# Patient Record
Sex: FEMALE | Race: WHITE | NOT HISPANIC OR LATINO | Employment: OTHER | URBAN - METROPOLITAN AREA
[De-identification: names, ages, dates, MRNs, and addresses within clinical notes are randomized per-mention and may not be internally consistent; named-entity substitution may affect disease eponyms.]

---

## 2017-01-31 ENCOUNTER — COMMUNICATION - HEALTHEAST (OUTPATIENT)
Dept: SCHEDULING | Facility: CLINIC | Age: 62
End: 2017-01-31

## 2017-01-31 ENCOUNTER — OFFICE VISIT - HEALTHEAST (OUTPATIENT)
Dept: FAMILY MEDICINE | Facility: CLINIC | Age: 62
End: 2017-01-31

## 2017-01-31 DIAGNOSIS — R07.9 CHEST PAIN: ICD-10-CM

## 2017-02-01 LAB
ATRIAL RATE - MUSE: 73 BPM
DIASTOLIC BLOOD PRESSURE - MUSE: NORMAL MMHG
INTERPRETATION ECG - MUSE: NORMAL
P AXIS - MUSE: 71 DEGREES
PR INTERVAL - MUSE: 172 MS
QRS DURATION - MUSE: 74 MS
QT - MUSE: 380 MS
QTC - MUSE: 418 MS
R AXIS - MUSE: 36 DEGREES
SYSTOLIC BLOOD PRESSURE - MUSE: NORMAL MMHG
T AXIS - MUSE: 67 DEGREES
VENTRICULAR RATE- MUSE: 73 BPM

## 2017-02-04 ENCOUNTER — COMMUNICATION - HEALTHEAST (OUTPATIENT)
Dept: FAMILY MEDICINE | Facility: CLINIC | Age: 62
End: 2017-02-04

## 2017-03-07 ENCOUNTER — COMMUNICATION - HEALTHEAST (OUTPATIENT)
Dept: FAMILY MEDICINE | Facility: CLINIC | Age: 62
End: 2017-03-07

## 2017-03-07 DIAGNOSIS — G47.00 INSOMNIA: ICD-10-CM

## 2017-03-08 ENCOUNTER — COMMUNICATION - HEALTHEAST (OUTPATIENT)
Dept: FAMILY MEDICINE | Facility: CLINIC | Age: 62
End: 2017-03-08

## 2017-03-21 ENCOUNTER — COMMUNICATION - HEALTHEAST (OUTPATIENT)
Dept: FAMILY MEDICINE | Facility: CLINIC | Age: 62
End: 2017-03-21

## 2017-03-21 DIAGNOSIS — E78.5 HYPERLIPEMIA: ICD-10-CM

## 2017-04-27 ENCOUNTER — COMMUNICATION - HEALTHEAST (OUTPATIENT)
Dept: FAMILY MEDICINE | Facility: CLINIC | Age: 62
End: 2017-04-27

## 2017-05-01 ENCOUNTER — OFFICE VISIT - HEALTHEAST (OUTPATIENT)
Dept: CARDIOLOGY | Facility: CLINIC | Age: 62
End: 2017-05-01

## 2017-05-01 DIAGNOSIS — I25.84 CORONARY ARTERY DISEASE DUE TO CALCIFIED CORONARY LESION: ICD-10-CM

## 2017-05-01 DIAGNOSIS — I25.10 CORONARY ARTERY DISEASE DUE TO CALCIFIED CORONARY LESION: ICD-10-CM

## 2017-05-01 ASSESSMENT — MIFFLIN-ST. JEOR: SCORE: 1196.32

## 2017-05-16 ENCOUNTER — HOSPITAL ENCOUNTER (OUTPATIENT)
Dept: CARDIOLOGY | Facility: CLINIC | Age: 62
Discharge: HOME | End: 2017-05-16
Attending: INTERNAL MEDICINE

## 2017-05-16 DIAGNOSIS — I25.10 CORONARY ARTERY DISEASE DUE TO CALCIFIED CORONARY LESION: ICD-10-CM

## 2017-05-16 DIAGNOSIS — I25.84 CORONARY ARTERY DISEASE DUE TO CALCIFIED CORONARY LESION: ICD-10-CM

## 2017-05-16 LAB
CV STRESS CURRENT BP HE: NORMAL
CV STRESS CURRENT HR HE: 100
CV STRESS CURRENT HR HE: 102
CV STRESS CURRENT HR HE: 105
CV STRESS CURRENT HR HE: 107
CV STRESS CURRENT HR HE: 107
CV STRESS CURRENT HR HE: 108
CV STRESS CURRENT HR HE: 108
CV STRESS CURRENT HR HE: 114
CV STRESS CURRENT HR HE: 116
CV STRESS CURRENT HR HE: 118
CV STRESS CURRENT HR HE: 119
CV STRESS CURRENT HR HE: 132
CV STRESS CURRENT HR HE: 133
CV STRESS CURRENT HR HE: 133
CV STRESS CURRENT HR HE: 136
CV STRESS CURRENT HR HE: 143
CV STRESS CURRENT HR HE: 154
CV STRESS CURRENT HR HE: 158
CV STRESS CURRENT HR HE: 158
CV STRESS CURRENT HR HE: 161
CV STRESS CURRENT HR HE: 163
CV STRESS CURRENT HR HE: 164
CV STRESS CURRENT HR HE: 165
CV STRESS CURRENT HR HE: 165
CV STRESS CURRENT HR HE: 75
CV STRESS CURRENT HR HE: 79
CV STRESS CURRENT HR HE: 86
CV STRESS CURRENT HR HE: 92
CV STRESS CURRENT HR HE: 93
CV STRESS CURRENT HR HE: 95
CV STRESS CURRENT HR HE: 96
CV STRESS DEVIATION TIME HE: NORMAL
CV STRESS ECHO PERCENT HR HE: NORMAL
CV STRESS EXERCISE STAGE HE: NORMAL
CV STRESS FINAL RESTING BP HE: NORMAL
CV STRESS FINAL RESTING HR HE: 86
CV STRESS MAX HR HE: 165
CV STRESS MAX TREADMILL GRADE HE: 16
CV STRESS MAX TREADMILL SPEED HE: 4.2
CV STRESS PEAK DIA BP HE: NORMAL
CV STRESS PEAK SYS BP HE: NORMAL
CV STRESS PHASE HE: NORMAL
CV STRESS PROTOCOL HE: NORMAL
CV STRESS RESTING PT POSITION HE: NORMAL
CV STRESS RESTING PT POSITION HE: NORMAL
CV STRESS ST DEVIATION AMOUNT HE: NORMAL
CV STRESS ST DEVIATION ELEVATION HE: NORMAL
CV STRESS ST EVELATION AMOUNT HE: NORMAL
CV STRESS TEST TYPE HE: NORMAL
CV STRESS TOTAL STAGE TIME MIN 1 HE: NORMAL
STRESS ECHO BASELINE BP: NORMAL
STRESS ECHO BASELINE HR: 75
STRESS ECHO CALCULATED PERCENT HR: 104 %
STRESS ECHO LAST STRESS BP: NORMAL
STRESS ECHO LAST STRESS HR: 164
STRESS ECHO POST ESTIMATED WORKLOAD: 12.1
STRESS ECHO POST EXERCISE DUR MIN: 11
STRESS ECHO POST EXERCISE DUR SEC: 53
STRESS ECHO TARGET HR: 134

## 2017-05-18 ENCOUNTER — AMBULATORY - HEALTHEAST (OUTPATIENT)
Dept: CARDIOLOGY | Facility: CLINIC | Age: 62
End: 2017-05-18

## 2017-05-18 DIAGNOSIS — R94.39 ABNORMAL STRESS TEST: ICD-10-CM

## 2017-05-18 DIAGNOSIS — I25.84 CORONARY ARTERY DISEASE DUE TO CALCIFIED CORONARY LESION: ICD-10-CM

## 2017-05-18 DIAGNOSIS — I25.10 CORONARY ARTERY DISEASE DUE TO CALCIFIED CORONARY LESION: ICD-10-CM

## 2017-05-19 ENCOUNTER — SURGERY - HEALTHEAST (OUTPATIENT)
Dept: CARDIOLOGY | Facility: CLINIC | Age: 62
End: 2017-05-19

## 2017-05-19 ENCOUNTER — COMMUNICATION - HEALTHEAST (OUTPATIENT)
Dept: FAMILY MEDICINE | Facility: CLINIC | Age: 62
End: 2017-05-19

## 2017-05-19 ENCOUNTER — COMMUNICATION - HEALTHEAST (OUTPATIENT)
Dept: CARDIOLOGY | Facility: CLINIC | Age: 62
End: 2017-05-19

## 2017-05-30 ENCOUNTER — SURGERY - HEALTHEAST (OUTPATIENT)
Dept: CARDIOLOGY | Facility: CLINIC | Age: 62
End: 2017-05-30

## 2017-05-30 ASSESSMENT — MIFFLIN-ST. JEOR: SCORE: 1178.18

## 2017-06-17 ENCOUNTER — COMMUNICATION - HEALTHEAST (OUTPATIENT)
Dept: FAMILY MEDICINE | Facility: CLINIC | Age: 62
End: 2017-06-17

## 2017-06-17 DIAGNOSIS — G47.00 INSOMNIA: ICD-10-CM

## 2017-06-29 ENCOUNTER — HOSPITAL ENCOUNTER (OUTPATIENT)
Dept: MAMMOGRAPHY | Facility: CLINIC | Age: 62
Discharge: HOME | End: 2017-06-29
Attending: FAMILY MEDICINE

## 2017-06-29 ENCOUNTER — OFFICE VISIT - HEALTHEAST (OUTPATIENT)
Dept: FAMILY MEDICINE | Facility: CLINIC | Age: 62
End: 2017-06-29

## 2017-06-29 ENCOUNTER — COMMUNICATION - HEALTHEAST (OUTPATIENT)
Dept: FAMILY MEDICINE | Facility: CLINIC | Age: 62
End: 2017-06-29

## 2017-06-29 DIAGNOSIS — I35.9 AORTIC VALVE DISORDER: ICD-10-CM

## 2017-06-29 DIAGNOSIS — M85.80 OSTEOPENIA: ICD-10-CM

## 2017-06-29 DIAGNOSIS — I25.10 CORONARY ARTERY DISEASE DUE TO CALCIFIED CORONARY LESION: ICD-10-CM

## 2017-06-29 DIAGNOSIS — E78.5 HYPERLIPIDEMIA, UNSPECIFIED HYPERLIPIDEMIA TYPE: ICD-10-CM

## 2017-06-29 DIAGNOSIS — I25.84 CORONARY ARTERY DISEASE DUE TO CALCIFIED CORONARY LESION: ICD-10-CM

## 2017-06-29 DIAGNOSIS — E55.9 VITAMIN D DEFICIENCY: ICD-10-CM

## 2017-06-29 DIAGNOSIS — Z00.00 ROUTINE GENERAL MEDICAL EXAMINATION AT A HEALTH CARE FACILITY: ICD-10-CM

## 2017-06-29 DIAGNOSIS — Z12.31 VISIT FOR SCREENING MAMMOGRAM: ICD-10-CM

## 2017-06-29 DIAGNOSIS — R87.612 PAPANICOLAOU SMEAR OF CERVIX WITH LOW GRADE SQUAMOUS INTRAEPITHELIAL LESION (LGSIL): ICD-10-CM

## 2017-06-29 DIAGNOSIS — I71.20 THORACIC AORTIC ANEURYSM WITHOUT RUPTURE (H): ICD-10-CM

## 2017-06-29 ASSESSMENT — MIFFLIN-ST. JEOR: SCORE: 1165.82

## 2017-07-05 LAB
HPV INTERPRETATION - HISTORICAL: NORMAL
HPV INTERPRETER - HISTORICAL: NORMAL

## 2017-07-13 ENCOUNTER — COMMUNICATION - HEALTHEAST (OUTPATIENT)
Dept: FAMILY MEDICINE | Facility: CLINIC | Age: 62
End: 2017-07-13

## 2017-07-13 LAB
BKR LAB AP ABNORMAL BLEEDING: NO
BKR LAB AP BIRTH CONTROL/HORMONES: NORMAL
BKR LAB AP CERVICAL APPEARANCE: NORMAL
BKR LAB AP GYN ADEQUACY: NORMAL
BKR LAB AP GYN INTERPRETATION: NORMAL
BKR LAB AP HPV REFLEX: NORMAL
BKR LAB AP LMP: NORMAL
BKR LAB AP PATIENT STATUS: NORMAL
BKR LAB AP PREVIOUS ABNORMAL: NORMAL
BKR LAB AP PREVIOUS NORMAL: NORMAL
HIGH RISK?: YES
PATH REPORT.COMMENTS IMP SPEC: NORMAL
RESULT FLAG (HE HISTORICAL CONVERSION): NORMAL

## 2017-07-26 ENCOUNTER — COMMUNICATION - HEALTHEAST (OUTPATIENT)
Dept: FAMILY MEDICINE | Facility: CLINIC | Age: 62
End: 2017-07-26

## 2017-08-08 ENCOUNTER — OFFICE VISIT - HEALTHEAST (OUTPATIENT)
Dept: CARDIOLOGY | Facility: CLINIC | Age: 62
End: 2017-08-08

## 2017-08-08 DIAGNOSIS — E78.5 HYPERLIPIDEMIA, UNSPECIFIED HYPERLIPIDEMIA TYPE: ICD-10-CM

## 2017-08-08 ASSESSMENT — MIFFLIN-ST. JEOR: SCORE: 1193.49

## 2017-08-24 ENCOUNTER — RECORDS - HEALTHEAST (OUTPATIENT)
Dept: ADMINISTRATIVE | Facility: OTHER | Age: 62
End: 2017-08-24

## 2017-08-25 ENCOUNTER — COMMUNICATION - HEALTHEAST (OUTPATIENT)
Dept: FAMILY MEDICINE | Facility: CLINIC | Age: 62
End: 2017-08-25

## 2017-08-25 DIAGNOSIS — G47.00 INSOMNIA: ICD-10-CM

## 2017-09-13 ENCOUNTER — RECORDS - HEALTHEAST (OUTPATIENT)
Dept: ADMINISTRATIVE | Facility: OTHER | Age: 62
End: 2017-09-13

## 2017-09-19 ENCOUNTER — AMBULATORY - HEALTHEAST (OUTPATIENT)
Dept: CARDIOLOGY | Facility: CLINIC | Age: 62
End: 2017-09-19

## 2017-12-27 ENCOUNTER — OFFICE VISIT - HEALTHEAST (OUTPATIENT)
Dept: FAMILY MEDICINE | Facility: CLINIC | Age: 62
End: 2017-12-27

## 2017-12-27 DIAGNOSIS — J34.0: ICD-10-CM

## 2017-12-31 ENCOUNTER — HEALTH MAINTENANCE LETTER (OUTPATIENT)
Age: 62
End: 2017-12-31

## 2018-01-25 ENCOUNTER — RECORDS - HEALTHEAST (OUTPATIENT)
Dept: ADMINISTRATIVE | Facility: OTHER | Age: 63
End: 2018-01-25

## 2018-01-26 ENCOUNTER — RECORDS - HEALTHEAST (OUTPATIENT)
Dept: ADMINISTRATIVE | Facility: OTHER | Age: 63
End: 2018-01-26

## 2018-02-22 ENCOUNTER — COMMUNICATION - HEALTHEAST (OUTPATIENT)
Dept: FAMILY MEDICINE | Facility: CLINIC | Age: 63
End: 2018-02-22

## 2018-02-22 DIAGNOSIS — G47.00 INSOMNIA: ICD-10-CM

## 2018-02-27 ENCOUNTER — AMBULATORY - HEALTHEAST (OUTPATIENT)
Dept: CARDIOLOGY | Facility: CLINIC | Age: 63
End: 2018-02-27

## 2018-02-27 ENCOUNTER — COMMUNICATION - HEALTHEAST (OUTPATIENT)
Dept: ADMINISTRATIVE | Facility: CLINIC | Age: 63
End: 2018-02-27

## 2018-02-27 DIAGNOSIS — I35.0 AORTIC STENOSIS: ICD-10-CM

## 2018-05-09 ENCOUNTER — OFFICE VISIT - HEALTHEAST (OUTPATIENT)
Dept: CARDIOLOGY | Facility: CLINIC | Age: 63
End: 2018-05-09

## 2018-05-09 DIAGNOSIS — I35.9 AORTIC VALVE DISORDER: ICD-10-CM

## 2018-05-09 ASSESSMENT — MIFFLIN-ST. JEOR: SCORE: 1208

## 2018-05-16 ENCOUNTER — HOSPITAL ENCOUNTER (OUTPATIENT)
Dept: CARDIOLOGY | Facility: CLINIC | Age: 63
Discharge: HOME | End: 2018-05-16
Attending: INTERNAL MEDICINE

## 2018-05-16 DIAGNOSIS — I35.9 AORTIC VALVE DISORDER: ICD-10-CM

## 2018-05-16 LAB
AORTIC ROOT: 2.6 CM
AORTIC VALVE MEAN VELOCITY: 201 CM/S
ASCENDING AORTA: 4 CM
AV DIMENSIONLESS INDEX VTI: 0.3
AV MEAN GRADIENT: 18 MMHG
AV PEAK GRADIENT: 31.8 MMHG
AV VALVE AREA: 0.7 CM2
AV VELOCITY RATIO: 0.3
BSA FOR ECHO PROCEDURE: 1.74 M2
CV BLOOD PRESSURE: NORMAL MMHG
CV ECHO HEIGHT: 62 IN
CV ECHO WEIGHT: 153 LBS
DOP CALC AO PEAK VEL: 282 CM/S
DOP CALC AO VTI: 63.7 CM
DOP CALC LVOT AREA: 2.83 CM2
DOP CALC LVOT DIAMETER: 1.9 CM
DOP CALC LVOT PEAK VEL: 79.7 CM/S
DOP CALC LVOT STROKE VOLUME: 46.8 CM3
DOP CALCLVOT PEAK VEL VTI: 16.5 CM
ECHO EJECTION FRACTION ESTIMATED: 60 %
EJECTION FRACTION: 54 % (ref 55–75)
FRACTIONAL SHORTENING: 29.9 % (ref 28–44)
INTERVENTRICULAR SEPTUM IN END DIASTOLE: 0.69 CM (ref 0.6–0.9)
IVS/PW RATIO: 0.7
LA AREA 1: 9.06 CM2
LA AREA 2: 11.8 CM2
LEFT ATRIUM LENGTH: 3.94 CM
LEFT ATRIUM SIZE: 3 CM
LEFT ATRIUM TO AORTIC ROOT RATIO: 1.15 NO UNITS
LEFT ATRIUM VOLUME INDEX: 13.3 ML/M2
LEFT ATRIUM VOLUME: 23.1 ML
LEFT VENTRICLE DIASTOLIC VOLUME INDEX: 26.4 CM3/M2 (ref 34–74)
LEFT VENTRICLE DIASTOLIC VOLUME: 46 CM3 (ref 46–106)
LEFT VENTRICLE MASS INDEX: 47.5 G/M2
LEFT VENTRICLE SYSTOLIC VOLUME INDEX: 12.1 CM3/M2 (ref 11–31)
LEFT VENTRICLE SYSTOLIC VOLUME: 21 CM3 (ref 14–42)
LEFT VENTRICULAR INTERNAL DIMENSION IN DIASTOLE: 3.54 CM (ref 3.8–5.2)
LEFT VENTRICULAR INTERNAL DIMENSION IN SYSTOLE: 2.48 CM (ref 2.2–3.5)
LEFT VENTRICULAR MASS: 82.6 G
LEFT VENTRICULAR OUTFLOW TRACT MEAN GRADIENT: 1 MMHG
LEFT VENTRICULAR OUTFLOW TRACT MEAN VELOCITY: 56.6 CM/S
LEFT VENTRICULAR OUTFLOW TRACT PEAK GRADIENT: 3 MMHG
LEFT VENTRICULAR POSTERIOR WALL IN END DIASTOLE: 0.99 CM (ref 0.6–0.9)
LV STROKE VOLUME INDEX: 26.9 ML/M2
MITRAL VALVE E/A RATIO: 1.1
MV E'TISSUE VEL-LAT: 7.51 CM/S
MV LATERAL E/E' RATIO: 10.4
MV PEAK A VELOCITY: 73.5 CM/S
MV PEAK E VELOCITY: 78 CM/S
NUC REST DIASTOLIC VOLUME INDEX: 2448 LBS
NUC REST SYSTOLIC VOLUME INDEX: 62 IN
TRICUSPID REGURGITATION PEAK PRESSURE GRADIENT: 17.6 MMHG
TRICUSPID VALVE ANULAR PLANE SYSTOLIC EXCURSION: 2.2 CM
TRICUSPID VALVE PEAK REGURGITANT VELOCITY: 210 CM/S

## 2018-05-16 ASSESSMENT — MIFFLIN-ST. JEOR: SCORE: 1187.25

## 2018-05-23 ENCOUNTER — COMMUNICATION - HEALTHEAST (OUTPATIENT)
Dept: FAMILY MEDICINE | Facility: CLINIC | Age: 63
End: 2018-05-23

## 2018-05-23 DIAGNOSIS — G47.00 INSOMNIA: ICD-10-CM

## 2018-06-21 ENCOUNTER — COMMUNICATION - HEALTHEAST (OUTPATIENT)
Dept: CARDIOLOGY | Facility: CLINIC | Age: 63
End: 2018-06-21

## 2018-06-21 DIAGNOSIS — E78.5 HYPERLIPIDEMIA, UNSPECIFIED HYPERLIPIDEMIA TYPE: ICD-10-CM

## 2018-07-09 ENCOUNTER — COMMUNICATION - HEALTHEAST (OUTPATIENT)
Dept: FAMILY MEDICINE | Facility: CLINIC | Age: 63
End: 2018-07-09

## 2018-07-09 ENCOUNTER — OFFICE VISIT - HEALTHEAST (OUTPATIENT)
Dept: FAMILY MEDICINE | Facility: CLINIC | Age: 63
End: 2018-07-09

## 2018-07-09 ENCOUNTER — HOSPITAL ENCOUNTER (OUTPATIENT)
Dept: MAMMOGRAPHY | Facility: CLINIC | Age: 63
Discharge: HOME | End: 2018-07-09
Attending: FAMILY MEDICINE

## 2018-07-09 DIAGNOSIS — K63.5 COLORECTAL POLYPS: ICD-10-CM

## 2018-07-09 DIAGNOSIS — I35.9 AORTIC VALVE DISORDER: ICD-10-CM

## 2018-07-09 DIAGNOSIS — I25.84 CORONARY ARTERY DISEASE DUE TO CALCIFIED CORONARY LESION: ICD-10-CM

## 2018-07-09 DIAGNOSIS — Z87.42 HISTORY OF ABNORMAL CERVICAL PAP SMEAR: ICD-10-CM

## 2018-07-09 DIAGNOSIS — I71.20 THORACIC AORTIC ANEURYSM WITHOUT RUPTURE (H): ICD-10-CM

## 2018-07-09 DIAGNOSIS — G47.00 INSOMNIA, UNSPECIFIED TYPE: ICD-10-CM

## 2018-07-09 DIAGNOSIS — Z00.00 ROUTINE GENERAL MEDICAL EXAMINATION AT A HEALTH CARE FACILITY: ICD-10-CM

## 2018-07-09 DIAGNOSIS — Z80.0 FAMILY HISTORY OF COLON CANCER: ICD-10-CM

## 2018-07-09 DIAGNOSIS — R87.612 PAPANICOLAOU SMEAR OF CERVIX WITH LOW GRADE SQUAMOUS INTRAEPITHELIAL LESION (LGSIL): ICD-10-CM

## 2018-07-09 DIAGNOSIS — E78.5 HYPERLIPIDEMIA, UNSPECIFIED HYPERLIPIDEMIA TYPE: ICD-10-CM

## 2018-07-09 DIAGNOSIS — I25.10 CORONARY ARTERY DISEASE DUE TO CALCIFIED CORONARY LESION: ICD-10-CM

## 2018-07-09 DIAGNOSIS — L60.9 NAIL ABNORMALITY: ICD-10-CM

## 2018-07-09 DIAGNOSIS — M85.80 OSTEOPENIA: ICD-10-CM

## 2018-07-09 DIAGNOSIS — E55.9 VITAMIN D DEFICIENCY: ICD-10-CM

## 2018-07-09 DIAGNOSIS — K62.1 COLORECTAL POLYPS: ICD-10-CM

## 2018-07-09 DIAGNOSIS — Z12.31 VISIT FOR SCREENING MAMMOGRAM: ICD-10-CM

## 2018-07-09 LAB
ALBUMIN SERPL-MCNC: 4.3 G/DL (ref 3.5–5)
ALP SERPL-CCNC: 60 U/L (ref 45–120)
ALT SERPL W P-5'-P-CCNC: 24 U/L (ref 0–45)
ANION GAP SERPL CALCULATED.3IONS-SCNC: 10 MMOL/L (ref 5–18)
AST SERPL W P-5'-P-CCNC: 23 U/L (ref 0–40)
BILIRUB SERPL-MCNC: 1.8 MG/DL (ref 0–1)
BUN SERPL-MCNC: 10 MG/DL (ref 8–22)
CALCIUM SERPL-MCNC: 9.7 MG/DL (ref 8.5–10.5)
CHLORIDE BLD-SCNC: 105 MMOL/L (ref 98–107)
CHOLEST SERPL-MCNC: 129 MG/DL
CO2 SERPL-SCNC: 24 MMOL/L (ref 22–31)
CREAT SERPL-MCNC: 0.79 MG/DL (ref 0.6–1.1)
FASTING STATUS PATIENT QL REPORTED: YES
GFR SERPL CREATININE-BSD FRML MDRD: >60 ML/MIN/1.73M2
GLUCOSE BLD-MCNC: 82 MG/DL (ref 70–125)
HDLC SERPL-MCNC: 59 MG/DL
LDLC SERPL CALC-MCNC: 55 MG/DL
POTASSIUM BLD-SCNC: 4 MMOL/L (ref 3.5–5)
PROT SERPL-MCNC: 6.5 G/DL (ref 6–8)
SODIUM SERPL-SCNC: 139 MMOL/L (ref 136–145)
TRIGL SERPL-MCNC: 73 MG/DL
TSH SERPL DL<=0.005 MIU/L-ACNC: 1.09 UIU/ML (ref 0.3–5)

## 2018-07-09 ASSESSMENT — MIFFLIN-ST. JEOR: SCORE: 1190.89

## 2018-07-10 LAB
25(OH)D3 SERPL-MCNC: 30.7 NG/ML (ref 30–80)
HPV SOURCE: ABNORMAL
HUMAN PAPILLOMA VIRUS 16 DNA: POSITIVE
HUMAN PAPILLOMA VIRUS 18 DNA: NEGATIVE
HUMAN PAPILLOMA VIRUS FINAL DIAGNOSIS: ABNORMAL
HUMAN PAPILLOMA VIRUS OTHER HR: NEGATIVE
SPECIMEN DESCRIPTION: ABNORMAL

## 2018-07-11 ENCOUNTER — AMBULATORY - HEALTHEAST (OUTPATIENT)
Dept: FAMILY MEDICINE | Facility: CLINIC | Age: 63
End: 2018-07-11

## 2018-07-12 ENCOUNTER — COMMUNICATION - HEALTHEAST (OUTPATIENT)
Dept: FAMILY MEDICINE | Facility: CLINIC | Age: 63
End: 2018-07-12

## 2018-07-16 LAB
BKR LAB AP ABNORMAL BLEEDING: NO
BKR LAB AP BIRTH CONTROL/HORMONES: ABNORMAL
BKR LAB AP CERVICAL APPEARANCE: ABNORMAL
BKR LAB AP GYN ADEQUACY: ABNORMAL
BKR LAB AP GYN INTERPRETATION: ABNORMAL
BKR LAB AP HPV REFLEX: ABNORMAL
BKR LAB AP LMP: ABNORMAL
BKR LAB AP PATIENT STATUS: ABNORMAL
BKR LAB AP PREVIOUS ABNORMAL: ABNORMAL
BKR LAB AP PREVIOUS NORMAL: ABNORMAL
HIGH RISK?: YES
PATH REPORT.COMMENTS IMP SPEC: ABNORMAL
RESULT FLAG (HE HISTORICAL CONVERSION): ABNORMAL

## 2018-07-19 ENCOUNTER — AMBULATORY - HEALTHEAST (OUTPATIENT)
Dept: FAMILY MEDICINE | Facility: CLINIC | Age: 63
End: 2018-07-19

## 2018-07-19 ENCOUNTER — COMMUNICATION - HEALTHEAST (OUTPATIENT)
Dept: FAMILY MEDICINE | Facility: CLINIC | Age: 63
End: 2018-07-19

## 2018-07-19 DIAGNOSIS — R87.810 ATYPICAL SQUAMOUS CELL CHANGES OF UNDETERMINED SIGNIFICANCE (ASCUS) ON CERVICAL CYTOLOGY WITH POSITIVE HIGH RISK HUMAN PAPILLOMA VIRUS (HPV): ICD-10-CM

## 2018-07-19 DIAGNOSIS — R87.610 ATYPICAL SQUAMOUS CELL CHANGES OF UNDETERMINED SIGNIFICANCE (ASCUS) ON CERVICAL CYTOLOGY WITH POSITIVE HIGH RISK HUMAN PAPILLOMA VIRUS (HPV): ICD-10-CM

## 2018-08-01 ENCOUNTER — RECORDS - HEALTHEAST (OUTPATIENT)
Dept: ADMINISTRATIVE | Facility: OTHER | Age: 63
End: 2018-08-01

## 2019-03-12 ENCOUNTER — COMMUNICATION - HEALTHEAST (OUTPATIENT)
Dept: CARDIOLOGY | Facility: CLINIC | Age: 64
End: 2019-03-12

## 2019-03-12 ENCOUNTER — COMMUNICATION - HEALTHEAST (OUTPATIENT)
Dept: TELEHEALTH | Facility: CLINIC | Age: 64
End: 2019-03-12

## 2019-03-12 DIAGNOSIS — I35.0 AORTIC STENOSIS: ICD-10-CM

## 2019-04-26 ENCOUNTER — OFFICE VISIT - HEALTHEAST (OUTPATIENT)
Dept: FAMILY MEDICINE | Facility: CLINIC | Age: 64
End: 2019-04-26

## 2019-04-26 ENCOUNTER — COMMUNICATION - HEALTHEAST (OUTPATIENT)
Dept: FAMILY MEDICINE | Facility: CLINIC | Age: 64
End: 2019-04-26

## 2019-04-26 DIAGNOSIS — J98.01 BRONCHOSPASM: ICD-10-CM

## 2019-04-28 ENCOUNTER — COMMUNICATION - HEALTHEAST (OUTPATIENT)
Dept: FAMILY MEDICINE | Facility: CLINIC | Age: 64
End: 2019-04-28

## 2019-04-28 DIAGNOSIS — J98.01 BRONCHOSPASM: ICD-10-CM

## 2019-04-29 ENCOUNTER — HOSPITAL ENCOUNTER (OUTPATIENT)
Dept: CARDIOLOGY | Facility: CLINIC | Age: 64
Discharge: HOME | End: 2019-04-29
Attending: INTERNAL MEDICINE

## 2019-04-29 ENCOUNTER — OFFICE VISIT - HEALTHEAST (OUTPATIENT)
Dept: CARDIOLOGY | Facility: CLINIC | Age: 64
End: 2019-04-29

## 2019-04-29 DIAGNOSIS — I35.0 AORTIC STENOSIS: ICD-10-CM

## 2019-04-29 DIAGNOSIS — I25.84 CORONARY ARTERY DISEASE DUE TO CALCIFIED CORONARY LESION: ICD-10-CM

## 2019-04-29 DIAGNOSIS — R05.9 COUGH: ICD-10-CM

## 2019-04-29 DIAGNOSIS — I35.9 AORTIC VALVE DISORDER: ICD-10-CM

## 2019-04-29 DIAGNOSIS — I25.10 CORONARY ARTERY DISEASE DUE TO CALCIFIED CORONARY LESION: ICD-10-CM

## 2019-04-29 LAB
AORTIC ROOT: 2.4 CM
AORTIC VALVE MEAN VELOCITY: 219 CM/S
ASCENDING AORTA: 4.1 CM
AV DIMENSIONLESS INDEX VTI: 0.3
AV MEAN GRADIENT: 22 MMHG
AV PEAK GRADIENT: 34.8 MMHG
AV VALVE AREA: 1.1 CM2
AV VELOCITY RATIO: 0.4
BSA FOR ECHO PROCEDURE: 1.74 M2
CV BLOOD PRESSURE: ABNORMAL MMHG
CV ECHO HEIGHT: 63 IN
CV ECHO WEIGHT: 150 LBS
DOP CALC AO PEAK VEL: 295 CM/S
DOP CALC AO VTI: 61.6 CM
DOP CALC LVOT AREA: 3.14 CM2
DOP CALC LVOT DIAMETER: 2 CM
DOP CALC LVOT PEAK VEL: 105 CM/S
DOP CALC LVOT STROKE VOLUME: 66.6 CM3
DOP CALCLVOT PEAK VEL VTI: 21.2 CM
EJECTION FRACTION: 62 % (ref 55–75)
FRACTIONAL SHORTENING: 29.2 % (ref 28–44)
INTERVENTRICULAR SEPTUM IN END DIASTOLE: 0.92 CM (ref 0.6–0.9)
IVS/PW RATIO: 1
LA AREA 1: 13 CM2
LA AREA 2: 14 CM2
LEFT ATRIUM LENGTH: 3.93 CM
LEFT ATRIUM SIZE: 2.6 CM
LEFT ATRIUM TO AORTIC ROOT RATIO: 1.08 NO UNITS
LEFT ATRIUM VOLUME INDEX: 22.6 ML/M2
LEFT ATRIUM VOLUME: 39.4 ML
LEFT VENTRICLE DIASTOLIC VOLUME INDEX: 39.1 CM3/M2 (ref 29–61)
LEFT VENTRICLE DIASTOLIC VOLUME: 68 CM3 (ref 46–106)
LEFT VENTRICLE MASS INDEX: 59.4 G/M2
LEFT VENTRICLE SYSTOLIC VOLUME INDEX: 14.9 CM3/M2 (ref 8–24)
LEFT VENTRICLE SYSTOLIC VOLUME: 26 CM3 (ref 14–42)
LEFT VENTRICULAR INTERNAL DIMENSION IN DIASTOLE: 3.73 CM (ref 3.8–5.2)
LEFT VENTRICULAR INTERNAL DIMENSION IN SYSTOLE: 2.64 CM (ref 2.2–3.5)
LEFT VENTRICULAR MASS: 103.3 G
LEFT VENTRICULAR OUTFLOW TRACT MEAN GRADIENT: 2 MMHG
LEFT VENTRICULAR OUTFLOW TRACT MEAN VELOCITY: 67.1 CM/S
LEFT VENTRICULAR OUTFLOW TRACT PEAK GRADIENT: 4 MMHG
LEFT VENTRICULAR POSTERIOR WALL IN END DIASTOLE: 0.95 CM (ref 0.6–0.9)
LV STROKE VOLUME INDEX: 38.3 ML/M2
MITRAL VALVE E/A RATIO: 1
MV AVERAGE E/E' RATIO: 12.7 CM/S
MV DECELERATION TIME: 246 MS
MV E'TISSUE VEL-LAT: 5.46 CM/S
MV E'TISSUE VEL-MED: 6.73 CM/S
MV LATERAL E/E' RATIO: 14.2
MV MEDIAL E/E' RATIO: 11.5
MV PEAK A VELOCITY: 75 CM/S
MV PEAK E VELOCITY: 77.5 CM/S
NUC REST DIASTOLIC VOLUME INDEX: 2400 LBS
NUC REST SYSTOLIC VOLUME INDEX: 63 IN
TRICUSPID REGURGITATION PEAK PRESSURE GRADIENT: 19.5 MMHG
TRICUSPID VALVE ANULAR PLANE SYSTOLIC EXCURSION: 1.8 CM
TRICUSPID VALVE PEAK REGURGITANT VELOCITY: 221 CM/S

## 2019-04-29 ASSESSMENT — MIFFLIN-ST. JEOR
SCORE: 1189.53
SCORE: 1175.92

## 2019-04-30 ENCOUNTER — HOSPITAL ENCOUNTER (OUTPATIENT)
Dept: RADIOLOGY | Facility: CLINIC | Age: 64
Discharge: HOME | End: 2019-04-30
Attending: INTERNAL MEDICINE

## 2019-04-30 DIAGNOSIS — R05.9 COUGH: ICD-10-CM

## 2019-05-13 ENCOUNTER — COMMUNICATION - HEALTHEAST (OUTPATIENT)
Dept: SCHEDULING | Facility: CLINIC | Age: 64
End: 2019-05-13

## 2019-05-16 ENCOUNTER — OFFICE VISIT - HEALTHEAST (OUTPATIENT)
Dept: FAMILY MEDICINE | Facility: CLINIC | Age: 64
End: 2019-05-16

## 2019-05-16 ENCOUNTER — COMMUNICATION - HEALTHEAST (OUTPATIENT)
Dept: FAMILY MEDICINE | Facility: CLINIC | Age: 64
End: 2019-05-16

## 2019-05-16 DIAGNOSIS — R05.3 CHRONIC COUGH: ICD-10-CM

## 2019-05-16 DIAGNOSIS — R07.89 CHEST HEAVINESS: ICD-10-CM

## 2019-05-17 ENCOUNTER — AMBULATORY - HEALTHEAST (OUTPATIENT)
Dept: PULMONOLOGY | Facility: OTHER | Age: 64
End: 2019-05-17

## 2019-05-17 ENCOUNTER — HOSPITAL ENCOUNTER (OUTPATIENT)
Dept: CT IMAGING | Facility: CLINIC | Age: 64
Discharge: HOME | End: 2019-05-17

## 2019-05-17 ENCOUNTER — COMMUNICATION - HEALTHEAST (OUTPATIENT)
Dept: ADMINISTRATIVE | Facility: CLINIC | Age: 64
End: 2019-05-17

## 2019-05-17 DIAGNOSIS — R05.3 CHRONIC COUGH: ICD-10-CM

## 2019-05-17 DIAGNOSIS — R07.89 CHEST HEAVINESS: ICD-10-CM

## 2019-05-17 LAB
CREAT BLD-MCNC: 0.9 MG/DL (ref 0.6–1.1)
GFR SERPL CREATININE-BSD FRML MDRD: >60 ML/MIN/1.73M2

## 2019-05-20 ENCOUNTER — COMMUNICATION - HEALTHEAST (OUTPATIENT)
Dept: FAMILY MEDICINE | Facility: CLINIC | Age: 64
End: 2019-05-20

## 2019-05-20 DIAGNOSIS — R05.3 CHRONIC COUGH: ICD-10-CM

## 2019-05-20 DIAGNOSIS — J98.01 BRONCHOSPASM: ICD-10-CM

## 2019-06-02 ENCOUNTER — COMMUNICATION - HEALTHEAST (OUTPATIENT)
Dept: FAMILY MEDICINE | Facility: CLINIC | Age: 64
End: 2019-06-02

## 2019-06-02 DIAGNOSIS — E55.9 VITAMIN D DEFICIENCY: ICD-10-CM

## 2019-06-12 ENCOUNTER — COMMUNICATION - HEALTHEAST (OUTPATIENT)
Dept: FAMILY MEDICINE | Facility: CLINIC | Age: 64
End: 2019-06-12

## 2019-06-12 DIAGNOSIS — R05.3 CHRONIC COUGH: ICD-10-CM

## 2019-06-12 DIAGNOSIS — J98.01 BRONCHOSPASM: ICD-10-CM

## 2019-06-19 ENCOUNTER — OFFICE VISIT - HEALTHEAST (OUTPATIENT)
Dept: FAMILY MEDICINE | Facility: CLINIC | Age: 64
End: 2019-06-19

## 2019-06-19 DIAGNOSIS — R05.9 COUGH: ICD-10-CM

## 2019-06-19 DIAGNOSIS — J40 BRONCHITIS: ICD-10-CM

## 2019-07-04 ENCOUNTER — COMMUNICATION - HEALTHEAST (OUTPATIENT)
Dept: FAMILY MEDICINE | Facility: CLINIC | Age: 64
End: 2019-07-04

## 2019-07-10 ENCOUNTER — HOSPITAL ENCOUNTER (OUTPATIENT)
Dept: MAMMOGRAPHY | Facility: CLINIC | Age: 64
Discharge: HOME | End: 2019-07-10
Attending: FAMILY MEDICINE

## 2019-07-10 ENCOUNTER — OFFICE VISIT - HEALTHEAST (OUTPATIENT)
Dept: FAMILY MEDICINE | Facility: CLINIC | Age: 64
End: 2019-07-10

## 2019-07-10 DIAGNOSIS — M32.9 SYSTEMIC LUPUS ERYTHEMATOSUS, UNSPECIFIED SLE TYPE, UNSPECIFIED ORGAN INVOLVEMENT STATUS (H): ICD-10-CM

## 2019-07-10 DIAGNOSIS — G47.00 INSOMNIA, UNSPECIFIED TYPE: ICD-10-CM

## 2019-07-10 DIAGNOSIS — K62.1 COLORECTAL POLYPS: ICD-10-CM

## 2019-07-10 DIAGNOSIS — R87.810 ATYPICAL SQUAMOUS CELL CHANGES OF UNDETERMINED SIGNIFICANCE (ASCUS) ON CERVICAL CYTOLOGY WITH POSITIVE HIGH RISK HUMAN PAPILLOMA VIRUS (HPV): ICD-10-CM

## 2019-07-10 DIAGNOSIS — R87.612 PAPANICOLAOU SMEAR OF CERVIX WITH LOW GRADE SQUAMOUS INTRAEPITHELIAL LESION (LGSIL): ICD-10-CM

## 2019-07-10 DIAGNOSIS — I25.84 CORONARY ARTERY DISEASE DUE TO CALCIFIED CORONARY LESION: ICD-10-CM

## 2019-07-10 DIAGNOSIS — Z00.00 ROUTINE GENERAL MEDICAL EXAMINATION AT A HEALTH CARE FACILITY: ICD-10-CM

## 2019-07-10 DIAGNOSIS — I71.20 THORACIC AORTIC ANEURYSM WITHOUT RUPTURE (H): ICD-10-CM

## 2019-07-10 DIAGNOSIS — E78.5 HYPERLIPIDEMIA, UNSPECIFIED HYPERLIPIDEMIA TYPE: ICD-10-CM

## 2019-07-10 DIAGNOSIS — Z12.31 VISIT FOR SCREENING MAMMOGRAM: ICD-10-CM

## 2019-07-10 DIAGNOSIS — I25.10 CORONARY ARTERY DISEASE DUE TO CALCIFIED CORONARY LESION: ICD-10-CM

## 2019-07-10 DIAGNOSIS — K63.5 COLORECTAL POLYPS: ICD-10-CM

## 2019-07-10 DIAGNOSIS — M85.80 OSTEOPENIA, UNSPECIFIED LOCATION: ICD-10-CM

## 2019-07-10 DIAGNOSIS — Z80.0 FAMILY HISTORY OF COLON CANCER: ICD-10-CM

## 2019-07-10 DIAGNOSIS — Z87.42 HISTORY OF ABNORMAL CERVICAL PAP SMEAR: ICD-10-CM

## 2019-07-10 DIAGNOSIS — E55.9 VITAMIN D DEFICIENCY: ICD-10-CM

## 2019-07-10 DIAGNOSIS — R87.610 ATYPICAL SQUAMOUS CELL CHANGES OF UNDETERMINED SIGNIFICANCE (ASCUS) ON CERVICAL CYTOLOGY WITH POSITIVE HIGH RISK HUMAN PAPILLOMA VIRUS (HPV): ICD-10-CM

## 2019-07-10 DIAGNOSIS — I35.9 AORTIC VALVE DISORDER: ICD-10-CM

## 2019-07-10 LAB
ALBUMIN SERPL-MCNC: 4.2 G/DL (ref 3.5–5)
ALP SERPL-CCNC: 66 U/L (ref 45–120)
ALT SERPL W P-5'-P-CCNC: 38 U/L (ref 0–45)
ANION GAP SERPL CALCULATED.3IONS-SCNC: 11 MMOL/L (ref 5–18)
AST SERPL W P-5'-P-CCNC: 30 U/L (ref 0–40)
BILIRUB SERPL-MCNC: 1.5 MG/DL (ref 0–1)
BUN SERPL-MCNC: 15 MG/DL (ref 8–22)
CALCIUM SERPL-MCNC: 10.1 MG/DL (ref 8.5–10.5)
CHLORIDE BLD-SCNC: 106 MMOL/L (ref 98–107)
CHOLEST SERPL-MCNC: 159 MG/DL
CO2 SERPL-SCNC: 22 MMOL/L (ref 22–31)
CREAT SERPL-MCNC: 0.82 MG/DL (ref 0.6–1.1)
ERYTHROCYTE [DISTWIDTH] IN BLOOD BY AUTOMATED COUNT: 11.7 % (ref 11–14.5)
FASTING STATUS PATIENT QL REPORTED: YES
GFR SERPL CREATININE-BSD FRML MDRD: >60 ML/MIN/1.73M2
GLUCOSE BLD-MCNC: 82 MG/DL (ref 70–125)
HCT VFR BLD AUTO: 38.9 % (ref 35–47)
HDLC SERPL-MCNC: 68 MG/DL
HGB BLD-MCNC: 13.2 G/DL (ref 12–16)
LDLC SERPL CALC-MCNC: 77 MG/DL
MCH RBC QN AUTO: 31.3 PG (ref 27–34)
MCHC RBC AUTO-ENTMCNC: 33.9 G/DL (ref 32–36)
MCV RBC AUTO: 92 FL (ref 80–100)
PLATELET # BLD AUTO: 197 THOU/UL (ref 140–440)
PMV BLD AUTO: 7.6 FL (ref 7–10)
POTASSIUM BLD-SCNC: 4.1 MMOL/L (ref 3.5–5)
PROT SERPL-MCNC: 6.5 G/DL (ref 6–8)
RBC # BLD AUTO: 4.21 MILL/UL (ref 3.8–5.4)
SODIUM SERPL-SCNC: 139 MMOL/L (ref 136–145)
TRIGL SERPL-MCNC: 68 MG/DL
TSH SERPL DL<=0.005 MIU/L-ACNC: 1.53 UIU/ML (ref 0.3–5)
WBC: 6.3 THOU/UL (ref 4–11)

## 2019-07-10 ASSESSMENT — MIFFLIN-ST. JEOR: SCORE: 1169.68

## 2019-07-11 LAB
25(OH)D3 SERPL-MCNC: 37.5 NG/ML (ref 30–80)
HPV SOURCE: NORMAL
HUMAN PAPILLOMA VIRUS 16 DNA: NEGATIVE
HUMAN PAPILLOMA VIRUS 18 DNA: NEGATIVE
HUMAN PAPILLOMA VIRUS FINAL DIAGNOSIS: NORMAL
HUMAN PAPILLOMA VIRUS OTHER HR: NEGATIVE
SPECIMEN DESCRIPTION: NORMAL

## 2019-07-17 ENCOUNTER — COMMUNICATION - HEALTHEAST (OUTPATIENT)
Dept: FAMILY MEDICINE | Facility: CLINIC | Age: 64
End: 2019-07-17

## 2019-07-18 ENCOUNTER — RECORDS - HEALTHEAST (OUTPATIENT)
Dept: ADMINISTRATIVE | Facility: OTHER | Age: 64
End: 2019-07-18

## 2019-07-18 ENCOUNTER — RECORDS - HEALTHEAST (OUTPATIENT)
Dept: BONE DENSITY | Facility: CLINIC | Age: 64
End: 2019-07-18

## 2019-07-18 DIAGNOSIS — Z00.00 ENCOUNTER FOR GENERAL ADULT MEDICAL EXAMINATION WITHOUT ABNORMAL FINDINGS: ICD-10-CM

## 2019-07-22 ENCOUNTER — OFFICE VISIT - HEALTHEAST (OUTPATIENT)
Dept: PULMONOLOGY | Facility: OTHER | Age: 64
End: 2019-07-22

## 2019-07-22 ENCOUNTER — RECORDS - HEALTHEAST (OUTPATIENT)
Dept: ADMINISTRATIVE | Facility: OTHER | Age: 64
End: 2019-07-22

## 2019-07-22 ENCOUNTER — RECORDS - HEALTHEAST (OUTPATIENT)
Dept: PULMONOLOGY | Facility: OTHER | Age: 64
End: 2019-07-22

## 2019-07-22 DIAGNOSIS — R05.9 COUGH: ICD-10-CM

## 2019-07-22 ASSESSMENT — MIFFLIN-ST. JEOR: SCORE: 1174.22

## 2019-07-24 ENCOUNTER — COMMUNICATION - HEALTHEAST (OUTPATIENT)
Dept: FAMILY MEDICINE | Facility: CLINIC | Age: 64
End: 2019-07-24

## 2019-07-28 ENCOUNTER — COMMUNICATION - HEALTHEAST (OUTPATIENT)
Dept: FAMILY MEDICINE | Facility: CLINIC | Age: 64
End: 2019-07-28

## 2019-07-28 DIAGNOSIS — G47.00 INSOMNIA, UNSPECIFIED TYPE: ICD-10-CM

## 2019-08-12 ENCOUNTER — RECORDS - HEALTHEAST (OUTPATIENT)
Dept: ADMINISTRATIVE | Facility: OTHER | Age: 64
End: 2019-08-12

## 2019-08-28 ENCOUNTER — COMMUNICATION - HEALTHEAST (OUTPATIENT)
Dept: FAMILY MEDICINE | Facility: CLINIC | Age: 64
End: 2019-08-28

## 2019-08-28 DIAGNOSIS — M32.9 SYSTEMIC LUPUS ERYTHEMATOSUS, UNSPECIFIED SLE TYPE, UNSPECIFIED ORGAN INVOLVEMENT STATUS (H): ICD-10-CM

## 2019-09-03 ENCOUNTER — COMMUNICATION - HEALTHEAST (OUTPATIENT)
Dept: FAMILY MEDICINE | Facility: CLINIC | Age: 64
End: 2019-09-03

## 2019-09-04 ENCOUNTER — AMBULATORY - HEALTHEAST (OUTPATIENT)
Dept: FAMILY MEDICINE | Facility: CLINIC | Age: 64
End: 2019-09-04

## 2019-09-04 DIAGNOSIS — E78.5 HYPERLIPIDEMIA, UNSPECIFIED HYPERLIPIDEMIA TYPE: ICD-10-CM

## 2019-09-09 ENCOUNTER — OFFICE VISIT - HEALTHEAST (OUTPATIENT)
Dept: FAMILY MEDICINE | Facility: CLINIC | Age: 64
End: 2019-09-09

## 2019-09-09 DIAGNOSIS — J06.9 UPPER RESPIRATORY TRACT INFECTION, UNSPECIFIED TYPE: ICD-10-CM

## 2020-02-14 ENCOUNTER — RECORDS - HEALTHEAST (OUTPATIENT)
Dept: ADMINISTRATIVE | Facility: OTHER | Age: 65
End: 2020-02-14

## 2020-02-16 ENCOUNTER — COMMUNICATION - HEALTHEAST (OUTPATIENT)
Dept: FAMILY MEDICINE | Facility: CLINIC | Age: 65
End: 2020-02-16

## 2020-02-18 ENCOUNTER — RECORDS - HEALTHEAST (OUTPATIENT)
Dept: ADMINISTRATIVE | Facility: OTHER | Age: 65
End: 2020-02-18

## 2020-02-20 ENCOUNTER — OFFICE VISIT - HEALTHEAST (OUTPATIENT)
Dept: FAMILY MEDICINE | Facility: CLINIC | Age: 65
End: 2020-02-20

## 2020-02-20 DIAGNOSIS — R05.9 COUGH: ICD-10-CM

## 2020-02-20 DIAGNOSIS — J40 BRONCHITIS: ICD-10-CM

## 2020-03-10 ENCOUNTER — RECORDS - HEALTHEAST (OUTPATIENT)
Dept: ADMINISTRATIVE | Facility: OTHER | Age: 65
End: 2020-03-10

## 2020-03-10 ENCOUNTER — HEALTH MAINTENANCE LETTER (OUTPATIENT)
Age: 65
End: 2020-03-10

## 2020-03-19 ENCOUNTER — COMMUNICATION - HEALTHEAST (OUTPATIENT)
Dept: FAMILY MEDICINE | Facility: CLINIC | Age: 65
End: 2020-03-19

## 2020-04-30 ENCOUNTER — COMMUNICATION - HEALTHEAST (OUTPATIENT)
Dept: FAMILY MEDICINE | Facility: CLINIC | Age: 65
End: 2020-04-30

## 2020-04-30 ENCOUNTER — COMMUNICATION - HEALTHEAST (OUTPATIENT)
Dept: CARDIOLOGY | Facility: CLINIC | Age: 65
End: 2020-04-30

## 2020-05-01 ENCOUNTER — COMMUNICATION - HEALTHEAST (OUTPATIENT)
Dept: CARDIOLOGY | Facility: CLINIC | Age: 65
End: 2020-05-01

## 2020-05-06 ENCOUNTER — COMMUNICATION - HEALTHEAST (OUTPATIENT)
Dept: FAMILY MEDICINE | Facility: CLINIC | Age: 65
End: 2020-05-06

## 2020-05-06 DIAGNOSIS — G47.00 INSOMNIA, UNSPECIFIED TYPE: ICD-10-CM

## 2020-05-06 DIAGNOSIS — E78.5 HYPERLIPIDEMIA, UNSPECIFIED HYPERLIPIDEMIA TYPE: ICD-10-CM

## 2020-05-13 ENCOUNTER — OFFICE VISIT - HEALTHEAST (OUTPATIENT)
Dept: CARDIOLOGY | Facility: CLINIC | Age: 65
End: 2020-05-13

## 2020-05-13 DIAGNOSIS — E78.00 HYPERCHOLESTEROLEMIA: ICD-10-CM

## 2020-05-13 DIAGNOSIS — I71.20 THORACIC AORTIC ANEURYSM WITHOUT RUPTURE (H): ICD-10-CM

## 2020-05-13 DIAGNOSIS — I35.9 AORTIC VALVE DISORDER: ICD-10-CM

## 2020-05-13 DIAGNOSIS — I20.89 ANGINAL EQUIVALENT (H): ICD-10-CM

## 2020-05-13 DIAGNOSIS — I25.10 CORONARY ARTERY DISEASE DUE TO CALCIFIED CORONARY LESION: ICD-10-CM

## 2020-05-13 DIAGNOSIS — I25.84 CORONARY ARTERY DISEASE DUE TO CALCIFIED CORONARY LESION: ICD-10-CM

## 2020-05-20 ENCOUNTER — HOSPITAL ENCOUNTER (OUTPATIENT)
Dept: MRI IMAGING | Facility: HOSPITAL | Age: 65
Discharge: HOME | End: 2020-05-20
Attending: INTERNAL MEDICINE

## 2020-05-20 DIAGNOSIS — I71.20 THORACIC AORTIC ANEURYSM WITHOUT RUPTURE (H): ICD-10-CM

## 2020-05-20 DIAGNOSIS — I25.84 CORONARY ARTERY DISEASE DUE TO CALCIFIED CORONARY LESION: ICD-10-CM

## 2020-05-20 DIAGNOSIS — I25.10 CORONARY ARTERY DISEASE DUE TO CALCIFIED CORONARY LESION: ICD-10-CM

## 2020-05-20 DIAGNOSIS — I35.9 AORTIC VALVE DISORDER: ICD-10-CM

## 2020-05-20 LAB
ATRIAL RATE - MUSE: 69 BPM
ATRIAL RATE - MUSE: 73 BPM
CREAT BLD-MCNC: 0.8 MG/DL (ref 0.6–1.1)
DIASTOLIC BLOOD PRESSURE - MUSE: NORMAL
DIASTOLIC BLOOD PRESSURE - MUSE: NORMAL
GFR SERPL CREATININE-BSD FRML MDRD: >60 ML/MIN/1.73M2
INTERPRETATION ECG - MUSE: NORMAL
INTERPRETATION ECG - MUSE: NORMAL
MR CARDIAC LEFT VENTRIAL CARDIAC INDEX: 1.6 L/MIN/M2 (ref 1.75–3.8)
MR CARDIAC LEFT VENTRICAL CARDIAC OUTPUT: 2.8 L/MIN (ref 2.8–8.8)
MR CARDIAC LEFT VENTRICULAR DIASTOLIC VOLUME INDEX: 36.97 ML/M2 (ref 41–81)
MR CARDIAC LEFT VENTRICULAR MASS INDEX: 39 G/M2 (ref 63–95)
MR CARDIAC LEFT VENTRICULAR MASS: 67 G (ref 75–175)
MR CARDIAC LEFT VENTRICULAR STROKE VOLUME INDEX: 24.92 ML/M2 (ref 26–56)
MR CARDIAC LEFT VENTRICULAR SYSTOLIC VOLUME INDEX: 12.05 ML/M2 (ref 12–20)
MR EJECTION FRACTION: 67.4 %
MR HEIGHT: 1.59 M
MR LEFT VENTRICULAR DYSTOLIC VOLUMEN: 63.5 ML (ref 52–141)
MR LEFT VENTRICULAR STROKE VOLUMEN: 42.8 ML (ref 33–97)
MR LEFT VENTRICULAR SYSTOLIC VOLUME: 20.7 ML (ref 13–51)
MR WEIGHT: 69.4 KG
P AXIS - MUSE: -10 DEGREES
P AXIS - MUSE: 59 DEGREES
PR INTERVAL - MUSE: 166 MS
PR INTERVAL - MUSE: 172 MS
QRS DURATION - MUSE: 72 MS
QRS DURATION - MUSE: 80 MS
QT - MUSE: 384 MS
QT - MUSE: 392 MS
QTC - MUSE: 411 MS
QTC - MUSE: 431 MS
R AXIS - MUSE: 18 DEGREES
R AXIS - MUSE: 23 DEGREES
SYSTOLIC BLOOD PRESSURE - MUSE: NORMAL
SYSTOLIC BLOOD PRESSURE - MUSE: NORMAL
T AXIS - MUSE: 56 DEGREES
T AXIS - MUSE: 65 DEGREES
VENTRICULAR RATE- MUSE: 69 BPM
VENTRICULAR RATE- MUSE: 73 BPM

## 2020-05-30 ENCOUNTER — RECORDS - HEALTHEAST (OUTPATIENT)
Dept: ADMINISTRATIVE | Facility: OTHER | Age: 65
End: 2020-05-30

## 2020-06-16 ENCOUNTER — RECORDS - HEALTHEAST (OUTPATIENT)
Dept: ADMINISTRATIVE | Facility: OTHER | Age: 65
End: 2020-06-16

## 2020-06-27 ENCOUNTER — COMMUNICATION - HEALTHEAST (OUTPATIENT)
Dept: FAMILY MEDICINE | Facility: CLINIC | Age: 65
End: 2020-06-27

## 2020-06-27 DIAGNOSIS — E78.5 HYPERLIPIDEMIA, UNSPECIFIED HYPERLIPIDEMIA TYPE: ICD-10-CM

## 2020-06-29 ENCOUNTER — COMMUNICATION - HEALTHEAST (OUTPATIENT)
Dept: SCHEDULING | Facility: CLINIC | Age: 65
End: 2020-06-29

## 2020-06-29 ENCOUNTER — COMMUNICATION - HEALTHEAST (OUTPATIENT)
Dept: FAMILY MEDICINE | Facility: CLINIC | Age: 65
End: 2020-06-29

## 2020-07-13 ENCOUNTER — OFFICE VISIT - HEALTHEAST (OUTPATIENT)
Dept: FAMILY MEDICINE | Facility: CLINIC | Age: 65
End: 2020-07-13

## 2020-07-13 ENCOUNTER — HOSPITAL ENCOUNTER (OUTPATIENT)
Dept: MAMMOGRAPHY | Facility: CLINIC | Age: 65
Discharge: HOME | End: 2020-07-13
Attending: FAMILY MEDICINE

## 2020-07-13 DIAGNOSIS — I35.9 AORTIC VALVE DISORDER: ICD-10-CM

## 2020-07-13 DIAGNOSIS — M32.9 SYSTEMIC LUPUS ERYTHEMATOSUS, UNSPECIFIED SLE TYPE, UNSPECIFIED ORGAN INVOLVEMENT STATUS (H): ICD-10-CM

## 2020-07-13 DIAGNOSIS — Z00.00 ROUTINE HISTORY AND PHYSICAL EXAMINATION OF ADULT: ICD-10-CM

## 2020-07-13 DIAGNOSIS — I25.10 CORONARY ARTERY DISEASE DUE TO CALCIFIED CORONARY LESION: ICD-10-CM

## 2020-07-13 DIAGNOSIS — Z12.31 VISIT FOR SCREENING MAMMOGRAM: ICD-10-CM

## 2020-07-13 DIAGNOSIS — I25.84 CORONARY ARTERY DISEASE DUE TO CALCIFIED CORONARY LESION: ICD-10-CM

## 2020-07-13 DIAGNOSIS — Z01.411 ENCOUNTER FOR GYNECOLOGICAL EXAMINATION (GENERAL) (ROUTINE) WITH ABNORMAL FINDINGS: ICD-10-CM

## 2020-07-13 DIAGNOSIS — E78.5 HYPERLIPIDEMIA, UNSPECIFIED HYPERLIPIDEMIA TYPE: ICD-10-CM

## 2020-07-13 DIAGNOSIS — Z80.0 FAMILY HISTORY OF COLON CANCER: ICD-10-CM

## 2020-07-13 DIAGNOSIS — E55.9 VITAMIN D DEFICIENCY: ICD-10-CM

## 2020-07-13 LAB
ALBUMIN SERPL-MCNC: 4 G/DL (ref 3.5–5)
ALP SERPL-CCNC: 72 U/L (ref 45–120)
ALT SERPL W P-5'-P-CCNC: 28 U/L (ref 0–45)
ANION GAP SERPL CALCULATED.3IONS-SCNC: 9 MMOL/L (ref 5–18)
AST SERPL W P-5'-P-CCNC: 24 U/L (ref 0–40)
BILIRUB SERPL-MCNC: 0.8 MG/DL (ref 0–1)
BUN SERPL-MCNC: 12 MG/DL (ref 8–22)
CALCIUM SERPL-MCNC: 9.5 MG/DL (ref 8.5–10.5)
CHLORIDE BLD-SCNC: 107 MMOL/L (ref 98–107)
CHOLEST SERPL-MCNC: 153 MG/DL
CO2 SERPL-SCNC: 25 MMOL/L (ref 22–31)
CREAT SERPL-MCNC: 0.83 MG/DL (ref 0.6–1.1)
ERYTHROCYTE [DISTWIDTH] IN BLOOD BY AUTOMATED COUNT: 12 % (ref 11–14.5)
FASTING STATUS PATIENT QL REPORTED: YES
GFR SERPL CREATININE-BSD FRML MDRD: >60 ML/MIN/1.73M2
GLUCOSE BLD-MCNC: 91 MG/DL (ref 70–125)
HCT VFR BLD AUTO: 40.1 % (ref 35–47)
HDLC SERPL-MCNC: 55 MG/DL
HGB BLD-MCNC: 13.3 G/DL (ref 12–16)
LDLC SERPL CALC-MCNC: 77 MG/DL
MCH RBC QN AUTO: 31.9 PG (ref 27–34)
MCHC RBC AUTO-ENTMCNC: 33.3 G/DL (ref 32–36)
MCV RBC AUTO: 96 FL (ref 80–100)
PLATELET # BLD AUTO: 194 THOU/UL (ref 140–440)
PMV BLD AUTO: 8.3 FL (ref 7–10)
POTASSIUM BLD-SCNC: 4.2 MMOL/L (ref 3.5–5)
PROT SERPL-MCNC: 6.3 G/DL (ref 6–8)
RBC # BLD AUTO: 4.19 MILL/UL (ref 3.8–5.4)
SODIUM SERPL-SCNC: 141 MMOL/L (ref 136–145)
TRIGL SERPL-MCNC: 104 MG/DL
WBC: 6.7 THOU/UL (ref 4–11)

## 2020-07-13 ASSESSMENT — MIFFLIN-ST. JEOR: SCORE: 1204.71

## 2020-07-14 LAB
25(OH)D3 SERPL-MCNC: 37.9 NG/ML (ref 30–80)
HPV SOURCE: NORMAL
HUMAN PAPILLOMA VIRUS 16 DNA: NEGATIVE
HUMAN PAPILLOMA VIRUS 18 DNA: NEGATIVE
HUMAN PAPILLOMA VIRUS FINAL DIAGNOSIS: NORMAL
HUMAN PAPILLOMA VIRUS OTHER HR: NEGATIVE
SPECIMEN DESCRIPTION: NORMAL

## 2020-07-15 ENCOUNTER — COMMUNICATION - HEALTHEAST (OUTPATIENT)
Dept: FAMILY MEDICINE | Facility: CLINIC | Age: 65
End: 2020-07-15

## 2020-07-22 ENCOUNTER — COMMUNICATION - HEALTHEAST (OUTPATIENT)
Dept: FAMILY MEDICINE | Facility: CLINIC | Age: 65
End: 2020-07-22

## 2020-08-20 ENCOUNTER — COMMUNICATION - HEALTHEAST (OUTPATIENT)
Dept: FAMILY MEDICINE | Facility: CLINIC | Age: 65
End: 2020-08-20

## 2020-08-20 ENCOUNTER — VIRTUAL VISIT (OUTPATIENT)
Dept: FAMILY MEDICINE | Facility: OTHER | Age: 65
End: 2020-08-20

## 2020-08-20 ENCOUNTER — AMBULATORY - HEALTHEAST (OUTPATIENT)
Dept: INTERNAL MEDICINE | Facility: CLINIC | Age: 65
End: 2020-08-20

## 2020-08-20 ENCOUNTER — AMBULATORY - HEALTHEAST (OUTPATIENT)
Dept: FAMILY MEDICINE | Facility: CLINIC | Age: 65
End: 2020-08-20

## 2020-08-20 DIAGNOSIS — Z20.822 SUSPECTED 2019 NOVEL CORONAVIRUS INFECTION: ICD-10-CM

## 2020-08-20 NOTE — PROGRESS NOTES
"Date: 2020 11:06:30  Clinician: Fareed Pearson  Clinician NPI: [id number]  Patient: IRINA Handy  Patient : 1955  Patient Address: [address] [place] [address]  Patient Phone: [phone]  Visit Protocol: URI  Patient Summary:  IRINA is a 65 year old ( : 1955 ) female who initiated a Visit for COVID-19 (Coronavirus) evaluation and screening. When asked the question \"Please sign me up to receive news, health information and promotions. \", IRINA responded \"No\".    IRINA states her symptoms started gradually 3-4 days ago.   Her symptoms consist of ear pain, a headache, malaise, a sore throat, a cough, nasal congestion, and rhinitis. She is experiencing difficulty breathing due to nasal congestion but she is not short of breath.   Symptom details     Nasal secretions: The color of her mucus is clear.    Cough: IRINA coughs a few times an hour and her cough is more bothersome at night. Phlegm does not come into her throat when she coughs. She does not believe her cough is caused by post-nasal drip.     Sore throat: IRINA reports having moderate throat pain (4-6 on a 10 point pain scale), does not have exudate on her tonsils, and can swallow liquids. She is not sure if the lymph nodes in her neck are enlarged. A rash has not appeared on the skin since the sore throat started.     Headache: She states the headache is mild (1-3 on a 10 point pain scale).      IRINA denies having chills, fever, wheezing, teeth pain, ageusia, diarrhea, vomiting, nausea, myalgias, anosmia, and facial pain or pressure. She also denies having recent facial or sinus surgery in the past 60 days, taking antibiotic medication in the past month, and double sickening (worsening symptoms after initial improvement).   Precipitating events  IRINA is not sure if she has been exposed to someone with strep throat. She has not recently been exposed to someone with influenza. IRINA has been in close contact with the following " high risk individuals: children under the age of 5 and adults 65 or older.   Pertinent COVID-19 (Coronavirus) information  In the past 14 days, IRINA has not worked in a congregate living setting.   She does not work or volunteer as healthcare worker or a  and does not work or volunteer in a healthcare facility.   IRINA also has not lived in a congregate living setting in the past 14 days. She does not live with a healthcare worker.   IRINA has not had a close contact with a laboratory-confirmed COVID-19 patient within 14 days of symptom onset.   Since December 2019, IRINA and has had upper respiratory infection (URI) or influenza-like illness. Has not been diagnosed with lab-confirmed COVID-19 test      Date(s) of previous URI or influenza-like illness (free-text): February 20, 2020     Symptoms IRINA experienced during previous URI or influenza-like illness as reported by the patient (free-text): Sore throat, stuffy nose, difficult breathing, plugged ear, consistent cough        Pertinent medical history  IRINA does not get yeast infections when she takes antibiotics.   IRINA does not need a return to work/school note.   Weight: 155 lbs   IRINA does not smoke or use smokeless tobacco.   Additional information as reported by the patient (free text): MY  HAS COLD SYMPTOMS AS WELL INCLUDING A FEVER OF 99.3, NAUSEA, AND FACIAL PRESSURE.  HIS DOCTOR ORDERD THE COVID TEST, WHICH IS IS DOING TODAY.  I WOULD LIKE TO BE TESTED AS WELL, GIVEN HIS SYMPTOMS AND THE START OF MINE.   Weight: 155 lbs    MEDICATIONS: atorvastatin oral, vitamin A-vitamin D3-vitamin E-vitamin K oral, cholecalciferol (vitamin D3)-vitamin K2 (MK4) oral, ALLERGIES: trazodone, atorvastatin, Vitamin D3  Clinician Response:  Dear IRINA,   Your symptoms show that you may have coronavirus (COVID-19). This illness can cause fever, cough and trouble breathing. Many people get a mild case and get better on their own. Some people can get  "very sick.  What should I do?  We would like to test you for this virus.   1. Please call [phone] to schedule your visit. Explain that you were referred by [facility] to have a COVID-19 test. Be ready to share your [facility] visit ID number.  The following will serve as your written order for this COVID Test, ordered by me, for the indication of suspected COVID [Z20.828]: The test will be ordered in [employer], our electronic health record, after you are scheduled. It will show as ordered and authorized by Luis M Laws MD.  Order: COVID-19 (Coronavirus) PCR for SYMPTOMATIC testing from [facility].      2. When it's time for your COVID test:  Stay at least 6 feet away from others. (If someone will drive you to your test, stay in the backseat, as far away from the  as you can.)   Cover your mouth and nose with a mask, tissue or washcloth.  Go straight to the testing site. Don't make any stops on the way there or back.      3.Starting now: Stay home and away from others (self-isolate) until:   You've had no fever---and no medicine that reduces fever---for one full day (24 hours). And...   Your other symptoms have gotten better. For example, your cough or breathing has improved. And...   At least 10 days have passed since your symptoms started.       During this time, don't leave the house except for testing or medical care.   Stay in your own room, even for meals. Use your own bathroom if you can.   Stay away from others in your home. No hugging, kissing or shaking hands. No visitors.  Don't go to work, school or anywhere else.    Clean \"high touch\" surfaces often (doorknobs, counters, handles, etc.). Use a household cleaning spray or wipes. You'll find a full list of  on the EPA website: www.epa.gov/pesticide-registration/list-n-disinfectants-use-against-sars-cov-2.   Cover your mouth and nose with a mask, tissue or washcloth to avoid spreading germs.  Wash your hands and face often. Use soap and water.  Caregivers " in these groups are at risk for severe illness due to COVID-19:  o People 65 years and older  o People who live in a nursing home or long-term care facility  o People with chronic disease (lung, heart, cancer, diabetes, kidney, liver, immunologic)  o People who have a weakened immune system, including those who:   Are in cancer treatment  Take medicine that weakens the immune system, such as corticosteroids  Had a bone marrow or organ transplant  Have an immune deficiency  Have poorly controlled HIV or AIDS  Are obese (body mass index of 40 or higher)  Smoke regularly   o Caregivers should wear gloves while washing dishes, handling laundry and cleaning bedrooms and bathrooms.  o Use caution when washing and drying laundry: Don't shake dirty laundry, and use the warmest water setting that you can.  o For more tips, go to www.cdc.gov/coronavirus/2019-ncov/downloads/10Things.pdf.    4.Sign up for [employer]. We know it's scary to hear that you might have COVID-19. We want to track your symptoms to make sure you're okay over the next 2 weeks. Please look for an email from [employer]---this is a free, online program that we'll use to keep in touch. To sign up, follow the link in the email. Learn more at [email or web address]  How can I take care of myself?   Get lots of rest. Drink extra fluids (unless a doctor has told you not to).   Take Tylenol (acetaminophen) for fever or pain. If you have liver or kidney problems, ask your family doctor if it's okay to take Tylenol.   Adults can take either:    650 mg (two 325 mg pills) every 4 to 6 hours, or...   1,000 mg (two 500 mg pills) every 8 hours as needed.    Note: Don't take more than 3,000 mg in one day. Acetaminophen is found in many medicines (both prescribed and over-the-counter medicines). Read all labels to be sure you don't take too much.   For children, check the Tylenol bottle for the right dose. The dose is based on the child's age or weight.     If you have other health problems (like cancer, heart failure, an organ transplant or severe kidney disease): Call your specialty clinic if you don't feel better in the next 2 days.       Know when to call 911. Emergency warning signs include:    Trouble breathing or shortness of breath Pain or pressure in the chest that doesn't go away Feeling confused like you haven't felt before, or not being able to wake up Bluish-colored lips or face.  Where can I get more information?   [facility] -- About COVID-19: [email or web address]   CDC -- What to Do If You're Sick: www.cdc.gov/coronavirus/2019-ncov/about/steps-when-sick.html   CDC -- Ending Home Isolation: www.cdc.gov/coronavirus/2019-ncov/hcp/disposition-in-home-patients.html   [facility] -- Caring for Someone: www.cdc.gov/coronavirus/2019-ncov/if-you-are-sick/care-for-someone.html   [facility] -- Interim Guidance for Hospital Discharge to Home: www.[facility].mn./diseases/coronavirus/hcp/hospdischarge.pdf   [facility] clinical trials (COVID-19 research studies): clinicalaffairs.[facility]-clinical-trials    Below are the COVID-19 hotlines at the [facility] ([facility]). Interpreters are available.    For health questions: Call 189-291-1526 or 1-778.996.5323 (7 a.m. to 7 p.m.) For questions about schools and childcare: Call 229-230-0758 or 1-846.758.5536 (7 a.m. to 7 p.m.)    Diagnosis: Other malaise  Diagnosis ICD: R53.81

## 2020-08-22 ENCOUNTER — COMMUNICATION - HEALTHEAST (OUTPATIENT)
Dept: SCHEDULING | Facility: CLINIC | Age: 65
End: 2020-08-22

## 2020-09-23 ENCOUNTER — RECORDS - HEALTHEAST (OUTPATIENT)
Dept: ADMINISTRATIVE | Facility: OTHER | Age: 65
End: 2020-09-23

## 2020-09-24 ENCOUNTER — COMMUNICATION - HEALTHEAST (OUTPATIENT)
Dept: FAMILY MEDICINE | Facility: CLINIC | Age: 65
End: 2020-09-24

## 2020-09-24 DIAGNOSIS — E55.9 VITAMIN D DEFICIENCY: ICD-10-CM

## 2020-09-29 ENCOUNTER — RECORDS - HEALTHEAST (OUTPATIENT)
Dept: ADMINISTRATIVE | Facility: OTHER | Age: 65
End: 2020-09-29

## 2020-10-13 ENCOUNTER — COMMUNICATION - HEALTHEAST (OUTPATIENT)
Dept: FAMILY MEDICINE | Facility: CLINIC | Age: 65
End: 2020-10-13

## 2020-10-22 ENCOUNTER — COMMUNICATION - HEALTHEAST (OUTPATIENT)
Dept: FAMILY MEDICINE | Facility: CLINIC | Age: 65
End: 2020-10-22

## 2020-10-22 DIAGNOSIS — G47.00 INSOMNIA, UNSPECIFIED TYPE: ICD-10-CM

## 2020-11-09 ENCOUNTER — COMMUNICATION - HEALTHEAST (OUTPATIENT)
Dept: FAMILY MEDICINE | Facility: CLINIC | Age: 65
End: 2020-11-09

## 2020-11-10 ENCOUNTER — OFFICE VISIT - HEALTHEAST (OUTPATIENT)
Dept: FAMILY MEDICINE | Facility: CLINIC | Age: 65
End: 2020-11-10

## 2020-11-10 DIAGNOSIS — H91.92 HEARING LOSS OF LEFT EAR, UNSPECIFIED HEARING LOSS TYPE: ICD-10-CM

## 2020-11-11 ENCOUNTER — AMBULATORY - HEALTHEAST (OUTPATIENT)
Dept: FAMILY MEDICINE | Facility: CLINIC | Age: 65
End: 2020-11-11

## 2020-11-11 DIAGNOSIS — H91.90 DECREASED HEARING, UNSPECIFIED LATERALITY: ICD-10-CM

## 2020-11-19 ENCOUNTER — OFFICE VISIT - HEALTHEAST (OUTPATIENT)
Dept: AUDIOLOGY | Facility: CLINIC | Age: 65
End: 2020-11-19

## 2020-11-19 ENCOUNTER — OFFICE VISIT - HEALTHEAST (OUTPATIENT)
Dept: OTOLARYNGOLOGY | Facility: CLINIC | Age: 65
End: 2020-11-19

## 2020-11-19 ENCOUNTER — RECORDS - HEALTHEAST (OUTPATIENT)
Dept: ADMINISTRATIVE | Facility: OTHER | Age: 65
End: 2020-11-19

## 2020-11-19 DIAGNOSIS — H92.02 LEFT EAR PAIN: ICD-10-CM

## 2020-11-19 DIAGNOSIS — H93.8X2 SENSATION OF FULLNESS IN LEFT EAR: ICD-10-CM

## 2020-11-19 DIAGNOSIS — H69.90 DYSFUNCTION OF EUSTACHIAN TUBE, UNSPECIFIED LATERALITY: ICD-10-CM

## 2020-12-16 ENCOUNTER — RECORDS - HEALTHEAST (OUTPATIENT)
Dept: ADMINISTRATIVE | Facility: OTHER | Age: 65
End: 2020-12-16

## 2020-12-27 ENCOUNTER — HEALTH MAINTENANCE LETTER (OUTPATIENT)
Age: 65
End: 2020-12-27

## 2020-12-29 ENCOUNTER — COMMUNICATION - HEALTHEAST (OUTPATIENT)
Dept: FAMILY MEDICINE | Facility: CLINIC | Age: 65
End: 2020-12-29

## 2020-12-29 DIAGNOSIS — M32.9 SYSTEMIC LUPUS ERYTHEMATOSUS, UNSPECIFIED SLE TYPE, UNSPECIFIED ORGAN INVOLVEMENT STATUS (H): ICD-10-CM

## 2021-01-04 ENCOUNTER — COMMUNICATION - HEALTHEAST (OUTPATIENT)
Dept: FAMILY MEDICINE | Facility: CLINIC | Age: 66
End: 2021-01-04

## 2021-01-04 DIAGNOSIS — G47.00 INSOMNIA, UNSPECIFIED TYPE: ICD-10-CM

## 2021-01-25 ENCOUNTER — RECORDS - HEALTHEAST (OUTPATIENT)
Dept: FAMILY MEDICINE | Facility: CLINIC | Age: 66
End: 2021-01-25

## 2021-01-25 DIAGNOSIS — Z12.31 VISIT FOR SCREENING MAMMOGRAM: ICD-10-CM

## 2021-02-01 ENCOUNTER — OFFICE VISIT - HEALTHEAST (OUTPATIENT)
Dept: FAMILY MEDICINE | Facility: CLINIC | Age: 66
End: 2021-02-01

## 2021-02-01 DIAGNOSIS — M85.80 OSTEOPENIA, UNSPECIFIED LOCATION: ICD-10-CM

## 2021-02-01 DIAGNOSIS — I35.9 AORTIC VALVE DISORDER: ICD-10-CM

## 2021-02-01 DIAGNOSIS — Z13.9 SCREENING FOR CONDITION: ICD-10-CM

## 2021-02-01 DIAGNOSIS — E83.52 SERUM CALCIUM ELEVATED: ICD-10-CM

## 2021-02-01 DIAGNOSIS — I71.20 THORACIC AORTIC ANEURYSM WITHOUT RUPTURE (H): ICD-10-CM

## 2021-02-01 DIAGNOSIS — K63.5 COLORECTAL POLYPS: ICD-10-CM

## 2021-02-01 DIAGNOSIS — E78.5 HYPERLIPIDEMIA, UNSPECIFIED HYPERLIPIDEMIA TYPE: ICD-10-CM

## 2021-02-01 DIAGNOSIS — E55.9 VITAMIN D DEFICIENCY: ICD-10-CM

## 2021-02-01 DIAGNOSIS — R87.810 ATYPICAL SQUAMOUS CELL CHANGES OF UNDETERMINED SIGNIFICANCE (ASCUS) ON CERVICAL CYTOLOGY WITH POSITIVE HIGH RISK HUMAN PAPILLOMA VIRUS (HPV): ICD-10-CM

## 2021-02-01 DIAGNOSIS — Z00.00 ROUTINE GENERAL MEDICAL EXAMINATION AT A HEALTH CARE FACILITY: ICD-10-CM

## 2021-02-01 DIAGNOSIS — Z78.0 POSTMENOPAUSAL: ICD-10-CM

## 2021-02-01 DIAGNOSIS — I25.10 CORONARY ARTERY DISEASE DUE TO CALCIFIED CORONARY LESION: ICD-10-CM

## 2021-02-01 DIAGNOSIS — I25.84 CORONARY ARTERY DISEASE DUE TO CALCIFIED CORONARY LESION: ICD-10-CM

## 2021-02-01 DIAGNOSIS — Z87.42 HISTORY OF ABNORMAL CERVICAL PAP SMEAR: ICD-10-CM

## 2021-02-01 DIAGNOSIS — R87.610 ATYPICAL SQUAMOUS CELL CHANGES OF UNDETERMINED SIGNIFICANCE (ASCUS) ON CERVICAL CYTOLOGY WITH POSITIVE HIGH RISK HUMAN PAPILLOMA VIRUS (HPV): ICD-10-CM

## 2021-02-01 DIAGNOSIS — N64.4 BREAST PAIN: ICD-10-CM

## 2021-02-01 DIAGNOSIS — K62.1 COLORECTAL POLYPS: ICD-10-CM

## 2021-02-01 DIAGNOSIS — M32.9 SYSTEMIC LUPUS ERYTHEMATOSUS, UNSPECIFIED SLE TYPE, UNSPECIFIED ORGAN INVOLVEMENT STATUS (H): ICD-10-CM

## 2021-02-01 LAB
ALBUMIN SERPL-MCNC: 4.8 G/DL (ref 3.5–5)
ALP SERPL-CCNC: 75 U/L (ref 45–120)
ALT SERPL W P-5'-P-CCNC: 34 U/L (ref 0–45)
ANION GAP SERPL CALCULATED.3IONS-SCNC: 10 MMOL/L (ref 5–18)
AST SERPL W P-5'-P-CCNC: 32 U/L (ref 0–40)
BILIRUB SERPL-MCNC: 1.4 MG/DL (ref 0–1)
BUN SERPL-MCNC: 14 MG/DL (ref 8–22)
CALCIUM SERPL-MCNC: 10.7 MG/DL (ref 8.5–10.5)
CHLORIDE BLD-SCNC: 104 MMOL/L (ref 98–107)
CHOLEST SERPL-MCNC: 162 MG/DL
CO2 SERPL-SCNC: 27 MMOL/L (ref 22–31)
CREAT SERPL-MCNC: 0.83 MG/DL (ref 0.6–1.1)
FASTING STATUS PATIENT QL REPORTED: YES
GFR SERPL CREATININE-BSD FRML MDRD: >60 ML/MIN/1.73M2
GLUCOSE BLD-MCNC: 78 MG/DL (ref 70–125)
HDLC SERPL-MCNC: 70 MG/DL
LDLC SERPL CALC-MCNC: 78 MG/DL
POTASSIUM BLD-SCNC: 4.5 MMOL/L (ref 3.5–5)
PROT SERPL-MCNC: 7.3 G/DL (ref 6–8)
SODIUM SERPL-SCNC: 141 MMOL/L (ref 136–145)
TRIGL SERPL-MCNC: 71 MG/DL

## 2021-02-01 ASSESSMENT — MIFFLIN-ST. JEOR: SCORE: 1205.4

## 2021-02-02 LAB
25(OH)D3 SERPL-MCNC: 37 NG/ML (ref 30–80)
HPV SOURCE: NORMAL
HUMAN PAPILLOMA VIRUS 16 DNA: NEGATIVE
HUMAN PAPILLOMA VIRUS 18 DNA: NEGATIVE
HUMAN PAPILLOMA VIRUS FINAL DIAGNOSIS: NORMAL
HUMAN PAPILLOMA VIRUS OTHER HR: NEGATIVE
SPECIMEN DESCRIPTION: NORMAL

## 2021-02-04 ENCOUNTER — COMMUNICATION - HEALTHEAST (OUTPATIENT)
Dept: FAMILY MEDICINE | Facility: CLINIC | Age: 66
End: 2021-02-04

## 2021-02-04 ENCOUNTER — AMBULATORY - HEALTHEAST (OUTPATIENT)
Dept: FAMILY MEDICINE | Facility: CLINIC | Age: 66
End: 2021-02-04

## 2021-02-04 DIAGNOSIS — G47.00 INSOMNIA, UNSPECIFIED TYPE: ICD-10-CM

## 2021-02-04 DIAGNOSIS — E78.5 HYPERLIPIDEMIA, UNSPECIFIED HYPERLIPIDEMIA TYPE: ICD-10-CM

## 2021-02-04 DIAGNOSIS — E83.52 SERUM CALCIUM ELEVATED: ICD-10-CM

## 2021-02-04 LAB — PTH-INTACT SERPL-MCNC: 31 PG/ML (ref 10–86)

## 2021-02-10 ENCOUNTER — HOSPITAL ENCOUNTER (OUTPATIENT)
Dept: MAMMOGRAPHY | Facility: CLINIC | Age: 66
Discharge: HOME | End: 2021-02-10
Attending: FAMILY MEDICINE

## 2021-02-10 DIAGNOSIS — N64.4 BREAST PAIN: ICD-10-CM

## 2021-02-15 ENCOUNTER — COMMUNICATION - HEALTHEAST (OUTPATIENT)
Dept: FAMILY MEDICINE | Facility: CLINIC | Age: 66
End: 2021-02-15

## 2021-02-17 ENCOUNTER — RECORDS - HEALTHEAST (OUTPATIENT)
Dept: ADMINISTRATIVE | Facility: OTHER | Age: 66
End: 2021-02-17

## 2021-02-23 ENCOUNTER — RECORDS - HEALTHEAST (OUTPATIENT)
Dept: BONE DENSITY | Facility: CLINIC | Age: 66
End: 2021-02-23

## 2021-02-23 ENCOUNTER — RECORDS - HEALTHEAST (OUTPATIENT)
Dept: ADMINISTRATIVE | Facility: OTHER | Age: 66
End: 2021-02-23

## 2021-02-23 DIAGNOSIS — Z78.0 ASYMPTOMATIC MENOPAUSAL STATE: ICD-10-CM

## 2021-02-24 ENCOUNTER — COMMUNICATION - HEALTHEAST (OUTPATIENT)
Dept: FAMILY MEDICINE | Facility: CLINIC | Age: 66
End: 2021-02-24

## 2021-03-04 ENCOUNTER — RECORDS - HEALTHEAST (OUTPATIENT)
Dept: ADMINISTRATIVE | Facility: OTHER | Age: 66
End: 2021-03-04

## 2021-03-12 ENCOUNTER — OFFICE VISIT - HEALTHEAST (OUTPATIENT)
Dept: FAMILY MEDICINE | Facility: CLINIC | Age: 66
End: 2021-03-12

## 2021-03-12 ENCOUNTER — COMMUNICATION - HEALTHEAST (OUTPATIENT)
Dept: FAMILY MEDICINE | Facility: CLINIC | Age: 66
End: 2021-03-12

## 2021-03-12 DIAGNOSIS — M77.11 LATERAL EPICONDYLITIS OF RIGHT ELBOW: ICD-10-CM

## 2021-03-12 ASSESSMENT — MIFFLIN-ST. JEOR: SCORE: 1203.59

## 2021-03-16 ENCOUNTER — OFFICE VISIT - HEALTHEAST (OUTPATIENT)
Dept: OCCUPATIONAL THERAPY | Facility: REHABILITATION | Age: 66
End: 2021-03-16

## 2021-03-16 DIAGNOSIS — R29.898 RIGHT HAND WEAKNESS: ICD-10-CM

## 2021-03-16 DIAGNOSIS — Z78.9 DECREASED ACTIVITIES OF DAILY LIVING (ADL): ICD-10-CM

## 2021-03-16 DIAGNOSIS — M25.521 RIGHT ELBOW PAIN: ICD-10-CM

## 2021-03-17 ENCOUNTER — COMMUNICATION - HEALTHEAST (OUTPATIENT)
Dept: FAMILY MEDICINE | Facility: CLINIC | Age: 66
End: 2021-03-17

## 2021-04-12 ENCOUNTER — COMMUNICATION - HEALTHEAST (OUTPATIENT)
Dept: FAMILY MEDICINE | Facility: CLINIC | Age: 66
End: 2021-04-12

## 2021-04-12 ENCOUNTER — OFFICE VISIT - HEALTHEAST (OUTPATIENT)
Dept: FAMILY MEDICINE | Facility: CLINIC | Age: 66
End: 2021-04-12

## 2021-04-12 DIAGNOSIS — Z01.818 PREOPERATIVE EXAMINATION: ICD-10-CM

## 2021-04-12 DIAGNOSIS — E78.5 HYPERLIPIDEMIA, UNSPECIFIED HYPERLIPIDEMIA TYPE: ICD-10-CM

## 2021-04-12 DIAGNOSIS — I35.9 AORTIC VALVE DISORDER: ICD-10-CM

## 2021-04-12 DIAGNOSIS — M32.9 SYSTEMIC LUPUS ERYTHEMATOSUS, UNSPECIFIED SLE TYPE, UNSPECIFIED ORGAN INVOLVEMENT STATUS (H): ICD-10-CM

## 2021-04-12 DIAGNOSIS — E83.52 SERUM CALCIUM ELEVATED: ICD-10-CM

## 2021-04-12 DIAGNOSIS — I71.20 THORACIC AORTIC ANEURYSM WITHOUT RUPTURE (H): ICD-10-CM

## 2021-04-12 DIAGNOSIS — H25.9 AGE-RELATED CATARACT OF BOTH EYES, UNSPECIFIED AGE-RELATED CATARACT TYPE: ICD-10-CM

## 2021-04-12 LAB
ALBUMIN SERPL-MCNC: 4.5 G/DL (ref 3.5–5)
ALP SERPL-CCNC: 73 U/L (ref 45–120)
ALT SERPL W P-5'-P-CCNC: 36 U/L (ref 0–45)
ANION GAP SERPL CALCULATED.3IONS-SCNC: 11 MMOL/L (ref 5–18)
AST SERPL W P-5'-P-CCNC: 27 U/L (ref 0–40)
BILIRUB SERPL-MCNC: 1.6 MG/DL (ref 0–1)
BUN SERPL-MCNC: 15 MG/DL (ref 8–22)
CALCIUM SERPL-MCNC: 10.6 MG/DL (ref 8.5–10.5)
CHLORIDE BLD-SCNC: 105 MMOL/L (ref 98–107)
CO2 SERPL-SCNC: 26 MMOL/L (ref 22–31)
CREAT SERPL-MCNC: 0.83 MG/DL (ref 0.6–1.1)
GFR SERPL CREATININE-BSD FRML MDRD: >60 ML/MIN/1.73M2
GLUCOSE BLD-MCNC: 91 MG/DL (ref 70–125)
POTASSIUM BLD-SCNC: 4.6 MMOL/L (ref 3.5–5)
PROT SERPL-MCNC: 7 G/DL (ref 6–8)
SODIUM SERPL-SCNC: 142 MMOL/L (ref 136–145)

## 2021-04-12 ASSESSMENT — MIFFLIN-ST. JEOR: SCORE: 1215.71

## 2021-04-13 ENCOUNTER — COMMUNICATION - HEALTHEAST (OUTPATIENT)
Dept: FAMILY MEDICINE | Facility: CLINIC | Age: 66
End: 2021-04-13

## 2021-04-23 ENCOUNTER — OFFICE VISIT - HEALTHEAST (OUTPATIENT)
Dept: CARDIOLOGY | Facility: CLINIC | Age: 66
End: 2021-04-23

## 2021-04-23 DIAGNOSIS — I71.20 THORACIC AORTIC ANEURYSM WITHOUT RUPTURE (H): ICD-10-CM

## 2021-04-23 DIAGNOSIS — I20.89 ANGINAL EQUIVALENT (H): ICD-10-CM

## 2021-04-23 DIAGNOSIS — E78.00 HYPERCHOLESTEROLEMIA: ICD-10-CM

## 2021-04-23 DIAGNOSIS — I25.84 CORONARY ARTERY DISEASE DUE TO CALCIFIED CORONARY LESION: ICD-10-CM

## 2021-04-23 DIAGNOSIS — I20.89 ANGINA OF EFFORT (H): ICD-10-CM

## 2021-04-23 DIAGNOSIS — I25.10 CORONARY ARTERY DISEASE DUE TO CALCIFIED CORONARY LESION: ICD-10-CM

## 2021-04-23 DIAGNOSIS — I35.9 AORTIC VALVE DISORDER: ICD-10-CM

## 2021-04-23 ASSESSMENT — MIFFLIN-ST. JEOR: SCORE: 1212.54

## 2021-05-28 NOTE — TELEPHONE ENCOUNTER
Medication Question or Clarification  Who is calling: Patient  What medication are you calling about? fluticasone propionate (FLOVENT HFA) 110 mcg/actuation inhaler, Inhale 1 puff 2 (two) times a day. - Inhalation  Who prescribed the medication?: Shahana Parker NP   What is your question/concern?: Patient states co-pay for this medication was $250. She is asking for an alternative to be sent to the pharmacy. Asked patient to contact her insurance in the mean time to ask what they would cover for her.   [id number] [place] [address]  Okay to leave a detailed message?: Yes  Site CMT - Please call the pharmacy to obtain any additional needed information.

## 2021-05-28 NOTE — PROGRESS NOTES
"Cardiology Progress Note    Assessment:  Coronary artery disease, mild, nonobstructive  Heart palpitations, infrequent, likely benign  Bicuspid aortic valve with mild to moderate stenosis(mean gradient 22), asymptomatic  Mild dilatation of the ascending aorta(3.9 cm by MRI in 2016, 4.1cm by echo in 2019)  Hypercholesterolemia on high-dose atorvastatin  Persistent cough        Plan:  Chest x-ray  Echo next year prior to follow-up    We discussed natural progression of aortic stenosis, aortic dilatation, coronary artery disease.  We will continue aggressive preventive measures.    Follow-up in 1 year      Subjective:   This is 64 y.o. female who comes in today for follow-up visit.  She has done well.  She denies exertional chest pain or shortness of breath.  She is physically active.  3 months ago she developed a nonproductive cough.  She denies postnasal drainage    Review of Systems:   General: WNL  Eyes: WNL  Ears/Nose/Throat: WNL  Lungs: Cough  Heart: WNL  Stomach: WNL  Bladder: WNL  Muscle/Joints: WNL  Skin: WNL  Nervous System: WNL  Mental Health: WNL     Blood: WNL    Objective:   /78 (Patient Site: Right Arm, Patient Position: Sitting, Cuff Size: Adult Large)   Pulse 68   Resp 14   Ht 5' 3\" (1.6 m)   Wt 147 lb (66.7 kg)   BMI 26.04 kg/m    Physical Exam:  GENERAL: no distress  NECK: No JVD  LUNGS: Clear to auscultation.  CARDIAC: regular rhythm, S1 & S2 normal.  No heaves, thrills, gallops opening click soft ejection murmur at the aortic area  ABDOMEN: flat, negative hepatosplenomegaly, soft and non-tender.  EXTREMITIES: No evidence of cyanosis, clubbing or edema.    Current Outpatient Medications   Medication Sig Dispense Refill     aspirin 81 mg chewable tablet Chew 1 tablet (81 mg total) daily.  0     atorvastatin (LIPITOR) 80 MG tablet Take 1 tablet (80 mg total) by mouth at bedtime. 90 tablet 3     cholecalciferol, vitamin D3, (VITAMIN D3) 1,000 unit capsule Take 1,000 Units by mouth daily.   "     cholecalciferol, vitamin D3, (VITAMIN D3) 2,000 unit Tab Take 1 tablet (2,000 Units total) by mouth daily. 90 tablet 3     multivitamin capsule Take 1 capsule by mouth daily.        OMEGA 3-DHA-EPA-FISH OIL ORAL Take 1 capsule by mouth daily.       traZODone (DESYREL) 50 MG tablet Take 0.5 tablets (25 mg total) by mouth at bedtime. 90 tablet 3     vitamin E 400 UNIT capsule Take 400 Units by mouth daily.        beclomethasone (QVAR) 80 mcg/actuation inhaler Inhale 1 puff 2 (two) times a day. Rinse mouth out after use. 1 Inhaler 0     No current facility-administered medications for this visit.        Cardiographics:    Echocardiogram: May 2019     Left ventricle ejection fraction is normal. The calculated left ventricular ejection fraction is 62%.    Normal right ventricular size and systolic function.    Probably bicuspid aortic valve. Mild aortic stenosis.    The ascending aorta is mildly dilated.  When compared to the previous study dated 5/16/2018, no significant change   Stress Test: May 2017     No reported chest discomfort with exercise with good overall exercise tolerance    The stress electrocardiogram is negative for inducible ischemic EKG changes. Less than 1 mm upsloping ST segment depression    Left ventricle ejection fraction at rest is normal resting left ventricular function estimated 55-60% without wall motion abnormality    Stress echocardiogram is positive for a mild area of inducible ischemia.    Post exercise there is augmentation of systolic function with a mild area of relative hypokinesis involving the mid to distal septum    Aortic stenosis at rest mean gradient of 18 mmHg. Post exercise velocity across the aortic valve is approximately 4 m/s with a recorded mean gradient of 27 mmHg. Consider additional evaluation of aortic stenosis  The mean gradient by echocardiography May 2016 was reported at 13 mmHg     Coronary calcium score: 2016  The total Agatston calcium score is 172 which is  located 10 in left main,  162 in LAD. A total Agatston calcium score of 172 places this patient in the  intermediate risk for cardiac events in the next 10 years when compared to an age  and gender match control group.      In addition, ascending aorta is mildly enlarged with measurement of 4.2 cm.         Cardiac MR: May 2016  1. Normal left ventricular size, wall thickness and function. The quantified left ventricular ejection fraction is 70%. No myocardial scar is identified.   2. Normal right ventricular size function.   3. A congenital bicuspid aortic valve with fusion of right and the left coronary cuspid. The peak velocity of aortic valve is 1.75 m/s. No aortic valve regurgitation.  4. Mildly dilated mid ascending aorta with maximal measurement 3.9 cm in size. No evidence of aortic dissection or coarctation        Coronary angiogram: May 2017    Dist LAD lesion 50% stenosed.    Mid LAD lesion 30% stenosed.    1st Mrg lesion 25% stenosed.    Prox RCA lesion 25% stenosed.      Multiple mild and moderate grade lesions noted but no severe or occlusive stenoses.  Recommend continued medical therapy for CAD andrisk reduction.  Daily ASA 81 mg.  Follow up on bicuspid aortic stenosis and aortic root enlargement.        Lab Results:       Lab Results   Component Value Date    CHOL 129 07/09/2018    CHOL 168 05/30/2017    CHOL 171 06/27/2016     Lab Results   Component Value Date    HDL 59 07/09/2018    HDL 53 05/30/2017    HDL 66 06/27/2016     Lab Results   Component Value Date    LDLCALC 55 07/09/2018    LDLCALC 98 05/30/2017    LDLCALC 82 06/27/2016     Lab Results   Component Value Date    TRIG 73 07/09/2018    TRIG 86 05/30/2017    TRIG 115 06/27/2016     No results found for: BNP    Arben (Lonnie)  MD Mari

## 2021-05-28 NOTE — PROGRESS NOTES
Assessment/Plan:     1. Chronic cough  Will proceed with a CT of the Chest to rule out any mass or lesion, although I would expect to see this on CXR.  Refer to pulmonology for PFTs and assessment.  I will have patient start a PPI in the case of acid reflux.  Omeprazole 20 mg once daily on an empty stomach.  I will get back to her regarding the CT results.  She will schedule with pulmonology.    - CT Chest With Contrast; Future  - Ambulatory referral to Pulmonology    2. Chest heaviness  - CT Chest With Contrast; Future  - Ambulatory referral to Pulmonology        Subjective:     Wendy Handy is a 64 y.o. female who presents for follow up regarding cough.  Patient originally seen about 3 weeks ago for this.  Reports a cough for the past 4 months.  She has had a normal chest xray.  Continues to have a dry cough throughout the day that is worse at night.  She will cough up some sputum in the morning.  She denies any wheezing.  Some chest heaviness and shortness of breath with activity.  No sinus drainage - she did try taking an antihistamine for about 2 weeks, and did not notice any improvement in symptoms.  Sometimes a sore throat with the cough.  No heartburn or abdominal pain.  Constantly clearing throat.  No fevers, weight loss, or lymphadenopathy.  No hemoptysis.  Pertinent history of aortic aneurysm.  She has never been a smoker.  Historically no seasonal allergies.  No history of asthma.  Patient prescribed a steroid inhaler, but this was too expensive and she never started it.  She did finish a 5 days course of Prednisone, and did not notice any improvement of symptoms.  Patient is frustrated.      The following portions of the patient's history were reviewed and updated as appropriate: allergies, current medications, past family history, past medical history, past social history, past surgical history and problem list.    Review of Systems  A comprehensive review of systems was performed and was otherwise  negative    Objective:     /70   Pulse 76   Temp 98.6  F (37  C)   Wt 146 lb 12.8 oz (66.6 kg)   SpO2 96%   BMI 26.00 kg/m      General Appearance: Alert, cooperative, no distress, appears stated age  Ears: Normal TM's and external ear canals, both ears  Nose: Nares normal, septum midline, mucosa normal, no drainage  Throat: Lips, mucosa, and tongue normal; teeth and gums normal. Normal posterior pharynx.   Neck: Supple, symmetrical, trachea midline, no adenopathy  Lungs: Clear to auscultation bilaterally, respirations unlabored  Heart: Regular rate and rhythm, S1 and S2 normal, no murmur, rub, or gallop    Shahana Wagstrom, NP

## 2021-05-28 NOTE — TELEPHONE ENCOUNTER
Different inhaler sent in to pharmacy, but unsure of the cost.  Encouraged her to check with her insurance.    Shahana Parker NP

## 2021-05-28 NOTE — PATIENT INSTRUCTIONS - HE
Wendy Handy,    It was a pleasure to see you today at the [facility].     My recommendations after this visit include:    Chest x-ray  Continue current medications    SHEN Guerra MD, FACC, TANJA

## 2021-05-28 NOTE — TELEPHONE ENCOUNTER
New Appointment Needed  What is the reason for the visit:    Ongoing cough x 4 months  Provider Preference: PCP only  How soon do you need to be seen?: At 300p or later on any Tues, Thurs or Fri. Was offered June 14th, but the patient doesn't want to wait a month or see another provider  Waitlist offered?: Yes  Okay to leave a detailed message:  Yes

## 2021-05-28 NOTE — TELEPHONE ENCOUNTER
Medication Question or Clarification  Who is calling: [facility]  What medication are you calling about? (include dose and sig) beclomethasone (QVAR) 80 mcg/actuation inhaler  Who prescribed the medication?: PCP  What is your question/concern?: Pharmacy called stating that the prescribed inhaler is no longer made and the prescription should be for QVAR red inhaler  Pharmacy: [facility] pharmacy in [place] on [address] Lorenzo to leave a detailed message?: Yes  Site CMT - Please call the pharmacy to obtain any additional needed information.

## 2021-05-29 ENCOUNTER — RECORDS - HEALTHEAST (OUTPATIENT)
Dept: ADMINISTRATIVE | Facility: CLINIC | Age: 66
End: 2021-05-29

## 2021-05-29 NOTE — TELEPHONE ENCOUNTER
RN cannot approve Refill Request    RN can NOT refill this medication med is not covered by policy/route to provider     . Last office visit: 5/16/2019 Shahana Parker NP Last Physical: Visit date not found Last MTM visit: Visit date not found Last visit same specialty: 5/16/2019 Shahana Parker NP.  Next visit within 3 mo: Visit date not found  Next physical within 3 mo: Visit date not found      Sarah Bates, [facility] Triage/Med Refill 6/13/2019    Requested Prescriptions   Pending Prescriptions Disp Refills     ADVAIR DISKUS 100-50 mcg/dose DISKUS [Pharmacy Med Name: ADVAIR 100-50 DISKUS]  0     Sig: TAKE 1 PUFF BY MOUTH TWICE A DAY       There is no refill protocol information for this order

## 2021-05-29 NOTE — PROGRESS NOTES
Chief complaint: Persistent cough    HPI: The patient reports that she started with a cough in January when she returned from [place].  She and another friend who had both flown on the same small airlines to [place], came back with a cough.  She really did not do anything about the cough until April when she saw her cardiologist.  They did a chest x-ray at that time and was negative for infiltrate.  She then was seen by my nurse practitioner couple of times with a persistent cough.  Ultimately she is done antibiotics and steroid inhaler.  The insurance did not want to pay for the steroid with long-acting beta agonist and she finally just paid the cost of it herself.    A CT was done and showed some mor-bronchial inflammation and a little bit of atelectasis in the left lower lobe.    She is feeling dramatically better and does have appointments for next month to do pulmonary function testing and see a pulmonologist because of this prolonged cough.    When I reviewed her vaccines, she had her Tdap in 2010 so it is possible that the pertussis protection wore off and this was an atypical presentation for pertussis.  She said at times in the spring she would cough to the point of vomiting.    Objective:/74   Pulse 79   Wt 145 lb 14.4 oz (66.2 kg)   SpO2 96%   BMI 25.85 kg/m    She is in no distress.  Her conjunctiva are clear TMs are clear throat is clear neck is supple.  Lungs are actually clear to auscultation.  The cough she has is slight and the sputum is still present but dramatically less and clear to white.    Assessment: Persistent cough.    Plan: I reviewed the CT report and with her exam today do not think I need to do anything else to repeat the imaging but I will have her do Tessalon Perles for about 10 days to settle down the cough so the inflammation can stop.  And I will have her keep her appointment with the pulmonologist and the pulmonary function testing just to make sure we do not need to do  anything else.  She has a somewhat atypical medical history and I want to make sure not miss anything.

## 2021-05-29 NOTE — TELEPHONE ENCOUNTER
Central PA team  [phone]  Pool: HE PA MED ([id number])          PA has been initiated.       PA form completed and faxed insurance via Cover My Meds     Key:  [id number] - PA Case ID: [id number]     Medication:  Advair Diskus 100-50MCG/DOSE aerosol powder    Insurance:  [employer]        Response will be received via fax and may take up to 5-10 business days depending on plan

## 2021-05-29 NOTE — TELEPHONE ENCOUNTER
RN cannot approve Refill Request    RN can NOT refill this medication med is not covered by policy/route to provider. Last office visit: 12/27/2017 Yolanda Gonzalez MD Last Physical: 7/9/2018 Last MTM visit: Visit date not found Last visit same specialty: 5/16/2019 Shahana Parker NP.  Next visit within 3 mo: Visit date not found  Next physical within 3 mo: Visit date not found      Kandace Partida, [facility] Triage/Med Refill 6/2/2019    Requested Prescriptions   Pending Prescriptions Disp Refills     cholecalciferol, vitamin D3, 2,000 unit Tab [Pharmacy Med Name: VITAMIN D3 2,000 UNIT TABLET] 90 tablet 3     Sig: TAKE 1 TABLET BY MOUTH DAILY.       There is no refill protocol information for this order

## 2021-05-29 NOTE — TELEPHONE ENCOUNTER
Call to patient regarding CT chest results.    Small infiltrate to the LLL.  Will treat with antibiotics.  Azithromycin x 2 courses.  PA sent for Advair inhaler, as previous steroid inhalers too expensive for patient.  I will let her know the decision regarding this.    Patient's cough got worse over the weekend.  She is thankful.    Shahana Parker NP

## 2021-05-30 ENCOUNTER — RECORDS - HEALTHEAST (OUTPATIENT)
Dept: ADMINISTRATIVE | Facility: CLINIC | Age: 66
End: 2021-05-30

## 2021-05-30 VITALS — HEIGHT: 64 IN | WEIGHT: 148 LBS | BODY MASS INDEX: 25.27 KG/M2

## 2021-05-30 VITALS — WEIGHT: 141.6 LBS | BODY MASS INDEX: 24.31 KG/M2

## 2021-05-30 NOTE — TELEPHONE ENCOUNTER
Refill Approved    Rx renewed per Medication Renewal Policy. Medication was last renewed on 7/10/19, last OV 7/10/19.    Anastacia Thornton, [facility] Triage/Med Refill 7/28/2019     Requested Prescriptions   Pending Prescriptions Disp Refills     traZODone (DESYREL) 50 MG tablet [Pharmacy Med Name: TRAZODONE HCL TABS 50MG] 90 tablet 3     Sig: TAKE ONE-HALF (1/2) TABLET AT BEDTIME       Tricyclics/Misc Antidepressant/Antianxiety Meds Refill Protocol Passed - 7/28/2019  6:34 AM        Passed - PCP or prescribing provider visit in last year     Last office visit with prescriber/PCP: 6/19/2019 Yolanda Gonzalez MD OR same dept: 6/19/2019 Yolanda Gonzalez MD OR same specialty: 6/19/2019 Yolanda Gonzalez MD  Last physical: 7/10/2019 Last MTM visit: Visit date not found   Next visit within 3 mo: Visit date not found  Next physical within 3 mo: Visit date not found  Prescriber OR PCP: Yolanda Gonzalez MD  Last diagnosis associated with med order: 1. Insomnia, unspecified type  - traZODone (DESYREL) 50 MG tablet [Pharmacy Med Name: TRAZODONE HCL TABS 50MG]; TAKE ONE-HALF (1/2) TABLET AT BEDTIME  Dispense: 90 tablet; Refill: 3    If protocol passes may refill for 12 months if within 3 months of last provider visit (or a total of 15 months).

## 2021-05-30 NOTE — PROGRESS NOTES
ASSESSMENT:  1. Routine general medical examination at a [facility] care facility  Mammogram completed this morning-normal  - DXA Bone Density Scan; Future    2. Hyperlipidemia, unspecified hyperlipidemia type     - Lipid Cascade  - Comprehensive Metabolic Panel  - atorvastatin (LIPITOR) 80 MG tablet; Take 1 tablet (80 mg total) by mouth at bedtime.  Dispense: 90 tablet; Refill: 3    3. Coronary artery disease due to calcified coronary lesion  Follows with Cardiology every year    4. Vitamin D deficiency     - Vitamin D, Total (25-Hydroxy)    5. Osteopenia, unspecified location  DEXA ordered for this year  - Thyroid Stimulating Hormone (TSH)  - Vitamin D, Total (25-Hydroxy)    6. Pap Smear (+) Low Grade Squamous Intraepith Lesion W/ Atypia       7. History of abnormal cervical Pap smear     - Gynecologic Cytology (PAP Smear)    8. Systemic lupus erythematosus, unspecified SLE type, unspecified organ involvement status (H)     - HM2(CBC w/o Differential)    9. Atypical squamous cell changes of undetermined significance (ASCUS) on cervical cytology with positive high risk human papilloma virus (HPV)       10. Insomnia, unspecified type     - traZODone (DESYREL) 50 MG tablet; Take 0.5 tablets (25 mg total) by mouth at bedtime.  Dispense: 90 tablet; Refill: 3    11. Aortic Stenosis  Follows with cardiology and yearly echocardiogram    12. Thoracic aortic aneurysm without rupture (H)       13. Family history of colon cancer   due for colonoscopy 2020    14. Colorectal polyps  Needs colonoscopy 2020           PLAN:  There are no Patient Instructions on file for this visit.    Orders Placed This Encounter   Procedures     DXA Bone Density Scan     Standing Status:   Future     Standing Expiration Date:   7/9/2020     Order Specific Question:   Can the procedure be changed per Radiologist protocol?     Answer:   Yes     Tdap vaccine greater than or equal to 6yo IM     Lipid Cascade     Order Specific Question:   Fasting is  required?     Answer:   Yes     Comprehensive Metabolic Panel     Thyroid Stimulating Hormone (TSH)     Vitamin D, Total (25-Hydroxy)     HM2(CBC w/o Differential)     Medications Discontinued During This Encounter   Medication Reason     ADVAIR DISKUS 100-50 mcg/dose DISKUS Therapy completed     benzonatate (TESSALON PERLES) 100 MG capsule Therapy completed     atorvastatin (LIPITOR) 80 MG tablet Reorder     traZODone (DESYREL) 50 MG tablet Reorder       No follow-ups on file.    Health Maintenance Due   Topic Date Due     TD 18+ HE  2020       CHIEF COMPLAINT:  Chief Complaint   Patient presents with     Annual Exam     No concerns       HISTORY OF PRESENT ILLNESS:  Wendy Handy is a 64 y.o. female presenting to the clinic today for a physical exam.     Healthy Habits:   Patient reports regular exercise, dental and eye exams. Uses healthy diet. Always uses seatbelts. Reports uses medications as directed.  Alcohol: Social  Smoking: Never  Caffeine use: Daily  Drug use: None  Birth control: Menopause    REVIEW OF SYSTEMS:   All other systems are negative.    Immunization History   Administered Date(s) Administered     Hep A / Hep B 2013     Hep A, historic 2007, 10/12/2007     Influenza, inj, historic,unspecified 10/12/2007, 10/01/2013, 10/07/2017     Td,adult,historic,unspecified 2005     Tdap 2010, 07/10/2019     ZOSTER, LIVE 2015       GYNECOLOGIC HISTORY:  Last menstrual period: Menopause  Contraception: None  Last Pap: 18 Results were: abnormal  Last mammogram: 18  Results were: normal    OB History        2    Para   2    Term   2            AB        Living           SAB        TAB        Ectopic        Multiple        Live Births                     PFSH:     Social History     Tobacco Use   Smoking Status Never Smoker   Smokeless Tobacco Never Used     Family History   Problem Relation Age of Onset     Mental illness Mother 30        schizophrenia,  bipolar     Colon cancer Father      Heart disease Father      Mental illness Sister         depression     Diabetes Brother         type 2     Mental illness Brother         depression     Social History     Socioeconomic History     Marital status:      Spouse name: None     Number of children: None     Years of education: None     Highest education level: None   Occupational History     None   Social Needs     Financial resource strain: None     Food insecurity:     Worry: None     Inability: None     Transportation needs:     Medical: None     Non-medical: None   Tobacco Use     Smoking status: Never Smoker     Smokeless tobacco: Never Used   Substance and Sexual Activity     Alcohol use: Yes     Alcohol/week: 1.2 oz     Types: 2 Cans of beer per week     Drug use: No     Sexual activity: Not Currently     Partners: Male     Birth control/protection: Abstinence   Lifestyle     Physical activity:     Days per week: None     Minutes per session: None     Stress: None   Relationships     Social connections:     Talks on phone: None     Gets together: None     Attends [employer] service: None     Active member of club or organization: None     Attends meetings of clubs or organizations: None     Relationship status: None     Intimate partner violence:     Fear of current or ex partner: None     Emotionally abused: None     Physically abused: None     Forced sexual activity: None   Other Topics Concern     None   Social History Narrative     None     Past Surgical History:   Procedure Laterality Date     CARDIAC CATHETERIZATION  05/30/2017    No intervention     CARDIAC CATHETERIZATION N/A 5/30/2017    Procedure: Coronary Angiogram;  Surgeon: Torito Metzger MD;  Location: [facility];  Service:      LAPAROSCOPIC CHOLECYSTECTOMY N/A 2/23/2015    Procedure: CHOLECYSTECTOMY LAPAROSCOPIC;  Surgeon: Doug Webber MD;  Location: [facility];  Service:      TN CLOSED RX MANDIBLE FX       "Description: Closed Treatment Of Mandibular Fracture;  Recorded: 05/29/2008;     WA CONIZATION CERVIX,LOOP ELECTRD      Description: Cervical Conization Loop Electrode Excision;  Recorded: 05/29/2008;     WA DILATION/CURETTAGE,DIAGNOSTIC      Description: Dilation And Curettage;  Recorded: 05/29/2008;  Comments: X 2  metrorhhagia     No Known Allergies  Active Ambulatory Problems     Diagnosis Date Noted     Vitamin D Deficiency      Hyperlipidemia      Discoid Lupus Erythematosus      Pap Smear (+) Low Grade Squamous Intraepith Lesion W/ Atypia      Aortic Stenosis      Osteopenia 02/15/2015     Coronary artery disease due to calcified coronary lesion 05/05/2016     Aortic aneurysm (H) 04/29/2016     Family history of colon cancer 09/13/2017     Colorectal polyps 12/27/2017     History of abnormal cervical Pap smear 07/09/2018     Atypical squamous cell changes of undetermined significance (ASCUS) on cervical cytology with positive high risk human papilloma virus (HPV) 07/19/2018     Resolved Ambulatory Problems     Diagnosis Date Noted     Acute Sinusitis      Bronchitis      Breast Pain      Cough 04/29/2019     Past Medical History:   Diagnosis Date     Aortic Stenosis      Bicuspid aortic valve      Coronary artery disease due to calcified coronary lesion 5/5/2016     Discoid Lupus Erythematosus      High cholesterol      Hypercholesteremia      Hyperlipidemia      Osteopenia 2/15/2015     Pap Smear (+) Low Grade Squamous Intraepith Lesion W/ Atypia      Vitamin D deficiency        VITALS:  Vitals:    07/10/19 0842   BP: 119/80   Patient Site: Right Arm   Patient Position: Sitting   Cuff Size: Adult Regular   Pulse: 71   SpO2: 99%   Weight: 146 lb 8 oz (66.5 kg)   Height: 5' 2.75\" (1.594 m)     BP Readings from Last 3 Encounters:   07/10/19 119/80   06/19/19 108/74   05/16/19 122/70     Wt Readings from Last 3 Encounters:   07/10/19 146 lb 8 oz (66.5 kg)   06/19/19 145 lb 14.4 oz (66.2 kg)   05/16/19 146 lb " 12.8 oz (66.6 kg)     Body mass index is 26.16 kg/m .    PHYSICAL EXAM:  General Appearance: Alert, cooperative, no distress, appears stated age  Head: Normocephalic, without obvious abnormality, atraumatic  Eyes: PERRL, conjunctiva/corneas clear, EOM's intact  Ears: Normal TM's and external ear canals, both ears  Nose: Nares normal, septum midline,mucosa normal, no drainage  Throat: Lips, mucosa, and tongue normal; teeth and gums normal  Neck: Supple, symmetrical, trachea midline, no adenopathy;  thyroid: not enlarged, symmetric, no tenderness/mass/nodules;    Back: Symmetric, no curvature, ROM normal, no CVA tenderness  Lungs: Clear to auscultation bilaterally, respirations unlabored  Breasts: No breast masses, tenderness, asymmetry, or nipple discharge.  Heart: Regular rate and rhythm, S1 and S2 normal,  flow murmur,  Abdomen: Soft, non-tender, bowel sounds active all four quadrants,  no masses, no organomegaly  Pelvic:Normally developed genitalia with no external lesions or eruptions. Vagina and cervix show no lesions, inflammation, discharge or tenderness. No cystocele, No rectocele. Uterus tilted to the patient's left.  No adnexal mass or tenderness.    Extremities: Extremities normal, atraumatic, no cyanosis or edema  Skin: Skin color, texture, turgor normal, no rashes or lesions  Lymph nodes: Cervical, supraclavicular, and axillary nodes normal  Neurologic: Normal      MEDICATIONS:  Current Outpatient Medications   Medication Sig Dispense Refill     aspirin 81 mg chewable tablet Chew 1 tablet (81 mg total) daily.  0     atorvastatin (LIPITOR) 80 MG tablet Take 1 tablet (80 mg total) by mouth at bedtime. 90 tablet 3     cholecalciferol, vitamin D3, 2,000 unit Tab TAKE 1 TABLET BY MOUTH DAILY. 90 tablet 3     multivitamin capsule Take 1 capsule by mouth daily.        OMEGA 3-DHA-EPA-FISH OIL ORAL Take 1 capsule by mouth daily.       traZODone (DESYREL) 50 MG tablet Take 0.5 tablets (25 mg total) by mouth at  bedtime. 90 tablet 3     vitamin E 400 UNIT capsule Take 400 Units by mouth daily.        No current facility-administered medications for this visit.

## 2021-05-31 VITALS — HEIGHT: 63 IN | WEIGHT: 150 LBS | BODY MASS INDEX: 26.58 KG/M2

## 2021-05-31 VITALS — HEIGHT: 64 IN | BODY MASS INDEX: 24.59 KG/M2 | WEIGHT: 144 LBS

## 2021-05-31 VITALS — BODY MASS INDEX: 26.99 KG/M2 | WEIGHT: 153.6 LBS

## 2021-05-31 VITALS — HEIGHT: 63 IN | BODY MASS INDEX: 25.5 KG/M2 | WEIGHT: 143.9 LBS

## 2021-05-31 NOTE — TELEPHONE ENCOUNTER
RN cannot approve Refill Request    RN can NOT refill this medication med is not covered by policy/route to provider. Last office visit: 6/19/2019 Yolanda Gonzalez MD Last Physical: 7/10/2019 Last MTM visit: Visit date not found Last visit same specialty: 6/19/2019 Yolanda Gonzalez MD.  Next visit within 3 mo: Visit date not found  Next physical within 3 mo: Visit date not found      Jennifer Gibson, [facility] Triage/Med Refill 8/28/2019    Requested Prescriptions   Pending Prescriptions Disp Refills     ibuprofen (ADVIL,MOTRIN) 800 MG tablet [Pharmacy Med Name: IBUPROFEN 800 MG TABLET] 30 tablet 3     Sig: TAKE ONE TABLET BY MOUTH THREE TIMES DAILY AS NEEDED TAKE WITH FOOD       There is no refill protocol information for this order

## 2021-06-01 ENCOUNTER — HOSPITAL ENCOUNTER (OUTPATIENT)
Dept: MRI IMAGING | Facility: HOSPITAL | Age: 66
Discharge: HOME | End: 2021-06-01
Attending: INTERNAL MEDICINE
Payer: COMMERCIAL

## 2021-06-01 VITALS — HEIGHT: 63 IN | BODY MASS INDEX: 26.63 KG/M2 | WEIGHT: 150.3 LBS

## 2021-06-01 VITALS — HEIGHT: 62 IN | WEIGHT: 153 LBS | BODY MASS INDEX: 28.16 KG/M2

## 2021-06-01 VITALS — BODY MASS INDEX: 27.14 KG/M2 | WEIGHT: 153.2 LBS | HEIGHT: 63 IN

## 2021-06-01 DIAGNOSIS — I25.10 CORONARY ARTERY DISEASE DUE TO CALCIFIED CORONARY LESION: ICD-10-CM

## 2021-06-01 DIAGNOSIS — I25.84 CORONARY ARTERY DISEASE DUE TO CALCIFIED CORONARY LESION: ICD-10-CM

## 2021-06-01 DIAGNOSIS — I20.89 ANGINAL EQUIVALENT (H): ICD-10-CM

## 2021-06-01 DIAGNOSIS — I35.9 AORTIC VALVE DISORDER: ICD-10-CM

## 2021-06-01 DIAGNOSIS — I71.20 THORACIC AORTIC ANEURYSM WITHOUT RUPTURE (H): ICD-10-CM

## 2021-06-01 DIAGNOSIS — E78.00 HYPERCHOLESTEROLEMIA: ICD-10-CM

## 2021-06-01 DIAGNOSIS — I20.89 ANGINA OF EFFORT (H): ICD-10-CM

## 2021-06-01 LAB
ATRIAL RATE - MUSE: 67 BPM
ATRIAL RATE - MUSE: 73 BPM
CCTA EJECTION FRACTION: 91 %
CCTA INTERVENTRICULAR SETPUM: 0.8 CM (ref 0.6–1.1)
CCTA LEFT INTERNAL DIMENSION IN SYSTOLE: 2 CM (ref 2.1–4)
CCTA LEFT VENTRICULAR INTERNAL DIMENSION IN DIASTOLE: 4.3 CM (ref 3.5–6)
CCTA LEFT VENTRICULAR MASS: 96.78 G
CCTA POSTERIOR WALL: 0.7 CM (ref 0.6–1.1)
CREAT BLD-MCNC: 0.8 MG/DL (ref 0.6–1.1)
DIASTOLIC BLOOD PRESSURE - MUSE: NORMAL
DIASTOLIC BLOOD PRESSURE - MUSE: NORMAL
GFR SERPL CREATININE-BSD FRML MDRD: >60 ML/MIN/1.73M2
INTERPRETATION ECG - MUSE: NORMAL
INTERPRETATION ECG - MUSE: NORMAL
MR CARDIAC LEFT VENTRIAL CARDIAC INDEX: 1.8 L/MIN/M2 (ref 1.75–3.8)
MR CARDIAC LEFT VENTRICAL CARDIAC OUTPUT: 3.2 L/MIN (ref 2.8–8.8)
MR CARDIAC LEFT VENTRICULAR DIASTOLIC VOLUME INDEX: 36.28 ML/M2 (ref 41–81)
MR CARDIAC LEFT VENTRICULAR MASS INDEX: 51.83 G/M2 (ref 63–95)
MR CARDIAC LEFT VENTRICULAR MASS: 90 G (ref 75–175)
MR CARDIAC LEFT VENTRICULAR STROKE VOLUME INDEX: 27.07 ML/M2 (ref 26–56)
MR CARDIAC LEFT VENTRICULAR SYSTOLIC VOLUME INDEX: 9.21 ML/M2 (ref 12–20)
MR EJECTION FRACTION: 74.6 %
MR HEIGHT: 1.61 M
MR LEFT VENTRICULAR DYSTOLIC VOLUMEN: 63 ML (ref 52–141)
MR LEFT VENTRICULAR STROKE VOLUMEN: 47 ML (ref 33–97)
MR LEFT VENTRICULAR SYSTOLIC VOLUME: 16 ML (ref 13–51)
MR WEIGHT: 69.9 KG
P AXIS - MUSE: -2 DEGREES
P AXIS - MUSE: 16 DEGREES
PR INTERVAL - MUSE: 148 MS
PR INTERVAL - MUSE: 172 MS
QRS DURATION - MUSE: 70 MS
QRS DURATION - MUSE: 72 MS
QT - MUSE: 384 MS
QT - MUSE: 400 MS
QTC - MUSE: 405 MS
QTC - MUSE: 440 MS
R AXIS - MUSE: 21 DEGREES
R AXIS - MUSE: 9 DEGREES
SYSTOLIC BLOOD PRESSURE - MUSE: NORMAL
SYSTOLIC BLOOD PRESSURE - MUSE: NORMAL
T AXIS - MUSE: 48 DEGREES
T AXIS - MUSE: 49 DEGREES
VENTRICULAR RATE- MUSE: 67 BPM
VENTRICULAR RATE- MUSE: 73 BPM

## 2021-06-01 NOTE — PROGRESS NOTES
Assessment/Plan:     1. Upper respiratory tract infection, unspecified type  Continue symptomatic cares with Sudafed, Mucinex, and Tylenol.  I do recommend adding some Flonase nasal spray, 2 sprays into each nostril once daily.  Patient will make sure that she has not been using Afrin, as I would advise against this.  Given her previous history of pneumonia, I did decide to cover with an antibiotic.  Azithromycin x5 days.  Tessalon 3 times daily as needed for cough.  Follow-up in 10 to 14 days with worsening cough or if symptoms are not improving.  - azithromycin (ZITHROMAX Z-KRUNAL) 250 MG tablet; Take 2 tablets (500 mg) on  Day 1,  followed by 1 tablet (250 mg) once daily on Days 2 through 5.  Dispense: 6 tablet; Refill: 0  - benzonatate (TESSALON) 200 MG capsule; Take 1 capsule (200 mg total) by mouth 3 (three) times a day as needed for cough.  Dispense: 30 capsule; Refill: 0        Subjective:     Wendy Handy is a 64 y.o. female who presents with complaints of sinus congestion and cough x10 days.  Associated symptoms include a hoarse voice and sore throat that started yesterday.  Cough is productive at times.  Denies any wheezing or shortness of breath.  She has some left ear discomfort.  No fevers.  Patient had similar symptoms this spring, and had a prolonged, chronic cough.  A CT scan eventually showed a pneumonia, and she was treated with antibiotics.  Patient would like to avoid this if possible.  Over-the-counter treatments include Sudafed, Mucinex, and Tylenol.  She is also using a nasal spray, but is unsure of the name.      The following portions of the patient's history were reviewed and updated as appropriate: allergies, current medications.  Review of Systems  A comprehensive review of systems was performed and was otherwise negative    Objective:     /68   Pulse 100   Temp 98  F (36.7  C)   Wt 147 lb 14.4 oz (67.1 kg)   SpO2 100%   BMI 26.41 kg/m      General Appearance: Alert,  cooperative, no distress, appears stated age  Eyes: PERRL, conjunctiva/corneas clear, EOM's intact  Ears: Normal TM's and external ear canals, both ears  Nose: Nares normal, septum midline, mucosa edematous  Throat: Lips, mucosa, and tongue normal; teeth and gums normal  Neck: Supple, symmetrical, trachea midline, no adenopathy  Lungs: Clear to auscultation bilaterally, respirations unlabored  Heart: Regular rate and rhythm, S1 and S2 normal, no murmur, rub, or gallop    Shahana Parker, NP

## 2021-06-01 NOTE — PROGRESS NOTES
Pulmonary Clinic Visit    Cc: shortness of breath    HPI: 64 y.o. female with history of AS, CAD, Discoid Lupus who presents with cough. This begain with a cold when she was around an infant. It continued February-April. Had a clear Chest Xray but ongoing chest heaviness and cough. CT chest then found a LLL pneumonia and received azithromycin x2 rounds. Then in late June finished Advair and PPI.      Past Medical History:   Diagnosis Date     Aortic Stenosis     echo 1/09  4.2 cm TAA-MRA 4/2016       Bicuspid aortic valve      Coronary artery disease due to calcified coronary lesion 5/5/2016    Echo 6/2016   Good function  CT Ca++ 172 LAD  Cardiology consult 4/016-nuclear stress     Discoid Lupus Erythematosus     Created by [employer] [facility] Annotation: May 29 2008  9:49AM - Yolanda Gonzalez: Rheumatologist      Hypercholesteremia      Hyperlipidemia     Created by Conversion      Osteopenia 2/15/2015     Pap Smear (+) Low Grade Squamous Intraepith Lesion W/ Atypia      LGSIL 2002  colpoLGSIL 2003  colpoLGSIL 4/2006 colpoLGSIL 11/06 colpoAbnormal 5/07 colpo  CHCWLEEPendometrial biopsy 5/07      Vitamin D deficiency          Social History     Tobacco Use     Smoking status: Never Smoker     Smokeless tobacco: Never Used   Substance Use Topics     Alcohol use: Yes     Alcohol/week: 2.0 standard drinks     Types: 2 Cans of beer per week         Family History   Problem Relation Age of Onset     Mental illness Mother 30        schizophrenia, bipolar     Colon cancer Father      Heart disease Father      Mental illness Sister         depression     Diabetes Brother         type 2     Mental illness Brother         depression         Current Outpatient Medications   Medication Sig     aspirin 81 mg chewable tablet Chew 1 tablet (81 mg total) daily.     cholecalciferol, vitamin D3, 2,000 unit Tab TAKE 1 TABLET BY MOUTH DAILY.     multivitamin capsule Take 1 capsule by mouth daily.      OMEGA 3-DHA-EPA-FISH OIL ORAL  Take 1 capsule by mouth daily.     vitamin E 400 UNIT capsule Take 266 Units by mouth daily.            atorvastatin (LIPITOR) 80 MG tablet Take 1 tablet (80 mg total) by mouth at bedtime.     benzonatate (TESSALON) 200 MG capsule Take 1 capsule (200 mg total) by mouth 3 (three) times a day as needed for cough.     ibuprofen (ADVIL,MOTRIN) 800 MG tablet TAKE ONE TABLET BY MOUTH THREE TIMES DAILY AS NEEDED TAKE WITH FOOD     traZODone (DESYREL) 50 MG tablet TAKE ONE-HALF (1/2) TABLET AT BEDTIME       No Known Allergies      Review of Systems   Constitutional: Negative.    HENT: Negative.    Eyes: Negative.    Respiratory: Positive for cough.    Cardiovascular: Negative.    Gastrointestinal: Negative.    Endocrine: Negative.    Genitourinary: Negative.    Musculoskeletal: Negative.    Skin: Negative.    Allergic/Immunologic: Negative.    Neurological: Negative.    Hematological: Negative.    Psychiatric/Behavioral: Negative.        Vitals:    07/22/19 1046   BP: 106/68   Pulse: 76   Resp: 24   SpO2: 99%   RA  Physical Exam   Constitutional: She is oriented to person, place, and time. She appears well-developed and well-nourished.   HENT:   Head: Normocephalic and atraumatic.   Eyes: Conjunctivae and EOM are normal.   Neck: Normal range of motion. No tracheal deviation present.   Cardiovascular: Normal rate and regular rhythm.   Pulmonary/Chest: Effort normal. She has no wheezes. She has no rales.   Abdominal: Soft. There is no tenderness. Musculoskeletal: Normal range of motion.         General: No edema.     Lymphadenopathy:     She has no cervical adenopathy.   Neurological: She is alert and oriented to person, place, and time.   Skin: Skin is warm and dry.   Psychiatric: She has a normal mood and affect. Her behavior is normal. Thought content normal.     PFT personally reviewed at length with patient  The spirometry was performed with good reproducibility.  The flow volume loop is essentially normal.     FEV1 is  2.53 L (110% predicted) and is normal.  FVC is 3.55 L (122% predicted) and is normal.  FEV1/FVC is 71% and is normal.     There was no improvement in spirometry after a single inhaled dose of bronchodilator.     TLC is 4.91 L (103% predicted) and is normal.  RV is 1.78 L (93% predicted) and is normal.     DLCO is 22.32 mL/min/mmHg (108% predicted) and is normal when it is corrected for hemoglobin.     Impression:  This pulmonary function study is normal.  Spirometry, lung volume measurements, and diffusion capacity (when corrected for hemoglobin) are normal.  There was no bronchodilator response.          Assessment/Plan  64yoF with cough that has now resolved and evidence of pneumonia in the past. There is no evidence of underlying lung disease. She was reassured by this. Annual flu shot, no additional recommendations currently. Welcome to return if new symptoms.     Tammy Bolton MD  Electronically signed

## 2021-06-02 ENCOUNTER — RECORDS - HEALTHEAST (OUTPATIENT)
Dept: ADMINISTRATIVE | Facility: CLINIC | Age: 66
End: 2021-06-02

## 2021-06-03 VITALS — WEIGHT: 147.5 LBS | HEIGHT: 63 IN | BODY MASS INDEX: 26.13 KG/M2

## 2021-06-03 VITALS
WEIGHT: 147.9 LBS | BODY MASS INDEX: 26.41 KG/M2 | DIASTOLIC BLOOD PRESSURE: 68 MMHG | HEART RATE: 100 BPM | OXYGEN SATURATION: 100 % | TEMPERATURE: 98 F | SYSTOLIC BLOOD PRESSURE: 106 MMHG

## 2021-06-03 VITALS — BODY MASS INDEX: 26 KG/M2 | WEIGHT: 146.8 LBS

## 2021-06-03 VITALS — BODY MASS INDEX: 25.96 KG/M2 | HEIGHT: 63 IN | WEIGHT: 146.5 LBS

## 2021-06-03 VITALS — HEIGHT: 63 IN | BODY MASS INDEX: 26.58 KG/M2 | WEIGHT: 150 LBS

## 2021-06-03 VITALS — BODY MASS INDEX: 25.85 KG/M2 | WEIGHT: 145.9 LBS

## 2021-06-03 VITALS — WEIGHT: 147 LBS | HEIGHT: 63 IN | BODY MASS INDEX: 26.05 KG/M2

## 2021-06-04 VITALS
DIASTOLIC BLOOD PRESSURE: 68 MMHG | BODY MASS INDEX: 25.88 KG/M2 | SYSTOLIC BLOOD PRESSURE: 118 MMHG | WEIGHT: 151.6 LBS | HEIGHT: 64 IN | HEART RATE: 74 BPM

## 2021-06-04 VITALS
BODY MASS INDEX: 27.78 KG/M2 | DIASTOLIC BLOOD PRESSURE: 73 MMHG | OXYGEN SATURATION: 95 % | WEIGHT: 155.6 LBS | HEART RATE: 105 BPM | SYSTOLIC BLOOD PRESSURE: 112 MMHG | TEMPERATURE: 98.2 F

## 2021-06-04 VITALS — TEMPERATURE: 98.1 F | BODY MASS INDEX: 27.32 KG/M2 | WEIGHT: 153 LBS

## 2021-06-05 VITALS
DIASTOLIC BLOOD PRESSURE: 72 MMHG | HEART RATE: 64 BPM | BODY MASS INDEX: 27.32 KG/M2 | RESPIRATION RATE: 16 BRPM | HEIGHT: 63 IN | WEIGHT: 154.2 LBS | SYSTOLIC BLOOD PRESSURE: 102 MMHG

## 2021-06-05 VITALS
WEIGHT: 153.1 LBS | DIASTOLIC BLOOD PRESSURE: 60 MMHG | BODY MASS INDEX: 27.12 KG/M2 | HEART RATE: 64 BPM | SYSTOLIC BLOOD PRESSURE: 126 MMHG | HEIGHT: 63 IN

## 2021-06-05 VITALS
SYSTOLIC BLOOD PRESSURE: 118 MMHG | DIASTOLIC BLOOD PRESSURE: 62 MMHG | HEART RATE: 88 BPM | WEIGHT: 154.9 LBS | HEIGHT: 63 IN | BODY MASS INDEX: 27.45 KG/M2

## 2021-06-05 VITALS
SYSTOLIC BLOOD PRESSURE: 106 MMHG | BODY MASS INDEX: 26.76 KG/M2 | HEART RATE: 81 BPM | DIASTOLIC BLOOD PRESSURE: 73 MMHG | WEIGHT: 153.5 LBS

## 2021-06-05 VITALS
WEIGHT: 153.5 LBS | HEART RATE: 72 BPM | DIASTOLIC BLOOD PRESSURE: 68 MMHG | HEIGHT: 63 IN | SYSTOLIC BLOOD PRESSURE: 124 MMHG | BODY MASS INDEX: 27.2 KG/M2

## 2021-06-06 NOTE — PROGRESS NOTES
Chief complaint: Sinus congestion and cough    HPI: The patient is been sick for about a week and she has a heavy chest and a cough once in a while she coughs so hard she would vomit.  She has been trying Sudafed and Mucinex and aspirin and cough drops.    Last year she had the same illness and she waited too long to seek assistance and by the time she came in she was pretty ill and ended up having a chest x-ray that was mildly abnormal and a CT that showed some atelectasis and pneumonia.  She had to have 2 rounds of Zithromax antibiotic and she also had to have an Advair inhaler that eventually worked.    She has not had a fever she has had some ear pain she has not had diarrhea or any other constitutional symptoms.  She does not typically need to have an inhaler but she did with her last illness.    Objective:/73   Pulse (!) 105   Temp 98.2  F (36.8  C) (Oral)   Wt 155 lb 9.6 oz (70.6 kg)   SpO2 95%   BMI 27.78 kg/m    She is in no acute distress.  She is wearing glasses but her conjunctiva are clear.  TMs are pearly gray.  Throat is clear nasal mucosa are fairly unremarkable.  She does not have maxillary sinus tenderness but she does have frontal sinus tenderness.  Neck is supple without lymphadenopathy lungs are clear to auscultation cardiac exam is unremarkable.    I gave her an albuterol nebulizer treatment and it did improve her aeration I still could not hear any localized atelectasis or consolidation but we decided to be aggressive with treatment    Assessment: Bronchitis  Cough  Sinus congestion    Plan: She will continue her Sudafed and Mucinex.  I will do Tessalon Perles 3 times a day for cough suppression.  I will do an Advair type inhaler.  I worked with the pharmacist to try to find something that would cover by the insurance with a long-acting beta agonist and steroid.  And then will also do Zithromax so I can be more aggressive so does not get as bad as it was last year and she was  comfortable with that.

## 2021-06-08 NOTE — PROGRESS NOTES
Subjective:      Patient ID: Wendy Handy is a 61 y.o. female.    Chief Complaint:    HPI  Wendy Handy is a 61 y.o. female who presents today complaining of chest pain.  She reports she has been having pain across her upper back for about two weeks.  That pain is radiating into the right arm.  The pain has been present constantly across the back.  She started having mid-chest pressure and tightness yesterday and then those symptoms are much worse today.  The symptoms are constant today and increasing in nature.  She is feeling very tired with the symptoms.  She is not having any shortness of breath.  She has a known aortic aneurysm (3.9 cm in 5/2016) as well as known CAD.  No previous MI.       Past Medical History   Diagnosis Date     Aortic Stenosis      Created by [employer] Annotation: Jun 6 2011  8:55AM -  ,  : echo 1/09  4.2 cm TAA-MRA 4/2016  Replacement Utility updated for latest IMO load     Bicuspid aortic valve      Coronary artery disease due to calcified coronary lesion 5/5/2016     Echo 6/2016   Good function  CT Ca++ 172 LAD  Cardiology consult 4/016-nuclear stress     Discoid Lupus Erythematosus      Created by [employer] Annotation: May 29 2008  9:49AM - Yolanda Gonzalez: Rheumatologist      Hypercholesteremia      Hyperlipidemia      Created by Conversion      Osteopenia 2/15/2015     Pap Smear (+) Low Grade Squamous Intraepith Lesion W/ Atypia      Created by [employer] Annotation: May 29 2008  9:49AM Yolanda Cherry: LGSIL 2002  colpoLGSIL 2003  colpoLGSIL 4/2006 colpoLGSIL 11/06 colpoAbnormal 5/07 colpo  CHCWLEEPendometrial biopsy 5/07      Vitamin D deficiency      Created by Conversion  Replacement Utility updated for latest IMO load       Past Surgical History   Procedure Laterality Date     Pr conization cervix,loop electrd       Description: Cervical Conization Loop Electrode Excision;  Recorded: 05/29/2008;     Pr dilation/curettage,diagnostic        Description: Dilation And Curettage;  Recorded: 05/29/2008;  Comments: X 2  metrorhhagia     Pr closed rx mandible fx       Description: Closed Treatment Of Mandibular Fracture;  Recorded: 05/29/2008;     Laparoscopic cholecystectomy N/A 2/23/2015     Procedure: CHOLECYSTECTOMY LAPAROSCOPIC;  Surgeon: Doug Webber MD;  Location: [facility] OR;  Service:        Family History   Problem Relation Age of Onset     Mental illness Mother 30     schizophrenia, bipolar     Heart disease Father      CABG, angioplasty     Colon cancer Father      Mental illness Sister      depression     Diabetes Brother      type 2     Mental illness Brother      depression       Social History   Substance Use Topics     Smoking status: Never Smoker     Smokeless tobacco: Never Used     Alcohol use Yes       Review of Systems    Objective:     Visit Vitals     /70     Pulse 74     Temp 98.1  F (36.7  C) (Oral)     Wt 141 lb 9.6 oz (64.2 kg)     SpO2 98%     BMI 24.31 kg/m2       Physical Exam   Constitutional: She appears well-developed and well-nourished. No distress.   Cardiovascular: Normal rate and regular rhythm.    No murmur heard.  Pulmonary/Chest: Effort normal and breath sounds normal. No respiratory distress. She has no wheezes. She has no rales.   Her pain is at least partially reproducible with palpation over her mid sternal area.   Musculoskeletal: She exhibits no edema.     Procedures    Results for orders placed or performed in visit on 01/31/17   Electrocardiogram Perform - Clinic   Result Value Ref Range    SYSTOLIC BLOOD PRESSURE  mmHg    DIASTOLIC BLOOD PRESSURE  mmHg    VENTRICULAR RATE 73 BPM    ATRIAL RATE 73 BPM    P-R INTERVAL 172 ms    QRS DURATION 74 ms    Q-T INTERVAL 380 ms    QTC CALCULATION (BEZET) 418 ms    P Axis 71 degrees    R AXIS 36 degrees    T AXIS 67 degrees    MUSE DIAGNOSIS       Normal sinus rhythm  Normal ECG  When compared with ECG of 05-MAY-2016 13:31,  No significant change was  found        EKG personally reviewed.    Assessment / Plan:     1. Chest pain  Electrocardiogram Perform - Clinic       I am most suspicious that her pain is musculoskeletal but given the increasing chest pain, shortness of breath, and her history she needs more work up than can be done in the clinic.  I have spoken with a provider in [facility] ER who is agreeable to accepting the patient in transfer.  She will proceed directly to the ER with her .

## 2021-06-08 NOTE — PROGRESS NOTES
Review of Systems - History obtained from the patient  General ROS: negative  Psychological ROS: negative  Ophthalmic ROS: negative  ENT ROS: negative  Hematological and Lymphatic ROS: positive for - easy bruising  Respiratory ROS: negative  Cardiovascular ROS: negative  Gastrointestinal ROS: negative  Genito-Urinary ROS: negative  Musculoskeletal ROS: positive for - muscle pain - sciatica pain   Neurological ROS: negative  Dermatological ROS: negative

## 2021-06-09 NOTE — TELEPHONE ENCOUNTER
Patient wondering if she should be tested.  Son spouse was exposed to a positive client (both wearing masks).  Happened last Tuesday.     He was tested today. Neither son or spouse have symptoms at this time.     Advised home care per protocol.      Bianka Griffin RN/[facility]    COVID 19 Nurse Triage Plan/Patient Instructions    Please be aware that novel coronavirus (COVID-19) may be circulating in the community. If you develop symptoms such as fever, cough, or SOB or if you have concerns about the presence of another infection including coronavirus (COVID-19), please contact your health care provider or visit [email or web address].     Disposition/Instructions    Patient to stay at home and follow home care protocol based instructions.    Thank you for taking steps to prevent the spread of this virus.  o Limit your contact with others.  o Wear a simple mask to cover your cough.  o Wash your hands well and often.    Resources    [facility]: About COVID-19: [email or web address]    CDC: What to Do If You're Sick: www.cdc.gov/coronavirus/2019-ncov/about/steps-when-sick.html    CDC: Ending Home Isolation: www.cdc.gov/coronavirus/2019-ncov/hcp/disposition-in-home-patients.html     CDC: Caring for Someone: www.cdc.gov/coronavirus/2019-ncov/if-you-are-sick/care-for-someone.html     [facility]: Interim Guidance for Hospital Discharge to Home: www.health.[facility].[place].us/diseases/coronavirus/hcp/hospdischarge.pdf    [facility] clinical trials (COVID-19 research studies): [email or web address]-clinical-trials     Below are the COVID-19 hotlines at the [facility] ([facility]). Interpreters are available.   o For health questions: Call 380-118-0956 or 1-485.580.6419 (7 a.m. to 7 p.m.)  o For questions about schools and childcare: Call 138-419-4566 or 1-255.936.1810 (7 a.m. to 7 p.m.)         Reason for Disposition    [1] No COVID-19 EXPOSURE BUT [2] living with someone  who was exposed and who has no symptoms of COVID-19    Additional Information    Negative: COVID-19 has been diagnosed by a healthcare provider (HCP)    Negative: COVID-19 lab test positive    Negative: [1] Symptoms of COVID-19 (e.g., cough, fever, SOB, or others) AND [2] lives in an area with community spread    Negative: [1] Symptoms of COVID-19 (e.g., cough, fever, SOB, or others) AND [2] within 14 days of EXPOSURE (close contact) with diagnosed or suspected COVID-19 patient    Negative: [1] Symptoms of COVID-19 (e.g., cough, fever, SOB, or others) AND [2] within 14 days of travel from high-risk area for COVID-19 community spread (identified by CDC)    Negative: [1] Difficulty breathing (shortness of breath) occurs AND [2] onset > 14 days after COVID-19 EXPOSURE (Close Contact) AND [3] no community spread where patient lives    Negative: [1] Dry cough occurs AND [2] onset > 14 days after COVID-19 EXPOSURE AND [3] no community spread where patient lives    Negative: [1] Wet cough (i.e., white-yellow, yellow, green, or olman colored sputum) AND [2] onset > 14 days after COVID-19 EXPOSURE AND [3] no community spread where patient lives    Negative: [1] Common cold symptoms AND [2] onset > 14 days after COVID-19 EXPOSURE AND [3] no community spread where patient lives    Negative: [1] COVID-19 EXPOSURE (Close Contact) within last 14 days AND [2] needs COVID-19 lab test to return to work AND [3] NO symptoms    Negative: [1] COVID-19 EXPOSURE (Close Contact) within last 14 days AND [2] exposed 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 healthcare worker who was NOT using all recommended personal protective equipment (i.e., a respirator-N95 mask, eye protection, gloves, and gown) AND [3] NO symptoms    Negative: [1] COVID-19 EXPOSURE (Close Contact) AND [2] within last 14 days BUT [3] NO symptoms    Negative: [1] COVID-19 EXPOSURE AND [2] 15 or more days ago AND [3] NO symptoms    Negative: [1] Living in area with community spread (identified by  local PHD) BUT [2] NO symptoms    Negative: [1] Travel from area with community spread (identified by CDC) AND [2] within last 14 days BUT [3] NO symptoms    Protocols used: CORONAVIRUS (COVID-19) EXPOSURE-A- 5.16.20

## 2021-06-09 NOTE — TELEPHONE ENCOUNTER
Refill Approved    Rx renewed per Medication Renewal Policy. Medication was last renewed on 5/6/20.    Sarah Bates, [facility] Triage/Med Refill 6/29/2020     Requested Prescriptions   Pending Prescriptions Disp Refills     atorvastatin (LIPITOR) 80 MG tablet [Pharmacy Med Name: ATORVASTATIN CALCIUM 80 MG Tablet] 90 tablet 0     Sig: TAKE 1 TABLET (80 MG TOTAL) BY MOUTH AT BEDTIME.(NEED TO BE SEEN FOR PHYSICAL)       Statins Refill Protocol (Hmg CoA Reductase Inhibitors) Passed - 6/27/2020  1:32 PM        Passed - PCP or prescribing provider visit in past 12 months      Last office visit with prescriber/PCP: 2/20/2020 Yolanda Gonzalez MD OR same dept: 2/20/2020 Yolanda Gonzalez MD OR same specialty: 2/20/2020 Yolanda Gonzalez MD  Last physical: 7/10/2019 Last MTM visit: Visit date not found   Next visit within 3 mo: Visit date not found  Next physical within 3 mo: Visit date not found  Prescriber OR PCP: Yolanda Gonzalez MD  Last diagnosis associated with med order: 1. Hyperlipidemia, unspecified hyperlipidemia type  - atorvastatin (LIPITOR) 80 MG tablet [Pharmacy Med Name: ATORVASTATIN CALCIUM 80 MG Tablet]; TAKE 1 TABLET (80 MG TOTAL) BY MOUTH AT BEDTIME.(NEED TO BE SEEN FOR PHYSICAL)  Dispense: 90 tablet; Refill: 0    If protocol passes may refill for 12 months if within 3 months of last provider visit (or a total of 15 months).

## 2021-06-09 NOTE — PROGRESS NOTES
ASSESSMENT:  1. Routine history and physical examination of adult     - Gynecologic Cytology (PAP Smear)    2. Vitamin D deficiency     - Vitamin D, Total (25-Hydroxy)    3. Systemic lupus erythematosus, unspecified SLE type, unspecified organ involvement status (H)     - HM2(CBC w/o Differential)    4. Hyperlipidemia, unspecified hyperlipidemia type     - Comprehensive Metabolic Panel  - Lipid Cascade    5. Aortic Stenosis   saw Cardiologist May 2020  - HM2(CBC w/o Differential)    6. Coronary artery disease due to calcified coronary lesion     - HM2(CBC w/o Differential)    7. Family history of colon cancer   colonoscopy up to date  - HM2(CBC w/o Differential)    8. Encounter for gynecological examination (general) (routine) with abnormal findings   History of 2 abnormals in the last 5-6 years requiring colposcopy  - Gynecologic Cytology (PAP Smear)           PLAN:  There are no Patient Instructions on file for this visit.    Orders Placed This Encounter   Procedures     Pneumococcal polysaccharide vaccine 23-valent greater than or equal to 3yo subcutaneous/IM     Comprehensive Metabolic Panel     Lipid Cascade     Order Specific Question:   Fasting is required?     Answer:   Yes     2(CBC w/o Differential)     Vitamin D, Total (25-Hydroxy)     Medications Discontinued During This Encounter   Medication Reason     budesonide-formoteroL (SYMBICORT) 80-4.5 mcg/actuation inhaler Therapy completed     fluticasone propion-salmeteroL (ADVAIR DISKUS) 250-50 mcg/dose DISKUS Therapy completed       No follow-ups on file.    Health Maintenance Due   Topic Date Due     HIV SCREENING  04/22/1970     CORONARY ARTERY DISEASE FOLLOW-UP  10/29/2019     PNEUMOCOCCAL IMMUNIZATION 65+ LOW/MEDIUM RISK (1 of 2 - PCV13) 04/22/2020     MEDICARE ANNUAL WELLNESS VISIT  07/10/2020       CHIEF COMPLAINT:  Chief Complaint   Patient presents with     Annual Exam       HISTORY OF PRESENT ILLNESS:  Wendy Handy is a 65 y.o. female presenting  to the clinic today for a physical exam.     She was here early so she had her fasting labs completed.  She had a mammogram completed prior to coming to see me.  Those results are still pending.    She saw her cardiologist in May because of her bicuspid aortic stenosis and ascending aortic aneurysm that was discovered on a CT calcium score.  I use the CT calcium score for risk stratification and since she has some coronary artery disease she is on aggressive treatment with atorvastatin 80 mg a day.  She has not had a problem with her lupus.  She feels well.    She reports that about twice a year she gets a significant bronchial infection that does not clear unless she uses antibiotics.    We will give her her Pneumovax today.  She sees her eye doctor and her dentist.  She does not need a bone density until next year.    She is requesting a Pap smear today because she has had some abnormals in the past which required colposcopy.    Healthy Habits  Are you taking a daily aspirin? Yes  Do you typically exercising at least 40 min, 3-4 times per week?  Yes  Do you usually eat at least 4 servings of fruit and vegetables a day, include whole grains and fiber and avoid regularly eating high fat foods? NO  Have you had an eye exam in the past two years? Yes  Do you see a dentist twice per year? Yes  Do you have any concerns regarding sleep? No  Do you drink caffeine? YES    Safety Screen  If you own firearms, are they secured in a locked gun cabinet or with trigger locks? Yes    REVIEW OF SYSTEMS:   All other systems are negative.    Immunization History   Administered Date(s) Administered     Hep A / Hep B 05/23/2013     Hep A, historic 04/25/2007, 10/12/2007     Influenza, inj, historic,unspecified 10/12/2007, 10/01/2013, 10/07/2017     Influenza,seasonal quad, PF, =/> 6months 09/20/2019     Td,adult,historic,unspecified 02/25/2005     Tdap 06/02/2010, 07/10/2019     ZOSTER, LIVE 06/24/2015     ZOSTER, RECOMBINANT, IM  2019, 2019       GYNECOLOGIC HISTORY:  Last menstrual period: No LMP recorded. Patient is postmenopausal.  Contraception: postmenapausal  Last Pap:  Results were: normal  Last mammogram:   Results were: normal    OB History        2    Para   2    Term   2            AB        Living           SAB        TAB        Ectopic        Multiple        Live Births                     PFSH:     Social History     Tobacco Use   Smoking Status Never Smoker   Smokeless Tobacco Never Used     Family History   Problem Relation Age of Onset     Mental illness Mother 30        schizophrenia, bipolar     Colon cancer Father      Heart disease Father      Mental illness Sister         depression     Diabetes Brother         type 2     Mental illness Brother         depression     Social History     Socioeconomic History     Marital status:      Spouse name: None     Number of children: None     Years of education: None     Highest education level: None   Occupational History     None   Social Needs     Financial resource strain: None     Food insecurity     Worry: None     Inability: None     Transportation needs     Medical: None     Non-medical: None   Tobacco Use     Smoking status: Never Smoker     Smokeless tobacco: Never Used   Substance and Sexual Activity     Alcohol use: Yes     Alcohol/week: 2.0 standard drinks     Types: 2 Cans of beer per week     Drug use: No     Sexual activity: Not Currently     Partners: Male     Birth control/protection: Abstinence   Lifestyle     Physical activity     Days per week: None     Minutes per session: None     Stress: None   Relationships     Social connections     Talks on phone: None     Gets together: None     Attends [employer] service: None     Active member of club or organization: None     Attends meetings of clubs or organizations: None     Relationship status: None     Intimate partner violence     Fear of current or ex partner: None      Emotionally abused: None     Physically abused: None     Forced sexual activity: None   Other Topics Concern     None   Social History Narrative     None     Past Surgical History:   Procedure Laterality Date     CARDIAC CATHETERIZATION  05/30/2017    No intervention     CARDIAC CATHETERIZATION N/A 5/30/2017    Procedure: Coronary Angiogram;  Surgeon: Torito Metzger MD;  Location: [facility];  Service:      LAPAROSCOPIC CHOLECYSTECTOMY N/A 2/23/2015    Procedure: CHOLECYSTECTOMY LAPAROSCOPIC;  Surgeon: Doug Webber MD;  Location: [facility];  Service:      WA CLOSED RX MANDIBLE FX      Description: Closed Treatment Of Mandibular Fracture;  Recorded: 05/29/2008;     WA CONIZATION CERVIX,LOOP ELECTRD      Description: Cervical Conization Loop Electrode Excision;  Recorded: 05/29/2008;     WA DILATION/CURETTAGE,DIAGNOSTIC      Description: Dilation And Curettage;  Recorded: 05/29/2008;  Comments: X 2  metrorhhagia     No Known Allergies  Active Ambulatory Problems     Diagnosis Date Noted     Vitamin D Deficiency      Hyperlipidemia      Discoid Lupus Erythematosus      Pap Smear (+) Low Grade Squamous Intraepith Lesion W/ Atypia      Aortic Stenosis      Osteopenia 02/15/2015     Coronary artery disease due to calcified coronary lesion 05/05/2016     Aortic aneurysm (H) 04/29/2016     Family history of colon cancer 09/13/2017     Colorectal polyps 12/27/2017     History of abnormal cervical Pap smear 07/09/2018     Atypical squamous cell changes of undetermined significance (ASCUS) on cervical cytology with positive high risk human papilloma virus (HPV) 07/19/2018     Resolved Ambulatory Problems     Diagnosis Date Noted     Acute Sinusitis      Bronchitis      Breast Pain      Cough 04/29/2019     Past Medical History:   Diagnosis Date     Bicuspid aortic valve      Hypercholesteremia      Hyperlipidemia        VITALS:  Vitals:    07/13/20 0803   BP: 118/68   Patient Site: Right Arm  "  Patient Position: Sitting   Cuff Size: Adult Regular   Pulse: 74   Weight: 151 lb 9.6 oz (68.8 kg)   Height: 5' 3.5\" (1.613 m)     BP Readings from Last 3 Encounters:   07/13/20 118/68   05/20/20 92/60   02/20/20 112/73     Wt Readings from Last 3 Encounters:   07/13/20 151 lb 9.6 oz (68.8 kg)   05/13/20 153 lb (69.4 kg)   02/20/20 155 lb 9.6 oz (70.6 kg)     Body mass index is 26.43 kg/m .    PHYSICAL EXAM:  General Appearance: Alert, cooperative, no distress, appears stated age  Head: Normocephalic, without obvious abnormality, atraumatic  Eyes: PERRL, conjunctiva/corneas clear, EOM's intact. Glasses   Ears: Normal TM's and external ear canals, both ears  Nose: Nares normal, septum midline,mucosa normal, no drainage  Throat: Lips, mucosa, and tongue normal; teeth and gums normal  Neck: Supple, symmetrical, trachea midline, no adenopathy;  thyroid: not enlarged, symmetric, no tenderness/mass/nodules;    Back: Symmetric, no curvature, ROM normal, no CVA tenderness  Lungs: Clear to auscultation bilaterally, respirations unlabored  Breasts: No breast masses, tenderness, asymmetry, or nipple discharge.  Heart: Regular rate and rhythm, S1 and S2 normal, no murmur, rub, or gallop, Abdomen: Soft, non-tender, bowel sounds active all four quadrants,  no masses, no organomegaly  Pelvic:Normally developed genitalia with no external lesions or eruptions. Vagina and cervix show no lesions, inflammation, discharge or tenderness. No cystocele, No rectocele. Uterus nontender.  No adnexal mass or tenderness.    Extremities: Extremities normal, atraumatic, no cyanosis or edema  Skin: Skin color, texture, turgor normal, no rashes or lesions  Lymph nodes: Cervical, supraclavicular, and axillary nodes normal  Neurologic: Normal       QUALITY MEASURES:        MEDICATIONS:  Current Outpatient Medications   Medication Sig Dispense Refill     ascorbic acid, vitamin C, (VITAMIN C) 500 MG tablet Take 500 mg by mouth daily.       aspirin 81 " mg chewable tablet Chew 1 tablet (81 mg total) daily.  0     atorvastatin (LIPITOR) 80 MG tablet TAKE 1 TABLET (80 MG TOTAL) BY MOUTH AT BEDTIME.(NEED TO BE SEEN FOR PHYSICAL) 90 tablet 2     calcium citrate/vitamin D3 (CITRACAL + D ORAL) Take by mouth daily.       cholecalciferol, vitamin D3, 2,000 unit Tab TAKE 1 TABLET BY MOUTH DAILY. 90 tablet 3     ibuprofen (ADVIL,MOTRIN) 800 MG tablet TAKE ONE TABLET BY MOUTH THREE TIMES DAILY AS NEEDED TAKE WITH FOOD 30 tablet 3     magnesium oxide 250 mg magnesium Tab Take by mouth.       OMEGA 3-DHA-EPA-FISH OIL ORAL Take 1 capsule by mouth daily.       traZODone (DESYREL) 50 MG tablet TAKE ONE-HALF (1/2) TABLET AT BEDTIME 90 tablet 0     vitamin E 400 UNIT capsule Take 400 Units by mouth daily.        No current facility-administered medications for this visit.

## 2021-06-10 NOTE — PROGRESS NOTES
Cardiology Progress Note    Assessment:  Coronary artery disease, mild, nonobstructive  Chest pain, atypical, nonexertional  Bicuspid aortic valve with mild stenosis, asymptomatic  Mild dilatation of the ascending aorta(3.9 cm by MRI in 2016)  Hyperlipidemia on atorvastatin    Plan:  Stress echo to evaluate atypical chest pain.    I discussed with her results of last year stress test, cardiac MR.  I reassured her that aortic valve although bicuspid is functionally near normal.  Likewise degree of aortic dilatation is unlikely to be a cause for any immediate concern    Routine follow-up in 1 year    Subjective:   This is 62 y.o. female who comes in today for follow-up visit.  She reports no exertional chest symptoms.  She has not had PND orthopnea.  She denies syncope.  She has had nonexertional short-lived pains in her back and front of her chest.  One time she went to the emergency room.  She had negative evaluation.  Past Medical History   Diagnosis Date     Hypercholesteremia        History            Social History     Marital status:        Spouse name: N/A     Number of children: N/A     Years of education: N/A          Occupational History      unemployed at present time              Social History Main Topics     Smoking status: Never Smoker     Smokeless tobacco: Never Used     Alcohol use: Yes      Drug use: No     Sexual activity: Not Currently       Partners: Male           Other Topics Concern     Not on file      Social History Narrative             Family History   Problem Relation Age of Onset     Mental illness Mother 30       schizophrenia, bipolar     Heart disease Father         CABG, angioplasty     Colon cancer Father       Mental illness Sister         depression     Diabetes Brother         type 2     Mental illness Brother         depression       Review of Systems:   General: WNL  Eyes: WNL  Ears/Nose/Throat: WNL  Lungs: WNL  Heart: WNL  Stomach: WNL  Bladder: WNL  Muscle/Joints:  "WNL  Skin: WNL  Nervous System: WNL  Mental Health: WNL     Blood: WNL    Objective:   /62 (Patient Site: Right Arm, Patient Position: Sitting, Cuff Size: Adult Regular)  Pulse 80  Resp 16  Ht 5' 4\" (1.626 m)  Wt 148 lb (67.1 kg)  BMI 25.4 kg/m2  Physical Exam:  GENERAL: no distress  NECK: No JVD  LUNGS: Clear to auscultation.  CARDIAC: regular rhythm, S1 & S2 normal.  No heaves, thrills, gallops or murmurs.  ABDOMEN: flat, negative hepatosplenomegaly, soft and non-tender.  EXTREMITIES: No evidence of cyanosis, clubbing or edema.    Current Outpatient Prescriptions   Medication Sig Dispense Refill     atorvastatin (LIPITOR) 40 MG tablet Take 1 tablet (40 mg total) by mouth daily. 90 tablet 0     cholecalciferol, vitamin D3, (VITAMIN D3) 1,000 unit capsule Take 1,000 Units by mouth daily.       ibuprofen (ADVIL,MOTRIN) 800 MG tablet TAKE ONE TABLET BY MOUTH THREE TIMES DAILY AS NEEDED TAKE WITH FOOD 90 tablet 1     multivitamin capsule Take 1 capsule by mouth daily.        OMEGA 3-DHA-EPA-FISH OIL ORAL Take 1 capsule by mouth daily.       traZODone (DESYREL) 50 MG tablet Take 0.5-1 tablets (25-50 mg total) by mouth bedtime as needed for sleep. 90 tablet 0     vitamin E 400 UNIT capsule Take 400 Units by mouth daily.        naproxen (NAPROSYN) 500 MG tablet Take 1 tablet (500 mg total) by mouth 2 (two) times a day with meals. 30 tablet 0     No current facility-administered medications for this visit.        Cardiographics:      Echocardiogram: May 2016  1. Normal left ventricular size and systolic performance. The ejection  fraction is estimated to be 60%.  2. There is mild aortic stenosis.  3. There is trace aortic insufficiency.    Stress Test: May 2016  1. Exercise stress nuclear study is negative for inducible myocardial ischemia or infarction.   2. Normal resting left ventricular ejection fraction of greater than 70%.    Coronary calcium score: 2016  The total Agatston calcium score is 172 which is " located 10 in left main,  162 in LAD. A total Agatston calcium score of 172 places this patient in the  intermediate risk for cardiac events in the next 10 years when compared to an age  and gender match control group.     In addition, ascending aorta is mildly enlarged with measurement of 4.2 cm.     Radiology review for incidental noncardiac findings are under separate report.     COMMENTS: Since the patient has multiple small calcified plaques in the LAD and  also small plaque in the left main, recommended aggressive risk factor control for  the cardiac evaluation if the patient has clinical symptoms.     Cardiac MR: May 2016  1. Normal left ventricular size, wall thickness and function. The quantified left ventricular ejection fraction is 70%. No myocardial scar is identified.   2. Normal right ventricular size function.   3. A congenital bicuspid aortic valve with fusion of right and the left coronary cuspid. The peak velocity of aortic valve is 1.75 m/s. No aortic valve regurgitation.  4. Mildly dilated mid ascending aorta with maximal measurement 3.9 cm in size. No evidence of aortic dissection or coarctation  Lab Results:       Lab Results   Component Value Date    CHOL 171 06/27/2016    CHOL 165 04/27/2016    CHOL 198 06/24/2015     Lab Results   Component Value Date    HDL 66 06/27/2016    HDL 62 04/27/2016    HDL 73 06/24/2015     Lab Results   Component Value Date    LDLCALC 82 06/27/2016    LDLCALC 85 04/27/2016    LDLCALC 110 06/24/2015     Lab Results   Component Value Date    TRIG 115 06/27/2016    TRIG 90 04/27/2016    TRIG 74 06/24/2015       Arben Guerra MD ([facility])

## 2021-06-11 ENCOUNTER — RECORDS - HEALTHEAST (OUTPATIENT)
Dept: FAMILY MEDICINE | Facility: CLINIC | Age: 66
End: 2021-06-11

## 2021-06-11 ENCOUNTER — OFFICE VISIT - HEALTHEAST (OUTPATIENT)
Dept: FAMILY MEDICINE | Facility: CLINIC | Age: 66
End: 2021-06-11

## 2021-06-11 DIAGNOSIS — R05.9 COUGH: ICD-10-CM

## 2021-06-11 NOTE — PROGRESS NOTES
ASSESSMENT:  1. Vitamin D deficiency     - Vitamin D, Total (25-Hydroxy)    2. Hyperlipidemia, unspecified hyperlipidemia type     - Comprehensive Metabolic Panel    3. Aortic Stenosis  Recently had an exercise stress test and an angiogram to quantify the stenosis in the gradient across the valve.  She is going to be seeing her cardiologist in August and they are doing the surveillance on the valve and the aortic aneurysm yearly while she is asymptomatic    4. Pap Smear (+) Low Grade Squamous Intraepith Lesion W/ Atypia   May need some colposcopy again as she has a cervical polyp    5. Osteopenia       6. Coronary artery disease due to calcified coronary lesion  Recently had a angiogram and is being followed closely by cardiology    7. Thoracic aortic aneurysm without rupture       8. Routine general medical examination at a health care facility  She had a shingles vaccine I think I will hold off on the pneumonia vaccines until she gets to be age 65  - Gynecologic Cytology (PAP Smear)         PLAN:  There are no Patient Instructions on file for this visit.    Orders Placed This Encounter   Procedures     Comprehensive Metabolic Panel     Vitamin D, Total (25-Hydroxy)     There are no discontinued medications.    No Follow-up on file.    Health Maintenance Due   Topic Date Due     ADVANCE DIRECTIVES DISCUSSED WITH PATIENT  01/20/2016     CORONARY ARTERY DISEASE FOLLOW-UP  12/27/2016     COLONOSCOPY  07/06/2017       CHIEF COMPLAINT:  Chief Complaint   Patient presents with     Annual Exam     fasting labs     Gynecologic Exam     pap smear       HISTORY OF PRESENT ILLNESS:  Wendy Handy is a 62 y.o. female presenting to the clinic today for a physical exam.     She had her mammogram this morning.    In May she had stress test and then an angiogram to quantify the gradient across her bicuspid aorta with its stenosis.  She also has a thoracic aortic aneurysm that is being monitored.  She goes to see the cardiologist  in August.    Healthy Habits:   Patient reports regular exercise, dental and eye exams. Uses healthy diet. Always uses seatbelts. Reports uses medications as directed.  Alcohol: 2 per week  Smoking: none  Caffeine use: 2 per day  Drug use: none  Birth control: abstinence    REVIEW OF SYSTEMS:   All other systems are negative.    Immunization History   Administered Date(s) Administered     Hep A, historic 2007, 10/12/2007     Influenza, inj, historic 10/12/2007, 10/01/2013     Td, historic 2005     Tdap 2010     ZOSTER 2015       GYNECOLOGIC HISTORY:  Last menstrual period: menopause  Contraception: abstinence  Last Pap: 16 Results were: abnormal  Last mammogram: 16  Results were: abnormal    OB History      Para Term  AB Living    2 2 2       SAB TAB Ectopic Multiple Live Births                  PFSH:     History   Smoking Status     Never Smoker   Smokeless Tobacco     Never Used     Family History   Problem Relation Age of Onset     Mental illness Mother 30     schizophrenia, bipolar     Colon cancer Father      Heart disease Father      Mental illness Sister      depression     Diabetes Brother      type 2     Mental illness Brother      depression     Social History     Social History     Marital status:      Spouse name: N/A     Number of children: N/A     Years of education: N/A     Social History Main Topics     Smoking status: Never Smoker     Smokeless tobacco: Never Used     Alcohol use 1.2 oz/week     2 Cans of beer per week     Drug use: No     Sexual activity: Not Currently     Partners: Male     Birth control/ protection: Abstinence     Other Topics Concern     None     Social History Narrative     Past Surgical History:   Procedure Laterality Date     CARDIAC CATHETERIZATION  2017    No intervention     CARDIAC CATHETERIZATION N/A 2017    Procedure: Coronary Angiogram;  Surgeon: Torito Metzger MD;  Location: [facility];   "Service:      LAPAROSCOPIC CHOLECYSTECTOMY N/A 2/23/2015    Procedure: CHOLECYSTECTOMY LAPAROSCOPIC;  Surgeon: Doug Webber MD;  Location: [facility];  Service:      MI CLOSED RX MANDIBLE FX      Description: Closed Treatment Of Mandibular Fracture;  Recorded: 05/29/2008;     MI CONIZATION CERVIX,LOOP ELECTRD      Description: Cervical Conization Loop Electrode Excision;  Recorded: 05/29/2008;     MI DILATION/CURETTAGE,DIAGNOSTIC      Description: Dilation And Curettage;  Recorded: 05/29/2008;  Comments: X 2  metrorhhagia     No Known Allergies  Active Ambulatory Problems     Diagnosis Date Noted     Vitamin D Deficiency      Hyperlipidemia      Discoid Lupus Erythematosus      Pap Smear (+) Low Grade Squamous Intraepith Lesion W/ Atypia      Aortic Stenosis      Osteopenia 02/15/2015     Coronary artery disease due to calcified coronary lesion 05/05/2016     Aortic aneurysm 04/29/2016     Resolved Ambulatory Problems     Diagnosis Date Noted     Acute Sinusitis      Bronchitis      Breast Pain      Past Medical History:   Diagnosis Date     Aortic Stenosis      Bicuspid aortic valve      Coronary artery disease due to calcified coronary lesion 5/5/2016     Discoid Lupus Erythematosus      High cholesterol      Hypercholesteremia      Hyperlipidemia      Osteopenia 2/15/2015     Pap Smear (+) Low Grade Squamous Intraepith Lesion W/ Atypia      Vitamin D deficiency        VITALS:  Vitals:    06/29/17 0814   BP: 114/80   Patient Site: Right Arm   Patient Position: Sitting   Cuff Size: Adult Regular   Pulse: 76   Weight: 143 lb 14.4 oz (65.3 kg)   Height: 5' 3.25\" (1.607 m)     BP Readings from Last 3 Encounters:   06/29/17 114/80   05/30/17 101/57   05/01/17 102/62     Wt Readings from Last 3 Encounters:   06/29/17 143 lb 14.4 oz (65.3 kg)   05/30/17 144 lb (65.3 kg)   05/01/17 148 lb (67.1 kg)     Body mass index is 25.29 kg/(m^2).    PHYSICAL EXAM:  General Appearance: Alert, cooperative, no distress, " appears stated age  Head: Normocephalic, without obvious abnormality, atraumatic  Eyes: PERRL, conjunctiva/corneas clear, EOM's intact  Ears: Normal TM's and external ear canals, both ears  Nose: Nares normal, septum midline,mucosa normal, no drainage  Throat: Lips, mucosa, and tongue normal; teeth and gums normal  Neck: Supple, symmetrical, trachea midline, no adenopathy;  thyroid: not enlarged, symmetric, no tenderness/mass/nodules; no carotid bruit or JVD  Back: Symmetric, no curvature, ROM normal, no CVA tenderness  Lungs: Clear to auscultation bilaterally, respirations unlabored  Breasts: No breast masses, tenderness, asymmetry, or nipple discharge.  Heart: Regular rate and rhythm, S1 and S2 normal, no murmur, rub, or gallop, Abdomen: Soft, non-tender, bowel sounds active all four quadrants,  no masses, no organomegaly  Pelvic:Normally developed genitalia with no external lesions or eruptions. Vagina shows no lesions, inflammation, discharge or tenderness.  Cervix appears to have a polyp Pap smear was taken with the side of broom.  No cystocele, No rectocele. Uterus slightly off to the patient's left.  No adnexal mass or tenderness.    Extremities: Extremities normal, atraumatic, no cyanosis or edema  Skin: Skin color, texture, turgor normal, no rashes or lesions  Lymph nodes: Cervical, supraclavicular, and axillary nodes normal  Neurologic: Normal        MEDICATIONS:  Current Outpatient Prescriptions   Medication Sig Dispense Refill     aspirin 81 mg chewable tablet Chew 1 tablet (81 mg total) daily.  0     atorvastatin (LIPITOR) 40 MG tablet Take 1 tablet (40 mg total) by mouth daily. 90 tablet 0     cholecalciferol, vitamin D3, (VITAMIN D3) 1,000 unit capsule Take 1,000 Units by mouth daily.       ibuprofen (ADVIL,MOTRIN) 800 MG tablet TAKE ONE TABLET BY MOUTH THREE TIMES DAILY AS NEEDED TAKE WITH FOOD 90 tablet 1     multivitamin capsule Take 1 capsule by mouth daily.        OMEGA 3-DHA-EPA-FISH OIL ORAL  Take 1 capsule by mouth daily.       traZODone (DESYREL) 50 MG tablet TAKE ONE-HALF (1/2) TO ONE TABLET AT BEDTIME AS NEEDED FOR SLEEP 90 tablet 0     vitamin E 400 UNIT capsule Take 400 Units by mouth daily.        No current facility-administered medications for this visit.

## 2021-06-11 NOTE — TELEPHONE ENCOUNTER
RN cannot approve Refill Request    RN can NOT refill this medication med is not covered by policy/route to provider. Last office visit: 2/20/2020 Yolanda Gonzalez MD Last Physical: 7/13/2020 Last MTM visit: Visit date not found Last visit same specialty: 2/20/2020 Yolanda Gonzalez MD.  Next visit within 3 mo: Visit date not found  Next physical within 3 mo: Visit date not found      Anastacia Thornton, [facility] Triage/Med Refill 9/27/2020    Requested Prescriptions   Pending Prescriptions Disp Refills     cholecalciferol, vitamin D3, 50 mcg (2,000 unit) Tab [Pharmacy Med Name: VITAMIN D3 2,000 UNIT TABLET] 90 tablet 3     Sig: TAKE 1 TABLET BY MOUTH DAILY.       There is no refill protocol information for this order

## 2021-06-12 NOTE — PROGRESS NOTES
ASSESSMENT:  1. Hearing loss of left ear, unspecified hearing loss type  Intermittent recurrent diminished auditory acuity left ear, suspect serous otitis media.  - Ambulatory referral to ENT        PLAN:  1.  Referral to ENT.      Orders Placed This Encounter   Procedures     Ambulatory referral to ENT     Referral Priority:   Routine     Referral Type:   Consultation     Referral Reason:   Evaluation and Treatment     Requested Specialty:   Otolaryngology     Number of Visits Requested:   1     There are no discontinued medications.    Return in about 1 week (around 11/17/2020) for recheck with PCP if symptoms persist/worsen.    CHIEF COMPLAINT:  Chief Complaint   Patient presents with     Ear Problem     thinks needs ears cleaned        SUBJECTIVE:  Wendy is a 65 y.o. female who comes in because she is having problems difficulties with the left ear.  Since February on and off she has had periods where she just does not hear well out of the left ear sounds are muffled.  She was trying some over-the-counter earwax drops but those did not make any difference at all.  She possibly has underlying hayfever since she has been regularly using an antihistamine and Flonase but that does not seem to be making much of a difference she does not really feel congested now.    The original episode in February where she was not hearing well out of the left ear started after she got a cold.  She is however not feeling at all sick at this time and is been no recent change in her health status.    After examination I told the patient there is no earwax in the ear canals, I suspect there is fluid behind the left ear which is causing the diminished hearing.    REVIEW OF SYSTEMS:      All other systems are negative.    PFSH:  Immunization History   Administered Date(s) Administered     Hep A / Hep B 05/23/2013     Hep A, historic 04/25/2007, 10/12/2007     INFLUENZA,SEASONAL QUAD, PF, =/> 6months 09/20/2019     Influenza, inj,  historic,unspecified 10/12/2007, 10/01/2013, 10/07/2017     Influenza,quad,high Dose,PF, 65yr + 09/09/2020     Pneumo Polysac 23-V 07/13/2020     Td,adult,historic,unspecified 02/25/2005     Tdap 06/02/2010, 07/10/2019     ZOSTER, LIVE 06/24/2015     ZOSTER, RECOMBINANT, IM 04/24/2019, 07/16/2019     Social History     Socioeconomic History     Marital status:      Spouse name: Not on file     Number of children: Not on file     Years of education: Not on file     Highest education level: Not on file   Occupational History     Not on file   Social Needs     Financial resource strain: Not on file     Food insecurity     Worry: Not on file     Inability: Not on file     Transportation needs     Medical: Not on file     Non-medical: Not on file   Tobacco Use     Smoking status: Never Smoker     Smokeless tobacco: Never Used   Substance and Sexual Activity     Alcohol use: Yes     Alcohol/week: 2.0 standard drinks     Types: 2 Cans of beer per week     Drug use: No     Sexual activity: Not Currently     Partners: Male     Birth control/protection: Abstinence   Lifestyle     Physical activity     Days per week: Not on file     Minutes per session: Not on file     Stress: Not on file   Relationships     Social connections     Talks on phone: Not on file     Gets together: Not on file     Attends Buddhism service: Not on file     Active member of club or organization: Not on file     Attends meetings of clubs or organizations: Not on file     Relationship status: Not on file     Intimate partner violence     Fear of current or ex partner: Not on file     Emotionally abused: Not on file     Physically abused: Not on file     Forced sexual activity: Not on file   Other Topics Concern     Not on file   Social History Narrative     Not on file     Past Medical History:   Diagnosis Date     Aortic Stenosis     echo 1/09  4.2 cm TAA-MRA 4/2016       Bicuspid aortic valve      Coronary artery disease due to calcified  coronary lesion 5/5/2016    Echo 6/2016   Good function  CT Ca++ 172 LAD  Cardiology consult 4/016-nuclear stress     Discoid Lupus Erythematosus     Created by [facility] Annotation: May 29 2008  9:49AM - Yolanda Gonzalez: Rheumatologist      Hypercholesteremia      Hyperlipidemia     Created by Conversion      Osteopenia 2/15/2015     Pap Smear (+) Low Grade Squamous Intraepith Lesion W/ Atypia      LGSIL 2002  colpoLGSIL 2003  colpoLGSIL 4/2006 colpoLGSIL 11/06 colpoAbnormal 5/07 colpo  CHCWLEEPendometrial biopsy 5/07      Vitamin D deficiency      Family History   Problem Relation Age of Onset     Mental illness Mother 30        schizophrenia, bipolar     Colon cancer Father      Heart disease Father      Mental illness Sister         depression     Diabetes Brother         type 2     Mental illness Brother         depression       MEDICATIONS:  Current Outpatient Medications   Medication Sig Dispense Refill     ascorbic acid, vitamin C, (VITAMIN C) 500 MG tablet Take 500 mg by mouth daily.       aspirin 81 mg chewable tablet Chew 1 tablet (81 mg total) daily.  0     atorvastatin (LIPITOR) 80 MG tablet TAKE 1 TABLET (80 MG TOTAL) BY MOUTH AT BEDTIME.(NEED TO BE SEEN FOR PHYSICAL) 90 tablet 2     calcium citrate/vitamin D3 (CITRACAL + D ORAL) Take by mouth daily.       cholecalciferol, vitamin D3, 50 mcg (2,000 unit) Tab TAKE 1 TABLET BY MOUTH DAILY. 90 tablet 3     fluticasone propionate (FLONASE) 50 mcg/actuation nasal spray Apply 1 spray into each nostril daily.       ibuprofen (ADVIL,MOTRIN) 800 MG tablet TAKE ONE TABLET BY MOUTH THREE TIMES DAILY AS NEEDED TAKE WITH FOOD 30 tablet 3     loratadine (CLARITIN) 10 mg tablet Take 10 mg by mouth daily.       magnesium oxide 250 mg magnesium Tab Take by mouth.       OMEGA 3-DHA-EPA-FISH OIL ORAL Take 1 capsule by mouth daily.       traZODone (DESYREL) 50 MG tablet TAKE 1/2 TABLET AT BEDTIME (NEED TO BE SEEN FOR PHYSICAL) 45 tablet 3     vitamin E 400 UNIT  capsule Take 400 Units by mouth daily.        No current facility-administered medications for this visit.        TOBACCO USE:  Social History     Tobacco Use   Smoking Status Never Smoker   Smokeless Tobacco Never Used       VITALS:  Vitals:    11/10/20 0809   BP: 106/73   Pulse: 81   Weight: 153 lb 8 oz (69.6 kg)     Wt Readings from Last 3 Encounters:   11/10/20 153 lb 8 oz (69.6 kg)   07/13/20 151 lb 9.6 oz (68.8 kg)   05/13/20 153 lb (69.4 kg)       PHYSICAL EXAM:  Constitutional:   Reveals a female who appears overall healthy.  Vitals: per nursing notes.  HEENT:  Ears:  External canals, TMs clear.  I do not note any earwax at all in either ear canal, I do not notice a significant dullness either of the left tympanic membrane.    Eyes:  EOMs full, PERRL.  Lungs: Clear to A&P without rales or wheezes.  Respiratory effort normal.  Cardiac:   Regular rate and rhythm, normal S1, S2, no murmur or gallop.  Musculoskeletal: No peripheral swelling.  Neuro:  Alert and oriented. Cranial nerves, motor, sensory exams are intact.  No gross focal deficits.  Psychiatric:  Memory intact, mood appropriate.    QUALITY MEASURES:      DATA REVIEWED:

## 2021-06-12 NOTE — PROGRESS NOTES
"Cardiology Progress Note    Assessment:  Coronary artery disease, mild, nonobstructive  Chest pain, atypical, resolved  Bicuspid aortic valve with mild stenosis, asymptomatic  Mild dilatation of the ascending aorta(3.9 cm by MRI in 2016)  Hyperlipidemia on atorvastatin, suboptimal control      Plan:  Increase atorvastatin to 80 mg a day.  Recheck lipid profile in 2 months  Reassess aortic stenosis and size of the ascending aorta with echo in May 2018  Follow-up after echo    Subjective:   This is 62 y.o. female who comes in today for follow-up visit.  She was having nonexertional chest pains last spring.  She had abnormal stress echo with distal septal hypokinesia.  Subsequent invasive coronary angiogram showed only mild nonobstructive coronary artery disease.  She reports complete resolution of chest pain.  She denies any new symptoms today.    Review of Systems:   General: WNL  Eyes: WNL  Ears/Nose/Throat: WNL  Lungs: WNL  Heart: WNL  Stomach: WNL  Bladder: WNL  Muscle/Joints: WNL  Skin: WNL  Nervous System: WNL  Mental Health: WNL     Blood: WNL    Objective:   /74 (Patient Site: Right Arm, Patient Position: Sitting, Cuff Size: Adult Large)  Pulse 72  Resp 16  Ht 5' 3.25\" (1.607 m)  Wt 150 lb (68 kg)  BMI 26.36 kg/m2  Physical Exam:  GENERAL: no distress  NECK: No JVD  LUNGS: Clear to auscultation.  CARDIAC: regular rhythm, S1 & S2 normal.  No heaves, thrills, gallops .  2/6 systolic ejection murmur at the aortic area  ABDOMEN: flat, negative hepatosplenomegaly, soft and non-tender.  EXTREMITIES: No evidence of cyanosis, clubbing or edema.    Current Outpatient Prescriptions   Medication Sig Dispense Refill     aspirin 81 mg chewable tablet Chew 1 tablet (81 mg total) daily.  0     cholecalciferol, vitamin D3, (VITAMIN D3) 1,000 unit capsule Take 1,000 Units by mouth daily.       ibuprofen (ADVIL,MOTRIN) 800 MG tablet TAKE ONE TABLET BY MOUTH THREE TIMES DAILY AS NEEDED TAKE WITH FOOD 90 tablet 1     " multivitamin capsule Take 1 capsule by mouth daily.        OMEGA 3-DHA-EPA-FISH OIL ORAL Take 1 capsule by mouth daily.       traZODone (DESYREL) 50 MG tablet TAKE ONE-HALF (1/2) TO ONE TABLET AT BEDTIME AS NEEDED FOR SLEEP 90 tablet 0     vitamin E 400 UNIT capsule Take 400 Units by mouth daily.        atorvastatin (LIPITOR) 80 MG tablet Take 1 tablet (80 mg total) by mouth at bedtime. 90 tablet 3     No current facility-administered medications for this visit.        Cardiographics:    Echocardiogram: May 2016  1. Normal left ventricular size and systolic performance. The ejection  fraction is estimated to be 60%.  2. There is mild aortic stenosis.  3. There is trace aortic insufficiency.     Stress Test: May 2017     No reported chest discomfort with exercise with good overall exercise tolerance    The stress electrocardiogram is negative for inducible ischemic EKG changes. Less than 1 mm upsloping ST segment depression    Left ventricle ejection fraction at rest is normal resting left ventricular function estimated 55-60% without wall motion abnormality    Stress echocardiogram is positive for a mild area of inducible ischemia.    Post exercise there is augmentation of systolic function with a mild area of relative hypokinesis involving the mid to distal septum    Aortic stenosis at rest mean gradient of 18 mmHg. Post exercise velocity across the aortic valve is approximately 4 m/s with a recorded mean gradient of 27 mmHg. Consider additional evaluation of aortic stenosis  The mean gradient by echocardiography May 2016 was reported at 13 mmHg    Coronary calcium score: 2016  The total Agatston calcium score is 172 which is located 10 in left main,  162 in LAD. A total Agatston calcium score of 172 places this patient in the  intermediate risk for cardiac events in the next 10 years when compared to an age  and gender match control group.      In addition, ascending aorta is mildly enlarged with measurement of  4.2 cm.        Cardiac MR: May 2016  1. Normal left ventricular size, wall thickness and function. The quantified left ventricular ejection fraction is 70%. No myocardial scar is identified.   2. Normal right ventricular size function.   3. A congenital bicuspid aortic valve with fusion of right and the left coronary cuspid. The peak velocity of aortic valve is 1.75 m/s. No aortic valve regurgitation.  4. Mildly dilated mid ascending aorta with maximal measurement 3.9 cm in size. No evidence of aortic dissection or coarctation      Coronary angiogram: May 2017    Dist LAD lesion 50% stenosed.    Mid LAD lesion 30% stenosed.    1st Mrg lesion 25% stenosed.    Prox RCA lesion 25% stenosed.      Multiple mild and moderate grade lesions noted but no severe or occlusive stenoses.  Recommend continued medical therapy for CAD andrisk reduction.  Daily ASA 81 mg.  Follow up on bicuspid aortic stenosis and aortic root enlargement.    Lab Results:       Lab Results   Component Value Date    CHOL 168 05/30/2017    CHOL 171 06/27/2016    CHOL 165 04/27/2016     Lab Results   Component Value Date    HDL 53 05/30/2017    HDL 66 06/27/2016    HDL 62 04/27/2016     Lab Results   Component Value Date    LDLCALC 98 05/30/2017    LDLCALC 82 06/27/2016    LDLCALC 85 04/27/2016     Lab Results   Component Value Date    TRIG 86 05/30/2017    TRIG 115 06/27/2016    TRIG 90 04/27/2016     No components found for: CHOLHDL  No results found for: BNP    Arben (Idris Guerra MD

## 2021-06-13 NOTE — PROGRESS NOTES
HISTORY OF PRESENT ILLNESS  Asked to see by Dr. Myles for evaluation of ear fullness. Patient reports that the left ear feels blocked all of the time. When she opens her mouth her ear pops. Once in a while she has pain in the ear. No pain with eating or chewing. She wears a retainer at night for braces. She doesn't believe that she grinds her teeth. No drainage out of the ears.     REVIEW OF SYSTEMS  Review of Systems: a 10-system review was performed. Pertinent positives are noted in the HPI and on a separate scanned document in the chart.    PMH, PSH, FH and SH has documented in the EHR.      EXAM    CONSTITUTIONAL  General Appearance:  Normal, well developed, well nourished, no obvious distress  Ability to Communicate:  communicates appropriately.    HEAD AND FACE  Appearance and Symmetry:  Normal, no scalp or facial scarring or suspicious lesions.  Paranasal sinuses tenderness:  Normal, Paranasal sinuses non tender    EYE  Normal external eye, conjunctiva, lids, cornea.     EARS  Clinical speech reception threshold:  Normal, able to hear normal speech.  Auricle:  Normal, Auricles without scars, lesions, masses.  External auditory canal:  Normal, External auditory canal normal.  Tympanic membrane:  Normal, Tympanic membranes normal without swelling or erythema.  Tympanic membrane mobility:  Normal, Normal tympanic membrane mobility.    NOSE (speculum or scope)  Architecture:  Normal, Grossly normal external nasal architecture with no masses or lesions.  Mucosa:  Normal mucosa, No polyps or masses.  Septum:  Normal, Septum non-obstructing.  Turbinates:  Normal, No turbinate abnormalities    ORAL CAVITY AND OROPHARYNX  Lips:  Normal.  Dental and gingiva:  Normal, No obvious dental or gingival disease.  Mucosa:  Normal, Moist mucous membranes.  Tongue:  Normal, Tongue mobile with no mucosal abnormalities  Hard and soft palate:  Normal, Hard and soft palate without cleft or mucosal lesions.  Oral pharynx:  Normal,  Posterior pharynx without lesions or remarkable asymmetry.  Saliva:  Normal, Clear saliva.  Masses:  Normal, No palpable masses or pathologically enlarged lymph nodes.    NECK  Masses/lymph nodes:  Normal, No worrisome neck masses or lymph nodes.  Salivary glands:  Normal, Parotid and submandibular glands.  Trachea and larynx position:  Normal, Trachea and larynx midline.  Thyroid:  Normal, No thyroid abnormality.  Tenderness:  Normal, No cervical tenderness.  Suppleness:  Normal, Neck supple    CARDIOVASCULAR  Regular rate and rhythm.  No cyanosis, clubbing or edema.    PULSES  Carotid pulses normal    NEUROLOGICAL  Speech pattern:  Normal, Proasaic    RESPIRATORY  Symmetry and Respiratory effort:  Normal, Symmetric chest movement and expansion with no increased intercostal retractions or use of accessory muscles.     HEARING TEST  Results of hearing test as documented in audiology notes which were reviewed.    IMPRESSION  Eustachian tube dysfunction. I discussed the pathophysiology of the Eustachian tube. She expressed understanding.     RECOMMENDATION  Follow up as needed.    Harshil Rodriguez MD

## 2021-06-14 NOTE — TELEPHONE ENCOUNTER
RN cannot approve Refill Request    RN can NOT refill this medication med is not covered by policy/route to provider. Last office visit: 2/20/2020 Yolanda Gonzalez MD Last Physical: 7/13/2020 Last MTM visit: Visit date not found Last visit same specialty: 11/10/2020 Ildefonso Myles MD.  Next visit within 3 mo: Visit date not found  Next physical within 3 mo: Visit date not found      Juliet Ghosh, [facility] Triage/Med Refill 12/30/2020    Requested Prescriptions   Pending Prescriptions Disp Refills     ibuprofen (ADVIL,MOTRIN) 800 MG tablet [Pharmacy Med Name: IBUPROFEN 800 MG TABLET] 30 tablet 3     Sig: TAKE ONE TABLET BY MOUTH THREE TIMES DAILY AS NEEDED TAKE WITH FOOD       There is no refill protocol information for this order

## 2021-06-14 NOTE — PROGRESS NOTES
Assessment and Plan:   Welcome to Medicare  Patient has been advised of split billing requirements and indicates understandin. Routine general medical examination at a health care facility       2. Breast pain     - Mammo Diagnostic Bilateral; Future  - US Breast Right Limited 1-3 Quadrants; Future    3. Screening for condition  History of abnormal; last 2 years normal  - Gynecologic Cytology (PAP Smear)    4. Vitamin D deficiency     - Vitamin D, Total (25-Hydroxy)    5. Hyperlipidemia, unspecified hyperlipidemia type     - Comprehensive Metabolic Panel  - Lipid Cascade    6. Osteopenia, unspecified location  DEXA this year    7. History of abnormal cervical Pap smear  History colposcopy    8. Postmenopausal     - DXA Bone Density Scan; Future    9. Aortic Stenosis  Follows with Cardiology    10. Coronary artery disease due to calcified coronary lesion  On Lipitor    11. Thoracic aortic aneurysm without rupture (H)  CT Calcium score found this    12. Systemic lupus erythematosus, unspecified SLE type, unspecified organ involvement status (H)       13. Colorectal polyps       14. Atypical squamous cell changes of undetermined significance (ASCUS) on cervical cytology with positive high risk human papilloma virus (HPV)           The patient's current medical problems were reviewed.    I have had an Advance Directives discussion with the patient.  The following health maintenance schedule was reviewed with the patient and provided in printed form in the after visit summary:   Health Maintenance   Topic Date Due     HIV SCREENING  1970     CORONARY ARTERY DISEASE FOLLOW-UP  10/29/2019     MEDICARE ANNUAL WELLNESS VISIT  2022     FALL RISK ASSESSMENT  2022     ADVANCE CARE PLANNING  2022     MAMMOGRAM  2022     COLORECTAL CANCER SCREENING  2023     LIPID  2025     TD 18+ HE  07/10/2029     DEXA SCAN  2034     HEPATITIS C SCREENING  Completed     Pneumococcal  Vaccine: 65+ Years  Completed     INFLUENZA VACCINE RULE BASED  Completed     TDAP ADULT ONE TIME DOSE  Completed     ZOSTER VACCINES  Completed     Pneumococcal Vaccine: Pediatrics (0 to 5 Years) and At-Risk Patients (6 to 64 Years)  Aged Out     HEPATITIS B VACCINES  Aged Out        Subjective:   Chief Complaint: Wendy Handy is an 65 y.o. female here for a Welcome to Medicare visit.   HPI: She is here for her welcome to Medicare visit.  She is fasting today so we can do a lipid panel and a comprehensive metabolic panel and a vitamin D level.  We been doing Pap smears yearly because she has had abnormal Pap smears requiring colposcopy with HPV positive.  The last 2 years her Pap and HPV have been negative but we are keeping close eye on this.    She has thoracic aortic aneurysm and bicuspid aortic valve which was discovered on CT calcium score that we did to try to stratify her cardiovascular risk.  She is on a high intensity statin and she does not need refills until after we get her test results back today.    Her bone density showed some osteopenia 2 years ago and they suggested another bone density in 2 years so that is due this year.    She has been complaining of some right breast pain in about the 12 o'clock position but it could be related to visiting her daughter and son-in-law and her 2-year-old granddaughter whom she picks up and her granddaughter weighs about 23 pounds so it could just be overuse.  I will order a diagnostic mammogram and ultrasound to make sure that we do not miss anything on her mammogram.  The rest of her health care maintenance issues were addressed and she is up-to-date with her vaccines and will monitor the website for when she can get her Covid vaccine    Review of Systems:     Please see above.  The rest of the review of systems are negative for all systems.    Patient Care Team:  Yolanda Gonzalez MD as PCP - General  Yolanda Gonzalez MD as Assigned PCP  Teodora Platt MD as  Assigned Heart and Vascular Provider  Harshil Rodriguez MD as Assigned Surgical Provider     Patient Active Problem List   Diagnosis     Vitamin D Deficiency     Hyperlipidemia     Discoid Lupus Erythematosus     Pap Smear (+) Low Grade Squamous Intraepith Lesion W/ Atypia     Aortic Stenosis     Osteopenia     Coronary artery disease due to calcified coronary lesion     Aortic aneurysm (H)     Family history of colon cancer     Colorectal polyps     History of abnormal cervical Pap smear     Atypical squamous cell changes of undetermined significance (ASCUS) on cervical cytology with positive high risk human papilloma virus (HPV)     History of colonic polyps     Systemic lupus erythematosus (H)     Abnormal Papanicolaou smear of cervix     Past Medical History:   Diagnosis Date     Aortic Stenosis     echo 1/09  4.2 cm TAA-MRA 4/2016       Bicuspid aortic valve      Coronary artery disease due to calcified coronary lesion 5/5/2016    Echo 6/2016   Good function  CT Ca++ 172 LAD  Cardiology consult 4/016-nuclear stress     Discoid Lupus Erythematosus     Created by CC video [facility] Annotation: May 29 2008  9:49AM - Yolanda Gonzalez: Rheumatologist      Hypercholesteremia      Hyperlipidemia     Created by Conversion      Osteopenia 2/15/2015     Pap Smear (+) Low Grade Squamous Intraepith Lesion W/ Atypia      LGSIL 2002  colpoLGSIL 2003  colpoLGSIL 4/2006 colpoLGSIL 11/06 colpoAbnormal 5/07 colpo  CHCWLEEPendometrial biopsy 5/07      Vitamin D deficiency       Past Surgical History:   Procedure Laterality Date     CARDIAC CATHETERIZATION  05/30/2017    No intervention     CARDIAC CATHETERIZATION N/A 5/30/2017    Procedure: Coronary Angiogram;  Surgeon: Torito Metzger MD;  Location: [facility];  Service:      LAPAROSCOPIC CHOLECYSTECTOMY N/A 2/23/2015    Procedure: CHOLECYSTECTOMY LAPAROSCOPIC;  Surgeon: Doug Webber MD;  Location: [facility];  Service:      AK CLOSED RX MANDIBLE FX       Description: Closed Treatment Of Mandibular Fracture;  Recorded: 05/29/2008;     ND CONIZATION CERVIX,LOOP ELECTRD      Description: Cervical Conization Loop Electrode Excision;  Recorded: 05/29/2008;     ND DILATION/CURETTAGE,DIAGNOSTIC      Description: Dilation And Curettage;  Recorded: 05/29/2008;  Comments: X 2  metrorhhagia      Family History   Problem Relation Age of Onset     Mental illness Mother 30        schizophrenia, bipolar     Colon cancer Father      Heart disease Father      Mental illness Sister         depression     Diabetes Brother         type 2     Mental illness Brother         depression      Social History     Socioeconomic History     Marital status:      Spouse name: Not on file     Number of children: Not on file     Years of education: Not on file     Highest education level: Not on file   Occupational History     Not on file   Social Needs     Financial resource strain: Not on file     Food insecurity     Worry: Not on file     Inability: Not on file     Transportation needs     Medical: Not on file     Non-medical: Not on file   Tobacco Use     Smoking status: Never Smoker     Smokeless tobacco: Never Used   Substance and Sexual Activity     Alcohol use: Yes     Alcohol/week: 2.0 standard drinks     Types: 2 Cans of beer per week     Drug use: No     Sexual activity: Not Currently     Partners: Male     Birth control/protection: Abstinence   Lifestyle     Physical activity     Days per week: Not on file     Minutes per session: Not on file     Stress: Not on file   Relationships     Social connections     Talks on phone: Not on file     Gets together: Not on file     Attends Denominational service: Not on file     Active member of club or organization: Not on file     Attends meetings of clubs or organizations: Not on file     Relationship status: Not on file     Intimate partner violence     Fear of current or ex partner: Not on file     Emotionally abused: Not on file      "Physically abused: Not on file     Forced sexual activity: Not on file   Other Topics Concern     Not on file   Social History Narrative     Not on file       Current Outpatient Medications   Medication Sig Dispense Refill     ascorbic acid, vitamin C, (VITAMIN C) 500 MG tablet Take 500 mg by mouth daily.       aspirin 81 mg chewable tablet Chew 1 tablet (81 mg total) daily.  0     atorvastatin (LIPITOR) 80 MG tablet TAKE 1 TABLET (80 MG TOTAL) BY MOUTH AT BEDTIME.(NEED TO BE SEEN FOR PHYSICAL) 90 tablet 2     calcium carbonate (CALCIUM 500 ORAL) Take 1,300 mg by mouth daily.       calcium citrate/vitamin D3 (CITRACAL + D ORAL) Take by mouth daily.       cholecalciferol, vitamin D3, 50 mcg (2,000 unit) Tab TAKE 1 TABLET BY MOUTH DAILY. 90 tablet 3     ibuprofen (ADVIL,MOTRIN) 800 MG tablet TAKE ONE TABLET BY MOUTH THREE TIMES DAILY AS NEEDED TAKE WITH FOOD 30 tablet 3     mecobalamin (B12 ACTIVE ORAL) Take 2,500 mcg by mouth.       OMEGA 3-DHA-EPA-FISH OIL ORAL Take 1 capsule by mouth daily.       traZODone (DESYREL) 50 MG tablet TAKE ONE-HALF TABLET BY  MOUTH AT BEDTIME 45 tablet 3     vitamin E 400 UNIT capsule Take 400 Units by mouth daily.        zinc 50 mg Tab Take 50 mg by mouth daily.       No current facility-administered medications for this visit.       Objective:   Vital Signs:   Visit Vitals  /68 (Patient Site: Right Arm, Patient Position: Sitting, Cuff Size: Adult Regular)   Pulse 72   Ht 5' 3\" (1.6 m)   Wt 153 lb 8 oz (69.6 kg)   BMI 27.19 kg/m         EKG:  Already completed with Cardiology    VisionScreening:  No exam data present     PHYSICAL EXAM  General Appearance: Alert, cooperative, no distress, appears stated age  Head: Normocephalic, without obvious abnormality, atraumatic  Eyes: PERRL, conjunctiva/corneas clear, EOM's intact  Ears: Normal TM's and external ear canals, both ears   Nose: Nares normal, septum midline,mucosa normal, no drainage  Throat:  Masked, has seen Dentist  Neck: " Supple, symmetrical, trachea midline, no adenopathy;  thyroid: not enlarged, symmetric, no tenderness/mass/nodules;   Back: Symmetric, no curvature, ROM normal, no CVA tenderness  Lungs: Clear to auscultation bilaterally, respirations unlabored  Breasts: No breast masses, tenderness, asymmetry, or nipple discharge.  Heart: Regular rate and rhythm, S1 and S2 normal, no murmur, rub, or gallop, Abdomen: Soft, non-tender, bowel sounds active all four quadrants,  no masses, no organomegaly  Pelvic:Normally developed genitalia with no external lesions or eruptions. Vagina and cervix show no lesions, inflammation, discharge or tenderness. Atrophic changes. No cystocele, No rectocele. Uterus nontender.  No adnexal mass or tenderness.    Extremities: Extremities normal, atraumatic, no cyanosis or edema  Skin: Skin color, texture, turgor normal, no rashes or lesions  Lymph nodes: Cervical, supraclavicular, and axillary nodes normal  Neurologic: Normal     Assessment Results 2/1/2021   Activities of Daily Living No help needed   Instrumental Activities of Daily Living No help needed   Get Up and Go Score Less than 12 seconds   Mini Cog Total Score 5   Some recent data might be hidden     A Mini Cog score of 0-2 suggests the possibility of dementia, score of 3-5 suggests no dementia    Identified Health Risks:     The patient was counseled and encouraged to consider modifying their diet and eating habits. She was provided with information on recommended healthy diet options.  Information regarding advance directives ([place]), including where she can download the appropriate form, was provided to the patient via the AVS.

## 2021-06-15 NOTE — PROGRESS NOTES
"Chief complaint: Right arm pain    HPI: The patient has a 4-day history of right elbow pain that is radiating up to her shoulder.  She has no obvious injury and no specific overuse activity.  She has tried prescription strength ibuprofen every 6 hours and it helps a little bit.  Resting quietly with her arm in one location or position helps a little bit.  She has been doing heat and that does not really help very much.    She does not have numbness tingling or weakness in her shoulder or elbow or hand    Objective:/60 (Patient Site: Right Arm, Patient Position: Sitting, Cuff Size: Adult Regular)   Pulse 64   Ht 5' 3\" (1.6 m)   Wt 153 lb 1.6 oz (69.4 kg)   BMI 27.12 kg/m    She is in no acute distress.  She has tenderness over the insertion of the deltoid tendon on the right and she is exquisitely tender to palpation over the lateral epicondyle on the right.  She does not have medial epicondyle tenderness.  Her wrist range of motion is full.  No dysesthesias    Assessment: Lateral epicondylitis on the right and right deltoid bursitis versus tendinitis    Plan: She will continue her ibuprofen I suggested icing and I will set her up for some physical therapy and hopefully she can get some ultrasound treatment or iontophoresis for this.  I do not think any sort of steroid injection is good to be appropriate at this point because she is getting her second Covid vaccine in 12 days.  "

## 2021-06-15 NOTE — TELEPHONE ENCOUNTER
----- Message from Yolanda Gonzalez MD sent at 2/10/2021  3:05 PM CST -----  Regarding: FW: pb coding unbillable  Please call the patient and get records from her ophthalmologist  ----- Message -----  From: Amber Lanier  Sent: 2/10/2021   1:03 PM CST  To: Yolanda Gonzalez MD  Subject: RE: pb coding unbillable                         When I reviewed this encounter- and the patients medicare coverage- her plan B started 4/1/20. So that means the only benefit she qualifies for until 4/1/21 is the welcome to medicare. She doesn't qualify for a [id number] until the welcome to medicare visit has been done, or missed the opportunity.     If she had one recently, we can request the results of that exam from the specialist and enter in the results to meet the requirement. Is that a possibility?    Felipa Lanier CCA   [address]  Phone:[phone]          ----- Message -----  From: Yolanda Goznalez MD  Sent: 2/10/2021  12:58 PM CST  To: Amber Lanier  Subject: RE: pb coding unbillable                         I don't think it was scheduled appropriately, as a Welcome to Medicare, so I think it was missed since she has a specialist. Would Annual wellness be a better note?  ----- Message -----  From: Amber Lanier  Sent: 2/10/2021  12:02 PM CST  To: Yolanda Gonzalez MD  Subject: pb coding unbillable                             DOS 2/1    Hey there.   This visit is a Welcome to medicare, and it looks like its missing the visual acuity which is required for these visits.  I cant seem to find the name of the [facility] that roomed this patient otherwise I would have included them in this message.    Do you know if the Visual acuity was performed?    Felipa Lanier CCA   [address]  Phone:[phone]

## 2021-06-16 NOTE — PROGRESS NOTES
Plan:  Increase atorvastatin to 80 mg a day.  Recheck lipid profile in 2 months  Reassess aortic stenosis and size of the ascending aorta with echo in May 2018  Follow-up after echo  Per 8/8/17 OV with WT      Echocardiogram order placed. -lillian

## 2021-06-16 NOTE — PATIENT INSTRUCTIONS - HE
1. Continue current medications and add zetia 10 mg daily to try and get LDL below 70.  2. Set up stress MRI to follow up on aortic valve and assess whether back pain is cardiac in nature. We will call with results.

## 2021-06-16 NOTE — TELEPHONE ENCOUNTER
New Appointment Needed  What is the reason for the visit:    Pre-Op Appt Request  When is the surgery? :  04/15    Where is the surgery?:   [facility]    Who is the surgeon? :  Dr. Hawley    What type of surgery is being done?: Cataract Surgery    Provider Preference: PCP only     How soon do you need to be seen?: 04/12/21    Waitlist offered?: No    Okay to leave a detailed message:  Yes

## 2021-06-16 NOTE — PROGRESS NOTES
[facility]  [address] [place] [address]  Dept: [phone]  Dept Fax: [phone]  Primary Provider: Yolanda Gonzalez MD         :[id number]}  PREOPERATIVE EVALUATION:  Today's date: 4/12/2021    Wendy Handy is a 65 y.o. female who presents for a preoperative evaluation.    Surgical Information:  Surgery/Procedure: Cataract eye surgery  Surgery Location: [facility]  Surgeon: Dr. Hawley  Surgery Date: 4/15/21-right; 4/29-left  Time of Surgery: 7:00 am   Where patient plans to recover: At home with family  Fax number for surgical facility: [phone]    Type of Anesthesia Anticipated: Local with MAC    Assessment & Plan      The proposed surgical procedure is considered LOW risk.    Preoperative examination       Age-related cataract of both eyes, unspecified age-related cataract type  Right, then left, 2 weeks later    Hyperlipidemia, unspecified hyperlipidemia type  On medication     Aortic Stenosis  Followed by cardiology    Thoracic aortic aneurysm without rupture (H)  Followed by cardiology    Systemic lupus erythematosus, unspecified SLE type, unspecified organ involvement status (H)  Followed with Rheumatology in the past; has not had a recent flare       RECOMMENDATION:  APPROVAL GIVEN to proceed with proposed procedure, without further diagnostic evaluation. Will hold all of vitamin supplements the morning of surgery     Subjective     HPI related to upcoming procedure: she has had ongoing evaluation of cataract and now is ready for removal    Preop Questions 4/12/2021   Have you ever had a heart attack or stroke? No   Have you ever had surgery on your heart or blood vessels, such as a stent placement, a coronary artery bypass, or surgery on an artery in your head, neck, heart, or legs? No   Do you have chest pain with activity? No   Do you have a history of  heart failure? No   Do you currently have a cold, bronchitis or symptoms of other infection? No   Do you  have a cough, shortness of breath, or wheezing? No   Do you or anyone in your family have previous history of blood clots? No   Do you or does anyone in your family have a serious bleeding problem such as prolonged bleeding following surgeries or cuts? No   Have you ever had problems with anemia or been told to take iron pills? No   Have you had any abnormal blood loss such as black, tarry or bloody stools, or abnormal vaginal bleeding? No   Have you ever had a blood transfusion? No   Are you willing to have a blood transfusion if it is medically needed before, during, or after your surgery? Yes   Have you or any of your relatives ever had problems with anesthesia? No   Do you have sleep apnea, excessive snoring or daytime drowsiness? No   Do you have any artifical heart valves or other implanted medical devices like a pacemaker, defibrillator, or continuous glucose monitor? No   Do you have artificial joints? No   Are you allergic to latex? No         Health Care Directive:  Patient does not have a Health Care Directive or Living Will: Advance Directive received and scanned. Click on Code in the patient header to view.     Review of Systems  CONSTITUTIONAL: NEGATIVE for fever, chills, change in weight  ENT/MOUTH: Negative for ear, mouth, and throat problems  RESP: NEGATIVE for significant cough or SOB  CV: NEGATIVE for chest pain, palpitations or peripheral edema    Patient Active Problem List    Diagnosis Date Noted     History of colonic polyps 10/01/2020     Atypical squamous cell changes of undetermined significance (ASCUS) on cervical cytology with positive high risk human papilloma virus (HPV) 07/19/2018     History of abnormal cervical Pap smear 07/09/2018     Colorectal polyps 12/27/2017     Family history of colon cancer 09/13/2017     Coronary artery disease due to calcified coronary lesion 05/05/2016     Aortic aneurysm (H) 04/29/2016     Osteopenia 02/15/2015     Vitamin D Deficiency      Hyperlipidemia       Discoid Lupus Erythematosus      Pap Smear (+) Low Grade Squamous Intraepith Lesion W/ Atypia      Aortic Stenosis      Systemic lupus erythematosus (H) 04/04/2007     H/O LEEP 2007     Abnormal Papanicolaou smear of cervix 02/25/2005     Past Medical History:   Diagnosis Date     Aortic Stenosis     echo 1/09  4.2 cm TAA-MRA 4/2016       Bicuspid aortic valve      Coronary artery disease due to calcified coronary lesion 5/5/2016    Echo 6/2016   Good function  CT Ca++ 172 LAD  Cardiology consult 4/016-nuclear stress     Discoid Lupus Erythematosus     Created by [facility] Annotation: May 29 2008  9:49AM - Yolanda Gonzalez: Rheumatologist      H/O LEEP 1/1/2007 2002-2006 LSIL paps with colposcopy, had LEEP in 2007. No records 6/2/10 ASCUS, +HR HPV 53 6/20/12 ASCUS, +HR HPV 53. Colposcopy outside / system? 6/23/14 NIL 6/24/15 NIL pap, neg HPV 6/27/16 ASCUS, neg HPV 6/29/17 NIL pap, neg HPV 7/9/18 ASCUS, +HR HPV 16. Unclear if colposcopy was completed 7/10/19 NIL pap, neg HPV 7/13/20 NIL pap, neg HPV 2/1/21 NIL pap, neg HPV. Plan: await provider     Hypercholesteremia      Hyperlipidemia     Created by Conversion      Osteopenia 2/15/2015     Pap Smear (+) Low Grade Squamous Intraepith Lesion W/ Atypia      LGSIL 2002  colpoLGSIL 2003  colpoLGSIL 4/2006 colpoLGSIL 11/06 colpoAbnormal 5/07 colpo  CHCWLEEPendometrial biopsy 5/07      Vitamin D deficiency      Past Surgical History:   Procedure Laterality Date     CARDIAC CATHETERIZATION  05/30/2017    No intervention     CARDIAC CATHETERIZATION N/A 5/30/2017    Procedure: Coronary Angiogram;  Surgeon: Torito Metzger MD;  Location: [facility];  Service:      LAPAROSCOPIC CHOLECYSTECTOMY N/A 2/23/2015    Procedure: CHOLECYSTECTOMY LAPAROSCOPIC;  Surgeon: Doug Webber MD;  Location: [facility];  Service:      GA CLOSED RX MANDIBLE FX      Description: Closed Treatment Of Mandibular Fracture;  Recorded: 05/29/2008;     GA  CONIZATION CERVIX,LOOP ELECTRD      Description: Cervical Conization Loop Electrode Excision;  Recorded: 05/29/2008;     MO DILATION/CURETTAGE,DIAGNOSTIC      Description: Dilation And Curettage;  Recorded: 05/29/2008;  Comments: X 2  metrorhhagia     Current Outpatient Medications   Medication Sig Dispense Refill     ascorbic acid, vitamin C, (VITAMIN C) 500 MG tablet Take 500 mg by mouth daily.       aspirin 81 mg chewable tablet Chew 1 tablet (81 mg total) daily.  0     atorvastatin (LIPITOR) 80 MG tablet TAKE 1 TABLET (80 MG TOTAL) BY MOUTH AT BEDTIME.(NEED TO BE SEEN FOR PHYSICAL) 90 tablet 3     calcium carbonate (CALCIUM 500 ORAL) Take 1,300 mg by mouth daily.       calcium citrate/vitamin D3 (CITRACAL + D ORAL) Take by mouth daily.       cholecalciferol, vitamin D3, 50 mcg (2,000 unit) Tab TAKE 1 TABLET BY MOUTH DAILY. 90 tablet 3     ibuprofen (ADVIL,MOTRIN) 800 MG tablet TAKE ONE TABLET BY MOUTH THREE TIMES DAILY AS NEEDED TAKE WITH FOOD 30 tablet 3     mecobalamin (B12 ACTIVE ORAL) Take 2,500 mcg by mouth.       OMEGA 3-DHA-EPA-FISH OIL ORAL Take 1 capsule by mouth daily.       traZODone (DESYREL) 50 MG tablet TAKE ONE-HALF TABLET BY  MOUTH AT BEDTIME 45 tablet 3     vitamin E 400 UNIT capsule Take 400 Units by mouth daily.        zinc 50 mg Tab Take 50 mg by mouth daily.       No current facility-administered medications for this visit.        No Known Allergies    Social History     Tobacco Use     Smoking status: Never Smoker     Smokeless tobacco: Never Used   Substance Use Topics     Alcohol use: Yes     Alcohol/week: 2.0 standard drinks     Types: 2 Cans of beer per week      Family History   Problem Relation Age of Onset     Mental illness Mother 30        schizophrenia, bipolar     Colon cancer Father      Heart disease Father      Mental illness Sister         depression     Diabetes Brother         type 2     Mental illness Brother         depression     Social History     Substance and Sexual  Activity   Drug Use No        Objective     There were no vitals taken for this visit.  Physical Exam  GENERAL APPEARANCE: healthy, alert and no distress  HENT: ear canals and TM's normal and nose and mouth without ulcers or lesions, glasses  RESP: lungs clear to auscultation - no rales, rhonchi or wheezes  CV: regular rate and rhythm, normal S1 S2,  Flow murmur noted  ABDOMEN: soft, nontender, no HSM or masses and bowel sounds normal  NEURO: Normal strength and tone, sensory exam grossly normal, mentation intact and speech normal    Recent Labs   Lab Test 02/01/21  1515 07/13/20  0751 07/10/19  0757   HGB  --  13.3 13.2   PLT  --  194 197    141 139   K 4.5 4.2 4.1   CREATININE 0.83 0.83 0.82        PRE-OP Diagnostics:   Labs pending at this time. Results will be reviewed when available.  No EKG required for low risk surgery (cataract, skin procedure, breast biopsy, etc).    REVISED CARDIAC RISK INDEX (RCRI)   The patient has the following serious cardiovascular risks for perioperative complications:   - No serious cardiac risks = 0 points    RCRI INTERPRETATION: 0 points: Class I (very low risk - 0.4% complication rate)     Both Pfizer vaccines for Covid completed; 3/24 was last dose    Signed Electronically by: Yolanda Gonzalez MD    Copy of this evaluation report is provided to requesting physician.

## 2021-06-17 NOTE — PROGRESS NOTES
"Cardiology Progress Note    Assessment:  Coronary artery disease, mild, nonobstructive  Heart palpitations, infrequent, likely benign  Bicuspid aortic valve with mild stenosis, asymptomatic  Mild dilatation of the ascending aorta(3.9 cm by MRI in 2016)  Hypercholesterolemia on high-dose atorvastatin      Plan:  Echo to reassess aortic valve and size of ascending aorta    Lipid profile will be checked by primary physician this summer.    I did not think that heart rhythm monitoring will be fruitful with the current frequency of heart palpitations.    I encouraged her to get 150 minutes of moderate exercise per week.    Follow-up in 1 year    Subjective:   This is 63 y.o. female who comes in today for follow-up visit.  She has denies chest pain or shortness of breath.  She is fairly physically active although she does not follow any exercise routine.  She has infrequent heart palpitations.  Those episodes are short-lived they last for only 10-20 seconds.  They did not happen any more often than once a month.  She has not had syncope or near syncope.  She feels that heart rate is irregular when the heart palpitations,    Review of Systems:   General: WNL  Eyes: WNL  Ears/Nose/Throat: WNL  Lungs: WNL  Heart: WNL  Stomach: WNL  Bladder: WNL  Muscle/Joints: WNL  Skin: WNL  Nervous System: WNL  Mental Health: WNL     Blood: WNL    Objective:   BP 92/62 (Patient Site: Right Arm, Patient Position: Sitting, Cuff Size: Adult Regular)  Pulse 84  Resp 16  Ht 5' 3.25\" (1.607 m)  Wt 153 lb 3.2 oz (69.5 kg) Comment: shoes on  BMI 26.92 kg/m2  Physical Exam:  GENERAL: no distress  NECK: No JVD  LUNGS: Clear to auscultation.  CARDIAC: regular rhythm, S1 & S2 normal.  No heaves, thrills, gallops or murmurs.  ABDOMEN: flat, negative hepatosplenomegaly, soft and non-tender.  EXTREMITIES: No evidence of cyanosis, clubbing or edema.    Current Outpatient Prescriptions   Medication Sig Dispense Refill     aspirin 81 mg chewable tablet " Chew 1 tablet (81 mg total) daily.  0     atorvastatin (LIPITOR) 80 MG tablet Take 1 tablet (80 mg total) by mouth at bedtime. 90 tablet 3     cholecalciferol, vitamin D3, (VITAMIN D3) 1,000 unit capsule Take 1,000 Units by mouth daily.       ibuprofen (ADVIL,MOTRIN) 800 MG tablet TAKE ONE TABLET BY MOUTH THREE TIMES DAILY AS NEEDED TAKE WITH FOOD 30 tablet 3     multivitamin capsule Take 1 capsule by mouth daily.        OMEGA 3-DHA-EPA-FISH OIL ORAL Take 1 capsule by mouth daily.       traZODone (DESYREL) 50 MG tablet TAKE ONE-HALF (1/2) TO ONE TABLET AT BEDTIME AS NEEDED FOR SLEEP 90 tablet 0     vitamin E 400 UNIT capsule Take 400 Units by mouth daily.        No current facility-administered medications for this visit.        Cardiographics:    Echocardiogram: May 2016  1. Normal left ventricular size and systolic performance. The ejection  fraction is estimated to be 60%.  2. There is mild aortic stenosis.  3. There is trace aortic insufficiency.      Stress Test: May 2017     No reported chest discomfort with exercise with good overall exercise tolerance    The stress electrocardiogram is negative for inducible ischemic EKG changes. Less than 1 mm upsloping ST segment depression    Left ventricle ejection fraction at rest is normal resting left ventricular function estimated 55-60% without wall motion abnormality    Stress echocardiogram is positive for a mild area of inducible ischemia.    Post exercise there is augmentation of systolic function with a mild area of relative hypokinesis involving the mid to distal septum    Aortic stenosis at rest mean gradient of 18 mmHg. Post exercise velocity across the aortic valve is approximately 4 m/s with a recorded mean gradient of 27 mmHg. Consider additional evaluation of aortic stenosis  The mean gradient by echocardiography May 2016 was reported at 13 mmHg     Coronary calcium score: 2016  The total Agatston calcium score is 172 which is located 10 in left  main,  162 in LAD. A total Agatston calcium score of 172 places this patient in the  intermediate risk for cardiac events in the next 10 years when compared to an age  and gender match control group.      In addition, ascending aorta is mildly enlarged with measurement of 4.2 cm.         Cardiac MR: May 2016  1. Normal left ventricular size, wall thickness and function. The quantified left ventricular ejection fraction is 70%. No myocardial scar is identified.   2. Normal right ventricular size function.   3. A congenital bicuspid aortic valve with fusion of right and the left coronary cuspid. The peak velocity of aortic valve is 1.75 m/s. No aortic valve regurgitation.  4. Mildly dilated mid ascending aorta with maximal measurement 3.9 cm in size. No evidence of aortic dissection or coarctation        Coronary angiogram: May 2017    Dist LAD lesion 50% stenosed.    Mid LAD lesion 30% stenosed.    1st Mrg lesion 25% stenosed.    Prox RCA lesion 25% stenosed.      Multiple mild and moderate grade lesions noted but no severe or occlusive stenoses.  Recommend continued medical therapy for CAD andrisk reduction.  Daily ASA 81 mg.  Follow up on bicuspid aortic stenosis and aortic root enlargement.    Lab Results:       Lab Results   Component Value Date    CHOL 168 05/30/2017    CHOL 171 06/27/2016    CHOL 165 04/27/2016     Lab Results   Component Value Date    HDL 53 05/30/2017    HDL 66 06/27/2016    HDL 62 04/27/2016     Lab Results   Component Value Date    LDLCALC 98 05/30/2017    LDLCALC 82 06/27/2016    LDLCALC 85 04/27/2016     Lab Results   Component Value Date    TRIG 86 05/30/2017    TRIG 115 06/27/2016    TRIG 90 04/27/2016     No components found for: CHOLHDL  No results found for: BNP    Arben (Lonnie)  MD Mari

## 2021-06-17 NOTE — PROGRESS NOTES
Discharge Summary  Patient Name: Wendy Handy  Date: 5/5/2021  Referral Diagnosis: lateral epicondylitis  Referring provider: Yolanda Gonzalez MD  Visit Diagnosis:   1. Right elbow pain     2. Right hand weakness     3. Decreased activities of daily living (ADL)         Goal status: Unable to assess as patient did not return.    Patient was seen for 1 visit. The patient discontinued therapy, did not return.    The patient will need a new referral to resume.    Thank you for your referral.  Laura Garcia  5/5/2021  4:56 PM

## 2021-06-17 NOTE — TELEPHONE ENCOUNTER
Telephone Encounter by Cinthya Cardenas, RN at 5/1/2020  1:22 PM     Author: Cinthya Cardenas, RN Service: -- Author Type: Registered Nurse    Filed: 5/1/2020  2:06 PM Encounter Date: 5/1/2020 Status: Signed    : Cinthya Cardenas, RN (Registered Nurse)       Wendy Handy Wojciech Tadeusz, MD (Ted) 16 hours ago (9:13 PM)          I have [employer] for my medicare, and you are not listed as an in network provider.  If you are in network, they said you would need to go to the provider area on [place] and add yourself as an in network provider.    I hope you are doing well during these challenging times.  Have a good day, Wendy         Called patient and LM for her to call back to discuss further and to see if writer can help in any way. -[facility]

## 2021-06-18 NOTE — PATIENT INSTRUCTIONS - HE
Patient Instructions by Yolanda Gonzalez MD at 2/1/2021  2:50 PM     Author: Yolanda Gonzalez MD Service: -- Author Type: Physician    Filed: 2/1/2021  3:07 PM Encounter Date: 2/1/2021 Status: Signed    : Yolanda Gonzalez MD (Physician)         Patient Education   Understanding USDA MyPlate  The USDA (US Department of Agriculture) has guidelines to help you make healthy food choices. These are called MyPlate. MyPlate shows the food groups that make up healthy meals using the image of a place setting. Before you eat, think about the healthiest choices for what to put onto your plate or into your cup or bowl. To learn more about building a healthy plate, visit www.choosemyplate.gov.       The Food Groups    Fruits: Any fruit or 100% fruit juice counts as part of the Fruit Group. Fruits may be fresh, canned, frozen, or dried, and may be whole, cut-up, or pureed. Make half your plate fruits and vegetables.    Vegetables: Any vegetable or 100% vegetable juice counts as a member of the Vegetable Group. Vegetables may be fresh, frozen, canned, or dried. They can be served raw or cooked and may be whole, cut-up, or mashed. Make half your plate fruits and vegetables.     Grains: All foods made from grains are part of the Grains Group. These include wheat, rice, oats, cornmeal, and barley such as bread, pasta, oatmeal, cereal, tortillas, and grits. Grains should be no more than a quarter of your plate. At least half of your grains should be whole grains.    Protein: This group includes meat, poultry, seafood, beans and peas, eggs, processed soy products (like tofu), nuts (including nut butters), and seeds. Make protein choices no more than a quarter of your plate. Meat and poultry choices should be lean or low fat.    Dairy: All fluid milk products and foods made from milk that contain calcium, like yogurt and cheese are part of the Dairy Group. (Foods that have little calcium, such as cream, butter, and cream  cheese, are not part of the group.) Most dairy choices should be low-fat or fat-free.    Oils: These are fats that are liquid at room temperature. They include canola, corn, olive, soybean, and sunflower oil. Foods that are mainly oil include mayonnaise, certain salad dressings, and soft margarines. You should have only 5 to 7 teaspoons of oils a day. You probably already get this much from the food you eat.  Use [employer] to Help Build Your Meals  The [employer] can help you plan and track your meals and activity. You can look up individual foods to see or compare their nutritional value. You can get guidelines for what and how much you should eat. You can compare your food choices. And you can assess personal physical activities and see ways you can improve. Go to www.[employer].gov/Cartela ABcker/.    4297-4453 The [employer]. [address] [place] [address]. All rights reserved. This information is not intended as a substitute for professional medical care. Always follow your healthcare professional's instructions.           Patient Education   Understanding Advance Care Planning  Advance care planning is the process of deciding ones own future medical care. It helps ensure that if you cant speak for yourself, your wishes can still be carried out. The plan is a series of legal documents that note a persons wishes. The documents vary by state. Advance care planning may be done when a person has a serious illness that is expected to get worse. It may be done before major surgery. And it can help you and your family be prepared in case of a major illness or injury. Advance care planning helps with making decisions at these times.       A health care proxy is a person who acts as the voice of a patient when the patient cant speak for himself or herself. The name of this role varies by state. It may be called a Durable Medical Power of  or Durable Power of  for Healthcare.  It may be called an agent, surrogate, or advocate. Or it may be called a representative or decision maker. It is an official duty that is identified by a legal document. The document also varies by state.    Why Is Advance Care Planning Important?  If a person communicates their healthcare wishes:    They will be given medical care that matches their values and goals.    Their family members will not be forced to make decisions in a crisis with no guidance.  Creating a Plan  Making an advance care plan is often done in 3 steps:    Thinking about ones wishes. To create an advance care plan, you should think about what kind of medical treatment you would want if you lose the ability to communicate. Are there any situations in which you would refuse or stop treatment? Are there therapies you would want or not want? And whom do you want to make decisions for you? There are many places to learn more about how to plan for your care. Ask your doctor or  for resources.    Picking a health care proxy. This means choosing a trusted person to speak for you only when you cant speak for yourself. When you cannot make medical decisions, your proxy makes sure the instructions in your advance care plan are followed. A proxy does not make decisions based on his or her own opinions. They must put aside those opinions and values if needed, and carry out your wishes.    Filling out the legal documents. There are several kinds of legal documents for advance care planning. Each one tells health care providers your wishes. The documents may vary by state. They must be signed and may need to be witnessed or notarized. You can cancel or change them whenever you wish. Depending on your state, the documents may include a Healthcare Proxy form, Living Will, Durable Medical Power of , Advance Directive, or others.  The Familys Role  The best help a family can give is to support their loved ones wishes. Open and honest  communication is vital. Family should express any concerns they have about the patients choices while the patient can still make decisions.    2611-2849 [employer]. [address] [place] [address], [place] [address]. All rights reserved. This information is not intended as a substitute for professional medical care. Always follow your healthcare professional's instructions.         Also, [facility] offers a free, downloadable health care directive that allows you to share your treatment choices and personal preferences if you cannot communicate your wishes. It also allows you to appoint another person (called a health care agent) to make health care decisions if you are unable to do so. You can download an advance directive by going here: [email or web address]     Patient Education   Personalized Prevention Plan  You are due for the preventive services outlined below.  Your care team is available to assist you in scheduling these services.  If you have already completed any of these items, please share that information with your care team to update in your medical record.  Health Maintenance   Topic Date Due   ? HIV SCREENING  04/22/1970   ? CORONARY ARTERY DISEASE FOLLOW-UP  10/29/2019   ? MEDICARE ANNUAL WELLNESS VISIT  02/01/2022   ? FALL RISK ASSESSMENT  02/01/2022   ? ADVANCE CARE PLANNING  06/29/2022   ? MAMMOGRAM  07/13/2022   ? COLORECTAL CANCER SCREENING  09/29/2023   ? LIPID  07/13/2025   ? TD 18+ HE  07/10/2029   ? DEXA SCAN  07/18/2034   ? HEPATITIS C SCREENING  Completed   ? Pneumococcal Vaccine: 65+ Years  Completed   ? INFLUENZA VACCINE RULE BASED  Completed   ? TDAP ADULT ONE TIME DOSE  Completed   ? ZOSTER VACCINES  Completed   ? Pneumococcal Vaccine: Pediatrics (0 to 5 Years) and At-Risk Patients (6 to 64 Years)  Aged Out   ? HEPATITIS B VACCINES  Aged Out

## 2021-06-19 NOTE — LETTER
Letter by Tammy Bolton MD at      Author: Tammy Bolton MD Service: -- Author Type: --    Filed:  Encounter Date: 5/17/2019 Status: (Other)         Wendy Handy  [address] [facility] [address]    May 17, 2019    Dear Ms. Handy,    Welcome to [facility]! Your appointment information is below.   Please bring the following to your appointment:    Insurance Card, so we may scan it for our records    Drivers license or valid ID, so we may scan it for our records    Co-pay (as applicable per your insurance plan)    A current list of your medications including over the counter products such as vitamins and supplements    Your medical records including copies of X-Ray films if you are transferring your care from another clinic.  If you do not have your records, please fill out the release of information form and we will request those records.     Provider: Tammy Bolton MD  Appointment Date: July 22, 2019  Arrival Time: 10:00am for PFT test and 11:00am for Dr. Bolton    Location: [facility]         [address]        [place], [address]    **Please allow adequate time for your commute and parking. If you are more than 10 minutes late, you may be asked to reschedule.     If you need to cancel or reschedule your appointment, please notify us at least 24 hours prior to your appointment time so we are able to make this time available for another patient.    Thank you for choosing the [facility] for your health care needs. If you have any questions, please do not hesitate to contact us at any time at   [phone]. We look forward to caring for you.     Sincerely,     [facility] staff

## 2021-06-19 NOTE — PROGRESS NOTES
FEMALE PREVENTATIVE EXAM    Assessment and Plan:       1. Routine general medical examination at a health care facility  Had mammogram  - Gynecologic Cytology (PAP Smear)    2. Vitamin D deficiency     - Vitamin D, Total (25-Hydroxy)    3. Hyperlipidemia, unspecified hyperlipidemia type  Sees cardiology; last 5/2018  - Lipid Cascade  - Comprehensive Metabolic Panel  - atorvastatin (LIPITOR) 80 MG tablet; Take 1 tablet (80 mg total) by mouth at bedtime.  Dispense: 90 tablet; Refill: 3    4. Pap Smear (+) Low Grade Squamous Intraepith Lesion W/ Atypia  Last year normal    5. Osteopenia       6. Coronary artery disease due to calcified coronary lesion  CT calcium score over 100    7. Thoracic aortic aneurysm without rupture (H)  Monitored by cardiology    8. Family history of colon cancer       9. Colorectal polyps       10. Insomnia, unspecified type     - traZODone (DESYREL) 50 MG tablet; Take 0.5 tablets (25 mg total) by mouth at bedtime.  Dispense: 90 tablet; Refill: 3    11. Aortic Stenosis       12. Nail abnormality     - Thyroid Stimulating Hormone (TSH)    13. History of abnormal cervical Pap smear           Next follow up:      Immunization Review  Adult Imm Review: No immunizations due today  discussed Shingrix and she will check with her insurance      Subjective:   Chief Complaint: Wendy Handy is an 63 y.o. female here for a preventative health visit.     HPI: She is here for health care maintenance review.  She saw the cardiologist in May of this year and had her echocardiogram but did not need to have a stress test.  They are monitoring her aortic stenosis and mild aortic aneurysm.  This was found incidentally on a CT calcium score.  They also increased her atorvastatin to 80 mg a day.    She is fasting so that we can monitor her lab levels.    Because she has had an abnormal Pap smear requiring colposcopy, we are continuing with yearly Pap smears with HPV reflex testing.    Healthy Habits  Are you  "taking a daily aspirin? Yes  Do you typically exercising at least 40 min, 3-4 times per week?  Yes  Do you usually eat at least 4 servings of fruit and vegetables a day, include whole grains and fiber and avoid regularly eating high fat foods? NO  Have you had an eye exam in the past two years? Yes  Do you see a dentist twice per year? Yes  Do you have any concerns regarding sleep? No    Safety Screen  If you own firearms, are they secured in a locked gun cabinet or with trigger locks? NO  Do you feel you are safe where you are living?: Yes (7/9/2018  7:51 AM)  Do you feel you are safe in your relationship(s)?: Yes (7/9/2018  7:51 AM)    Review of Systems:  Please see above.  The rest of the review of systems are negative for all systems.     Pap History:   Yes - other categories  Cancer Screening       Status Date      MAMMOGRAM Next Due 6/29/2019      Done 6/29/2017 MAMMO SCREENING BILATERAL     Patient has more history with this topic...    COLONOSCOPY Next Due 9/13/2020      Done 9/13/2017 COLONOSCOPY EXTERNAL RESULT     Patient has more history with this topic...    PAP SMEAR Next Due 6/29/2022      Done 6/29/2017 GYNECOLOGIC CYTOLOGY (PAP SMEAR)     Patient has more history with this topic...          Patient Care Team:  Yolanda Gonzalez MD as PCP - General        History     Reviewed By Date/Time Sections Reviewed    Daniela Cid [facility] 7/9/2018  7:53 AM Tobacco    Daniela Cid [facility] 7/9/2018  7:52 AM Tobacco, Alcohol, Drug Use, Sexual Activity            Objective:   Vital Signs:   Visit Vitals     /62     Pulse 60     Ht 5' 3\" (1.6 m)     Wt 150 lb 4.8 oz (68.2 kg)     Breastfeeding No     BMI 26.62 kg/m2          PHYSICAL EXAM  General Appearance: Alert, cooperative, no distress, appears stated age  Head: Normocephalic, without obvious abnormality, atraumatic  Eyes: PERRL, conjunctiva/corneas clear, EOM's intact  Ears: Normal TM's and external ear canals, both ears  Nose: Nares normal, septum " midline,mucosa normal, no drainage  Throat: Lips, mucosa, and tongue normal; teeth and gums normal  Neck: Supple, symmetrical, trachea midline, no adenopathy;  thyroid: not enlarged, symmetric, no tenderness/mass/nodules;   Back: Symmetric, no curvature, ROM normal, no CVA tenderness  Lungs: Clear to auscultation bilaterally, respirations unlabored  Breasts: No breast masses, tenderness, asymmetry, or nipple discharge.  Heart: Regular rate and rhythm, S1 and S2 normal, slight  Murmur, no rub, or gallop, Abdomen: Soft, non-tender, bowel sounds active all four quadrants,  no masses, no organomegaly  Pelvic:Normally developed genitalia with no external lesions or eruptions. Vagina  shows no lesions, inflammation, discharge or tenderness. Cervix might have a polyp. No cystocele, No rectocele. Uterus nontender; canted slightly to the right.  No adnexal mass or tenderness.    Extremities: Extremities normal, atraumatic, no cyanosis or edema  Skin: Skin color, texture, turgor normal, no rashes or lesions  Lymph nodes: Cervical, supraclavicular, and axillary nodes normal  Neurologic: Normal     The 10-year ASCVD risk score ([place] DC Jr, et al., 2013) is: 3.5%    Values used to calculate the score:      Age: 63 years      Sex: Female      Is Non- : No      Diabetic: No      Tobacco smoker: No      Systolic Blood Pressure: 118 mmHg      Is BP treated: No      HDL Cholesterol: 53 mg/dL      Total Cholesterol: 168 mg/dL         Medication List          These changes are accurate as of 7/9/18 11:23 AM.  If you have any questions, ask your nurse or doctor.               CHANGE how you take these medications          atorvastatin 80 MG tablet   Also known as:  LIPITOR   INSTRUCTIONS:  Take 1 tablet (80 mg total) by mouth at bedtime.   What changed:  See the new instructions.   Changed by:  Yolanda Gonzalez MD             CONTINUE taking these medications          aspirin 81 mg chewable tablet   INSTRUCTIONS:   Chew 1 tablet (81 mg total) daily.   Reason for med:  taken morning of procedure           ibuprofen 800 MG tablet   Also known as:  ADVIL,MOTRIN   INSTRUCTIONS:  TAKE ONE TABLET BY MOUTH THREE TIMES DAILY AS NEEDED TAKE WITH FOOD           multivitamin capsule   INSTRUCTIONS:  Take 1 capsule by mouth daily.           OMEGA 3-DHA-EPA-FISH OIL ORAL   INSTRUCTIONS:  Take 1 capsule by mouth daily.           traZODone 50 MG tablet   Also known as:  DESYREL   INSTRUCTIONS:  Take 0.5 tablets (25 mg total) by mouth at bedtime.           VITAMIN D3 1,000 unit capsule   INSTRUCTIONS:  Take 1,000 Units by mouth daily.   Generic drug:  cholecalciferol (vitamin D3)           vitamin E 400 UNIT capsule   INSTRUCTIONS:  Take 400 Units by mouth daily.                Where to Get Your Medications      These medications were sent to [facility] - [address] [address]     Phone:  [phone]      atorvastatin 80 MG tablet     traZODone 50 MG tablet             Additional Screenings Completed Today:

## 2021-06-24 NOTE — TELEPHONE ENCOUNTER
----- Message from Mary Quintana sent at 3/12/2019 11:13 AM CDT -----  Contact: neena  General phone call:    Caller: neena  Primary cardiologist: dr gonsalves  Detailed reason for call: pt scheduled a yearly follow up on 4/29. She is wondering if she needs to have a stress test prior  New or active symptoms? n/a  Best phone number: [phone]  Best time to contact: any  Ok to leave a detailed message? yes  Device? no    Additional Info:

## 2021-06-24 NOTE — TELEPHONE ENCOUNTER
----- Message -----  From: Arben Guerra MD (Ted)  Sent: 3/12/2019  11:55 AM  To: Cinthya Cardenas RN    Should get echo re as and aortic dilatation before fu visit.      Order placed for echocardiogram. Called patient and updated that order is placed via voicemail. Msg sent to schedulers to reach out and arrange. [place] [place]

## 2021-06-26 NOTE — PROGRESS NOTES
"Wendy Handy is a 66 y.o. female who is being evaluated via a billable video visit.      How would you like to obtain your AVS? MyChart.  If dropped from the video visit, the video invitation should be resent by: [phone]  Will anyone else be joining your video visit? No     Video Start Time: 11:36 AM    Assessment & Plan     Cough  Patient with an acute onset of a cough, probably viral.  - albuterol (PROAIR HFA;PROVENTIL HFA;VENTOLIN HFA) 90 mcg/actuation inhaler  Dispense: 1 each; Refill: 0    I did renew the patient's inhaler she will follow-up as needed.          BMI:   Estimated body mass index is 27.1 kg/m  as calculated from the following:    Height as of 4/23/21: 5' 3.25\" (1.607 m).    Weight as of 4/23/21: 154 lb 3.2 oz (69.9 kg).       No follow-ups on file.    Ildefonso Myles MD  [facility]    Subjective   Wendy Handy is 66 y.o. and presents today for the following health issues   HPI patient reports that a number of years ago she was given an butyryl inhaler for a cough she reports she does not have a history of asthma she does not believe she has a history of allergies or hayfever as well.  4 days ago she started to feel ill, with a cold fever cough congestion, though symptoms have resolved with the exception of a dry cough.  She is also been taking some Tylenol.    Other change in her health status, she reports she is quite isolated and so her exposure to others would be quite limited she already has had the Covid vaccine.  I told the patient that since he is not having any systemic symptoms my concern about pneumonia would be very low I would not do Covid testing at this time and I think I would treat her simply symptomatically with an inhaler and only if that does not improve symptoms which she need further follow-up or evaluation.    Otherwise no other change in her health status       Review of Systems  Otherwise negative      Objective    Vitals - Patient " Reported  Temperature (Patient Reported): 97.8  F (36.6  C)    Physical Exam  Patient did cough a bit during the exam but she did not appear short of breath and overall looks healthy.            Video-Visit Details    Type of service:  Video Visit    Video End Time (time video stopped): 11:44 AM  Originating Location (pt. Location): Home    Distant Location (provider location):  [facility]     Platform used for Video Visit: [employer]

## 2021-06-29 NOTE — PROGRESS NOTES
"Progress Notes by Teodora Platt MD at 5/13/2020  3:50 PM     Author: Teodora Platt MD Service: -- Author Type: Physician    Filed: 5/13/2020  4:51 PM Encounter Date: 5/13/2020 Status: Signed    : Teodora Platt MD (Physician)           The patient has been notified of following:     \"This video visit will be conducted via a call between you and your physician/provider. We have found that certain health care needs can be provided without the need for an in-person physical exam.  This service lets us provide the care you need with a video conversation.  If a prescription is necessary we can send it directly to your pharmacy.  If lab work is needed we can place an order for that and you can then stop by our lab to have the test done at a later time.      Patient has given verbal consent to a Video visit? Yes    HEART CARE VIDEO ENCOUNTER        The patient has chosen to have the visit conducted as a video visit, to reduce risk of exposure given the current status of Coronavirus in our community. This video visit is being conducted via a call between the patient and physician/provider. Health care needs are being provided without a physical exam.     Assessment/Recommendations   Assessment/Plan:  1.  Mild to moderate coronary artery disease with most significant stenosis being a 50% lesion in the distal LAD.  She does report symptoms of exertional mid back pain which could be an anginal equivalent although cannot exclude musculoskeletal source.  I suggested an imaging stress study.  Given her history of a sending aortic aneurysm and bicuspid aortic valve with mild to moderate stenosis, I recommended stress cardiac MRI which would allow us to assess all of these things and one study.  She is agreeable and we will attempt to arrange this in the next several weeks.  2.  Ascending aortic aneurysm likely secondary to bicuspid aortic valve.  Again we will get a reassessment of her a sending aorta with MRI imaging.  3.  " Bicuspid aortic valve with mild to moderate stenosis.  Will reassess the degree of narrowing with MRI imaging.  4.  Hypercholesterolemia, borderline controlled.  She is currently on atorvastatin 80 mg daily and was concerned about the high dosage as her  only takes 10 mg daily.  Her last lipid profile nearly a year ago demonstrated LDL of 77 which is slightly higher than desired goal of less than 70.  After discussing with her, she is content to stay on her current dosage.    Follow Up Plan: We will follow-up with results of MRI testing and further recommendations.  I have reviewed the note as documented.  This accurately captures the substance of my conversation with the patient.    Total time of video between patient and provider was 26 minutes   Start Time: 1513  Stop Time:1539    Originating Location (pt. Location):  Home    Distant Location (provider location):  [facility]      Mode of Communication:  Video Conference via [email or web address]       History of Present Illness/Subjective    Wendy Handy is a 65 y.o. female who is being evaluated via a billable video visit and has consented to a video visit. Wendy Handy has a history of mild-moderate coronary artery disease by angiography in 2017, bicuspid aortic valve with mild to moderate aortic valve stenosis and mild ascending aortic aneurysm who returns to the [facility] clinic for routine follow-up visit.  She had previously been seeing 1 of my colleagues but was told he was out of network and thus she is seeing me today.  She currently reports no significant complaints although on further questioning, does admit to some vague chest discomfort but more prominently mid back pain with walking.  She states it will typically begin about 1/2-hour into her walk and persist throughout her walk.  It does not radiate into the arms.  No clear diaphoresis or nausea.  No significant shortness of breath.  It does resolve with rest.  She is also concerned  about her a sending aortic aneurysm as well as her aortic valve disease, both of which were to be followed up this spring or early summer.  No symptoms of orthopnea, PND or lower extremity edema.  She denies any palpitations or near syncope.      I have reviewed and updated the patient's Past Medical History, Social History, Family History and Medication List.     Physical Examination performed via live video encounter Review of Systems   General Appearance:   no distress, normal body habitus, upright.   ENT/Mouth: membranes moist, no nasal discharge or bleeding gums.  Normal head shape, no evidence of injury or laceration.     EYES:  no scleral icterus, normal conjunctivae   Neck: no evidence of thyromegaly.  Supple   Chest/Lungs:   No audible wheezing equal chest wall expansion. Non labored breathing.  No cough.   Cardiovascular:   No evidence of elevated jugular venous pressure.  No evidence of pitting edema bilaterally    Abdomen:  no evidence of abdominal distention. No observe juandice.     Extremities: no cyanosis or clubbing noted.    Skin: no xanthelasma, normal skin color. No evidence of facial lacerations.      Neurologic: Normal arm motion bilateral, no tremors.  No evidence of focal defect.       Psychiatric: alert and oriented x3, calm                    Review of Systems - History obtained from the patient  General ROS: negative  Psychological ROS: negative  Ophthalmic ROS: negative  ENT ROS: negative  Hematological and Lymphatic ROS: positive for - easy bruising  Respiratory ROS: negative  Cardiovascular ROS: negative  Gastrointestinal ROS: negative  Genito-Urinary ROS: negative  Musculoskeletal ROS: positive for - muscle pain - sciatica pain   Neurological ROS: negative  Dermatological ROS: negative                                Medical History  Surgical History Family History Social History   Past Medical History:   Diagnosis Date   ? Aortic Stenosis     echo 1/09  4.2 cm TAA-MRA 4/2016     ?  Bicuspid aortic valve    ? Coronary artery disease due to calcified coronary lesion 5/5/2016    Echo 6/2016   Good function  CT Ca++ 172 LAD  Cardiology consult 4/016-nuclear stress   ? Discoid Lupus Erythematosus     Created by [facility] Annotation: May 29 2008  9:49AM - Yolanda Gonzalez: Rheumatologist    ? Hypercholesteremia    ? Hyperlipidemia     Created by Conversion    ? Osteopenia 2/15/2015   ? Pap Smear (+) Low Grade Squamous Intraepith Lesion W/ Atypia      LGSIL 2002  colpoLGSIL 2003  colpoLGSIL 4/2006 colpoLGSIL 11/06 colpoAbnormal 5/07 colpo  CHCWLEEPendometrial biopsy 5/07    ? Vitamin D deficiency     Past Surgical History:   Procedure Laterality Date   ? CARDIAC CATHETERIZATION  05/30/2017    No intervention   ? CARDIAC CATHETERIZATION N/A 5/30/2017    Procedure: Coronary Angiogram;  Surgeon: Torito Metzger MD;  Location: [facility];  Service:    ? LAPAROSCOPIC CHOLECYSTECTOMY N/A 2/23/2015    Procedure: CHOLECYSTECTOMY LAPAROSCOPIC;  Surgeon: Doug Webber MD;  Location: [facility];  Service:    ? OH CLOSED RX MANDIBLE FX      Description: Closed Treatment Of Mandibular Fracture;  Recorded: 05/29/2008;   ? [place] CONIZATION CERVIX,LOOP ELECTRD      Description: Cervical Conization Loop Electrode Excision;  Recorded: 05/29/2008;   ? [place] DILATION/CURETTAGE,DIAGNOSTIC      Description: Dilation And Curettage;  Recorded: 05/29/2008;  Comments: X 2  metrorhhagia    Family History   Problem Relation Age of Onset   ? Mental illness Mother 30        schizophrenia, bipolar   ? Colon cancer Father    ? Heart disease Father    ? Mental illness Sister         depression   ? Diabetes Brother         type 2   ? Mental illness Brother         depression      Social History     Socioeconomic History   ? Marital status:      Spouse name: Not on file   ? Number of children: Not on file   ? Years of education: Not on file   ? Highest education level: Not on file   Occupational  History   ? Not on file   Social Needs   ? Financial resource strain: Not on file   ? Food insecurity     Worry: Not on file     Inability: Not on file   ? Transportation needs     Medical: Not on file     Non-medical: Not on file   Tobacco Use   ? Smoking status: Never Smoker   ? Smokeless tobacco: Never Used   Substance and Sexual Activity   ? Alcohol use: Yes     Alcohol/week: 2.0 standard drinks     Types: 2 Cans of beer per week   ? Drug use: No   ? Sexual activity: Not Currently     Partners: Male     Birth control/protection: Abstinence   Lifestyle   ? Physical activity     Days per week: Not on file     Minutes per session: Not on file   ? Stress: Not on file   Relationships   ? Social connections     Talks on phone: Not on file     Gets together: Not on file     Attends Protestant service: Not on file     Active member of club or organization: Not on file     Attends meetings of clubs or organizations: Not on file     Relationship status: Not on file   ? Intimate partner violence     Fear of current or ex partner: Not on file     Emotionally abused: Not on file     Physically abused: Not on file     Forced sexual activity: Not on file   Other Topics Concern   ? Not on file   Social History Narrative   ? Not on file          Medications  Allergies   Current Outpatient Medications   Medication Sig Dispense Refill   ? ascorbic acid, vitamin C, (VITAMIN C) 500 MG tablet Take 500 mg by mouth daily.     ? aspirin 81 mg chewable tablet Chew 1 tablet (81 mg total) daily.  0   ? atorvastatin (LIPITOR) 80 MG tablet Take 1 tablet (80 mg total) by mouth at bedtime. 90 tablet 0   ? calcium citrate/vitamin D3 (CITRACAL + D ORAL) Take by mouth daily.     ? cholecalciferol, vitamin D3, 2,000 unit Tab TAKE 1 TABLET BY MOUTH DAILY. 90 tablet 3   ? ibuprofen (ADVIL,MOTRIN) 800 MG tablet TAKE ONE TABLET BY MOUTH THREE TIMES DAILY AS NEEDED TAKE WITH FOOD 30 tablet 3   ? magnesium oxide 250 mg magnesium Tab Take by mouth.     ?  OMEGA 3-DHA-EPA-FISH OIL ORAL Take 1 capsule by mouth daily.     ? traZODone (DESYREL) 50 MG tablet TAKE ONE-HALF (1/2) TABLET AT BEDTIME 90 tablet 0   ? vitamin E 400 UNIT capsule Take 400 Units by mouth daily.      ? benzonatate (TESSALON PERLES) 100 MG capsule Take 1 capsule (100 mg total) by mouth 3 (three) times a day as needed for cough. 30 capsule 0   ? budesonide-formoteroL (SYMBICORT) 80-4.5 mcg/actuation inhaler Inhale 2 puffs 2 (two) times a day. 1 Inhaler 12   ? fluticasone propion-salmeteroL (ADVAIR DISKUS) 250-50 mcg/dose DISKUS Inhale 1 puff 2 (two) times a day. 1 puff 1   ? multivitamin capsule Take 1 capsule by mouth daily.        No current facility-administered medications for this visit.     No Known Allergies      Lab Results    Chemistry/lipid CBC Cardiac Enzymes/BNP/TSH/INR   Lab Results   Component Value Date    CHOL 159 07/10/2019    HDL 68 07/10/2019    LDLCALC 77 07/10/2019    TRIG 68 07/10/2019    CREATININE 0.82 07/10/2019    BUN 15 07/10/2019    K 4.1 07/10/2019     07/10/2019     07/10/2019    CO2 22 07/10/2019    Lab Results   Component Value Date    WBC 6.3 07/10/2019    HGB 13.2 07/10/2019    HCT 38.9 07/10/2019    MCV 92 07/10/2019     07/10/2019    Lab Results   Component Value Date    TROPONINI <0.01 01/31/2017    TSH 1.53 07/10/2019        Teodora Platt

## 2021-06-30 NOTE — PROGRESS NOTES
"Progress Notes by Cierra Kaufman AuD at 11/19/2020  1:20 PM     Author: Cierra Kaufman AuD Service: -- Author Type: Audiologist    Filed: 11/19/2020  1:50 PM Encounter Date: 11/19/2020 Status: Signed    : Cierra Kaufman AuD (Audiologist)       Hearing evaluation in conjunction with ENT exam (Dr. Rodriguez)    Summary:  Audiology visit completed. Please see audiogram below or under \"media\" tab for history and results.    Transducer:  Both insert phones and circumaural headphones were used.    Reliability:    Good    Recommendations:  Follow-up with ENT; retest hearing per medical management or patient concern.  Wear hearing protection consistently in noise to preserve residual hearing sensitivity.     Sean Bagley, [facility]  [place] Licensed Audiologist [id number]           "

## 2021-06-30 NOTE — PROGRESS NOTES
Progress Notes by Laura Garcia OT at 3/16/2021  5:30 PM     Author: Laura Garcia OT Service: -- Author Type: Occupational Therapist    Filed: 3/16/2021  6:49 PM Encounter Date: 3/16/2021 Status: Attested    : Laura Garcia OT (Occupational Therapist) Cosigner: Yolanda Gonzalez MD at 3/17/2021  8:23 AM    Attestation signed by Yolanda Gonzalez MD at 3/17/2021  8:23 AM    agree                    Certification Request    March 16, 2021      Patient: Wendy Handy  MR Number: [id number]  YOB: 1955  Date of Visit: 3/16/2021      Dear Dr. Yolanda Gonzalez:    Thank you for this referral.  We are seeing Wendy Handy in Occupational Therapy for right elbow pain.    Medicare and/or Medicaid requires physician review and approval of the treatment plan. Please review the plan of care and verify that you agree with the therapy plan of care by co-signing this note.      Plan of Care  Authorization / Certification Start Date: 03/16/21  Authorization / Certification End Date: 06/14/21  Authorization / Certification Number of Visits: 12  Communication with: Referral Source  Patient Related Instruction: Nature of Condition;Treatment plan and rationale;Basis of treatment;Expected outcome  Times per Week: 1  Number of Weeks: 12  Number of Visits: 12  Select Plan of Care: Select  Therapeutic Exercise: Stretching;Strengthening  Neuromuscular Reeducation: kinesio tape  Manual Therapy: soft tissue mobilization  Functional Training (ADL's): ergonomics  Orthotic Fitting: OTC      Goals:  Patient Will Demonstrate / Verbalize independence in self-management of condition in: 2 weeks  Patient will be independent with home exercise program in: 2 weeks  Patient will be able to: lift;carry;for grocery shopping;with no pain;in 12 weeks  Patient will perform: housework;with no pain;in 12 weeks  Patient will be able to  & pinch: for meal prep;with no pain;in 12 weeks  Patient will improve hand/finger  coordination for: writing;typing;with no pain;in 12 weeks        If you have any questions or concerns, please don't hesitate to call.    Sincerely,      Laura Garcia, OT        Physician recommendation:                                ___ Follow therapist's recommendation                                                                                                    ___ Modify therapy                                                                                                      Physician Signature:_____________________                                                                                                                                        Date:___________________________      *Physician co-signature indicates they certify the need for these services furnished within this plan and while under their care.        Upper Extremity Initial Evaluation    Patient Name: Wendy Handy  Date of evaluation: 3/16/2021  Referral Diagnosis: lateral epicondylitis of right elbow  Referring provider: Yolanda Gonzalez MD  Visit Diagnosis:     ICD-10-CM    1. Right elbow pain  M25.521    2. Right hand weakness  R29.898    3. Decreased activities of daily living (ADL)  Z78.9        Assessment:      Pt. is appropriate for skilled OT intervention as outlined in the Plan of Care (POC).    Goals:  Patient Will Demonstrate / Verbalize independence in self-management of condition in: 2 weeks  Patient will be independent with home exercise program in: 2 weeks  Patient will be able to: lift;carry;for grocery shopping;with no pain;in 12 weeks  Patient will perform: housework;with no pain;in 12 weeks  Patient will be able to  & pinch: for meal prep;with no pain;in 12 weeks  Patient will improve hand/finger coordination for: writing;typing;with no pain;in 12 weeks      Patient's expectations/goals are realistic.    Barriers to Learning or Achieving Goals:  No Barriers.       Plan / Patient Instructions:        Plan  of Care:   Authorization / Certification Start Date: 03/16/21  Authorization / Certification End Date: 06/14/21  Authorization / Certification Number of Visits: 12  Communication with: Referral Source  Patient Related Instruction: Nature of Condition;Treatment plan and rationale;Basis of treatment;Expected outcome  Times per Week: 1  Number of Weeks: 12  Number of Visits: 12  Select Plan of Care: Select  Therapeutic Exercise: Stretching;Strengthening  Neuromuscular Reeducation: kinesio tape  Manual Therapy: soft tissue mobilization  Functional Training (ADL's): ergonomics  Orthotic Fitting: OTC      POC and pathology of condition were reviewed with patient.  Pt. is in agreement with the Plan of Care. Treatment techniques, plan of care, and goals were discussed with the patient.  The patient agrees to the plan as outlined.  The plan of care is dynamic and will be modified on an ongoing basis.    A Home Exercise Program (HEP) was initiated today. Pt. was instructed in exercises by OT and patient was given a handout with detailed instructions.        Subjective:        Social information:   Occupation:retired   Work Status:NA     History of Present Illness:    Wendy is a 65 y.o. female who presents to therapy today with complaints of right elbow pain and hand weakness. Date of onset/duration of symptoms is March 2021. Onset was gradual. Symptoms are not improving. She reports no history of similar symptoms. She describes their previous level of function as not limited. Functional limitations are described as occurring with gripping, pinching, lifting, carrying for ADL's and IADL's.     Pain rating at rest: 7  Pain rating with activity: 10         Objective:      Note: Items left blank indicates the item was not performed or not indicated at the time of the evaluation.    Patient Outcome Measures :    QuickDASH Score: 56.8      Upper Extremity Examination:  1. Right elbow pain     2. Right hand weakness     3. Decreased  activities of daily living (ADL)       Precautions/Restrictions: None  Involved side: Right  Atrophy:  Absent  Color: Normal  Temperature: Normal  Edema: Absent  Palpation: right lateral epicondyle  Scar: NA  Guarded extremity: Minimal.  Sensory: Patient reports normal.      Hand AROM  Right   Left     NT WNL Impaired NT WNL Impaired   Full Fist  X   X    Flat Fist  X   X    Claw Fist  X   X      ROM/STRENGTH RIGHT LEFT   Note: * indicates pain AROM AROM   Shoulder Flexion - 180? WNL WNL   Shoulder Ext - 60?      Shoulder Abd - 180?     Elbow Flexion - 150? WNL WNL   Elbow Extension - 0?    WNL WNL   Forearm Supination - 80? WNL WNL   Forearm Pronation - 80? WNL WNL   Wrist Flexion - 65-80?  WNL WNL   Wrist Extension - 65-80? WNL WNL   Ulnar Deviation - 20-35?     Radial Deviation - 10-20?      Strength 31# 43#   3 Point Pinch     Lateral Pinch       May benefit from PT evaluation: No    U/E orthosis currently:   No    Plan for next visit: to be determined if patient still having pain. STM and consider eccentric exercises.    Treatment Today:  Patient instructed on stretch to right wrist flexors and extensors as well as right triceps. Patient instructed to wear a right wrist brace at night. Performed and instructed on muscle stripping to right wrist extensors. Instructed on and applied kinesiotape from right dorsal hand to lateral elbow. Patient instructed on proper ergonomics and activity modification including using larger  size for utensils and tools, avoiding repetitive gripping and rotation, proper height of computer and sewing machine/work surfaces.  TREATMENT MINUTES COMMENTS   Evaluation 20    Self-care/ Home management 5    Manual therapy 10    Neuromuscular Re-education 2    Orthotic fitting 3    Therapeutic Exercises 10    Iontophoresis           Total 50    Blank areas are intentional and mean the treatment did not include these items.     GOALS AND PLAN OF CARE WERE ESTABLISHED IN COOPERATION  WITH THE PATIENT    OT Evaluation Code: (Please list factors)   Comorbidities:   Patient Active Problem List   Diagnosis   ? Vitamin D Deficiency   ? Hyperlipidemia   ? Discoid Lupus Erythematosus   ? Pap Smear (+) Low Grade Squamous Intraepith Lesion W/ Atypia   ? Aortic Stenosis   ? Osteopenia   ? Coronary artery disease due to calcified coronary lesion   ? Aortic aneurysm (H)   ? Family history of colon cancer   ? Colorectal polyps   ? History of abnormal cervical Pap smear   ? Atypical squamous cell changes of undetermined significance (ASCUS) on cervical cytology with positive high risk human papilloma virus (HPV)   ? History of colonic polyps   ? Systemic lupus erythematosus (H)   ? Abnormal Papanicolaou smear of cervix   ? H/O LEEP     Profile/History Review: Brief    Need for eval modification: No    # Treatment options: Limited    Clinical Decision Making:  Low      Occupational Profile/ Medical and Therapy History and Comorbidities Occupational Performance Clinical Decision Making   (Complexity)   brief history with review of medical/therapy records related to the presenting problem.  No comorbidities 1-3 Performance deficits that result in activity limitations and/or participation restrictions.    No Assessment Modification  Low complexity, which includes  problem-focused assessments, and consideration of a limited number of treatment options.      expanded review of medical/therapy records and additional review of physical, cognitive and psychosocial history.    May have comorbidities 3-5 Performance deficits that result in activity limitations and/or participation restrictions.    Minimal to moderate modification of assessment Moderate complexity, which includes analysis of data from detailed assessments, and consideration of several treatment options.         Review of medical/therapy records and extensive additional review of physical, cognitive and psychosocial history.  Comorbidities affect  occupational performance 5 or more Performance deficits that result in activity limitations and/or participation restrictions.    Significant modification of assessment High complexity, analysis of  Occupational profile and data,  Comprehensive assessments, multiple treatment options.            Laura Garcia  3/16/2021  5:36 PM

## 2021-06-30 NOTE — PROGRESS NOTES
Progress Notes by Teodora Platt MD at 4/23/2021  8:50 AM     Author: Teodora Platt MD Service: -- Author Type: Physician    Filed: 4/23/2021  9:23 AM Encounter Date: 4/23/2021 Status: Signed    : Teodora Platt MD (Physician)           Thank you, Dr. Gonzalez, for asking the [facility] team to see Ms. Wendy Handy to follow-up on coronary artery disease, bicuspid aortic valve with stenosis and hypercholesterolemia.    Assessment/Recommendations   Assessment:    1.  Coronary artery disease, mild to moderate by coronary angiography in 2017.  Patient continues to report some mid to upper back pain with walking which has been going on over the past year although less intense.  Did bring up pursuing a stress study to further evaluate.  We did pursue a stress MRI a year ago which also allowed us to evaluate her aortic valve and ascending aortic aneurysm.  She would like to follow up with another study and this will be arranged.  2.  Hypercholesterolemia with LDL above 70.  Because she is not at goal, I brought up options of adding Zetia or switching to rosuvastatin.  She would rather add Zetia to see if we can get her LDL to goal.  3.  Bicuspid aortic valve with mild stenosis.  Examination today suggests no significant change.  Again we will be able to follow-up on this with her MRI.    Plan:  1.  Continue current medications and add Zetia 10 mg daily.  Would repeat a lipid profile in 3 months to see if we are achieving our goal of an LDL below 70  2.  Set up stress cardiac MRI with further recommendations to follow       History of Present Illness    Ms. Wendy Handy is a 66 y.o. female with history of bicuspid aortic valve with mild stenosis, mild to moderate ascending aortic aneurysm, and mild to moderate coronary artery disease who presents today for follow-up visit.  I met her a year ago virtually for a visit.  At that time, she reported some mild exertional mid to upper back pain which  occurred with walking and would resolve with rest.  Denied any associated chest discomfort, shortness of breath or palpitations.  At that time, because we needed to reassess her aortic valve and ascending aorta, I brought up the option of a stress cardiac MRI which she agreed to.  This demonstrated no evidence of ischemia and only mild aortic valve stenosis.  Her ascending aorta appeared stable.    Today, she reports continued mid to upper back discomfort with walking although she does not get similar discomfort when she rides the bike.  She does tell me that when she rides her bike, however, it is not as intense as when she does her walks.  Again there is no chest discomfort.  Denies orthopnea, PND, lower extremity edema, lightheadedness or near syncope.  Most recent lipid profile continued to show an LDL above 70 despite high-dose atorvastatin.  Told her ideally we would like to see her LDL below 70 given her coronary disease.    ECG (personally reviewed): No ECG today    Cardiac Imaging Studies (personally reviewed): No recent cardiac imaging     Physical Examination Review of Systems   Vitals:    04/23/21 0844   BP: 102/72   Pulse: 64   Resp: 16     Body mass index is 27.1 kg/m .  Wt Readings from Last 3 Encounters:   04/23/21 154 lb 3.2 oz (69.9 kg)   04/12/21 154 lb 14.4 oz (70.3 kg)   03/12/21 153 lb 1.6 oz (69.4 kg)     General Appearance:   Awake, Alert, No acute distress.   HEENT:  No scleral icterus; the mucous membranes were pink and moist.   Neck: No cervical bruits or jugular venous distention    Chest: The spine was straight. The chest was symmetric.   Lungs:   Respirations unlabored; the lungs are clear to auscultation. No wheezing   Cardiovascular:    Regular rate and rhythm.  S1, S2 normal.  1/6 systolic ejection murmur heard at the left sternal border.   Abdomen:  No organomegaly, masses, bruits, or tenderness. Bowels sounds are present   Extremities:  No peripheral edema, clubbing or cyanosis.    Skin: No xanthelasma. Warm, Dry.   Musculoskeletal: No tenderness.   Neurologic: Mood and affect are appropriate.    General: WNL  Eyes: WNL  Ears/Nose/Throat: WNL  Lungs: WNL  Heart: WNL  Stomach: WNL  Bladder: WNL  Muscle/Joints: WNL  Skin: WNL  Nervous System: WNL  Mental Health: WNL     Blood: WNL     Medical History  Surgical History Family History Social History   Past Medical History:   Diagnosis Date   ? Aortic Stenosis     echo 1/09  4.2 cm TAA-MRA 4/2016     ? Bicuspid aortic valve    ? Coronary artery disease due to calcified coronary lesion 5/5/2016    Echo 6/2016   Good function  CT Ca++ 172 LAD  Cardiology consult 4/016-nuclear stress   ? Discoid Lupus Erythematosus     Created by [facility] Annotation: May 29 2008  9:49AM - Yolanda Gonzalez: Rheumatologist    ? H/O LEEP 1/1/2007 2002-2006 LSIL paps with colposcopy, had LEEP in 2007. No records 6/2/10 ASCUS, +HR HPV 53 6/20/12 ASCUS, +HR HPV 53. Colposcopy outside HE/ system? 6/23/14 NIL 6/24/15 NIL pap, neg HPV 6/27/16 ASCUS, neg HPV 6/29/17 NIL pap, neg HPV 7/9/18 ASCUS, +HR HPV 16. Unclear if colposcopy was completed 7/10/19 NIL pap, neg HPV 7/13/20 NIL pap, neg HPV 2/1/21 NIL pap, neg HPV. Plan: await provider   ? Hypercholesteremia    ? Hyperlipidemia     Created by Conversion    ? Osteopenia 2/15/2015   ? Pap Smear (+) Low Grade Squamous Intraepith Lesion W/ Atypia      LGSIL 2002  colpoLGSIL 2003  colpoLGSIL 4/2006 colpoLGSIL 11/06 colpoAbnormal 5/07 colpo  CHCWLEEPendometrial biopsy 5/07    ? Vitamin D deficiency     Past Surgical History:   Procedure Laterality Date   ? CARDIAC CATHETERIZATION  05/30/2017    No intervention   ? CARDIAC CATHETERIZATION N/A 5/30/2017    Procedure: Coronary Angiogram;  Surgeon: Troito Metzger MD;  Location: [facility];  Service:    ? LAPAROSCOPIC CHOLECYSTECTOMY N/A 2/23/2015    Procedure: CHOLECYSTECTOMY LAPAROSCOPIC;  Surgeon: Doug Webber MD;  Location: [facility];   Service:    ? NH CLOSED RX MANDIBLE FX      Description: Closed Treatment Of Mandibular Fracture;  Recorded: 05/29/2008;   ? NH CONIZATION CERVIX,LOOP ELECTRD      Description: Cervical Conization Loop Electrode Excision;  Recorded: 05/29/2008;   ? NH DILATION/CURETTAGE,DIAGNOSTIC      Description: Dilation And Curettage;  Recorded: 05/29/2008;  Comments: X 2  metrorhhagia    Family History   Problem Relation Age of Onset   ? Mental illness Mother 30        schizophrenia, bipolar   ? Colon cancer Father    ? Heart disease Father    ? Mental illness Sister         depression   ? Diabetes Brother         type 2   ? Mental illness Brother         depression    Social History     Socioeconomic History   ? Marital status:      Spouse name: Not on file   ? Number of children: Not on file   ? Years of education: Not on file   ? Highest education level: Not on file   Occupational History   ? Not on file   Social Needs   ? Financial resource strain: Not on file   ? Food insecurity     Worry: Not on file     Inability: Not on file   ? Transportation needs     Medical: Not on file     Non-medical: Not on file   Tobacco Use   ? Smoking status: Never Smoker   ? Smokeless tobacco: Never Used   Substance and Sexual Activity   ? Alcohol use: Yes     Alcohol/week: 2.0 standard drinks     Types: 2 Cans of beer per week   ? Drug use: No   ? Sexual activity: Not Currently     Partners: Male     Birth control/protection: Abstinence   Lifestyle   ? Physical activity     Days per week: Not on file     Minutes per session: Not on file   ? Stress: Not on file   Relationships   ? Social connections     Talks on phone: Not on file     Gets together: Not on file     Attends Bahai service: Not on file     Active member of club or organization: Not on file     Attends meetings of clubs or organizations: Not on file     Relationship status: Not on file   ? Intimate partner violence     Fear of current or ex partner: Not on file      Emotionally abused: Not on file     Physically abused: Not on file     Forced sexual activity: Not on file   Other Topics Concern   ? Not on file   Social History Narrative   ? Not on file          Medications  Allergies   Current Outpatient Medications   Medication Sig Dispense Refill   ? ascorbic acid, vitamin C, (VITAMIN C) 500 MG tablet Take 500 mg by mouth daily.     ? aspirin 81 mg chewable tablet Chew 1 tablet (81 mg total) daily.  0   ? atorvastatin (LIPITOR) 80 MG tablet TAKE 1 TABLET (80 MG TOTAL) BY MOUTH AT BEDTIME.(NEED TO BE SEEN FOR PHYSICAL) 90 tablet 3   ? cholecalciferol, vitamin D3, 50 mcg (2,000 unit) Tab TAKE 1 TABLET BY MOUTH DAILY. 90 tablet 3   ? ibuprofen (ADVIL,MOTRIN) 800 MG tablet TAKE ONE TABLET BY MOUTH THREE TIMES DAILY AS NEEDED TAKE WITH FOOD 30 tablet 3   ? mecobalamin (B12 ACTIVE ORAL) Take 2,500 mcg by mouth.     ? OMEGA 3-DHA-EPA-FISH OIL ORAL Take 1 capsule by mouth daily.     ? prednisoLONE acetate (PRED-FORTE) 1 % ophthalmic suspension INSTILL 1 DROP INTO RIGHT EYE FOUR TIMES A DAY START AFTER SURGERY FOR 1 WEEK THEN TAPER     ? traZODone (DESYREL) 50 MG tablet TAKE ONE-HALF TABLET BY  MOUTH AT BEDTIME 45 tablet 3   ? vitamin E 400 UNIT capsule Take 400 Units by mouth daily.      ? zinc 50 mg Tab Take 50 mg by mouth daily.     ? calcium carbonate (CALCIUM 500 ORAL) Take 1,300 mg by mouth daily.     ? calcium citrate/vitamin D3 (CITRACAL + D ORAL) Take by mouth daily.     ? ezetimibe (ZETIA) 10 mg tablet Take 1 tablet (10 mg total) by mouth daily. 90 tablet 3     No current facility-administered medications for this visit.       No Known Allergies      Lab Results    Chemistry/lipid CBC Cardiac Enzymes/BNP/TSH/INR   Lab Results   Component Value Date    CHOL 162 02/01/2021    HDL 70 02/01/2021    LDLCALC 78 02/01/2021    TRIG 71 02/01/2021    CREATININE 0.83 04/12/2021    BUN 15 04/12/2021    K 4.6 04/12/2021     04/12/2021     04/12/2021    CO2 26 04/12/2021    Lab  Results   Component Value Date    WBC 6.7 07/13/2020    HGB 13.3 07/13/2020    HCT 40.1 07/13/2020    MCV 96 07/13/2020     07/13/2020    Lab Results   Component Value Date    TROPONINI <0.01 01/31/2017    TSH 1.53 07/10/2019        A total of 35 minutes was spent reviewing patient's medical records, obtaining history and performing examination, as well as discussing diagnoses/ recommendations with patient and answering all questions.

## 2021-07-04 NOTE — ADDENDUM NOTE
Addendum Note by Yolanda Gonzalez MD at 2/1/2021  2:50 PM     Author: Yolanda Gonzalez MD Service: -- Author Type: Physician    Filed: 2/3/2021  9:29 AM Encounter Date: 2/1/2021 Status: Signed    : Yolanda Gonzalez MD (Physician)    Addended by: YOLANDA GONZALEZ on: 2/3/2021 09:29 AM        Modules accepted: Orders

## 2021-07-13 ENCOUNTER — RECORDS - HEALTHEAST (OUTPATIENT)
Dept: ADMINISTRATIVE | Facility: CLINIC | Age: 66
End: 2021-07-13

## 2021-07-21 ENCOUNTER — RECORDS - HEALTHEAST (OUTPATIENT)
Dept: ADMINISTRATIVE | Facility: CLINIC | Age: 66
End: 2021-07-21

## 2021-08-23 DIAGNOSIS — E78.00 HYPERCHOLESTEROLEMIA: Primary | ICD-10-CM

## 2021-08-23 RX ORDER — EZETIMIBE 10 MG/1
10 TABLET ORAL DAILY
Qty: 90 TABLET | Refills: 1 | Status: SHIPPED | OUTPATIENT
Start: 2021-08-23 | End: 2022-01-17

## 2021-08-23 RX ORDER — EZETIMIBE 10 MG/1
10 TABLET ORAL
COMMUNITY
Start: 2021-04-23 | End: 2021-08-23

## 2021-10-09 ENCOUNTER — HEALTH MAINTENANCE LETTER (OUTPATIENT)
Age: 66
End: 2021-10-09

## 2021-10-20 ENCOUNTER — MYC MEDICAL ADVICE (OUTPATIENT)
Dept: FAMILY MEDICINE | Facility: CLINIC | Age: 66
End: 2021-10-20

## 2021-11-01 ENCOUNTER — TRANSFERRED RECORDS (OUTPATIENT)
Dept: HEALTH INFORMATION MANAGEMENT | Facility: CLINIC | Age: 66
End: 2021-11-01
Payer: COMMERCIAL

## 2021-11-01 DIAGNOSIS — R09.89 ABNORMAL VASCULAR FLOW: Primary | ICD-10-CM

## 2021-11-03 ENCOUNTER — ANCILLARY PROCEDURE (OUTPATIENT)
Dept: VASCULAR ULTRASOUND | Facility: CLINIC | Age: 66
End: 2021-11-03
Attending: FAMILY MEDICINE
Payer: COMMERCIAL

## 2021-11-03 DIAGNOSIS — R09.89 ABNORMAL VASCULAR FLOW: ICD-10-CM

## 2021-11-03 PROCEDURE — 93924 LWR XTR VASC STDY BILAT: CPT | Mod: 26 | Performed by: SURGERY

## 2021-11-03 PROCEDURE — 93924 LWR XTR VASC STDY BILAT: CPT

## 2021-11-04 DIAGNOSIS — E55.9 VITAMIN D DEFICIENCY, UNSPECIFIED: ICD-10-CM

## 2021-11-06 NOTE — TELEPHONE ENCOUNTER
"Routing refill request to provider for review/approval because:  Drug not active on patient's medication list  Due to medication information not transferring due to [facility] please review the medication information prior to signing to ensure accuracy.    Last Written Prescription:      Last office visit provider:  3/12/21     Requested Prescriptions   Pending Prescriptions Disp Refills     VITAMIN D3 50 MCG (2000 UT) tablet [Pharmacy Med Name: VITAMIN D3 50 MCG TABLET] 90 tablet 3     Sig: TAKE 1 TABLET BY MOUTH EVERY DAY       Vitamin Supplements (Adult) Protocol Failed - 11/4/2021 12:27 AM        Failed - Medication is active on med list        Passed - High dose Vitamin D not ordered        Passed - Recent (12 mo) or future (30 days) visit within the authorizing provider's specialty     Patient has had an office visit with the authorizing provider or a provider within the authorizing providers department within the previous 12 mos or has a future within next 30 days. See \"Patient Info\" tab in inbasket, or \"Choose Columns\" in Meds & Orders section of the refill encounter.                   Stephenie Marshall RN 11/05/21 10:59 PM  "

## 2021-11-08 RX ORDER — CHOLECALCIFEROL (VITAMIN D3) 50 MCG
TABLET ORAL
Qty: 90 TABLET | Refills: 3 | Status: SHIPPED | OUTPATIENT
Start: 2021-11-08 | End: 2022-12-04

## 2022-02-01 NOTE — TELEPHONE ENCOUNTER
Telephone Encounter by Sonia Somers at 6/14/2019  8:09 AM     Author: Sonia Somers Service: -- Author Type: --    Filed: 6/14/2019  8:12 AM Encounter Date: 6/12/2019 Status: Signed    : Sonia Somers APPROVED:    Approval start date:05/30/2019  Approval end date:08/13/2019    Pharmacy has been notified of approval and will contact patient when medication is ready for pickup.                 show

## 2022-02-06 DIAGNOSIS — G47.00 INSOMNIA, UNSPECIFIED TYPE: Primary | ICD-10-CM

## 2022-02-08 RX ORDER — TRAZODONE HYDROCHLORIDE 50 MG/1
TABLET, FILM COATED ORAL
Qty: 45 TABLET | Refills: 0 | Status: SHIPPED | OUTPATIENT
Start: 2022-02-08 | End: 2022-03-09

## 2022-02-08 NOTE — TELEPHONE ENCOUNTER
"Outpatient Medication Detail     Disp Refills Start End ROULA   traZODone (DESYREL) 50 MG tablet 45 tablet 3 2/4/2021  No   Sig: TAKE ONE-HALF TABLET BY  MOUTH AT BEDTIME   Sent to pharmacy as: traZODone 50 mg tablet (DESYREL)   Notes to Pharmacy: Requesting 1 year supply   E-Prescribing Status: Receipt confirmed by pharmacy (2/4/2021 12:33 PM CST)       Last office visit provider:  4/12/21     Requested Prescriptions   Pending Prescriptions Disp Refills     traZODone (DESYREL) 50 MG tablet [Pharmacy Med Name: traZODone HCl 50 MG Oral Tablet] 45 tablet 3     Sig: TAKE ONE-HALF TABLET BY  MOUTH AT BEDTIME       Serotonin Modulators Passed - 2/6/2022  9:09 PM        Passed - Recent (12 mo) or future (30 days) visit within the authorizing provider's specialty     Patient has had an office visit with the authorizing provider or a provider within the authorizing providers department within the previous 12 mos or has a future within next 30 days. See \"Patient Info\" tab in inbasket, or \"Choose Columns\" in Meds & Orders section of the refill encounter.              Passed - Medication is active on med list        Passed - Patient is age 18 or older        Passed - No active pregnancy on record        Passed - No positive pregnancy test in past 12 months             Luca Mercer RN 02/08/22 11:07 AM  "

## 2022-03-07 ASSESSMENT — ACTIVITIES OF DAILY LIVING (ADL): CURRENT_FUNCTION: NO ASSISTANCE NEEDED

## 2022-03-09 ENCOUNTER — OFFICE VISIT (OUTPATIENT)
Dept: FAMILY MEDICINE | Facility: CLINIC | Age: 67
End: 2022-03-09
Payer: COMMERCIAL

## 2022-03-09 ENCOUNTER — HOSPITAL ENCOUNTER (OUTPATIENT)
Dept: MAMMOGRAPHY | Facility: CLINIC | Age: 67
Discharge: HOME | End: 2022-03-09
Attending: FAMILY MEDICINE | Admitting: FAMILY MEDICINE
Payer: COMMERCIAL

## 2022-03-09 VITALS
HEIGHT: 63 IN | WEIGHT: 157.9 LBS | HEART RATE: 76 BPM | BODY MASS INDEX: 27.98 KG/M2 | DIASTOLIC BLOOD PRESSURE: 62 MMHG | SYSTOLIC BLOOD PRESSURE: 126 MMHG

## 2022-03-09 DIAGNOSIS — H81.10 BENIGN PAROXYSMAL POSITIONAL VERTIGO, UNSPECIFIED LATERALITY: ICD-10-CM

## 2022-03-09 DIAGNOSIS — Z82.49 FAMILY HISTORY OF VASCULAR DISEASE: ICD-10-CM

## 2022-03-09 DIAGNOSIS — Z12.31 VISIT FOR SCREENING MAMMOGRAM: ICD-10-CM

## 2022-03-09 DIAGNOSIS — I35.9 AORTIC VALVE DISORDER: ICD-10-CM

## 2022-03-09 DIAGNOSIS — F51.01 PRIMARY INSOMNIA: ICD-10-CM

## 2022-03-09 DIAGNOSIS — I25.10 CORONARY ARTERY DISEASE DUE TO CALCIFIED CORONARY LESION: ICD-10-CM

## 2022-03-09 DIAGNOSIS — I71.9 AORTIC ANEURYSM WITHOUT RUPTURE, UNSPECIFIED PORTION OF AORTA (H): ICD-10-CM

## 2022-03-09 DIAGNOSIS — Z86.79 HISTORY OF AORTIC STENOSIS: ICD-10-CM

## 2022-03-09 DIAGNOSIS — M32.9 SYSTEMIC LUPUS ERYTHEMATOSUS, UNSPECIFIED SLE TYPE, UNSPECIFIED ORGAN INVOLVEMENT STATUS (H): ICD-10-CM

## 2022-03-09 DIAGNOSIS — G47.00 INSOMNIA, UNSPECIFIED TYPE: ICD-10-CM

## 2022-03-09 DIAGNOSIS — I25.84 CORONARY ARTERY DISEASE DUE TO CALCIFIED CORONARY LESION: ICD-10-CM

## 2022-03-09 DIAGNOSIS — E55.9 VITAMIN D DEFICIENCY: ICD-10-CM

## 2022-03-09 DIAGNOSIS — E78.00 HYPERCHOLESTEROLEMIA: ICD-10-CM

## 2022-03-09 DIAGNOSIS — Z80.0 FAMILY HISTORY OF COLON CANCER: ICD-10-CM

## 2022-03-09 DIAGNOSIS — E78.5 HYPERLIPIDEMIA, UNSPECIFIED HYPERLIPIDEMIA TYPE: ICD-10-CM

## 2022-03-09 DIAGNOSIS — Z00.00 ENCOUNTER FOR ANNUAL WELLNESS EXAM IN MEDICARE PATIENT: Primary | ICD-10-CM

## 2022-03-09 PROBLEM — K63.5 POLYP OF COLON: Status: ACTIVE | Noted: 2017-09-13

## 2022-03-09 PROBLEM — Z87.42 HISTORY OF ABNORMAL CERVICAL PAP SMEAR: Status: ACTIVE | Noted: 2018-07-09

## 2022-03-09 PROBLEM — D12.5 BENIGN NEOPLASM OF SIGMOID COLON: Status: ACTIVE | Noted: 2017-09-15

## 2022-03-09 PROBLEM — L93.0 DISCOID LUPUS ERYTHEMATOSUS: Status: ACTIVE | Noted: 2022-03-09

## 2022-03-09 PROBLEM — Z86.0100 HISTORY OF COLONIC POLYPS: Status: ACTIVE | Noted: 2020-10-01

## 2022-03-09 PROBLEM — K64.9 HEMORRHOIDS: Status: ACTIVE | Noted: 2020-09-29

## 2022-03-09 LAB
ALBUMIN SERPL-MCNC: 4.5 G/DL (ref 3.5–5)
ALP SERPL-CCNC: 82 U/L (ref 45–120)
ALT SERPL W P-5'-P-CCNC: 38 U/L (ref 0–45)
ANION GAP SERPL CALCULATED.3IONS-SCNC: 14 MMOL/L (ref 5–18)
AST SERPL W P-5'-P-CCNC: 41 U/L (ref 0–40)
BILIRUB SERPL-MCNC: 1.2 MG/DL (ref 0–1)
BUN SERPL-MCNC: 9 MG/DL (ref 8–22)
CALCIUM SERPL-MCNC: 10.4 MG/DL (ref 8.5–10.5)
CHLORIDE BLD-SCNC: 104 MMOL/L (ref 98–107)
CHOLEST SERPL-MCNC: 127 MG/DL
CO2 SERPL-SCNC: 23 MMOL/L (ref 22–31)
CREAT SERPL-MCNC: 0.84 MG/DL (ref 0.6–1.1)
FASTING STATUS PATIENT QL REPORTED: YES
GFR SERPL CREATININE-BSD FRML MDRD: 76 ML/MIN/1.73M2
GLUCOSE BLD-MCNC: 88 MG/DL (ref 70–125)
HDLC SERPL-MCNC: 57 MG/DL
LDLC SERPL CALC-MCNC: 53 MG/DL
POTASSIUM BLD-SCNC: 4.3 MMOL/L (ref 3.5–5)
PROT SERPL-MCNC: 7.3 G/DL (ref 6–8)
SODIUM SERPL-SCNC: 141 MMOL/L (ref 136–145)
TRIGL SERPL-MCNC: 87 MG/DL

## 2022-03-09 PROCEDURE — G0438 PPPS, INITIAL VISIT: HCPCS | Performed by: FAMILY MEDICINE

## 2022-03-09 PROCEDURE — G0009 ADMIN PNEUMOCOCCAL VACCINE: HCPCS | Performed by: FAMILY MEDICINE

## 2022-03-09 PROCEDURE — 80053 COMPREHEN METABOLIC PANEL: CPT | Performed by: FAMILY MEDICINE

## 2022-03-09 PROCEDURE — 77067 SCR MAMMO BI INCL CAD: CPT

## 2022-03-09 PROCEDURE — 90670 PCV13 VACCINE IM: CPT | Performed by: FAMILY MEDICINE

## 2022-03-09 PROCEDURE — 99214 OFFICE O/P EST MOD 30 MIN: CPT | Mod: 25 | Performed by: FAMILY MEDICINE

## 2022-03-09 PROCEDURE — 80061 LIPID PANEL: CPT | Performed by: FAMILY MEDICINE

## 2022-03-09 PROCEDURE — 82306 VITAMIN D 25 HYDROXY: CPT | Performed by: FAMILY MEDICINE

## 2022-03-09 PROCEDURE — 36415 COLL VENOUS BLD VENIPUNCTURE: CPT | Performed by: FAMILY MEDICINE

## 2022-03-09 RX ORDER — PILOCARPINE HYDROCHLORIDE 10 MG/ML
SOLUTION/ DROPS OPHTHALMIC
Status: ON HOLD | COMMUNITY
Start: 2021-10-18 | End: 2022-06-09

## 2022-03-09 RX ORDER — LANOLIN ALCOHOL/MO/W.PET/CERES
1000 CREAM (GRAM) TOPICAL DAILY
COMMUNITY
Start: 2020-01-01

## 2022-03-09 RX ORDER — MULTIVIT WITH MINERALS/LUTEIN
1000 TABLET ORAL DAILY
COMMUNITY
Start: 2020-01-01

## 2022-03-09 RX ORDER — TRAZODONE HYDROCHLORIDE 50 MG/1
50 TABLET, FILM COATED ORAL AT BEDTIME
Qty: 90 TABLET | Refills: 3 | Status: SHIPPED | OUTPATIENT
Start: 2022-03-09 | End: 2023-01-31

## 2022-03-09 RX ORDER — ATORVASTATIN CALCIUM 80 MG/1
80 TABLET, FILM COATED ORAL AT BEDTIME
Qty: 90 TABLET | Refills: 3 | Status: SHIPPED | OUTPATIENT
Start: 2022-03-09 | End: 2023-01-22

## 2022-03-09 RX ORDER — EZETIMIBE 10 MG/1
10 TABLET ORAL DAILY
Qty: 90 TABLET | Refills: 3 | Status: SHIPPED | OUTPATIENT
Start: 2022-03-09 | End: 2023-01-16

## 2022-03-09 RX ORDER — ZINC GLUCONATE 50 MG
50 TABLET ORAL DAILY
COMMUNITY
Start: 2020-01-01

## 2022-03-09 ASSESSMENT — ACTIVITIES OF DAILY LIVING (ADL): CURRENT_FUNCTION: NO ASSISTANCE NEEDED

## 2022-03-09 NOTE — PROGRESS NOTES
SUBJECTIVE:   Wendy Handy is a 66 year old female who presents for Preventive Visit.    She is here for health maintenance review.  She just had her mammogram and I already have the report that it was normal.    She sees the cardiologist regularly because as an incidental finding when it is CT calcium score in order to determine how aggressive to be treating her lipids, we found a bicuspid aortic valve and aortic aneurysm that they are following closely.    She will have some dizziness and the room spins a little bit when she gets up from lying down at night to just sit on the side of the bed for about a minute before she goes the bathroom otherwise she is dizzy.  She also has those symptoms when she rolls over in bed.  We talked a little bit about benign positional vertigo and that this is likely what this is and that vestibular rehabilitation can get that straightened out for her and she is interested in pursuing that.    When I was reviewing her vaccines, I see that her Pneumovax was actually given to her before she was 65 so that will need to be repeated again 5 years after the first 1.  I think because of her heart history and the history of having had lupus in the past, I would also like to do a Prevnar 13 today.  There is a new Prevnar coming out and I told her to stay tuned because she may be getting another vaccine next year if those guidelines become solidified.    We reviewed the rest of her health maintenance and she is otherwise up-to-date.  Her other review of systems is that she has a little bit of urinary urgency sometimes and almost cannot make it to the bathroom without a little dribbling.  We talked about doing some time to voidings to have her go to the bathroom every 2-3 hours whether she feels like she needs to or not to keep her bladder decompressed.  I have also suggested that there is pelvic floor physical therapy that she could do and she would like to try to do some of that on her own  "before having formal pelvic floor physical therapy.    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  So she has a rheumatologist she could see again if she starts to flare with that again.     Patient has been advised of split billing requirements and indicates understanding: Yes  Are you in the first 12 months of your Medicare coverage?  No    Healthy Habits:     In general, how would you rate your overall health?  Good    Frequency of exercise:  4-5 days/week    Duration of exercise:  30-45 minutes    Do you usually eat at least 4 servings of fruit and vegetables a day, include whole grains    & fiber and avoid regularly eating high fat or \"junk\" foods?  No    Taking medications regularly:  Yes    Medication side effects:  None    Ability to successfully perform activities of daily living:  No assistance needed    Home Safety:  Throw rugs in the hallway    Hearing Impairment:  No hearing concerns    In the past 6 months, have you been bothered by leaking of urine? Yes    In general, how would you rate your overall mental or emotional health?  Good      PHQ-2 Total Score: 0    Additional concerns today:  Yes    Do you feel safe in your environment? Yes    Have you ever done Advance Care Planning? (For example, a Health Directive, POLST, or a discussion with a medical provider or your loved ones about your wishes): Yes, advance care planning is on file.       Fall risk none  Fallen 2 or more times in the past year?: No  Any fall with injury in the past year?: No  click delete button to remove this line now  Cognitive Screening   1) Repeat 3 items (Leader, Season, Table)      2) Clock draw:   NORMAL  3) 3 item recall:   Recalls 3 objects  Results: 3 items recalled: COGNITIVE IMPAIRMENT LESS LIKELY    Mini-CogTM Copyright [employer]. Licensed by the author for use in [employer] " Services; reprinted with permission (soob@[facility]). All rights reserved.      Do you have sleep apnea, excessive snoring or daytime drowsiness?: no    Reviewed and updated as needed this visit by clinical staff   Tobacco  Allergies  Meds  Problems             Reviewed and updated as needed this visit by Provider      Problems            Social History     Tobacco Use     Smoking status: Never Smoker     Smokeless tobacco: Never Used   Substance Use Topics     Alcohol use: Yes     Alcohol/week: 2.0 standard drinks     Comment: occasion          Alcohol Use 3/7/2022   Prescreen: >3 drinks/day or >7 drinks/week? No         Current providers sharing in care for this patient include:    Patient Care Team:  Yolanda Gonzalez MD as PCP - General (Family Practice)  Yolanda Gonzalez MD as Assigned PCP  Harshil Rodriguez MD as Assigned Surgical Provider  Teodora Platt MD as Assigned Heart and Vascular Provider    The following health maintenance items are reviewed in Epic and correct as of today:  Health Maintenance Due   Topic Date Due     ANNUAL REVIEW OF HM ORDERS  Never done    FHS-7:   Breast CA Risk Assessment (FHS-7) 3/7/2022 3/9/2022   Did any of your first-degree relatives have breast or ovarian cancer? No No   Did any of your relatives have bilateral breast cancer? No No   Did any man in your family have breast cancer? No No   Did any woman in your family have breast and ovarian cancer? No No   Did any woman in your family have breast cancer before age 50 y? No No   Do you have 2 or more relatives with breast and/or ovarian cancer? No No   Do you have 2 or more relatives with breast and/or bowel cancer? No No       Mammogram Screening: Recommended mammography every 1-2 years with patient discussion and risk factor consideration  Pertinent mammograms are reviewed under the imaging tab.    Review of Systems  See above    OBJECTIVE:   /62 (BP Location: Right arm, Patient Position: Sitting, Cuff Size: Adult  "Regular)   Pulse 76   Ht 1.6 m (5' 3\")   Wt 71.6 kg (157 lb 14.4 oz)   BMI 27.97 kg/m   Estimated body mass index is 27.97 kg/m  as calculated from the following:    Height as of this encounter: 1.6 m (5' 3\").    Weight as of this encounter: 71.6 kg (157 lb 14.4 oz).  Physical Exam  GENERAL APPEARANCE: healthy, alert and no distress  EYES: Eyes grossly normal to inspection, PERRL and conjunctivae and sclerae normal, glasses  HENT: ear canals and TM's normal  NECK: no adenopathy, no asymmetry, masses, or scars and thyroid normal to palpation  RESP: lungs clear to auscultation - no rales, rhonchi or wheezes  BREAST: deferred  CV: regular rate and rhythm, normal S1 S2, no S3 or S4, no murmur, click or rub, no peripheral edema and peripheral pulses strong  ABDOMEN: soft, nontender, no hepatosplenomegaly, no masses and bowel sounds normal  MS: no musculoskeletal defects are noted and gait is age appropriate without ataxia  SKIN: no suspicious lesions or rashes  NEURO: Normal strength and tone, sensory exam grossly normal, mentation intact and speech normal  PSYCH: mentation appears normal and affect normal/bright         ASSESSMENT / PLAN:       ICD-10-CM    1. Encounter for annual wellness exam in Medicare patient  Z00.00    2. Vitamin D deficiency  E55.9 Vitamin D Deficiency     Vitamin D Deficiency   3. Benign paroxysmal positional vertigo, unspecified laterality  H81.10 Physical Therapy Referral   4. Hyperlipidemia, unspecified hyperlipidemia type  E78.5 Lipid Profile     Comprehensive metabolic panel     atorvastatin (LIPITOR) 80 MG tablet     Lipid Profile     Comprehensive metabolic panel   5. Hypercholesterolemia  E78.00 Comprehensive metabolic panel     ezetimibe (ZETIA) 10 MG tablet     Comprehensive metabolic panel   6. Insomnia, unspecified type  G47.00 traZODone (DESYREL) 50 MG tablet   7. History of aortic stenosis  Z86.79    8. Family history of vascular disease  Z82.49    9. Systemic lupus " "erythematosus, unspecified SLE type, unspecified organ involvement status (H)  M32.9    10. Aortic valve disorder  I35.9    11. Aortic aneurysm without rupture, unspecified portion of aorta (H)  I71.9    12. Coronary artery disease due to calcified coronary lesion  I25.10     I25.84    13. Primary insomnia  F51.01    14. Family history of colon cancer  Z80.0         COUNSELING:  Reviewed preventive health counseling, as reflected in patient instructions       Regular exercise       Healthy diet/nutrition       Vision screening       Dental care       Bladder control       Colon cancer screening    Estimated body mass index is 27.97 kg/m  as calculated from the following:    Height as of this encounter: 1.6 m (5' 3\").    Weight as of this encounter: 71.6 kg (157 lb 14.4 oz).    Weight management plan: Discussed healthy diet and exercise guidelines    She reports that she has never smoked. She has never used smokeless tobacco.      Appropriate preventive services were discussed with this patient, including applicable screening as appropriate for cardiovascular disease, diabetes, osteopenia/osteoporosis, and glaucoma.  As appropriate for age/gender, discussed screening for colorectal cancer, prostate cancer, breast cancer, and cervical cancer. Checklist reviewing preventive services available has been given to the patient.    Reviewed patients plan of care and provided an AVS. The Basic Care Plan (routine screening as documented in Health Maintenance) for Wendy meets the Care Plan requirement. This Care Plan has been established and reviewed with the Patient.    Counseling Resources:  ATP IV Guidelines  Pooled Cohorts Equation Calculator  Breast Cancer Risk Calculator  Breast Cancer: Medication to Reduce Risk  FRAX Risk Assessment  ICSI Preventive Guidelines  Dietary Guidelines for Americans, 2010  USDA's MyPlate  ASA Prophylaxis  Lung CA Screening    Yolanda Gonzalez MD  [facility] " [facility]    Identified Health Risks:

## 2022-03-10 LAB — DEPRECATED CALCIDIOL+CALCIFEROL SERPL-MC: 32 UG/L (ref 30–80)

## 2022-03-31 ENCOUNTER — OFFICE VISIT (OUTPATIENT)
Dept: CARDIOLOGY | Facility: CLINIC | Age: 67
End: 2022-03-31
Payer: COMMERCIAL

## 2022-03-31 VITALS
DIASTOLIC BLOOD PRESSURE: 70 MMHG | WEIGHT: 157 LBS | RESPIRATION RATE: 16 BRPM | HEIGHT: 63 IN | BODY MASS INDEX: 27.82 KG/M2 | HEART RATE: 88 BPM | SYSTOLIC BLOOD PRESSURE: 108 MMHG

## 2022-03-31 DIAGNOSIS — I25.84 CORONARY ARTERY DISEASE DUE TO CALCIFIED CORONARY LESION: ICD-10-CM

## 2022-03-31 DIAGNOSIS — Q23.81 AORTIC STENOSIS DUE TO BICUSPID AORTIC VALVE: Primary | ICD-10-CM

## 2022-03-31 DIAGNOSIS — I71.20 THORACIC AORTIC ANEURYSM WITHOUT RUPTURE (H): ICD-10-CM

## 2022-03-31 DIAGNOSIS — I25.10 CORONARY ARTERY DISEASE DUE TO CALCIFIED CORONARY LESION: ICD-10-CM

## 2022-03-31 DIAGNOSIS — E78.00 PURE HYPERCHOLESTEROLEMIA: ICD-10-CM

## 2022-03-31 DIAGNOSIS — Q23.0 AORTIC STENOSIS DUE TO BICUSPID AORTIC VALVE: Primary | ICD-10-CM

## 2022-03-31 PROCEDURE — 99214 OFFICE O/P EST MOD 30 MIN: CPT | Performed by: INTERNAL MEDICINE

## 2022-03-31 RX ORDER — ASPIRIN 81 MG/1
81 TABLET ORAL DAILY
COMMUNITY

## 2022-03-31 NOTE — PROGRESS NOTES
"    Thank you, Dr. Yolanda Gonzalez, for asking the [facility] team to see Ms. Wendy Handy to follow-up on bicuspid aortic valve with moderate stenosis.    Assessment/Recommendations   Assessment:    1.  Bicuspid aortic valve with moderate stenosis suggested by cardiac MRI 1 year ago.  Reports no clear symptoms of exertional chest discomfort or dyspnea.  No lightheadedness or near syncope.  Advised echocardiogram this spring to follow-up on her valve disease.  2.  Mild ascending aortic dilatation, secondary to #1 above.  Echocardiogram will give us some additional information regarding that as well.  3.  Mild to moderate coronary artery disease, stable  4.  Hypercholesterolemia, well controlled on current regimen of atorvastatin and Zetia.  Continue current meds.    Plan:  1.  Continue current medications  2.  Schedule echocardiogram to follow-up on aortic valve disease  History of bicuspid aortic valve with     History of Present Illness    Ms. Wendy Handy is a 66 year old female with moderate stenosis by cardiac MRI last year, mild to moderate multivessel coronary artery disease, hypercholesterolemia who presents today for a follow-up visit.  Tells me she has been doing well overall over the past year.  Did go to [place] for 2 months in the winter and initially noted some exertional dyspnea although that improved as she continued to do walk regularly.  Denied any associated chest discomfort.  No orthopnea, PND, lightheadedness or near syncope    ECG (personally reviewed): No ECG today    Cardiac Imaging Studies (personally reviewed): No new imaging     Physical Examination Review of Systems   /70 (BP Location: Right arm, Patient Position: Sitting, Cuff Size: Adult Regular)   Pulse 88   Resp 16   Ht 1.6 m (5' 3\")   Wt 71.2 kg (157 lb)   BMI 27.81 kg/m    Body mass index is 27.81 kg/m .  Wt Readings from Last 3 Encounters:   03/31/22 71.2 kg (157 lb)   03/09/22 71.6 kg (157 lb 14.4 oz) " "  04/23/21 69.9 kg (154 lb 3.2 oz)     General Appearance:   Awake, Alert, No acute distress.   HEENT:  No scleral icterus; the mucous membranes were pink and moist.   Neck: No cervical bruits or jugular venous distention    Chest: The spine was straight. The chest was symmetric.   Lungs:   Respirations unlabored; the lungs are clear to auscultation. No wheezing   Cardiovascular:    Regular rate and rhythm.  S1 normal, S2 reduced.  2/6 mid peaking crescendo decrescendo systolic murmur heard to the left upper sternal border radiating to apex and carotids   Abdomen:  No organomegaly, masses, bruits, or tenderness. Bowels sounds are present   Extremities:  No peripheral edema bilaterally   Skin: No xanthelasma. Warm, Dry.   Musculoskeletal: No tenderness.   Neurologic: Mood and affect are appropriate.    Enc Vitals  BP: 108/70  Pulse: 88  Resp: 16  Weight: 71.2 kg (157 lb)  Height: 160 cm (5' 3\")                                         Medical History  Surgical History Family History Social History   Past Medical History:   Diagnosis Date     Aortic valve disorder     echo 1/09  4.2 cm TAA-MRA 4/2016       Bicuspid aortic valve      Coronary artery disease due to calcified coronary lesion 5/5/2016    Echo 6/2016   Good function  CT Ca++ 172 LAD  Cardiology consult 4/016-nuclear stress     Family history of psychiatric condition      Family history of tremor      Family history of vascular disease      H/O LEEP 1/1/2007 2002-2006 LSIL paps with colposcopy, had LEEP in 2007. No records 6/2/10 ASCUS, +HR HPV 53 6/20/12 ASCUS, +HR HPV 53. Colposcopy outside HE/ system? 6/23/14 NIL 6/24/15 NIL pap, neg HPV 6/27/16 ASCUS, neg HPV 6/29/17 NIL pap, neg HPV 7/9/18 ASCUS, +HR HPV 16. Unclear if colposcopy was completed 7/10/19 NIL pap, neg HPV 7/13/20 NIL pap, neg HPV 2/1/21 NIL pap, neg HPV. Plan: await provider     History of aortic stenosis      History of vitamin D deficiency      Hypercholesteremia      Hyperlipidemia  "     Laboratory test 8/18/2016    Reading Physician Reading Date Result Priority    Patricia Orta MD 8/16/2016       Narrative       Examination: Nuclear medicine DATscan for Dopamine Receptor Localization.    Examination: NM BRAIN IMAGING TOMOGRAPHIC (SPECT) DATSCAN  Date: 8/16/2016 3:38 PM  Indication: Right sided postural tremor.   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       Osteopenia 2/15/2015     Other and unspecified hyperlipidemia     Created by Conversion      Papanicolaou smear of cervix with low grade squamous intraepithelial lesion (LGSIL)      LGSIL 2002  colpoLGSIL 2003  colpoLGSIL 4/2006 colpoLGSIL 11/06 colpoAbnormal 5/07 colpo  CHCWLEEPendometrial biopsy 5/07      Plantar fasciitis of right foot      Primary insomnia      Snores      Tremor      Vertigo      Vitamin D deficiency      Wears glasses     Past Surgical History:   Procedure Laterality Date     BLEPHAROPLASTY       CARDIAC CATHETERIZATION  05/30/2017    No intervention     CARDIAC CATHETERIZATION N/A 5/30/2017    Procedure: Coronary Angiogram;  Surgeon: Torito Metzger MD;  Location: [facility] Cath Lab;  Service:      CHOLECYSTECTOMY       CONIZATION CERVIX,LOOP ELECTRD      Description: Cervical Conization Loop Electrode Excision;  Recorded: 05/29/2008;     FACIAL RECONSTRUCTION SURGERY      forehead as well     HC CLOSED TX MANDIBLE FX W/O MANIP      Description: Closed Treatment Of Mandibular Fracture;  Recorded: 05/29/2008;     HC DILATION/CURETTAGE DIAG/THER NON OB      Description: Dilation And Curettage;  Recorded: 05/29/2008;  Comments: X 2  metrorhhagia     LAPAROSCOPIC CHOLECYSTECTOMY N/A 2/23/2015    Procedure: CHOLECYSTECTOMY LAPAROSCOPIC;  Surgeon:  Doug Webber MD;  Location: [facility] OR;  Service:      [facility] ORAL SURGERY PROCEDURE      jaw fracture    Family History   Problem Relation Age of Onset     Heart Disease Father      Heart Disease Sister      Heart Disease Sister      Mental Illness Mother 30.00        schizophrenia, bipolar     Mental Illness Sister         bipolar     Colon Cancer Father      Hyperlipidemia Brother      Hyperlipidemia Brother      Hyperlipidemia Sister      Cervical Cancer Daughter      Diabetes Type 2  Brother      Diabetes Type 2  Brother      Tremor Father      Tremor Paternal Aunt      Tremor Paternal Grandfather      Tremor Son      Tremor Cousin         Paternal 1st cousin     Attention Deficit Disorder Son      Neurologic Disorder Son         Tourette Syndrome      Schizophrenia Mother      Schizophrenia Sister      Mental Illness Sister         depression     Diabetes Brother         type 2     Mental Illness Brother         depression    Social History     Socioeconomic History     Marital status:      Spouse name: Not on file     Number of children: Not on file     Years of education: Not on file     Highest education level: Not on file   Occupational History     Not on file   Tobacco Use     Smoking status: Never Smoker     Smokeless tobacco: Never Used   Substance and Sexual Activity     Alcohol use: Yes     Alcohol/week: 2.0 standard drinks     Comment: occasion     Drug use: No     Sexual activity: Not Currently     Partners: Male     Birth control/protection: Abstinence   Other Topics Concern     Not on file   Social History Narrative    Living in [place] with her  and has been  for 11 yrs     This is her 3rd marriage    1st marriage had 2 kids: son and daughter - 33 y r old son and 31 yr old daughter    2nd marriage no kids        Not working presently. Retired    Had been  in the past.      Social Determinants of Health     Financial Resource Strain: Not on file   Food  Insecurity: Not on file   Transportation Needs: Not on file   Physical Activity: Not on file   Stress: Not on file   Social Connections: Not on file   Intimate Partner Violence: Not on file   Housing Stability: Not on file          Medications  Allergies   Current Outpatient Medications   Medication Sig Dispense Refill     aspirin 81 MG EC tablet Take 81 mg by mouth daily       atorvastatin (LIPITOR) 80 MG tablet Take 1 tablet (80 mg) by mouth At Bedtime 90 tablet 3     cyanocobalamin (VITAMIN B-12) 1000 MCG tablet Take 1,000 mcg by mouth daily        ezetimibe (ZETIA) 10 MG tablet Take 1 tablet (10 mg) by mouth daily 90 tablet 3     ibuprofen (ADVIL/MOTRIN) 800 MG tablet TAKE ONE TABLET BY MOUTH THREE TIMES DAILY AS NEEDED TAKE WITH FOOD 60 tablet 1     Omega-3 Fatty Acids (OMEGA-3 FISH OIL) 1200 MG CAPS Take 1 capsule by mouth daily        pilocarpine (PILOCAR) 1 % ophthalmic solution As needed       traZODone (DESYREL) 50 MG tablet Take 1 tablet (50 mg) by mouth At Bedtime 90 tablet 3     vitamin C (ASCORBIC ACID) 1000 MG TABS Take 1,000 mg by mouth daily        VITAMIN D3 50 MCG (2000 UT) tablet TAKE 1 TABLET BY MOUTH EVERY DAY 90 tablet 3     vitamin E 400 UNIT capsule Take 400 Units by mouth daily        zinc gluconate 50 MG tablet Take 50 mg by mouth daily         No Known Allergies      Lab Results    Chemistry/lipid CBC Cardiac Enzymes/BNP/TSH/INR   Recent Labs   Lab Test 03/09/22  1415   TRIG 87   LDL 53   BUN 9      CO2 23    Recent Labs   Lab Test 07/13/20  0751   WBC 6.7   HGB 13.3   HCT 40.1   MCV 96       Recent Labs   Lab Test 07/10/19  0757   TSH 1.53        A total of 40 minutes was spent reviewing patient's medical records, obtaining history and performing examination, as well as discussing diagnoses/ recommendations with patient and answering all questions.

## 2022-03-31 NOTE — LETTER
3/31/2022    Yolanda Gonzalez MD  [address] [address]    RE: Wendy Handy       Dear Colleague,     I had the pleasure of seeing Wendy Handy in the [facility].      Thank you, Dr. Yolanda Gonzalez, for asking the [facility] team to see Ms. Wendy Handy to follow-up on bicuspid aortic valve with moderate stenosis.    Assessment/Recommendations   Assessment:    1.  Bicuspid aortic valve with moderate stenosis suggested by cardiac MRI 1 year ago.  Reports no clear symptoms of exertional chest discomfort or dyspnea.  No lightheadedness or near syncope.  Advised echocardiogram this spring to follow-up on her valve disease.  2.  Mild ascending aortic dilatation, secondary to #1 above.  Echocardiogram will give us some additional information regarding that as well.  3.  Mild to moderate coronary artery disease, stable  4.  Hypercholesterolemia, well controlled on current regimen of atorvastatin and Zetia.  Continue current meds.    Plan:  1.  Continue current medications  2.  Schedule echocardiogram to follow-up on aortic valve disease  History of bicuspid aortic valve with     History of Present Illness    Ms. Wendy Handy is a 66 year old female with moderate stenosis by cardiac MRI last year, mild to moderate multivessel coronary artery disease, hypercholesterolemia who presents today for a follow-up visit.  Tells me she has been doing well overall over the past year.  Did go to [place] for 2 months in the winter and initially noted some exertional dyspnea although that improved as she continued to do walk regularly.  Denied any associated chest discomfort.  No orthopnea, PND, lightheadedness or near syncope    ECG (personally reviewed): No ECG today    Cardiac Imaging Studies (personally reviewed): No new imaging     Physical Examination Review of Systems   /70 (BP Location: Right arm, Patient Position: Sitting, Cuff Size: Adult Regular)   Pulse 88    "Resp 16   Ht 1.6 m (5' 3\")   Wt 71.2 kg (157 lb)   BMI 27.81 kg/m    Body mass index is 27.81 kg/m .  Wt Readings from Last 3 Encounters:   03/31/22 71.2 kg (157 lb)   03/09/22 71.6 kg (157 lb 14.4 oz)   04/23/21 69.9 kg (154 lb 3.2 oz)     General Appearance:   Awake, Alert, No acute distress.   HEENT:  No scleral icterus; the mucous membranes were pink and moist.   Neck: No cervical bruits or jugular venous distention    Chest: The spine was straight. The chest was symmetric.   Lungs:   Respirations unlabored; the lungs are clear to auscultation. No wheezing   Cardiovascular:    Regular rate and rhythm.  S1 normal, S2 reduced.  2/6 mid peaking crescendo decrescendo systolic murmur heard to the left upper sternal border radiating to apex and carotids   Abdomen:  No organomegaly, masses, bruits, or tenderness. Bowels sounds are present   Extremities:  No peripheral edema bilaterally   Skin: No xanthelasma. Warm, Dry.   Musculoskeletal: No tenderness.   Neurologic: Mood and affect are appropriate.    Enc Vitals  BP: 108/70  Pulse: 88  Resp: 16  Weight: 71.2 kg (157 lb)  Height: 160 cm (5' 3\")                                         Medical History  Surgical History Family History Social History   Past Medical History:   Diagnosis Date     Aortic valve disorder     echo 1/09  4.2 cm TAA-MRA 4/2016       Bicuspid aortic valve      Coronary artery disease due to calcified coronary lesion 5/5/2016    Echo 6/2016   Good function  CT Ca++ 172 LAD  Cardiology consult 4/016-nuclear stress     Family history of psychiatric condition      Family history of tremor      Family history of vascular disease      H/O LEEP 1/1/2007 2002-2006 LSIL paps with colposcopy, had LEEP in 2007. No records 6/2/10 ASCUS, +HR HPV 53 6/20/12 ASCUS, +HR HPV 53. Colposcopy outside HE/ system? 6/23/14 NIL 6/24/15 NIL pap, neg HPV 6/27/16 ASCUS, neg HPV 6/29/17 NIL pap, neg HPV 7/9/18 ASCUS, +HR HPV 16. Unclear if colposcopy was completed " 7/10/19 NIL pap, neg HPV 7/13/20 NIL pap, neg HPV 2/1/21 NIL pap, neg HPV. Plan: await provider     History of aortic stenosis      History of vitamin D deficiency      Hypercholesteremia      Hyperlipidemia      Laboratory test 8/18/2016    Reading Physician Reading Date Result Priority    Patricia Orta MD 8/16/2016       Narrative       Examination: Nuclear medicine DATscan for Dopamine Receptor Localization.    Examination: NM BRAIN IMAGING TOMOGRAPHIC (SPECT) DATSCAN  Date: 8/16/2016 3:38 PM  Indication: Right sided postural tremor.   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       Osteopenia 2/15/2015     Other and unspecified hyperlipidemia     Created by Conversion      Papanicolaou smear of cervix with low grade squamous intraepithelial lesion (LGSIL)      LGSIL 2002  colpoLGSIL 2003  colpoLGSIL 4/2006 colpoLGSIL 11/06 colpoAbnormal 5/07 colpo  CHCWLEEPendometrial biopsy 5/07      Plantar fasciitis of right foot      Primary insomnia      Snores      Tremor      Vertigo      Vitamin D deficiency      Wears glasses     Past Surgical History:   Procedure Laterality Date     BLEPHAROPLASTY       CARDIAC CATHETERIZATION  05/30/2017    No intervention     CARDIAC CATHETERIZATION N/A 5/30/2017    Procedure: Coronary Angiogram;  Surgeon: Torito Metzger MD;  Location: [facility] Cath Lab;  Service:      CHOLECYSTECTOMY       CONIZATION CERVIX,LOOP ELECTRD      Description: Cervical Conization Loop Electrode Excision;  Recorded: 05/29/2008;     FACIAL RECONSTRUCTION SURGERY      forehead as well     HC CLOSED TX MANDIBLE FX W/O MANIP      Description: Closed Treatment Of Mandibular Fracture;  Recorded: 05/29/2008;     HC DILATION/CURETTAGE  DIAG/THER NON OB      Description: Dilation And Curettage;  Recorded: 05/29/2008;  Comments: X 2  metrorhhagia     LAPAROSCOPIC CHOLECYSTECTOMY N/A 2/23/2015    Procedure: CHOLECYSTECTOMY LAPAROSCOPIC;  Surgeon: Doug Webber MD;  Location: [facility];  Service:      [facility] ORAL SURGERY PROCEDURE      jaw fracture    Family History   Problem Relation Age of Onset     Heart Disease Father      Heart Disease Sister      Heart Disease Sister      Mental Illness Mother 30.00        schizophrenia, bipolar     Mental Illness Sister         bipolar     Colon Cancer Father      Hyperlipidemia Brother      Hyperlipidemia Brother      Hyperlipidemia Sister      Cervical Cancer Daughter      Diabetes Type 2  Brother      Diabetes Type 2  Brother      Tremor Father      Tremor Paternal Aunt      Tremor Paternal Grandfather      Tremor Son      Tremor Cousin         Paternal 1st cousin     Attention Deficit Disorder Son      Neurologic Disorder Son         Tourette Syndrome      Schizophrenia Mother      Schizophrenia Sister      Mental Illness Sister         depression     Diabetes Brother         type 2     Mental Illness Brother         depression    Social History     Socioeconomic History     Marital status:      Spouse name: Not on file     Number of children: Not on file     Years of education: Not on file     Highest education level: Not on file   Occupational History     Not on file   Tobacco Use     Smoking status: Never Smoker     Smokeless tobacco: Never Used   Substance and Sexual Activity     Alcohol use: Yes     Alcohol/week: 2.0 standard drinks     Comment: occasion     Drug use: No     Sexual activity: Not Currently     Partners: Male     Birth control/protection: Abstinence   Other Topics Concern     Not on file   Social History Narrative    Living in [place] with her  and has been  for 11 yrs     This is her 3rd marriage    1st marriage had 2 kids: son and daughter - 33 y r old  son and 31 yr old daughter    2nd marriage no kids        Not working presently. Retired    Had been  in the past.      Social Determinants of Health     Financial Resource Strain: Not on file   Food Insecurity: Not on file   Transportation Needs: Not on file   Physical Activity: Not on file   Stress: Not on file   Social Connections: Not on file   Intimate Partner Violence: Not on file   Housing Stability: Not on file          Medications  Allergies   Current Outpatient Medications   Medication Sig Dispense Refill     aspirin 81 MG EC tablet Take 81 mg by mouth daily       atorvastatin (LIPITOR) 80 MG tablet Take 1 tablet (80 mg) by mouth At Bedtime 90 tablet 3     cyanocobalamin (VITAMIN B-12) 1000 MCG tablet Take 1,000 mcg by mouth daily        ezetimibe (ZETIA) 10 MG tablet Take 1 tablet (10 mg) by mouth daily 90 tablet 3     ibuprofen (ADVIL/MOTRIN) 800 MG tablet TAKE ONE TABLET BY MOUTH THREE TIMES DAILY AS NEEDED TAKE WITH FOOD 60 tablet 1     Omega-3 Fatty Acids (OMEGA-3 FISH OIL) 1200 MG CAPS Take 1 capsule by mouth daily        pilocarpine (PILOCAR) 1 % ophthalmic solution As needed       traZODone (DESYREL) 50 MG tablet Take 1 tablet (50 mg) by mouth At Bedtime 90 tablet 3     vitamin C (ASCORBIC ACID) 1000 MG TABS Take 1,000 mg by mouth daily        VITAMIN D3 50 MCG (2000 UT) tablet TAKE 1 TABLET BY MOUTH EVERY DAY 90 tablet 3     vitamin E 400 UNIT capsule Take 400 Units by mouth daily        zinc gluconate 50 MG tablet Take 50 mg by mouth daily         No Known Allergies      Lab Results    Chemistry/lipid CBC Cardiac Enzymes/BNP/TSH/INR   Recent Labs   Lab Test 03/09/22  1415   TRIG 87   LDL 53   BUN 9      CO2 23    Recent Labs   Lab Test 07/13/20  0751   WBC 6.7   HGB 13.3   HCT 40.1   MCV 96       Recent Labs   Lab Test 07/10/19  0757   TSH 1.53        A total of 40 minutes was spent reviewing patient's medical records, obtaining history and performing examination, as well  as discussing diagnoses/ recommendations with patient and answering all questions.                    Thank you for allowing me to participate in the care of your patient.    Sincerely,   Teodora Platt MD     [facility]  cc:   No referring provider defined for this encounter.

## 2022-04-19 ENCOUNTER — HOSPITAL ENCOUNTER (OUTPATIENT)
Dept: PHYSICAL THERAPY | Facility: REHABILITATION | Age: 67
Discharge: HOME | End: 2022-04-19
Attending: FAMILY MEDICINE
Payer: COMMERCIAL

## 2022-04-19 DIAGNOSIS — H81.10 BENIGN PAROXYSMAL POSITIONAL VERTIGO, UNSPECIFIED LATERALITY: ICD-10-CM

## 2022-04-19 PROCEDURE — 95992 CANALITH REPOSITIONING PROC: CPT | Mod: GP | Performed by: PHYSICAL THERAPIST

## 2022-04-19 PROCEDURE — 97161 PT EVAL LOW COMPLEX 20 MIN: CPT | Mod: GP | Performed by: PHYSICAL THERAPIST

## 2022-04-19 NOTE — PROGRESS NOTES
[facility]                                                                                   OUTPATIENT PHYSICAL THERAPY FUNCTIONAL EVALUATION  PLAN OF TREATMENT FOR OUTPATIENT REHABILITATION  (COMPLETE FOR INITIAL CLAIMS ONLY)  Patient's Last Name, First Name, M.I.  YOB: 1955  Wendy Handy     Provider's Name   [facility]   Medical Record No.  [id number]     Start of Care Date:  04/19/22   Onset Date:  10/01/21   Type:     _X__PT   ____OT  ____SLP Medical Diagnosis:  Benign paroxysmal positional vertigo, unspecified laterality     PT Diagnosis:  decreased activity tolerance Visits from SOC:  1                              __________________________________________________________________________________  Plan of Treatment/Functional Goals:  neuromuscular re-education, manual therapy (cannalith repositioning maneuver as appropriate)           GOALS  HEP  Patient will be independent with HEP and self management of symptoms  07/18/22    rolling  Patient will report no dizziness when rolling over in bed  07/18/22    supine to sit  Patient will report no dizziness when transfering supine to sit  07/18/22                                                           Therapy Frequency:  1 time/week as needed  Predicted Duration of Therapy Intervention:  90 days    Doug Garcia, PT                                    I CERTIFY THE NEED FOR THESE SERVICES FURNISHED UNDER        THIS PLAN OF TREATMENT AND WHILE UNDER MY CARE     (Physician co-signature of this document indicates review and certification of the therapy plan).              Certification Date From:  04/19/22   Certification Date To:  07/18/22    Referring Provider:  Dr Yolanda Gonzalez    Initial Assessment  See Epic Evaluation- Start of Care Date: 04/19/22 04/19/22 0900   Quick Adds   Quick Adds  Certification;Vestibular Eval   Type of Visit Initial OP PT Evaluation   General Information   Start of Care Date 04/19/22   Referring Physician Dr Yolanda Gonzalez   Orders Evaluate and Treat as Indicated   Order Date 03/09/22   Medical Diagnosis Benign paroxysmal positional vertigo, unspecified laterality   Onset of illness/injury or Date of Surgery 10/01/21   Patient/Family Goals Statement get rid of dizziness   General Information Comments Patient reports dizziness starting while rolling over in bed 10/1/21.  Since then it has continued to bother her while rolling in bed and when sitting up from supine.  The dizziness is short lived, a few seconds, but it is fairly consistent.  It does seem to be improving over the last couple of weeks.   Fall Risk Screen   Fall screen completed by PT   Have you fallen 2 or more times in the past year? No   Have you fallen and had an injury in the past year? Yes   Is patient a fall risk? No   Fall screen comments patient had one fall with injury when she slipped on wet rock.  No other balance issues identified by patient.   Abuse Screen (yes response referral indicated)   Feels Unsafe at Home or Work/School no   Feels Threatened by Someone no   Does Anyone Try to Keep You From Having Contact with Others or Doing Things Outside Your Home? no   Physical Signs of Abuse Present no   Patient needs abuse support services and resources No   System Outcome Measures   Outcome Measures BPPV   Dizziness Handicap Inventory (score out of 100) A decrease in score by 17.18 or greater indicates a clinically significant change in symptoms. 14   Cognitive Status Examination   Level of Consciousness alert   Personal Safety and Judgment intact   Posture   Posture Forward head position;Protracted shoulders   Cervicogenic Screen   Neck ROM WFL   Vertebral Artery Test Normal   Oculomotor Exam   Smooth Pursuit Normal   Saccades Normal   VOR Normal   VOR Cancellation Normal   Convergence Testing Normal    Infrared Goggle Exam or Frenzel Lense Exam   Spontaneous Nystagmus Negative   Pablo-Hallpike (right) Negative   Pablo-Hallpike (Left)   (dizziness reported but no nystagmus seen)   Planned Therapy Interventions   Planned Therapy Interventions neuromuscular re-education;manual therapy  (cannalith repositioning maneuver as appropriate)   Clinical Impression   Criteria for Skilled Therapeutic Interventions Met yes, treatment indicated   PT Diagnosis decreased activity tolerance   Influenced by the following impairments left BPPV, dizziness,   Functional limitations due to impairments difficulty rolling over in bed, difficulty with supine to sit.   Clinical Presentation Stable/Uncomplicated   Clinical Decision Making (Complexity) Low complexity   Therapy Frequency 1 time/week   Predicted Duration of Therapy Intervention (days/wks) 90 days   Risk & Benefits of therapy have been explained Yes   Patient, Family & other staff in agreement with plan of care Yes   Clinical Impression Comments Patient presents with signs and symptoms consistent with left BPPV.   + left Hallpike-[place], dizziness when rolling in bed and supine to sit.  Patient will benefit from PT to address impairments and improve function.   GOALS   PT Eval Goals 1;2;3   Goal 1   Goal Identifier HEP   Goal Description Patient will be independent with HEP and self management of symptoms   Target Date 07/18/22   Goal 2   Goal Identifier rolling   Goal Description Patient will report no dizziness when rolling over in bed   Target Date 07/18/22   Goal 3   Goal Identifier supine to sit   Goal Description Patient will report no dizziness when transfering supine to sit   Target Date 07/18/22   Total Evaluation Time   PT Eval, Low Complexity Minutes (32914) 20   Therapy Certification   Certification date from 04/19/22   Certification date to 07/18/22   Medical Diagnosis Benign paroxysmal positional vertigo, unspecified laterality   Certification I certify the need for  these services furnished under this plan of treatment and while under my care.  (Physician co-signature of this document indicates review and certification of the therapy plan).               Patient seen for one visit of PT.  She did not return to continue POC.  Discharge PT.     Doug Garcia, PT

## 2022-04-20 ENCOUNTER — HOSPITAL ENCOUNTER (OUTPATIENT)
Dept: CARDIOLOGY | Facility: CLINIC | Age: 67
Discharge: HOME | End: 2022-04-20
Attending: INTERNAL MEDICINE | Admitting: INTERNAL MEDICINE
Payer: COMMERCIAL

## 2022-04-20 DIAGNOSIS — Q23.81 AORTIC STENOSIS DUE TO BICUSPID AORTIC VALVE: ICD-10-CM

## 2022-04-20 DIAGNOSIS — Q23.0 AORTIC STENOSIS DUE TO BICUSPID AORTIC VALVE: ICD-10-CM

## 2022-04-20 PROCEDURE — 93306 TTE W/DOPPLER COMPLETE: CPT

## 2022-04-20 PROCEDURE — 93306 TTE W/DOPPLER COMPLETE: CPT | Mod: 26 | Performed by: INTERNAL MEDICINE

## 2022-05-16 DIAGNOSIS — I35.9 AORTIC VALVE DISORDER: Primary | ICD-10-CM

## 2022-05-17 ENCOUNTER — ALLIED HEALTH/NURSE VISIT (OUTPATIENT)
Dept: CARDIOLOGY | Facility: CLINIC | Age: 67
End: 2022-05-17

## 2022-05-17 ENCOUNTER — LAB (OUTPATIENT)
Dept: CARDIOLOGY | Facility: CLINIC | Age: 67
End: 2022-05-17
Payer: COMMERCIAL

## 2022-05-17 ENCOUNTER — OFFICE VISIT (OUTPATIENT)
Dept: CARDIOLOGY | Facility: CLINIC | Age: 67
End: 2022-05-17
Payer: COMMERCIAL

## 2022-05-17 VITALS
SYSTOLIC BLOOD PRESSURE: 118 MMHG | WEIGHT: 154 LBS | DIASTOLIC BLOOD PRESSURE: 68 MMHG | HEART RATE: 63 BPM | BODY MASS INDEX: 27.29 KG/M2 | RESPIRATION RATE: 16 BRPM | HEIGHT: 63 IN

## 2022-05-17 DIAGNOSIS — I35.9 AORTIC VALVE DISORDER: Primary | ICD-10-CM

## 2022-05-17 DIAGNOSIS — I51.89 OTHER ILL-DEFINED HEART DISEASES: ICD-10-CM

## 2022-05-17 DIAGNOSIS — I35.9 AORTIC VALVE DISORDER: ICD-10-CM

## 2022-05-17 DIAGNOSIS — I35.0 NONRHEUMATIC AORTIC VALVE STENOSIS: Primary | ICD-10-CM

## 2022-05-17 LAB
ALBUMIN SERPL-MCNC: 4.3 G/DL (ref 3.5–5)
ALP SERPL-CCNC: 68 U/L (ref 45–120)
ALT SERPL W P-5'-P-CCNC: 25 U/L (ref 0–45)
ANION GAP SERPL CALCULATED.3IONS-SCNC: 8 MMOL/L (ref 5–18)
AST SERPL W P-5'-P-CCNC: 22 U/L (ref 0–40)
BILIRUB SERPL-MCNC: 1.4 MG/DL (ref 0–1)
BUN SERPL-MCNC: 12 MG/DL (ref 8–22)
CALCIUM SERPL-MCNC: 9.6 MG/DL (ref 8.5–10.5)
CHLORIDE BLD-SCNC: 104 MMOL/L (ref 98–107)
CO2 SERPL-SCNC: 26 MMOL/L (ref 22–31)
CREAT SERPL-MCNC: 0.78 MG/DL (ref 0.6–1.1)
ERYTHROCYTE [DISTWIDTH] IN BLOOD BY AUTOMATED COUNT: 11.9 % (ref 10–15)
GFR SERPL CREATININE-BSD FRML MDRD: 83 ML/MIN/1.73M2
GLUCOSE BLD-MCNC: 84 MG/DL (ref 70–125)
HCT VFR BLD AUTO: 40.5 % (ref 35–47)
HGB BLD-MCNC: 13.4 G/DL (ref 11.7–15.7)
MCH RBC QN AUTO: 31.4 PG (ref 26.5–33)
MCHC RBC AUTO-ENTMCNC: 33.1 G/DL (ref 31.5–36.5)
MCV RBC AUTO: 95 FL (ref 78–100)
PLATELET # BLD AUTO: 193 10E3/UL (ref 150–450)
POTASSIUM BLD-SCNC: 4.2 MMOL/L (ref 3.5–5)
PROT SERPL-MCNC: 7.1 G/DL (ref 6–8)
RBC # BLD AUTO: 4.27 10E6/UL (ref 3.8–5.2)
SODIUM SERPL-SCNC: 138 MMOL/L (ref 136–145)
WBC # BLD AUTO: 7.7 10E3/UL (ref 4–11)

## 2022-05-17 PROCEDURE — 80053 COMPREHEN METABOLIC PANEL: CPT

## 2022-05-17 PROCEDURE — 93000 ELECTROCARDIOGRAM COMPLETE: CPT | Performed by: INTERNAL MEDICINE

## 2022-05-17 PROCEDURE — 99207 PR NO CHARGE NURSE ONLY: CPT

## 2022-05-17 PROCEDURE — 99214 OFFICE O/P EST MOD 30 MIN: CPT | Performed by: INTERNAL MEDICINE

## 2022-05-17 PROCEDURE — 36415 COLL VENOUS BLD VENIPUNCTURE: CPT

## 2022-05-17 PROCEDURE — 85027 COMPLETE CBC AUTOMATED: CPT

## 2022-05-17 RX ORDER — POLYETHYLENE GLYCOL 400 AND PROPYLENE GLYCOL 4; 3 MG/ML; MG/ML
1 SOLUTION/ DROPS OPHTHALMIC PRN
COMMUNITY

## 2022-05-17 NOTE — PROGRESS NOTES
HEART CARE ENCOUNTER CONSULTATON MARCIA      [facility]  [phone]      Assessment/Recommendations   Assessment/Plan:    Severe aortic valve stenosis: Ms Handy has a bicuspid aortic valve that has progressed to severe stenosis.  While she does not have classic symptoms of shortness of breath, she feels there may have been some decline in functional capacity, and her echocardiogram has shown the development of pulmonary hypertension, all of which would make it reasonable to proceed with aortic valve replacement in the near future.    The options of surgical as well as transcatheter aortic valve replacement were reviewed, and it was outlined that the best option for her would be determined after review of her planned TAVR CTA as well as coronary angiogram.  If she has significant coronary artery disease, an enlarging root or cusp/annular anatomy that would make a transcatheter procedure more challenging, she understands that surgery may be better for her in those scenarios.  Otherwise she states that she would prefer TAVR if possible.    She will be scheduled for her CTA and angiogram, and in the interim has been asked to avoid heavy exertion, and knows to call if there is a change in condition or worsening symptoms.    Thank you for the opportunity to participate in the care of Ms. Handy.  Please do not hesitate to call with any questions or concerns regarding her cardiovascular status.       History of Present Illness/Subjective    HPI: Wendy Handy is a 67 year old female who has been known to have a bicuspid aortic valve for a number of years, and has been followed by serial echocardiograms.  Her last echocardiogram on April 20, 2022 showed progression of the aortic stenosis to the severe range, prompting this referral to the valve clinic.    This echocardiogram was reviewed and shows normal to hyperdynamic left ventricular systolic function with an ejection fraction of 70 to 75%.  The mean  "gradient across the aortic valve was 40 mmHg with a peak velocity of 4 m/s and a valve area of 0.8 cm .  There was mild aortic regurgitation and mild pulmonary hypertension with PA pressures estimated to be 45 mmHg plus right atrial pressure.    The ascending aorta was measured at 41 mm on this echocardiogram, which appears to be in line with prior MRIs done over the past 2 to 3 years.    Ms. Handy states that she continues to feel reasonably well.  She denies any chest pain or shortness of breath, but acknowledges that there may have been some decline in her functional capacity over the past several months.             Physical Examination  Review of Systems   Vitals: /68 (BP Location: Right arm, Patient Position: Sitting, Cuff Size: Adult Regular)   Pulse 63   Resp 16   Ht 1.6 m (5' 3\")   Wt 69.9 kg (154 lb)   BMI 27.28 kg/m    BMI= Body mass index is 27.28 kg/m .  Wt Readings from Last 3 Encounters:   05/17/22 69.9 kg (154 lb)   03/31/22 71.2 kg (157 lb)   03/09/22 71.6 kg (157 lb 14.4 oz)       General Appearance:   no distress, normal body habitus, upright.   ENT/Mouth: membranes moist, no nasal discharge or bleeding gums.  Normal head shape, no evidence of injury or laceration.     EYES:  no scleral icterus, normal conjunctivae   Neck: no evidence of thyromegaly.  Supple   Chest/Lungs:   No audible wheezing equal chest wall expansion. Non labored breathing.  No cough.   Cardiovascular:   No evidence of elevated jugular venous pressure.  No evidence of pitting edema bilaterally    Abdomen:  no evidence of abdominal distention. No observe juandice.     Extremities: no cyanosis or clubbing noted.    Skin: no xanthelasma, normal skin color. No evidence of facial lacerations.      Neurologic: Normal arm motion bilateral, no tremors.  No evidence of focal defect.       Psychiatric: alert and oriented x3, calm        Please refer above for cardiac ROS details.        Medical History  Surgical History " Family History Social History   Past Medical History:   Diagnosis Date     Aortic valve disorder     echo 1/09  4.2 cm TAA-MRA 4/2016       Bicuspid aortic valve      Coronary artery disease due to calcified coronary lesion 5/5/2016    Echo 6/2016   Good function  CT Ca++ 172 LAD  Cardiology consult 4/016-nuclear stress     Family history of psychiatric condition      Family history of tremor      Family history of vascular disease      H/O LEEP 1/1/2007 2002-2006 LSIL paps with colposcopy, had LEEP in 2007. No records 6/2/10 ASCUS, +HR HPV 53 6/20/12 ASCUS, +HR HPV 53. Colposcopy outside / system? 6/23/14 NIL 6/24/15 NIL pap, neg HPV 6/27/16 ASCUS, neg HPV 6/29/17 NIL pap, neg HPV 7/9/18 ASCUS, +HR HPV 16. Unclear if colposcopy was completed 7/10/19 NIL pap, neg HPV 7/13/20 NIL pap, neg HPV 2/1/21 NIL pap, neg HPV. Plan: await provider     History of aortic stenosis      History of vitamin D deficiency      Hypercholesteremia      Hyperlipidemia      Laboratory test 8/18/2016    Reading Physician Reading Date Result Priority    Patricia Orta MD 8/16/2016       Narrative       Examination: Nuclear medicine DATscan for Dopamine Receptor Localization.    Examination: NM BRAIN IMAGING TOMOGRAPHIC (SPECT) DATSCAN  Date: 8/16/2016 3:38 PM  Indication: Right sided postural tremor.   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       Osteopenia 2/15/2015     Other and unspecified hyperlipidemia     Created by Conversion      Papanicolaou smear of cervix with low grade squamous intraepithelial lesion (LGSIL)      LGSIL 2002  colpoLGSIL 2003  colpoLGSIL 4/2006 colpoLGSIL 11/06 colpoAbnormal 5/07 colpo  CHCWLEEPendometrial biopsy 5/07      Plantar  fasciitis of right foot      Primary insomnia      Snores      Tremor      Vertigo      Vitamin D deficiency      Wears glasses      Past Surgical History:   Procedure Laterality Date     BLEPHAROPLASTY       CARDIAC CATHETERIZATION  05/30/2017    No intervention     CARDIAC CATHETERIZATION N/A 5/30/2017    Procedure: Coronary Angiogram;  Surgeon: Torito Metzger MD;  Location: [facility] Cath Lab;  Service:      CHOLECYSTECTOMY       CONIZATION CERVIX,LOOP ELECTRD      Description: Cervical Conization Loop Electrode Excision;  Recorded: 05/29/2008;     FACIAL RECONSTRUCTION SURGERY      forehead as well     HC CLOSED TX MANDIBLE FX W/O MANIP      Description: Closed Treatment Of Mandibular Fracture;  Recorded: 05/29/2008;     HC DILATION/CURETTAGE DIAG/THER NON OB      Description: Dilation And Curettage;  Recorded: 05/29/2008;  Comments: X 2  metrorhhagia     LAPAROSCOPIC CHOLECYSTECTOMY N/A 2/23/2015    Procedure: CHOLECYSTECTOMY LAPAROSCOPIC;  Surgeon: Doug Webber MD;  Location: [facility] Main OR;  Service:      [facility] ORAL SURGERY PROCEDURE      jaw fracture     Family History   Problem Relation Age of Onset     Heart Disease Father      Heart Disease Sister      Heart Disease Sister      Mental Illness Mother 30.00        schizophrenia, bipolar     Mental Illness Sister         bipolar     Colon Cancer Father      Hyperlipidemia Brother      Hyperlipidemia Brother      Hyperlipidemia Sister      Cervical Cancer Daughter      Diabetes Type 2  Brother      Diabetes Type 2  Brother      Tremor Father      Tremor Paternal Aunt      Tremor Paternal Grandfather      Tremor Son      Tremor Cousin         Paternal 1st cousin     Attention Deficit Disorder Son      Neurologic Disorder Son         Tourette Syndrome      Schizophrenia Mother      Schizophrenia Sister      Mental Illness Sister         depression     Diabetes Brother         type 2     Mental Illness Brother         depression        Social History      Socioeconomic History     Marital status:      Spouse name: Not on file     Number of children: Not on file     Years of education: Not on file     Highest education level: Not on file   Occupational History     Not on file   Tobacco Use     Smoking status: Never Smoker     Smokeless tobacco: Never Used   Substance and Sexual Activity     Alcohol use: Yes     Alcohol/week: 2.0 standard drinks     Comment: occasion     Drug use: No     Sexual activity: Not Currently     Partners: Male     Birth control/protection: Abstinence   Other Topics Concern     Not on file   Social History Narrative    Living in [place] with her  and has been  for 11 yrs     This is her 3rd marriage    1st marriage had 2 kids: son and daughter - 33 y r old son and 31 yr old daughter    2nd marriage no kids        Not working presently. Retired    Had been  in the past.      Social Determinants of Health     Financial Resource Strain: Not on file   Food Insecurity: Not on file   Transportation Needs: Not on file   Physical Activity: Not on file   Stress: Not on file   Social Connections: Not on file   Intimate Partner Violence: Not on file   Housing Stability: Not on file           Medications  Allergies   Current Outpatient Medications   Medication Sig Dispense Refill     aspirin 81 MG EC tablet Take 81 mg by mouth daily       atorvastatin (LIPITOR) 80 MG tablet Take 1 tablet (80 mg) by mouth At Bedtime 90 tablet 3     cyanocobalamin (VITAMIN B-12) 1000 MCG tablet Take 1,000 mcg by mouth daily        ezetimibe (ZETIA) 10 MG tablet Take 1 tablet (10 mg) by mouth daily 90 tablet 3     ibuprofen (ADVIL/MOTRIN) 800 MG tablet TAKE ONE TABLET BY MOUTH THREE TIMES DAILY AS NEEDED TAKE WITH FOOD 60 tablet 1     Omega-3 Fatty Acids (OMEGA-3 FISH OIL) 1200 MG CAPS Take 1 capsule by mouth daily        polyethylene glycol-propylene glycol (SYSTANE) 0.4-0.3 % SOLN ophthalmic solution Place 1 drop into both eyes as needed  for dry eyes       traZODone (DESYREL) 50 MG tablet Take 1 tablet (50 mg) by mouth At Bedtime 90 tablet 3     vitamin C (ASCORBIC ACID) 1000 MG TABS Take 1,000 mg by mouth daily        VITAMIN D3 50 MCG (2000 UT) tablet TAKE 1 TABLET BY MOUTH EVERY DAY 90 tablet 3     vitamin E 400 UNIT capsule Take 400 Units by mouth daily        zinc gluconate 50 MG tablet Take 50 mg by mouth daily        pilocarpine (PILOCAR) 1 % ophthalmic solution As needed       No Known Allergies       Lab Results    Chemistry/lipid CBC Cardiac Enzymes/BNP/TSH/INR   Recent Labs   Lab Test 03/09/22  1415   CHOL 127   HDL 57   LDL 53   TRIG 87     Recent Labs   Lab Test 03/09/22  1415 02/01/21  1515 07/13/20  0751   LDL 53 78 77     Recent Labs   Lab Test 03/09/22  1415      POTASSIUM 4.3   CHLORIDE 104   CO2 23   GLC 88   BUN 9   CR 0.84   GFRESTIMATED 76   FÉLIX 10.4     Recent Labs   Lab Test 03/09/22  1415 06/01/21  1130 04/12/21  1337   CR 0.84 0.8 0.83     No results for input(s): A1C in the last 02004 hours.       Recent Labs   Lab Test 07/13/20  0751   WBC 6.7   HGB 13.3   HCT 40.1   MCV 96        Recent Labs   Lab Test 07/13/20  0751 07/10/19  0757   HGB 13.3 13.2    No results for input(s): TROPONINI in the last 84106 hours.  No results for input(s): BNP, NTBNPI, NTBNP in the last 69811 hours.  Recent Labs   Lab Test 07/10/19  0757   TSH 1.53     No results for input(s): INR in the last 77557 hours.     Anabela Hernandez MD

## 2022-05-17 NOTE — PROGRESS NOTES
"Valve Clinic Nursing Note: Aortic Stenosis    Referring provider: Lc     Patient history is significant for mild-mod CAD, severe bicuspid aortic stenosis, mild ascending aortic dilation    Symptoms include chest \"twinges\", dizziness.    Echo information: ()    EF: 70-75% M P. Narayan: 4.0 VELMA: 0.8 Di: 0.26 Svi: 46    Tentative Plan: plan to obtain full TAVR work up. Angiogram, CT scan, dental clearance, surgeon consult.      Research update: did not discuss research during valve consult.          KCCQ12 (date completed 2022 )    Preliminary STS score: 1%      Zo Garcia, RN, BSN  Valve Clinic Coordinator  [facility]  [phone]  22 2:24 PM    "

## 2022-05-17 NOTE — PROGRESS NOTES
[facility] RN Pre-Procedure Education Note    Reason for angiogram: pre-TAVR work up    Procedure: coronary angiogram with possible intervention With Dr. Hernandez     Date of Procedure: 6/9  Arrival time: 7:30 am    Location: [facility]                Diagnosis: severe aortic stenosis   Cardiologist Ordering Procedure: David   Primary Cardiologist: Lc   PCP: Yolanda Gonzalez    H&P completed by: 5/17/2022   Previous Cath Report: in epic   Bypass grafts: No  Labs within 7 days: no  Renal Issues: No  Diabetic: No    COVID TEST:   Date:6/7  Location: [facility]        Does patient have contrast/IV dye allergy: no      Patient Education  Explained indications/risks for diagnostic evaluation, including one or more of the following:  left heart catheterization, right heart catheterization and coronary angiogram  Explained indications/risks for therapeutic interventions, including one or more of the following: PTCA, artherectomy and stent.  These risks are in addition to baseline risks associated with a Diagnostic Evaluation.  Patient state understanding of procedure and risks and agrees to proceed    Additional education comment: Pt was instructed on and given procedure letter and written education material. This information was reviewed with the patient. No further questions at this time.    Pre-procedure instructions  Patient instructed to be NPO after midnight.  Patient instructed to arrange for transportation home following procedure  No driving for 24 hours post procedure  Depending on the results of the test, provider may decide to keep patient overnight in the hospital for further evaluation.  Reviewed lifting restrictions    Pre-procedure medication instructions  medication instructions: take 4 baby aspirin prior to coming in for procedure.       Current Outpatient Medications   Medication Sig Dispense Refill     aspirin 81 MG EC tablet Take 81 mg by mouth daily       atorvastatin  (LIPITOR) 80 MG tablet Take 1 tablet (80 mg) by mouth At Bedtime 90 tablet 3     cyanocobalamin (VITAMIN B-12) 1000 MCG tablet Take 1,000 mcg by mouth daily        ezetimibe (ZETIA) 10 MG tablet Take 1 tablet (10 mg) by mouth daily 90 tablet 3     ibuprofen (ADVIL/MOTRIN) 800 MG tablet TAKE ONE TABLET BY MOUTH THREE TIMES DAILY AS NEEDED TAKE WITH FOOD 60 tablet 1     Omega-3 Fatty Acids (OMEGA-3 FISH OIL) 1200 MG CAPS Take 1 capsule by mouth daily        pilocarpine (PILOCAR) 1 % ophthalmic solution As needed       polyethylene glycol-propylene glycol (SYSTANE) 0.4-0.3 % SOLN ophthalmic solution Place 1 drop into both eyes as needed for dry eyes       traZODone (DESYREL) 50 MG tablet Take 1 tablet (50 mg) by mouth At Bedtime 90 tablet 3     vitamin C (ASCORBIC ACID) 1000 MG TABS Take 1,000 mg by mouth daily        VITAMIN D3 50 MCG (2000 UT) tablet TAKE 1 TABLET BY MOUTH EVERY DAY 90 tablet 3     vitamin E 400 UNIT capsule Take 400 Units by mouth daily        zinc gluconate 50 MG tablet Take 50 mg by mouth daily          No Known Allergies      Zo Garcia, RN, BSN  Valve Clinic Coordinator  [facility]  [phone]  05/17/22 3:52 PM

## 2022-05-17 NOTE — LETTER
5/17/2022    Yolanda Gonzalez MD  [address] [facility] [address]    RE: Wendy Handy       Dear Colleague,     I had the pleasure of seeing Wendy Handy in the [facility].    HEART CARE ENCOUNTER CONSULTATON NOTE      BRIAN [facility]  [phone]      Assessment/Recommendations   Assessment/Plan:    Severe aortic valve stenosis: Ms Handy has a bicuspid aortic valve that has progressed to severe stenosis.  While she does not have classic symptoms of shortness of breath, she feels there may have been some decline in functional capacity, and her echocardiogram has shown the development of pulmonary hypertension, all of which would make it reasonable to proceed with aortic valve replacement in the near future.    The options of surgical as well as transcatheter aortic valve replacement were reviewed, and it was outlined that the best option for her would be determined after review of her planned TAVR CTA as well as coronary angiogram.  If she has significant coronary artery disease, an enlarging root or cusp/annular anatomy that would make a transcatheter procedure more challenging, she understands that surgery may be better for her in those scenarios.  Otherwise she states that she would prefer TAVR if possible.    She will be scheduled for her CTA and angiogram, and in the interim has been asked to avoid heavy exertion, and knows to call if there is a change in condition or worsening symptoms.    Thank you for the opportunity to participate in the care of Ms. Handy.  Please do not hesitate to call with any questions or concerns regarding her cardiovascular status.       History of Present Illness/Subjective    HPI: Wendy Handy is a 67 year old female who has been known to have a bicuspid aortic valve for a number of years, and has been followed by serial echocardiograms.  Her last echocardiogram on April 20, 2022 showed progression of the aortic stenosis to the severe  "range, prompting this referral to the valve clinic.    This echocardiogram was reviewed and shows normal to hyperdynamic left ventricular systolic function with an ejection fraction of 70 to 75%.  The mean gradient across the aortic valve was 40 mmHg with a peak velocity of 4 m/s and a valve area of 0.8 cm .  There was mild aortic regurgitation and mild pulmonary hypertension with PA pressures estimated to be 45 mmHg plus right atrial pressure.    The ascending aorta was measured at 41 mm on this echocardiogram, which appears to be in line with prior MRIs done over the past 2 to 3 years.    Ms. Handy states that she continues to feel reasonably well.  She denies any chest pain or shortness of breath, but acknowledges that there may have been some decline in her functional capacity over the past several months.             Physical Examination  Review of Systems   Vitals: /68 (BP Location: Right arm, Patient Position: Sitting, Cuff Size: Adult Regular)   Pulse 63   Resp 16   Ht 1.6 m (5' 3\")   Wt 69.9 kg (154 lb)   BMI 27.28 kg/m    BMI= Body mass index is 27.28 kg/m .  Wt Readings from Last 3 Encounters:   05/17/22 69.9 kg (154 lb)   03/31/22 71.2 kg (157 lb)   03/09/22 71.6 kg (157 lb 14.4 oz)       General Appearance:   no distress, normal body habitus, upright.   ENT/Mouth: membranes moist, no nasal discharge or bleeding gums.  Normal head shape, no evidence of injury or laceration.     EYES:  no scleral icterus, normal conjunctivae   Neck: no evidence of thyromegaly.  Supple   Chest/Lungs:   No audible wheezing equal chest wall expansion. Non labored breathing.  No cough.   Cardiovascular:   No evidence of elevated jugular venous pressure.  No evidence of pitting edema bilaterally    Abdomen:  no evidence of abdominal distention. No observe juandice.     Extremities: no cyanosis or clubbing noted.    Skin: no xanthelasma, normal skin color. No evidence of facial lacerations.      Neurologic: Normal " arm motion bilateral, no tremors.  No evidence of focal defect.       Psychiatric: alert and oriented x3, calm        Please refer above for cardiac ROS details.        Medical History  Surgical History Family History Social History   Past Medical History:   Diagnosis Date     Aortic valve disorder     echo 1/09  4.2 cm TAA-MRA 4/2016       Bicuspid aortic valve      Coronary artery disease due to calcified coronary lesion 5/5/2016    Echo 6/2016   Good function  CT Ca++ 172 LAD  Cardiology consult 4/016-nuclear stress     Family history of psychiatric condition      Family history of tremor      Family history of vascular disease      H/O LEEP 1/1/2007 2002-2006 LSIL paps with colposcopy, had LEEP in 2007. No records 6/2/10 ASCUS, +HR HPV 53 6/20/12 ASCUS, +HR HPV 53. Colposcopy outside / system? 6/23/14 NIL 6/24/15 NIL pap, neg HPV 6/27/16 ASCUS, neg HPV 6/29/17 NIL pap, neg HPV 7/9/18 ASCUS, +HR HPV 16. Unclear if colposcopy was completed 7/10/19 NIL pap, neg HPV 7/13/20 NIL pap, neg HPV 2/1/21 NIL pap, neg HPV. Plan: await provider     History of aortic stenosis      History of vitamin D deficiency      Hypercholesteremia      Hyperlipidemia      Laboratory test 8/18/2016    Reading Physician Reading Date Result Priority    Patricia Orta MD 8/16/2016       Narrative       Examination: Nuclear medicine DATscan for Dopamine Receptor Localization.    Examination: NM BRAIN IMAGING TOMOGRAPHIC (SPECT) DATSCAN  Date: 8/16/2016 3:38 PM  Indication: Right sided postural tremor.   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       Osteopenia 2/15/2015     Other and unspecified hyperlipidemia     Created by Conversion      Papanicolaou smear of  cervix with low grade squamous intraepithelial lesion (LGSIL)      LGSIL 2002  colpoLGSIL 2003  colpoLGSIL 4/2006 colpoLGSIL 11/06 colpoAbnormal 5/07 colpo  CHCWLEEPendometrial biopsy 5/07      Plantar fasciitis of right foot      Primary insomnia      Snores      Tremor      Vertigo      Vitamin D deficiency      Wears glasses      Past Surgical History:   Procedure Laterality Date     BLEPHAROPLASTY       CARDIAC CATHETERIZATION  05/30/2017    No intervention     CARDIAC CATHETERIZATION N/A 5/30/2017    Procedure: Coronary Angiogram;  Surgeon: Torito Metzger MD;  Location: [facility] Cath Lab;  Service:      CHOLECYSTECTOMY       CONIZATION CERVIX,LOOP ELECTRD      Description: Cervical Conization Loop Electrode Excision;  Recorded: 05/29/2008;     FACIAL RECONSTRUCTION SURGERY      forehead as well     HC CLOSED TX MANDIBLE FX W/O MANIP      Description: Closed Treatment Of Mandibular Fracture;  Recorded: 05/29/2008;     HC DILATION/CURETTAGE DIAG/THER NON OB      Description: Dilation And Curettage;  Recorded: 05/29/2008;  Comments: X 2  metrorhhagia     LAPAROSCOPIC CHOLECYSTECTOMY N/A 2/23/2015    Procedure: CHOLECYSTECTOMY LAPAROSCOPIC;  Surgeon: Doug Webber MD;  Location: [facility];  Service:      [facility] ORAL SURGERY PROCEDURE      jaw fracture     Family History   Problem Relation Age of Onset     Heart Disease Father      Heart Disease Sister      Heart Disease Sister      Mental Illness Mother 30.00        schizophrenia, bipolar     Mental Illness Sister         bipolar     Colon Cancer Father      Hyperlipidemia Brother      Hyperlipidemia Brother      Hyperlipidemia Sister      Cervical Cancer Daughter      Diabetes Type 2  Brother      Diabetes Type 2  Brother      Tremor Father      Tremor Paternal Aunt      Tremor Paternal Grandfather      Tremor Son      Tremor Cousin         Paternal 1st cousin     Attention Deficit Disorder Son      Neurologic Disorder Son         Tourette Syndrome       Schizophrenia Mother      Schizophrenia Sister      Mental Illness Sister         depression     Diabetes Brother         type 2     Mental Illness Brother         depression        Social History     Socioeconomic History     Marital status:      Spouse name: Not on file     Number of children: Not on file     Years of education: Not on file     Highest education level: Not on file   Occupational History     Not on file   Tobacco Use     Smoking status: Never Smoker     Smokeless tobacco: Never Used   Substance and Sexual Activity     Alcohol use: Yes     Alcohol/week: 2.0 standard drinks     Comment: occasion     Drug use: No     Sexual activity: Not Currently     Partners: Male     Birth control/protection: Abstinence   Other Topics Concern     Not on file   Social History Narrative    Living in [place] with her  and has been  for 11 yrs     This is her 3rd marriage    1st marriage had 2 kids: son and daughter - 33 y r old son and 31 yr old daughter    2nd marriage no kids        Not working presently. Retired    Had been  in the past.      Social Determinants of Health     Financial Resource Strain: Not on file   Food Insecurity: Not on file   Transportation Needs: Not on file   Physical Activity: Not on file   Stress: Not on file   Social Connections: Not on file   Intimate Partner Violence: Not on file   Housing Stability: Not on file           Medications  Allergies   Current Outpatient Medications   Medication Sig Dispense Refill     aspirin 81 MG EC tablet Take 81 mg by mouth daily       atorvastatin (LIPITOR) 80 MG tablet Take 1 tablet (80 mg) by mouth At Bedtime 90 tablet 3     cyanocobalamin (VITAMIN B-12) 1000 MCG tablet Take 1,000 mcg by mouth daily        ezetimibe (ZETIA) 10 MG tablet Take 1 tablet (10 mg) by mouth daily 90 tablet 3     ibuprofen (ADVIL/MOTRIN) 800 MG tablet TAKE ONE TABLET BY MOUTH THREE TIMES DAILY AS NEEDED TAKE WITH FOOD 60 tablet 1      Omega-3 Fatty Acids (OMEGA-3 FISH OIL) 1200 MG CAPS Take 1 capsule by mouth daily        polyethylene glycol-propylene glycol (SYSTANE) 0.4-0.3 % SOLN ophthalmic solution Place 1 drop into both eyes as needed for dry eyes       traZODone (DESYREL) 50 MG tablet Take 1 tablet (50 mg) by mouth At Bedtime 90 tablet 3     vitamin C (ASCORBIC ACID) 1000 MG TABS Take 1,000 mg by mouth daily        VITAMIN D3 50 MCG (2000 UT) tablet TAKE 1 TABLET BY MOUTH EVERY DAY 90 tablet 3     vitamin E 400 UNIT capsule Take 400 Units by mouth daily        zinc gluconate 50 MG tablet Take 50 mg by mouth daily        pilocarpine (PILOCAR) 1 % ophthalmic solution As needed       No Known Allergies       Lab Results    Chemistry/lipid CBC Cardiac Enzymes/BNP/TSH/INR   Recent Labs   Lab Test 03/09/22  1415   CHOL 127   HDL 57   LDL 53   TRIG 87     Recent Labs   Lab Test 03/09/22  1415 02/01/21  1515 07/13/20  0751   LDL 53 78 77     Recent Labs   Lab Test 03/09/22  1415      POTASSIUM 4.3   CHLORIDE 104   CO2 23   GLC 88   BUN 9   CR 0.84   GFRESTIMATED 76   FÉLIX 10.4     Recent Labs   Lab Test 03/09/22  1415 06/01/21  1130 04/12/21  1337   CR 0.84 0.8 0.83     No results for input(s): A1C in the last 82842 hours.       Recent Labs   Lab Test 07/13/20  0751   WBC 6.7   HGB 13.3   HCT 40.1   MCV 96        Recent Labs   Lab Test 07/13/20  0751 07/10/19  0757   HGB 13.3 13.2    No results for input(s): TROPONINI in the last 30115 hours.  No results for input(s): BNP, NTBNPI, NTBNP in the last 34770 hours.  Recent Labs   Lab Test 07/10/19  0757   TSH 1.53     No results for input(s): INR in the last 18589 hours.     Anabela Hernandez MD    Thank you for allowing me to participate in the care of your patient.      Sincerely,     Anabela Hernandez MD     [facility]  cc: No referring provider defined for this encounter.

## 2022-05-17 NOTE — H&P (VIEW-ONLY)
HEART CARE ENCOUNTER CONSULTATON MARCIA      [facility]  [phone]      Assessment/Recommendations   Assessment/Plan:    Severe aortic valve stenosis: Ms Handy has a bicuspid aortic valve that has progressed to severe stenosis.  While she does not have classic symptoms of shortness of breath, she feels there may have been some decline in functional capacity, and her echocardiogram has shown the development of pulmonary hypertension, all of which would make it reasonable to proceed with aortic valve replacement in the near future.    The options of surgical as well as transcatheter aortic valve replacement were reviewed, and it was outlined that the best option for her would be determined after review of her planned TAVR CTA as well as coronary angiogram.  If she has significant coronary artery disease, an enlarging root or cusp/annular anatomy that would make a transcatheter procedure more challenging, she understands that surgery may be better for her in those scenarios.  Otherwise she states that she would prefer TAVR if possible.    She will be scheduled for her CTA and angiogram, and in the interim has been asked to avoid heavy exertion, and knows to call if there is a change in condition or worsening symptoms.    Thank you for the opportunity to participate in the care of Ms. Handy.  Please do not hesitate to call with any questions or concerns regarding her cardiovascular status.       History of Present Illness/Subjective    HPI: Wendy Handy is a 67 year old female who has been known to have a bicuspid aortic valve for a number of years, and has been followed by serial echocardiograms.  Her last echocardiogram on April 20, 2022 showed progression of the aortic stenosis to the severe range, prompting this referral to the valve clinic.    This echocardiogram was reviewed and shows normal to hyperdynamic left ventricular systolic function with an ejection fraction of 70 to 75%.  The mean  "gradient across the aortic valve was 40 mmHg with a peak velocity of 4 m/s and a valve area of 0.8 cm .  There was mild aortic regurgitation and mild pulmonary hypertension with PA pressures estimated to be 45 mmHg plus right atrial pressure.    The ascending aorta was measured at 41 mm on this echocardiogram, which appears to be in line with prior MRIs done over the past 2 to 3 years.    Ms. Handy states that she continues to feel reasonably well.  She denies any chest pain or shortness of breath, but acknowledges that there may have been some decline in her functional capacity over the past several months.             Physical Examination  Review of Systems   Vitals: /68 (BP Location: Right arm, Patient Position: Sitting, Cuff Size: Adult Regular)   Pulse 63   Resp 16   Ht 1.6 m (5' 3\")   Wt 69.9 kg (154 lb)   BMI 27.28 kg/m    BMI= Body mass index is 27.28 kg/m .  Wt Readings from Last 3 Encounters:   05/17/22 69.9 kg (154 lb)   03/31/22 71.2 kg (157 lb)   03/09/22 71.6 kg (157 lb 14.4 oz)       General Appearance:   no distress, normal body habitus, upright.   ENT/Mouth: membranes moist, no nasal discharge or bleeding gums.  Normal head shape, no evidence of injury or laceration.     EYES:  no scleral icterus, normal conjunctivae   Neck: no evidence of thyromegaly.  Supple   Chest/Lungs:   No audible wheezing equal chest wall expansion. Non labored breathing.  No cough.   Cardiovascular:   No evidence of elevated jugular venous pressure.  No evidence of pitting edema bilaterally    Abdomen:  no evidence of abdominal distention. No observe juandice.     Extremities: no cyanosis or clubbing noted.    Skin: no xanthelasma, normal skin color. No evidence of facial lacerations.      Neurologic: Normal arm motion bilateral, no tremors.  No evidence of focal defect.       Psychiatric: alert and oriented x3, calm        Please refer above for cardiac ROS details.        Medical History  Surgical History " Family History Social History   Past Medical History:   Diagnosis Date     Aortic valve disorder     echo 1/09  4.2 cm TAA-MRA 4/2016       Bicuspid aortic valve      Coronary artery disease due to calcified coronary lesion 5/5/2016    Echo 6/2016   Good function  CT Ca++ 172 LAD  Cardiology consult 4/016-nuclear stress     Family history of psychiatric condition      Family history of tremor      Family history of vascular disease      H/O LEEP 1/1/2007 2002-2006 LSIL paps with colposcopy, had LEEP in 2007. No records 6/2/10 ASCUS, +HR HPV 53 6/20/12 ASCUS, +HR HPV 53. Colposcopy outside / system? 6/23/14 NIL 6/24/15 NIL pap, neg HPV 6/27/16 ASCUS, neg HPV 6/29/17 NIL pap, neg HPV 7/9/18 ASCUS, +HR HPV 16. Unclear if colposcopy was completed 7/10/19 NIL pap, neg HPV 7/13/20 NIL pap, neg HPV 2/1/21 NIL pap, neg HPV. Plan: await provider     History of aortic stenosis      History of vitamin D deficiency      Hypercholesteremia      Hyperlipidemia      Laboratory test 8/18/2016    Reading Physician Reading Date Result Priority    Patricia Orta MD 8/16/2016       Narrative       Examination: Nuclear medicine DATscan for Dopamine Receptor Localization.    Examination: NM BRAIN IMAGING TOMOGRAPHIC (SPECT) DATSCAN  Date: 8/16/2016 3:38 PM  Indication: Right sided postural tremor.   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       Osteopenia 2/15/2015     Other and unspecified hyperlipidemia     Created by Conversion      Papanicolaou smear of cervix with low grade squamous intraepithelial lesion (LGSIL)      LGSIL 2002  colpoLGSIL 2003  colpoLGSIL 4/2006 colpoLGSIL 11/06 colpoAbnormal 5/07 colpo  CHCWLEEPendometrial biopsy 5/07      Plantar  fasciitis of right foot      Primary insomnia      Snores      Tremor      Vertigo      Vitamin D deficiency      Wears glasses      Past Surgical History:   Procedure Laterality Date     BLEPHAROPLASTY       CARDIAC CATHETERIZATION  05/30/2017    No intervention     CARDIAC CATHETERIZATION N/A 5/30/2017    Procedure: Coronary Angiogram;  Surgeon: Torito Metzger MD;  Location: [facility] Cath Lab;  Service:      CHOLECYSTECTOMY       CONIZATION CERVIX,LOOP ELECTRD      Description: Cervical Conization Loop Electrode Excision;  Recorded: 05/29/2008;     FACIAL RECONSTRUCTION SURGERY      forehead as well     HC CLOSED TX MANDIBLE FX W/O MANIP      Description: Closed Treatment Of Mandibular Fracture;  Recorded: 05/29/2008;     HC DILATION/CURETTAGE DIAG/THER NON OB      Description: Dilation And Curettage;  Recorded: 05/29/2008;  Comments: X 2  metrorhhagia     LAPAROSCOPIC CHOLECYSTECTOMY N/A 2/23/2015    Procedure: CHOLECYSTECTOMY LAPAROSCOPIC;  Surgeon: Doug Webber MD;  Location: [facility] Main OR;  Service:      [facility] ORAL SURGERY PROCEDURE      jaw fracture     Family History   Problem Relation Age of Onset     Heart Disease Father      Heart Disease Sister      Heart Disease Sister      Mental Illness Mother 30.00        schizophrenia, bipolar     Mental Illness Sister         bipolar     Colon Cancer Father      Hyperlipidemia Brother      Hyperlipidemia Brother      Hyperlipidemia Sister      Cervical Cancer Daughter      Diabetes Type 2  Brother      Diabetes Type 2  Brother      Tremor Father      Tremor Paternal Aunt      Tremor Paternal Grandfather      Tremor Son      Tremor Cousin         Paternal 1st cousin     Attention Deficit Disorder Son      Neurologic Disorder Son         Tourette Syndrome      Schizophrenia Mother      Schizophrenia Sister      Mental Illness Sister         depression     Diabetes Brother         type 2     Mental Illness Brother         depression        Social History      Socioeconomic History     Marital status:      Spouse name: Not on file     Number of children: Not on file     Years of education: Not on file     Highest education level: Not on file   Occupational History     Not on file   Tobacco Use     Smoking status: Never Smoker     Smokeless tobacco: Never Used   Substance and Sexual Activity     Alcohol use: Yes     Alcohol/week: 2.0 standard drinks     Comment: occasion     Drug use: No     Sexual activity: Not Currently     Partners: Male     Birth control/protection: Abstinence   Other Topics Concern     Not on file   Social History Narrative    Living in [place] with her  and has been  for 11 yrs     This is her 3rd marriage    1st marriage had 2 kids: son and daughter - 33 y r old son and 31 yr old daughter    2nd marriage no kids        Not working presently. Retired    Had been  in the past.      Social Determinants of Health     Financial Resource Strain: Not on file   Food Insecurity: Not on file   Transportation Needs: Not on file   Physical Activity: Not on file   Stress: Not on file   Social Connections: Not on file   Intimate Partner Violence: Not on file   Housing Stability: Not on file           Medications  Allergies   Current Outpatient Medications   Medication Sig Dispense Refill     aspirin 81 MG EC tablet Take 81 mg by mouth daily       atorvastatin (LIPITOR) 80 MG tablet Take 1 tablet (80 mg) by mouth At Bedtime 90 tablet 3     cyanocobalamin (VITAMIN B-12) 1000 MCG tablet Take 1,000 mcg by mouth daily        ezetimibe (ZETIA) 10 MG tablet Take 1 tablet (10 mg) by mouth daily 90 tablet 3     ibuprofen (ADVIL/MOTRIN) 800 MG tablet TAKE ONE TABLET BY MOUTH THREE TIMES DAILY AS NEEDED TAKE WITH FOOD 60 tablet 1     Omega-3 Fatty Acids (OMEGA-3 FISH OIL) 1200 MG CAPS Take 1 capsule by mouth daily        polyethylene glycol-propylene glycol (SYSTANE) 0.4-0.3 % SOLN ophthalmic solution Place 1 drop into both eyes as needed  for dry eyes       traZODone (DESYREL) 50 MG tablet Take 1 tablet (50 mg) by mouth At Bedtime 90 tablet 3     vitamin C (ASCORBIC ACID) 1000 MG TABS Take 1,000 mg by mouth daily        VITAMIN D3 50 MCG (2000 UT) tablet TAKE 1 TABLET BY MOUTH EVERY DAY 90 tablet 3     vitamin E 400 UNIT capsule Take 400 Units by mouth daily        zinc gluconate 50 MG tablet Take 50 mg by mouth daily        pilocarpine (PILOCAR) 1 % ophthalmic solution As needed       No Known Allergies       Lab Results    Chemistry/lipid CBC Cardiac Enzymes/BNP/TSH/INR   Recent Labs   Lab Test 03/09/22  1415   CHOL 127   HDL 57   LDL 53   TRIG 87     Recent Labs   Lab Test 03/09/22  1415 02/01/21  1515 07/13/20  0751   LDL 53 78 77     Recent Labs   Lab Test 03/09/22  1415      POTASSIUM 4.3   CHLORIDE 104   CO2 23   GLC 88   BUN 9   CR 0.84   GFRESTIMATED 76   FÉLIX 10.4     Recent Labs   Lab Test 03/09/22  1415 06/01/21  1130 04/12/21  1337   CR 0.84 0.8 0.83     No results for input(s): A1C in the last 20928 hours.       Recent Labs   Lab Test 07/13/20  0751   WBC 6.7   HGB 13.3   HCT 40.1   MCV 96        Recent Labs   Lab Test 07/13/20  0751 07/10/19  0757   HGB 13.3 13.2    No results for input(s): TROPONINI in the last 49038 hours.  No results for input(s): BNP, NTBNPI, NTBNP in the last 89814 hours.  Recent Labs   Lab Test 07/10/19  0757   TSH 1.53     No results for input(s): INR in the last 75562 hours.     Anabela Hernandez MD

## 2022-05-18 LAB
ATRIAL RATE - MUSE: 63 BPM
DIASTOLIC BLOOD PRESSURE - MUSE: NORMAL MMHG
INTERPRETATION ECG - MUSE: NORMAL
P AXIS - MUSE: -18 DEGREES
PR INTERVAL - MUSE: 156 MS
QRS DURATION - MUSE: 74 MS
QT - MUSE: 404 MS
QTC - MUSE: 413 MS
R AXIS - MUSE: 16 DEGREES
SYSTOLIC BLOOD PRESSURE - MUSE: NORMAL MMHG
T AXIS - MUSE: 53 DEGREES
VENTRICULAR RATE- MUSE: 63 BPM

## 2022-05-23 ENCOUNTER — PREP FOR PROCEDURE (OUTPATIENT)
Dept: CARDIOLOGY | Facility: CLINIC | Age: 67
End: 2022-05-23
Payer: COMMERCIAL

## 2022-05-23 DIAGNOSIS — I35.9 AORTIC VALVE DISORDER: Primary | ICD-10-CM

## 2022-05-23 RX ORDER — LIDOCAINE 40 MG/G
CREAM TOPICAL
Status: CANCELLED | OUTPATIENT
Start: 2022-05-23

## 2022-05-23 RX ORDER — ASPIRIN 81 MG/1
243 TABLET, CHEWABLE ORAL ONCE
Status: CANCELLED | OUTPATIENT
Start: 2022-06-09

## 2022-05-23 RX ORDER — SODIUM CHLORIDE 9 MG/ML
INJECTION, SOLUTION INTRAVENOUS CONTINUOUS
Status: CANCELLED | OUTPATIENT
Start: 2022-06-09

## 2022-05-23 RX ORDER — DIAZEPAM 5 MG
5 TABLET ORAL
Status: CANCELLED | OUTPATIENT
Start: 2022-06-09

## 2022-05-23 RX ORDER — FENTANYL CITRATE 50 UG/ML
25 INJECTION, SOLUTION INTRAMUSCULAR; INTRAVENOUS
Status: CANCELLED | OUTPATIENT
Start: 2022-06-09

## 2022-05-23 RX ORDER — ASPIRIN 325 MG
325 TABLET ORAL ONCE
Status: CANCELLED | OUTPATIENT
Start: 2022-06-09 | End: 2022-05-23

## 2022-06-01 DIAGNOSIS — Z11.59 ENCOUNTER FOR SCREENING FOR OTHER VIRAL DISEASES: Primary | ICD-10-CM

## 2022-06-07 ENCOUNTER — LAB (OUTPATIENT)
Dept: LAB | Facility: CLINIC | Age: 67
End: 2022-06-07
Payer: COMMERCIAL

## 2022-06-07 DIAGNOSIS — Z11.59 ENCOUNTER FOR SCREENING FOR OTHER VIRAL DISEASES: ICD-10-CM

## 2022-06-07 LAB — SARS-COV-2 RNA RESP QL NAA+PROBE: NEGATIVE

## 2022-06-07 PROCEDURE — U0003 INFECTIOUS AGENT DETECTION BY NUCLEIC ACID (DNA OR RNA); SEVERE ACUTE RESPIRATORY SYNDROME CORONAVIRUS 2 (SARS-COV-2) (CORONAVIRUS DISEASE [COVID-19]), AMPLIFIED PROBE TECHNIQUE, MAKING USE OF HIGH THROUGHPUT TECHNOLOGIES AS DESCRIBED BY CMS-2020-01-R: HCPCS

## 2022-06-07 PROCEDURE — U0005 INFEC AGEN DETEC AMPLI PROBE: HCPCS

## 2022-06-09 ENCOUNTER — HOSPITAL ENCOUNTER (OUTPATIENT)
Facility: HOSPITAL | Age: 67
Discharge: HOME | End: 2022-06-09
Attending: INTERNAL MEDICINE | Admitting: INTERNAL MEDICINE
Payer: COMMERCIAL

## 2022-06-09 VITALS
RESPIRATION RATE: 16 BRPM | OXYGEN SATURATION: 98 % | TEMPERATURE: 97.6 F | BODY MASS INDEX: 26.47 KG/M2 | WEIGHT: 149.4 LBS | HEIGHT: 63 IN | HEART RATE: 74 BPM | SYSTOLIC BLOOD PRESSURE: 114 MMHG | DIASTOLIC BLOOD PRESSURE: 66 MMHG

## 2022-06-09 DIAGNOSIS — I35.9 AORTIC VALVE DISORDER: ICD-10-CM

## 2022-06-09 DIAGNOSIS — I51.89 OTHER ILL-DEFINED HEART DISEASES: ICD-10-CM

## 2022-06-09 LAB
ABO/RH(D): NORMAL
ANION GAP SERPL CALCULATED.3IONS-SCNC: 6 MMOL/L (ref 5–18)
ANTIBODY SCREEN: NEGATIVE
ATRIAL RATE - MUSE: 63 BPM
BUN SERPL-MCNC: 16 MG/DL (ref 8–22)
CALCIUM SERPL-MCNC: 9.1 MG/DL (ref 8.5–10.5)
CHLORIDE BLD-SCNC: 111 MMOL/L (ref 98–107)
CO2 SERPL-SCNC: 24 MMOL/L (ref 22–31)
CREAT SERPL-MCNC: 0.74 MG/DL (ref 0.6–1.1)
DIASTOLIC BLOOD PRESSURE - MUSE: NORMAL MMHG
ERYTHROCYTE [DISTWIDTH] IN BLOOD BY AUTOMATED COUNT: 12.3 % (ref 10–15)
GFR SERPL CREATININE-BSD FRML MDRD: 88 ML/MIN/1.73M2
GLUCOSE BLD-MCNC: 92 MG/DL (ref 70–125)
HCT VFR BLD AUTO: 36.7 % (ref 35–47)
HGB BLD-MCNC: 12.3 G/DL (ref 11.7–15.7)
INTERPRETATION ECG - MUSE: NORMAL
MCH RBC QN AUTO: 31.5 PG (ref 26.5–33)
MCHC RBC AUTO-ENTMCNC: 33.5 G/DL (ref 31.5–36.5)
MCV RBC AUTO: 94 FL (ref 78–100)
P AXIS - MUSE: 14 DEGREES
PLATELET # BLD AUTO: 161 10E3/UL (ref 150–450)
POTASSIUM BLD-SCNC: 3.9 MMOL/L (ref 3.5–5)
PR INTERVAL - MUSE: 140 MS
QRS DURATION - MUSE: 72 MS
QT - MUSE: 402 MS
QTC - MUSE: 411 MS
R AXIS - MUSE: 15 DEGREES
RBC # BLD AUTO: 3.9 10E6/UL (ref 3.8–5.2)
SODIUM SERPL-SCNC: 141 MMOL/L (ref 136–145)
SPECIMEN EXPIRATION DATE: NORMAL
SYSTOLIC BLOOD PRESSURE - MUSE: NORMAL MMHG
T AXIS - MUSE: 64 DEGREES
VENTRICULAR RATE- MUSE: 63 BPM
WBC # BLD AUTO: 5.6 10E3/UL (ref 4–11)

## 2022-06-09 PROCEDURE — 258N000003 HC RX IP 258 OP 636: Performed by: INTERNAL MEDICINE

## 2022-06-09 PROCEDURE — 999N000054 HC STATISTIC EKG NON-CHARGEABLE

## 2022-06-09 PROCEDURE — 82310 ASSAY OF CALCIUM: CPT | Performed by: INTERNAL MEDICINE

## 2022-06-09 PROCEDURE — C1894 INTRO/SHEATH, NON-LASER: HCPCS | Performed by: INTERNAL MEDICINE

## 2022-06-09 PROCEDURE — 250N000011 HC RX IP 250 OP 636: Performed by: INTERNAL MEDICINE

## 2022-06-09 PROCEDURE — 93454 CORONARY ARTERY ANGIO S&I: CPT | Mod: 26 | Performed by: INTERNAL MEDICINE

## 2022-06-09 PROCEDURE — 99152 MOD SED SAME PHYS/QHP 5/>YRS: CPT | Performed by: INTERNAL MEDICINE

## 2022-06-09 PROCEDURE — 86850 RBC ANTIBODY SCREEN: CPT | Performed by: INTERNAL MEDICINE

## 2022-06-09 PROCEDURE — 93010 ELECTROCARDIOGRAM REPORT: CPT | Performed by: INTERNAL MEDICINE

## 2022-06-09 PROCEDURE — 999N000127 HC STATISTIC PERIPHERAL IV START W US GUIDANCE

## 2022-06-09 PROCEDURE — 250N000013 HC RX MED GY IP 250 OP 250 PS 637: Performed by: INTERNAL MEDICINE

## 2022-06-09 PROCEDURE — 250N000009 HC RX 250: Performed by: INTERNAL MEDICINE

## 2022-06-09 PROCEDURE — C1887 CATHETER, GUIDING: HCPCS | Performed by: INTERNAL MEDICINE

## 2022-06-09 PROCEDURE — 999N000054 ECG 12-LEAD WITH MUSE (LHE): Performed by: INTERNAL MEDICINE

## 2022-06-09 PROCEDURE — 272N000001 HC OR GENERAL SUPPLY STERILE: Performed by: INTERNAL MEDICINE

## 2022-06-09 PROCEDURE — 36415 COLL VENOUS BLD VENIPUNCTURE: CPT | Performed by: INTERNAL MEDICINE

## 2022-06-09 PROCEDURE — 93005 ELECTROCARDIOGRAM TRACING: CPT

## 2022-06-09 PROCEDURE — 93454 CORONARY ARTERY ANGIO S&I: CPT | Performed by: INTERNAL MEDICINE

## 2022-06-09 PROCEDURE — 85027 COMPLETE CBC AUTOMATED: CPT | Performed by: INTERNAL MEDICINE

## 2022-06-09 RX ORDER — OXYCODONE HYDROCHLORIDE 5 MG/1
5 TABLET ORAL EVERY 4 HOURS PRN
Status: DISCONTINUED | OUTPATIENT
Start: 2022-06-09 | End: 2022-06-09 | Stop reason: HOSPADM

## 2022-06-09 RX ORDER — NALOXONE HYDROCHLORIDE 0.4 MG/ML
0.4 INJECTION, SOLUTION INTRAMUSCULAR; INTRAVENOUS; SUBCUTANEOUS
Status: DISCONTINUED | OUTPATIENT
Start: 2022-06-09 | End: 2022-06-09 | Stop reason: HOSPADM

## 2022-06-09 RX ORDER — SODIUM CHLORIDE 9 MG/ML
INJECTION, SOLUTION INTRAVENOUS CONTINUOUS
Status: ACTIVE | OUTPATIENT
Start: 2022-06-09 | End: 2022-06-09

## 2022-06-09 RX ORDER — FLUMAZENIL 0.1 MG/ML
0.2 INJECTION, SOLUTION INTRAVENOUS
Status: DISCONTINUED | OUTPATIENT
Start: 2022-06-09 | End: 2022-06-09 | Stop reason: HOSPADM

## 2022-06-09 RX ORDER — ATROPINE SULFATE 0.1 MG/ML
0.5 INJECTION INTRAVENOUS
Status: DISCONTINUED | OUTPATIENT
Start: 2022-06-09 | End: 2022-06-09 | Stop reason: HOSPADM

## 2022-06-09 RX ORDER — LIDOCAINE 40 MG/G
CREAM TOPICAL
Status: DISCONTINUED | OUTPATIENT
Start: 2022-06-09 | End: 2022-06-09 | Stop reason: HOSPADM

## 2022-06-09 RX ORDER — SODIUM CHLORIDE 9 MG/ML
INJECTION, SOLUTION INTRAVENOUS CONTINUOUS
Status: DISCONTINUED | OUTPATIENT
Start: 2022-06-09 | End: 2022-06-09 | Stop reason: HOSPADM

## 2022-06-09 RX ORDER — DIAZEPAM 5 MG
5 TABLET ORAL
Status: COMPLETED | OUTPATIENT
Start: 2022-06-09 | End: 2022-06-09

## 2022-06-09 RX ORDER — FENTANYL CITRATE 50 UG/ML
INJECTION, SOLUTION INTRAMUSCULAR; INTRAVENOUS
Status: DISCONTINUED | OUTPATIENT
Start: 2022-06-09 | End: 2022-06-09 | Stop reason: HOSPADM

## 2022-06-09 RX ORDER — ASPIRIN 81 MG/1
243 TABLET, CHEWABLE ORAL ONCE
Status: DISCONTINUED | OUTPATIENT
Start: 2022-06-09 | End: 2022-06-09 | Stop reason: HOSPADM

## 2022-06-09 RX ORDER — OXYCODONE HYDROCHLORIDE 5 MG/1
10 TABLET ORAL EVERY 4 HOURS PRN
Status: DISCONTINUED | OUTPATIENT
Start: 2022-06-09 | End: 2022-06-09 | Stop reason: HOSPADM

## 2022-06-09 RX ORDER — NALOXONE HYDROCHLORIDE 0.4 MG/ML
0.2 INJECTION, SOLUTION INTRAMUSCULAR; INTRAVENOUS; SUBCUTANEOUS
Status: DISCONTINUED | OUTPATIENT
Start: 2022-06-09 | End: 2022-06-09 | Stop reason: HOSPADM

## 2022-06-09 RX ORDER — FENTANYL CITRATE 50 UG/ML
25 INJECTION, SOLUTION INTRAMUSCULAR; INTRAVENOUS
Status: DISCONTINUED | OUTPATIENT
Start: 2022-06-09 | End: 2022-06-09 | Stop reason: HOSPADM

## 2022-06-09 RX ORDER — HYDRALAZINE HYDROCHLORIDE 20 MG/ML
10 INJECTION INTRAMUSCULAR; INTRAVENOUS ONCE
Status: DISCONTINUED | OUTPATIENT
Start: 2022-06-09 | End: 2022-06-09

## 2022-06-09 RX ORDER — ACETAMINOPHEN 325 MG/1
650 TABLET ORAL EVERY 4 HOURS PRN
Status: DISCONTINUED | OUTPATIENT
Start: 2022-06-09 | End: 2022-06-09 | Stop reason: HOSPADM

## 2022-06-09 RX ORDER — ASPIRIN 325 MG
325 TABLET ORAL ONCE
Status: DISCONTINUED | OUTPATIENT
Start: 2022-06-09 | End: 2022-06-09 | Stop reason: HOSPADM

## 2022-06-09 RX ADMIN — DIAZEPAM 5 MG: 5 TABLET ORAL at 10:53

## 2022-06-09 RX ADMIN — SODIUM CHLORIDE 150 ML/HR: 9 INJECTION, SOLUTION INTRAVENOUS at 11:18

## 2022-06-09 NOTE — PRE-PROCEDURE
GENERAL PRE-PROCEDURE:   Procedure:  Coronary angiogram with possible PCI  Date/Time:  6/9/2022 10:56 AM    Written consent obtained?: Yes    Risks and benefits: Risks, benefits and alternatives were discussed    Consent given by:  Patient  Patient states understanding of procedure being performed: Yes    Patient's understanding of procedure matches consent: Yes    Procedure consent matches procedure scheduled: Yes    Expected level of sedation:  Moderate  Appropriately NPO:  Yes  ASA Class:  4 (severe aortic stenosis, bicspid aortic valve, CAD, HLD)  Mallampati  :  Grade 4- soft palate obscured by base of tongue  Lungs:  Lungs clear with good breath sounds bilaterally  Heart:  Systolic murmur  History & Physical reviewed:  History and physical reviewed and no updates needed  Statement of review:  I have reviewed the lab findings, diagnostic data, medications, and the plan for sedation

## 2022-06-09 NOTE — PROGRESS NOTES
Pt. Prepped and ready for angiogram Denies symptoms. NSR. Murmer noted. Labs noted, checklist complete. Spouse Omar present today. Ready for procedure.

## 2022-06-09 NOTE — INTERVAL H&P NOTE
"I have reviewed the surgical (or preoperative) H&P that is linked to this encounter, and examined the patient. Noted significant changes noted    Clinical Conditions Present on Arrival:  Clinically Significant Risk Factors Present on Admission                  # Platelet Defect: home medication list includes an antiplatelet medication  # Overweight: Estimated body mass index is 26.47 kg/m  as calculated from the following:    Height as of this encounter: 1.6 m (5' 3\").    Weight as of this encounter: 67.8 kg (149 lb 6.4 oz).       "

## 2022-06-09 NOTE — PLAN OF CARE
Problem: Plan of Care - These are the overarching goals to be used throughout the patient stay.    Goal: Absence of Hospital-Acquired Illness or Injury  Intervention: Prevent and Manage VTE (Venous Thromboembolism) Risk  Recent Flowsheet Documentation  Taken 6/9/2022 1600 by Noah Manrique, RN  Activity Management:   activity encouraged   ambulated in    ambulated to bathroom   ambulated in room  Taken 6/9/2022 1400 by Noah Manrique RN  Activity Management: bedrest   Received from previous RN @1400.Patient was kept comfortable during post-procedure stay.VSS. Denies pain. Right femoral access site remained dry & free from signs of bleeding even after ambulating. Appointments already made & included in AVS. Post-op instructions given to patient & spouse. Able to ask questions. Verbalized no concerns. Belongings returned. Discharged in stable condition.

## 2022-06-09 NOTE — DISCHARGE INSTRUCTIONS
Interventional Cardiology  Coronary Angiogram/Angioplasty/Stent/Atherectomy Discharge Instructions -   Femoral Approach    The instructions below are to help you understand how to take care of yourself. There is also information about when to call the doctor or emergency services    **Do not stop your aspirin or platelet inhibitor unless directed by your Cardiologist.  These medications help to prevent platelets in your blood from sticking together and forming a clot.  Examples of these medications are:  Ticagrelor (Brilinta), Clopidigrel (Plavix), Prasugrel (Effient)          For the first 72 hours after your procedure:  No driving for 24 hours  Do not lift more than 15 pounds.  If you usually lift 50 pounds or more daily, please contact your cardiologist  Avoid any hard work or tiring activities, this includes, yard work, jogging, biking, sexual activity  During the day get up and walk around every 2 hours  You may return to work after 72 hours if you are feeling well and your job does not involve heavy lifting          Groin Site / Wound Care / Bleeding    You may take off the dressing on your groin the day after your procedure  Keep the area dry and clean  It is ok to shower with regular soap.  Pat dry, do not rub  You do not need to use a bandage  No tub/pool or hot tub for 1 week  If your groin starts to bleed or begins to swell suddenly after leaving the hospital, lie flat and apply firm pressure above the site for 15 minutes.  If bleeding continues, call 9-1-1  Bruising around the groin area is normal.  It may take 2-3 weeks for this to go away.  It is normal for the bruised area to turn green and/or yellow as it is healing.  A small lump may also be present and may last 2-3 months.  Your leg may be sore or stiff for a few days.  You may take Tylenol or a pain medicine recommended by your doctor  If you have a fever over 100.4, that lasts more than one day - call your cardiologist.    Your Procedural  Physician was: Dr. Anabela Hernandez  the phone number is: [phone]  [facility]:  [phone]  If you are calling after hours, please listen to the entire voicemail, a live  will answer at the end of the message

## 2022-06-21 ENCOUNTER — HOSPITAL ENCOUNTER (OUTPATIENT)
Dept: CT IMAGING | Facility: CLINIC | Age: 67
Discharge: HOME | End: 2022-06-21
Attending: INTERNAL MEDICINE | Admitting: INTERNAL MEDICINE
Payer: COMMERCIAL

## 2022-06-21 DIAGNOSIS — I51.89 OTHER ILL-DEFINED HEART DISEASES: ICD-10-CM

## 2022-06-21 DIAGNOSIS — I35.9 AORTIC VALVE DISORDER: ICD-10-CM

## 2022-06-21 PROCEDURE — 71275 CT ANGIOGRAPHY CHEST: CPT | Mod: 26 | Performed by: INTERNAL MEDICINE

## 2022-06-21 PROCEDURE — 250N000011 HC RX IP 250 OP 636: Performed by: INTERNAL MEDICINE

## 2022-06-21 PROCEDURE — 74174 CTA ABD&PLVS W/CONTRAST: CPT

## 2022-06-21 PROCEDURE — 74174 CTA ABD&PLVS W/CONTRAST: CPT | Mod: 26 | Performed by: INTERNAL MEDICINE

## 2022-06-21 RX ORDER — IOPAMIDOL 755 MG/ML
100 INJECTION, SOLUTION INTRAVASCULAR ONCE
Status: COMPLETED | OUTPATIENT
Start: 2022-06-21 | End: 2022-06-21

## 2022-06-21 RX ADMIN — IOPAMIDOL 90 ML: 755 INJECTION, SOLUTION INTRAVENOUS at 09:08

## 2022-07-06 ENCOUNTER — OFFICE VISIT (OUTPATIENT)
Dept: CARDIOLOGY | Facility: CLINIC | Age: 67
End: 2022-07-06
Payer: COMMERCIAL

## 2022-07-06 VITALS
RESPIRATION RATE: 14 BRPM | WEIGHT: 153 LBS | BODY MASS INDEX: 27.1 KG/M2 | HEART RATE: 68 BPM | DIASTOLIC BLOOD PRESSURE: 70 MMHG | SYSTOLIC BLOOD PRESSURE: 106 MMHG

## 2022-07-06 DIAGNOSIS — L93.0 DISCOID LUPUS ERYTHEMATOSUS: ICD-10-CM

## 2022-07-06 DIAGNOSIS — I35.0 SEVERE AORTIC STENOSIS: Primary | ICD-10-CM

## 2022-07-06 DIAGNOSIS — I35.0 NONRHEUMATIC AORTIC VALVE STENOSIS: Primary | ICD-10-CM

## 2022-07-06 DIAGNOSIS — I71.20 THORACIC AORTIC ANEURYSM WITHOUT RUPTURE (H): ICD-10-CM

## 2022-07-06 PROCEDURE — 99203 OFFICE O/P NEW LOW 30 MIN: CPT | Performed by: THORACIC SURGERY (CARDIOTHORACIC VASCULAR SURGERY)

## 2022-07-06 NOTE — LETTER
7/6/2022    Shahana Parker, NP  [address] [place] [address]    RE: Wendy Handy       Dear Colleague,     I had the pleasure of seeing Wendy Handy in the [facility].  ASKED BY REFERRING PHYSICIAN: Dr. Platt    CHIEF COMPLAINT: Bad aortic valve    HPI: Wendy is a 67 year old female who presents with a bicuspid aortic valve for which she has been followed for many years. The mean gradient across the valve is now 40 mmHg and the peak velocity is 4 m/s.  The aortic valve area is ow 0.8 cm squared.  By echo she also has some degree of pulmonary hypertension.  Her ascending aorta measures 41 mm in its greatest dimension and this size has been stable for the past 2 to 3 years.    PAST MEDICAL HISTORY:  Past Medical History:   Diagnosis Date     Aortic valve disorder     echo 1/09  4.2 cm TAA-MRA 4/2016       Bicuspid aortic valve      Coronary artery disease due to calcified coronary lesion 5/5/2016    Echo 6/2016   Good function  CT Ca++ 172 LAD  Cardiology consult 4/016-nuclear stress     Family history of psychiatric condition      Family history of tremor      Family history of vascular disease      H/O LEEP 1/1/2007 2002-2006 LSIL paps with colposcopy, had LEEP in 2007. No records 6/2/10 ASCUS, +HR HPV 53 6/20/12 ASCUS, +HR HPV 53. Colposcopy outside / system? 6/23/14 NIL 6/24/15 NIL pap, neg HPV 6/27/16 ASCUS, neg HPV 6/29/17 NIL pap, neg HPV 7/9/18 ASCUS, +HR HPV 16. Unclear if colposcopy was completed 7/10/19 NIL pap, neg HPV 7/13/20 NIL pap, neg HPV 2/1/21 NIL pap, neg HPV. Plan: await provider     History of aortic stenosis      History of vitamin D deficiency      Hypercholesteremia      Hyperlipidemia      Laboratory test 8/18/2016    Reading Physician Reading Date Result Priority    Patricia Orta MD 8/16/2016       Narrative       Examination: Nuclear medicine DATscan for Dopamine Receptor Localization.    Examination: NM BRAIN IMAGING TOMOGRAPHIC (SPECT) DATSCAN  Date:  8/16/2016 3:38 PM  Indication: Right sided postural tremor.   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       Osteopenia 2/15/2015     Other and unspecified hyperlipidemia     Created by Conversion      Papanicolaou smear of cervix with low grade squamous intraepithelial lesion (LGSIL)      LGSIL 2002  colpoLGSIL 2003  colpoLGSIL 4/2006 colpoLGSIL 11/06 colpoAbnormal 5/07 colpo  CHCWLEEPendometrial biopsy 5/07      Plantar fasciitis of right foot      Primary insomnia      Snores      Tremor      Vertigo      Vitamin D deficiency      Wears glasses        PAST SURGICAL HISTORY:  Past Surgical History:   Procedure Laterality Date     BLEPHAROPLASTY       CARDIAC CATHETERIZATION  05/30/2017    No intervention     CARDIAC CATHETERIZATION N/A 5/30/2017    Procedure: Coronary Angiogram;  Surgeon: Torito Metzger MD;  Location: [facility] Cath Lab;  Service:      CHOLECYSTECTOMY       CONIZATION CERVIX,LOOP ELECTRD      Description: Cervical Conization Loop Electrode Excision;  Recorded: 05/29/2008;     CV CORONARY ANGIOGRAM N/A 6/9/2022    Procedure: Coronary Angiogram;  Surgeon: Anabela Hernandez MD;  Location: [facility] CV     CV CORONARY ANGIOGRAM  6/9/2022    Procedure: ;  Surgeon: Anabela Hernandez MD;  Location: [facility] CV     FACIAL RECONSTRUCTION SURGERY      forehead as well     HC CLOSED TX MANDIBLE FX W/O MANIP      Description: Closed Treatment Of Mandibular Fracture;  Recorded: 05/29/2008;     HC DILATION/CURETTAGE DIAG/THER NON OB      Description: Dilation And Curettage;  Recorded: 05/29/2008;  Comments: X 2  metrorhhagia     LAPAROSCOPIC CHOLECYSTECTOMY N/A 2/23/2015    Procedure: CHOLECYSTECTOMY LAPAROSCOPIC;   Surgeon: Doug Webber MD;  Location: [facility] OR;  Service:      [facility] ORAL SURGERY PROCEDURE      jaw fracture       FAMILY HISTORY:   Family History   Problem Relation Age of Onset     Heart Disease Father      Heart Disease Sister      Heart Disease Sister      Mental Illness Mother 30.00        schizophrenia, bipolar     Mental Illness Sister         bipolar     Colon Cancer Father      Hyperlipidemia Brother      Hyperlipidemia Brother      Hyperlipidemia Sister      Cervical Cancer Daughter      Diabetes Type 2  Brother      Diabetes Type 2  Brother      Tremor Father      Tremor Paternal Aunt      Tremor Paternal Grandfather      Tremor Son      Tremor Cousin         Paternal 1st cousin     Attention Deficit Disorder Son      Neurologic Disorder Son         Tourette Syndrome      Schizophrenia Mother      Schizophrenia Sister      Mental Illness Sister         depression     Diabetes Brother         type 2     Mental Illness Brother         depression       SOCIAL HISTORY:  Social History     Socioeconomic History     Marital status:      Spouse name: Not on file     Number of children: Not on file     Years of education: Not on file     Highest education level: Not on file   Occupational History     Not on file   Tobacco Use     Smoking status: Never Smoker     Smokeless tobacco: Never Used   Substance and Sexual Activity     Alcohol use: Yes     Alcohol/week: 2.0 standard drinks     Comment: occasion     Drug use: No     Sexual activity: Not Currently     Partners: Male     Birth control/protection: Abstinence   Other Topics Concern     Not on file   Social History Narrative    Living in [place] with her  and has been  for 11 yrs     This is her 3rd marriage    1st marriage had 2 kids: son and daughter - 33 y r old son and 31 yr old daughter    2nd marriage no kids        Not working presently. Retired    Had been  in the past.      Social Determinants of Health      Financial Resource Strain: Not on file   Food Insecurity: Not on file   Transportation Needs: Not on file   Physical Activity: Not on file   Stress: Not on file   Social Connections: Not on file   Intimate Partner Violence: Not on file   Housing Stability: Not on file        ALLERGIES:   No Known Allergies    CURRENT MEDICATIONS:   Prescription Medications as of 7/7/2022       Rx Number Disp Refills Start End Last Dispensed Date Next Fill Date Owning Pharmacy    aspirin 81 MG EC tablet        [facility] - [facility] [address]    Sig: Take 81 mg by mouth daily    Class: Historical    Route: Oral    atorvastatin (LIPITOR) 80 MG tablet  90 tablet 3 3/9/2022    [facility] - [facility] [address]    Sig: Take 1 tablet (80 mg) by mouth At Bedtime    Class: E-Prescribe    Route: Oral    cyanocobalamin (VITAMIN B-12) 1000 MCG tablet    1/1/2020    [id number] [place] [address] [facility]    Sig: Take 1,000 mcg by mouth daily     Class: Historical    Route: Oral    ezetimibe (ZETIA) 10 MG tablet  90 tablet 3 3/9/2022    [facility] - [facility] [address]    Sig: Take 1 tablet (10 mg) by mouth daily    Class: E-Prescribe    Route: Oral    ibuprofen (ADVIL/MOTRIN) 800 MG tablet  60 tablet 1 7/19/2021    [facility] [place] [address] [facility] [employer]    Sig: TAKE ONE TABLET BY MOUTH THREE TIMES DAILY AS NEEDED TAKE WITH FOOD    Class: E-Prescribe    Renewals     Renewal provider: Chante Kincaid MD          Omega-3 Fatty Acids (OMEGA-3 FISH OIL) 1200 MG CAPS            Sig: Take 1 capsule by mouth daily     Class: Historical    Route: Oral    polyethylene glycol-propylene glycol (SYSTANE) 0.4-0.3 % SOLN ophthalmic solution            Sig: Place 1 drop into both eyes as needed for dry eyes    Class: Historical    Route: Both Eyes    traZODone (DESYREL)  50 MG tablet  90 tablet 3 3/9/2022    [facility]  [facility] - [place] - [address]    Sig: Take 1 tablet (50 mg) by mouth At Bedtime    Class: E-Prescribe    Route: Oral    vitamin C (ASCORBIC ACID) 1000 MG TABS    1/1/2020    [facility] [address] [place] [address] [employer] [facility]    Sig: Take 1,000 mg by mouth daily     Class: Historical    Route: Oral    VITAMIN D3 50 MCG (2000 UT) tablet  90 tablet 3 11/8/2021    [facility] [address] [place] [address] [facility]    Sig: TAKE 1 TABLET BY MOUTH EVERY DAY    Class: E-Prescribe    vitamin E 400 UNIT capsule            Sig: Take 400 Units by mouth daily     Class: Historical    Route: Oral    zinc gluconate 50 MG tablet    1/1/2020    [facility] [place] [address] [facility]    Sig: Take 50 mg by mouth daily     Class: Historical    Route: Oral          REVIEW OF SYSTEMS:   Gen: denies frequent headaches, double/blurry vision, insomnia, fatigue, unexplained weight loss/gain. No previous anesthesia reactions.  CV: denies chest pain, shortness of breath, palpitations, peripheral edema.    Pulm: denies shortness of breath, asthma or wheezing  GI/: denies liver or kidney problems, blood in stool or BRBPR, difficulty urinating  Endo: denies thyroid problems or Diabetes  Heme/Onc: denies bleeding or clotting disorders, no family problems with bleeding/clotting diorders  MS: no weakness, tremors or gait instability  Neuro: denies depression, memory problems, no dysesthesias, no previous strokes, no migraines, no dysphagia  Skin: No petechiae, purpura or rash.    PHYSICAL EXAMINATION:   /70 (BP Location: Left arm, Patient Position: Sitting, Cuff Size: Adult Regular)   Pulse 68   Resp 14   Wt 69.4 kg (153 lb)   BMI 27.10 kg/m    General: alert and oriented x 3, pleasant, no acute distress  CV: S1 S2, grade II/VI systolic ejection murmur heard best over the right upper sternal border but no rubs or gallops,  regular rate and rhythm, no peripheral edema, no carotid or abdominal bruits, pulses in upper and lower extremities palpable  Pulm: bilateral breath sounds, clear to auscultation, easy work of breathing  GI: (+) bowel sounds, soft non-tender and non-distended  : voiding without problems  MS: moves all extremities x 4,  5+/5+ equal strength bilaterally  Neuro: pupils equal round and reactive to light, cranial nerves, II-XII grossly intact, no gross neurologic deficits noted    LABS: Pending    PROCEDURES/IMAGING:  Chest X-Ray: Pending  Angio: No significant coronary artery disease  Echo: Severe aortic stenosis with some degree of pulmonary hypertension  CT: Good for TAVR  MRI: Not done  Carotid US: Not done     ASSESSMENT/PLAN:   Wendy is a 67 year old woman with a bicuspid aortic valve and severe aortic stenosis.  She does not appear to be very symptomatic.  Her ascending aorta is mildly enlarged at 4.1 cm and this size appears to be stable.  I believe that she would benefit from aortic valve replacement.  The risks and benefits of a transcatheter aortic valve replacement and an open surgical aortic valve replacement were described to the patient in detail.  Given her age she was warned that an additional procedure may be necessary in the future and her aorta will need continued careful follow-up.  She would like to take the TAVR option.  She and her  understand that the risks for this procedure include: bleeding, infection, stroke, heart block requiring a pacemaker, cardiac perforation, aortic root rupture, aortic dissection, and an operative mortality of 1 to 2 percent.    1. Complete outpatient work-up  2. Transcatheter aortic valve replacement  3.  Continued follow-up for her ascending aorta.     Approximately 30 minutes were spent with the patient in clinic at this visit.    CC  Patient Care Team:  Shahana Parker NP as PCP - General (Family Medicine)  Yolanda Gonzalez MD as Assigned PCP  Teodora Platt,  MD as Assigned Heart and Vascular Provider  Shahana Parker NP as Nurse Practitioner (Family Medicine)      Thank you for allowing me to participate in the care of your patient.      Sincerely,     Katy Rojas MD     [facility]  cc:   No referring provider defined for this encounter.

## 2022-07-07 NOTE — PROGRESS NOTES
ASKED BY REFERRING PHYSICIAN: Dr. Platt    CHIEF COMPLAINT: Bad aortic valve    HPI: Wendy is a 67 year old female who presents with a bicuspid aortic valve for which she has been followed for many years. The mean gradient across the valve is now 40 mmHg and the peak velocity is 4 m/s.  The aortic valve area is ow 0.8 cm squared.  By echo she also has some degree of pulmonary hypertension.  Her ascending aorta measures 41 mm in its greatest dimension and this size has been stable for the past 2 to 3 years.    PAST MEDICAL HISTORY:  Past Medical History:   Diagnosis Date     Aortic valve disorder     echo 1/09  4.2 cm TAA-MRA 4/2016       Bicuspid aortic valve      Coronary artery disease due to calcified coronary lesion 5/5/2016    Echo 6/2016   Good function  CT Ca++ 172 LAD  Cardiology consult 4/016-nuclear stress     Family history of psychiatric condition      Family history of tremor      Family history of vascular disease      H/O LEEP 1/1/2007 2002-2006 LSIL paps with colposcopy, had LEEP in 2007. No records 6/2/10 ASCUS, +HR HPV 53 6/20/12 ASCUS, +HR HPV 53. Colposcopy outside / system? 6/23/14 NIL 6/24/15 NIL pap, neg HPV 6/27/16 ASCUS, neg HPV 6/29/17 NIL pap, neg HPV 7/9/18 ASCUS, +HR HPV 16. Unclear if colposcopy was completed 7/10/19 NIL pap, neg HPV 7/13/20 NIL pap, neg HPV 2/1/21 NIL pap, neg HPV. Plan: await provider     History of aortic stenosis      History of vitamin D deficiency      Hypercholesteremia      Hyperlipidemia      Laboratory test 8/18/2016    Reading Physician Reading Date Result Priority    Patricia Orta MD 8/16/2016       Narrative       Examination: Nuclear medicine DATscan for Dopamine Receptor Localization.    Examination: NM BRAIN IMAGING TOMOGRAPHIC (SPECT) DATSCAN  Date: 8/16/2016 3:38 PM  Indication: Right sided postural tremor.   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       Osteopenia 2/15/2015     Other and unspecified hyperlipidemia     Created by Conversion      Papanicolaou smear of cervix with low grade squamous intraepithelial lesion (LGSIL)      LGSIL 2002  colpoLGSIL 2003  colpoLGSIL 4/2006 colpoLGSIL 11/06 colpoAbnormal 5/07 colpo  CHCWLEEPendometrial biopsy 5/07      Plantar fasciitis of right foot      Primary insomnia      Snores      Tremor      Vertigo      Vitamin D deficiency      Wears glasses        PAST SURGICAL HISTORY:  Past Surgical History:   Procedure Laterality Date     BLEPHAROPLASTY       CARDIAC CATHETERIZATION  05/30/2017    No intervention     CARDIAC CATHETERIZATION N/A 5/30/2017    Procedure: Coronary Angiogram;  Surgeon: Torito Metzger MD;  Location: [facility] Cath Lab;  Service:      CHOLECYSTECTOMY       CONIZATION CERVIX,LOOP ELECTRD      Description: Cervical Conization Loop Electrode Excision;  Recorded: 05/29/2008;     CV CORONARY ANGIOGRAM N/A 6/9/2022    Procedure: Coronary Angiogram;  Surgeon: Anabela Hernandez MD;  Location: [facility] CV     CV CORONARY ANGIOGRAM  6/9/2022    Procedure: ;  Surgeon: Anabela Hernandez MD;  Location: [facility] LAB CV     FACIAL RECONSTRUCTION SURGERY      forehead as well     HC CLOSED TX MANDIBLE FX W/O MANIP      Description: Closed Treatment Of Mandibular Fracture;  Recorded: 05/29/2008;     HC DILATION/CURETTAGE DIAG/THER NON OB      Description: Dilation And Curettage;  Recorded: 05/29/2008;  Comments: X 2  metrorhhagia     LAPAROSCOPIC CHOLECYSTECTOMY N/A 2/23/2015    Procedure: CHOLECYSTECTOMY LAPAROSCOPIC;  Surgeon: Doug Webber MD;  Location: [facility] OR;  Service:      [facility] ORAL SURGERY PROCEDURE      jaw fracture       FAMILY HISTORY:   Family History   Problem Relation Age of Onset      Heart Disease Father      Heart Disease Sister      Heart Disease Sister      Mental Illness Mother 30.00        schizophrenia, bipolar     Mental Illness Sister         bipolar     Colon Cancer Father      Hyperlipidemia Brother      Hyperlipidemia Brother      Hyperlipidemia Sister      Cervical Cancer Daughter      Diabetes Type 2  Brother      Diabetes Type 2  Brother      Tremor Father      Tremor Paternal Aunt      Tremor Paternal Grandfather      Tremor Son      Tremor Cousin         Paternal 1st cousin     Attention Deficit Disorder Son      Neurologic Disorder Son         Tourette Syndrome      Schizophrenia Mother      Schizophrenia Sister      Mental Illness Sister         depression     Diabetes Brother         type 2     Mental Illness Brother         depression       SOCIAL HISTORY:  Social History     Socioeconomic History     Marital status:      Spouse name: Not on file     Number of children: Not on file     Years of education: Not on file     Highest education level: Not on file   Occupational History     Not on file   Tobacco Use     Smoking status: Never Smoker     Smokeless tobacco: Never Used   Substance and Sexual Activity     Alcohol use: Yes     Alcohol/week: 2.0 standard drinks     Comment: occasion     Drug use: No     Sexual activity: Not Currently     Partners: Male     Birth control/protection: Abstinence   Other Topics Concern     Not on file   Social History Narrative    Living in [place] with her  and has been  for 11 yrs     This is her 3rd marriage    1st marriage had 2 kids: son and daughter - 33 y r old son and 31 yr old daughter    2nd marriage no kids        Not working presently. Retired    Had been  in the past.      Social Determinants of Health     Financial Resource Strain: Not on file   Food Insecurity: Not on file   Transportation Needs: Not on file   Physical Activity: Not on file   Stress: Not on file   Social Connections: Not on  file   Intimate Partner Violence: Not on file   Housing Stability: Not on file        ALLERGIES:   No Known Allergies    CURRENT MEDICATIONS:   Prescription Medications as of 7/7/2022       Rx Number Disp Refills Start End Last Dispensed Date Next Fill Date Owning Pharmacy    aspirin 81 MG EC tablet        [facility] - [facility] [address]    Sig: Take 81 mg by mouth daily    Class: Historical    Route: Oral    atorvastatin (LIPITOR) 80 MG tablet  90 tablet 3 3/9/2022    [facility] - [facility] [address]    Sig: Take 1 tablet (80 mg) by mouth At Bedtime    Class: E-Prescribe    Route: Oral    cyanocobalamin (VITAMIN B-12) 1000 MCG tablet    1/1/2020    [facility] [address] [place] [address] [employer] [facility]    Sig: Take 1,000 mcg by mouth daily     Class: Historical    Route: Oral    ezetimibe (ZETIA) 10 MG tablet  90 tablet 3 3/9/2022    [facility]  ([facility] - [facility] [address]    Sig: Take 1 tablet (10 mg) by mouth daily    Class: E-Prescribe    Route: Oral    ibuprofen (ADVIL/MOTRIN) 800 MG tablet  60 tablet 1 7/19/2021    [facility] [address] [place] [address] [facility]    Sig: TAKE ONE TABLET BY MOUTH THREE TIMES DAILY AS NEEDED TAKE WITH FOOD    Class: E-Prescribe    Renewals     Renewal provider: Chante Kincaid MD          Omega-3 Fatty Acids (OMEGA-3 FISH OIL) 1200 MG CAPS            Sig: Take 1 capsule by mouth daily     Class: Historical    Route: Oral    polyethylene glycol-propylene glycol (SYSTANE) 0.4-0.3 % SOLN ophthalmic solution            Sig: Place 1 drop into both eyes as needed for dry eyes    Class: Historical    Route: Both Eyes    traZODone (DESYREL) 50 MG tablet  90 tablet 3 3/9/2022    [facility]  [facility] - [facility] [address]    Sig: Take 1 tablet (50 mg) by mouth At Bedtime    Class: E-Prescribe     Route: Oral    vitamin C (ASCORBIC ACID) 1000 MG TABS    1/1/2020    [facility] [address] [place] [address] [facility]    Sig: Take 1,000 mg by mouth daily     Class: Historical    Route: Oral    VITAMIN D3 50 MCG (2000 UT) tablet  90 tablet 3 11/8/2021    [facility] [address] [place] [address] [facility]    Sig: TAKE 1 TABLET BY MOUTH EVERY DAY    Class: E-Prescribe    vitamin E 400 UNIT capsule            Sig: Take 400 Units by mouth daily     Class: Historical    Route: Oral    zinc gluconate 50 MG tablet    1/1/2020    [facility] [address] [place] [address] [facility]    Sig: Take 50 mg by mouth daily     Class: Historical    Route: Oral          REVIEW OF SYSTEMS:   Gen: denies frequent headaches, double/blurry vision, insomnia, fatigue, unexplained weight loss/gain. No previous anesthesia reactions.  CV: denies chest pain, shortness of breath, palpitations, peripheral edema.    Pulm: denies shortness of breath, asthma or wheezing  GI/: denies liver or kidney problems, blood in stool or BRBPR, difficulty urinating  Endo: denies thyroid problems or Diabetes  Heme/Onc: denies bleeding or clotting disorders, no family problems with bleeding/clotting diorders  MS: no weakness, tremors or gait instability  Neuro: denies depression, memory problems, no dysesthesias, no previous strokes, no migraines, no dysphagia  Skin: No petechiae, purpura or rash.    PHYSICAL EXAMINATION:   /70 (BP Location: Left arm, Patient Position: Sitting, Cuff Size: Adult Regular)   Pulse 68   Resp 14   Wt 69.4 kg (153 lb)   BMI 27.10 kg/m    General: alert and oriented x 3, pleasant, no acute distress  CV: S1 S2, grade II/VI systolic ejection murmur heard best over the right upper sternal border but no rubs or gallops, regular rate and rhythm, no peripheral edema, no carotid or abdominal bruits, pulses in upper and lower extremities palpable  Pulm: bilateral breath sounds, clear to auscultation, easy work of  breathing  GI: (+) bowel sounds, soft non-tender and non-distended  : voiding without problems  MS: moves all extremities x 4,  5+/5+ equal strength bilaterally  Neuro: pupils equal round and reactive to light, cranial nerves, II-XII grossly intact, no gross neurologic deficits noted    LABS: Pending    PROCEDURES/IMAGING:  Chest X-Ray: Pending  Angio: No significant coronary artery disease  Echo: Severe aortic stenosis with some degree of pulmonary hypertension  CT: Good for TAVR  MRI: Not done  Carotid US: Not done     ASSESSMENT/PLAN:   Wendy is a 67 year old woman with a bicuspid aortic valve and severe aortic stenosis.  She does not appear to be very symptomatic.  Her ascending aorta is mildly enlarged at 4.1 cm and this size appears to be stable.  I believe that she would benefit from aortic valve replacement.  The risks and benefits of a transcatheter aortic valve replacement and an open surgical aortic valve replacement were described to the patient in detail.  Given her age she was warned that an additional procedure may be necessary in the future and her aorta will need continued careful follow-up.  She would like to take the TAVR option.  She and her  understand that the risks for this procedure include: bleeding, infection, stroke, heart block requiring a pacemaker, cardiac perforation, aortic root rupture, aortic dissection, and an operative mortality of 1 to 2 percent.    1. Complete outpatient work-up  2. Transcatheter aortic valve replacement  3.  Continued follow-up for her ascending aorta.     Approximately 30 minutes were spent with the patient in clinic at this visit.    CC  Patient Care Team:  Shahana Parker NP as PCP - General (Family Medicine)  Yolanda Gonzalez MD as Assigned PCP  Teodora Platt MD as Assigned Heart and Vascular Provider  Shahana Parker NP as Nurse Practitioner (Family Medicine)

## 2022-08-11 DIAGNOSIS — M77.11 LATERAL EPICONDYLITIS OF RIGHT ELBOW: ICD-10-CM

## 2022-08-12 NOTE — TELEPHONE ENCOUNTER
"Former patient of ??? & has not established care with another provider.  Please assign refill request to covering provider per clinic standard process.    Routing refill request to provider for review/approval because:  Age warning  No PCP    Last Written Prescription Date:  7/19/21  Last Fill Quantity: 60,  # refills: 1   Last office visit provider:  3/9/22     Requested Prescriptions   Pending Prescriptions Disp Refills     ibuprofen (ADVIL/MOTRIN) 800 MG tablet 60 tablet 1       NSAID Medications Failed - 8/12/2022 11:00 AM        Failed - Patient is age 6-64 years        Passed - Blood pressure under 140/90 in past 12 months     BP Readings from Last 3 Encounters:   07/06/22 106/70   06/09/22 114/66   05/17/22 118/68                 Passed - Normal ALT on file in past 12 months     Recent Labs   Lab Test 05/17/22  1319   ALT 25             Passed - Normal AST on file in past 12 months     Recent Labs   Lab Test 05/17/22  1319   AST 22             Passed - Recent (12 mo) or future (30 days) visit within the authorizing provider's specialty     Patient has had an office visit with the authorizing provider or a provider within the authorizing providers department within the previous 12 mos or has a future within next 30 days. See \"Patient Info\" tab in inbasket, or \"Choose Columns\" in Meds & Orders section of the refill encounter.              Passed - Normal CBC on file in past 12 months     Recent Labs   Lab Test 06/09/22  0846   WBC 5.6   RBC 3.90   HGB 12.3   HCT 36.7                    Passed - Medication is active on med list        Passed - No active pregnancy on record        Passed - Normal serum creatinine on file in past 12 months     Recent Labs   Lab Test 06/09/22  0846   CR 0.74       Ok to refill medication if creatinine is low          Passed - No positive pregnancy test in past 12 months             Luca Mercer RN 08/12/22 11:00 AM  "

## 2022-08-17 DIAGNOSIS — I35.0 NONRHEUMATIC AORTIC VALVE STENOSIS: Primary | ICD-10-CM

## 2022-08-19 ENCOUNTER — OFFICE VISIT (OUTPATIENT)
Dept: CARDIOLOGY | Facility: CLINIC | Age: 67
End: 2022-08-19

## 2022-08-19 ENCOUNTER — ALLIED HEALTH/NURSE VISIT (OUTPATIENT)
Dept: CARDIOLOGY | Facility: CLINIC | Age: 67
End: 2022-08-19

## 2022-08-19 ENCOUNTER — PREP FOR PROCEDURE (OUTPATIENT)
Dept: CARDIOLOGY | Facility: CLINIC | Age: 67
End: 2022-08-19

## 2022-08-19 ENCOUNTER — LAB (OUTPATIENT)
Dept: CARDIOLOGY | Facility: CLINIC | Age: 67
End: 2022-08-19
Payer: COMMERCIAL

## 2022-08-19 VITALS
SYSTOLIC BLOOD PRESSURE: 112 MMHG | DIASTOLIC BLOOD PRESSURE: 70 MMHG | RESPIRATION RATE: 14 BRPM | WEIGHT: 155 LBS | BODY MASS INDEX: 27.46 KG/M2 | HEART RATE: 77 BPM

## 2022-08-19 DIAGNOSIS — E78.00 PURE HYPERCHOLESTEROLEMIA: ICD-10-CM

## 2022-08-19 DIAGNOSIS — I35.0 SEVERE AORTIC STENOSIS: ICD-10-CM

## 2022-08-19 DIAGNOSIS — I35.0 NONRHEUMATIC AORTIC VALVE STENOSIS: ICD-10-CM

## 2022-08-19 DIAGNOSIS — I71.20 THORACIC AORTIC ANEURYSM WITHOUT RUPTURE (H): ICD-10-CM

## 2022-08-19 DIAGNOSIS — I25.84 CORONARY ARTERY DISEASE DUE TO CALCIFIED CORONARY LESION: ICD-10-CM

## 2022-08-19 DIAGNOSIS — Z11.59 ENCOUNTER FOR SCREENING FOR OTHER VIRAL DISEASES: Primary | ICD-10-CM

## 2022-08-19 DIAGNOSIS — L93.2 CUTANEOUS LUPUS ERYTHEMATOSUS: Primary | ICD-10-CM

## 2022-08-19 DIAGNOSIS — I50.32 CHRONIC DIASTOLIC HEART FAILURE (H): ICD-10-CM

## 2022-08-19 DIAGNOSIS — I35.0 SEVERE AORTIC STENOSIS: Primary | ICD-10-CM

## 2022-08-19 DIAGNOSIS — I25.10 CORONARY ARTERY DISEASE DUE TO CALCIFIED CORONARY LESION: ICD-10-CM

## 2022-08-19 LAB
ABO/RH(D): NORMAL
ALBUMIN SERPL-MCNC: 3.9 G/DL (ref 3.5–5)
ALP SERPL-CCNC: 69 U/L (ref 45–120)
ALT SERPL W P-5'-P-CCNC: 20 U/L (ref 0–45)
ANION GAP SERPL CALCULATED.3IONS-SCNC: 5 MMOL/L (ref 5–18)
ANTIBODY SCREEN: NEGATIVE
AST SERPL W P-5'-P-CCNC: 21 U/L (ref 0–40)
BASOPHILS # BLD AUTO: 0.1 10E3/UL (ref 0–0.2)
BASOPHILS NFR BLD AUTO: 1 %
BILIRUB SERPL-MCNC: 1.3 MG/DL (ref 0–1)
BLOOD BANK CHART COMMENT: NORMAL
BNP SERPL-MCNC: 56 PG/ML (ref 0–112)
BUN SERPL-MCNC: 11 MG/DL (ref 8–22)
CALCIUM SERPL-MCNC: 9.3 MG/DL (ref 8.5–10.5)
CHLORIDE BLD-SCNC: 108 MMOL/L (ref 98–107)
CO2 SERPL-SCNC: 28 MMOL/L (ref 22–31)
CREAT SERPL-MCNC: 0.78 MG/DL (ref 0.6–1.1)
EOSINOPHIL # BLD AUTO: 0.2 10E3/UL (ref 0–0.7)
EOSINOPHIL NFR BLD AUTO: 3 %
ERYTHROCYTE [DISTWIDTH] IN BLOOD BY AUTOMATED COUNT: 12.5 % (ref 10–15)
GFR SERPL CREATININE-BSD FRML MDRD: 83 ML/MIN/1.73M2
GLUCOSE BLD-MCNC: 105 MG/DL (ref 70–125)
HCT VFR BLD AUTO: 41.3 % (ref 35–47)
HGB BLD-MCNC: 13.7 G/DL (ref 11.7–15.7)
IMM GRANULOCYTES # BLD: 0 10E3/UL
IMM GRANULOCYTES NFR BLD: 0 %
INR PPP: 1.07 (ref 0.85–1.15)
LYMPHOCYTES # BLD AUTO: 1.9 10E3/UL (ref 0.8–5.3)
LYMPHOCYTES NFR BLD AUTO: 33 %
MAGNESIUM SERPL-MCNC: 2.1 MG/DL (ref 1.8–2.6)
MCH RBC QN AUTO: 30.9 PG (ref 26.5–33)
MCHC RBC AUTO-ENTMCNC: 33.2 G/DL (ref 31.5–36.5)
MCV RBC AUTO: 93 FL (ref 78–100)
MONOCYTES # BLD AUTO: 0.5 10E3/UL (ref 0–1.3)
MONOCYTES NFR BLD AUTO: 9 %
NEUTROPHILS # BLD AUTO: 3.2 10E3/UL (ref 1.6–8.3)
NEUTROPHILS NFR BLD AUTO: 54 %
NRBC # BLD AUTO: 0 10E3/UL
NRBC BLD AUTO-RTO: 0 /100
PLATELET # BLD AUTO: 189 10E3/UL (ref 150–450)
POTASSIUM BLD-SCNC: 4.1 MMOL/L (ref 3.5–5)
PROT SERPL-MCNC: 6.6 G/DL (ref 6–8)
RBC # BLD AUTO: 4.44 10E6/UL (ref 3.8–5.2)
SARS-COV-2 RNA RESP QL NAA+PROBE: NEGATIVE
SODIUM SERPL-SCNC: 141 MMOL/L (ref 136–145)
SPECIMEN EXPIRATION DATE: NORMAL
SPECIMEN EXPIRATION DATE: NORMAL
WBC # BLD AUTO: 5.8 10E3/UL (ref 4–11)

## 2022-08-19 PROCEDURE — 85025 COMPLETE CBC W/AUTO DIFF WBC: CPT | Performed by: INTERNAL MEDICINE

## 2022-08-19 PROCEDURE — U0003 INFECTIOUS AGENT DETECTION BY NUCLEIC ACID (DNA OR RNA); SEVERE ACUTE RESPIRATORY SYNDROME CORONAVIRUS 2 (SARS-COV-2) (CORONAVIRUS DISEASE [COVID-19]), AMPLIFIED PROBE TECHNIQUE, MAKING USE OF HIGH THROUGHPUT TECHNOLOGIES AS DESCRIBED BY CMS-2020-01-R: HCPCS | Performed by: INTERNAL MEDICINE

## 2022-08-19 PROCEDURE — 99207 PR NO CHARGE LOS: CPT

## 2022-08-19 PROCEDURE — 86901 BLOOD TYPING SEROLOGIC RH(D): CPT | Performed by: INTERNAL MEDICINE

## 2022-08-19 PROCEDURE — 85610 PROTHROMBIN TIME: CPT | Performed by: INTERNAL MEDICINE

## 2022-08-19 PROCEDURE — 86923 COMPATIBILITY TEST ELECTRIC: CPT | Performed by: INTERNAL MEDICINE

## 2022-08-19 PROCEDURE — 83880 ASSAY OF NATRIURETIC PEPTIDE: CPT | Performed by: INTERNAL MEDICINE

## 2022-08-19 PROCEDURE — 36415 COLL VENOUS BLD VENIPUNCTURE: CPT | Performed by: INTERNAL MEDICINE

## 2022-08-19 PROCEDURE — 80053 COMPREHEN METABOLIC PANEL: CPT | Performed by: INTERNAL MEDICINE

## 2022-08-19 PROCEDURE — 99215 OFFICE O/P EST HI 40 MIN: CPT | Mod: 25 | Performed by: INTERNAL MEDICINE

## 2022-08-19 PROCEDURE — U0005 INFEC AGEN DETEC AMPLI PROBE: HCPCS | Performed by: INTERNAL MEDICINE

## 2022-08-19 PROCEDURE — 83735 ASSAY OF MAGNESIUM: CPT | Performed by: INTERNAL MEDICINE

## 2022-08-19 PROCEDURE — 86900 BLOOD TYPING SEROLOGIC ABO: CPT | Performed by: INTERNAL MEDICINE

## 2022-08-19 PROCEDURE — 86850 RBC ANTIBODY SCREEN: CPT | Performed by: INTERNAL MEDICINE

## 2022-08-19 PROCEDURE — 93000 ELECTROCARDIOGRAM COMPLETE: CPT | Performed by: INTERNAL MEDICINE

## 2022-08-19 RX ORDER — LIDOCAINE 40 MG/G
CREAM TOPICAL
Status: CANCELLED | OUTPATIENT
Start: 2022-08-19

## 2022-08-19 RX ORDER — ASPIRIN 325 MG
325 TABLET ORAL DAILY
Status: CANCELLED | OUTPATIENT
Start: 2022-08-23

## 2022-08-19 RX ORDER — CEFAZOLIN SODIUM/WATER 2 G/20 ML
2 SYRINGE (ML) INTRAVENOUS
Status: CANCELLED | OUTPATIENT
Start: 2022-08-23

## 2022-08-19 RX ORDER — SODIUM CHLORIDE 9 MG/ML
INJECTION, SOLUTION INTRAVENOUS CONTINUOUS
Status: CANCELLED | OUTPATIENT
Start: 2022-08-23

## 2022-08-19 NOTE — PATIENT INSTRUCTIONS
Wendy Handy,    It was a pleasure to see you today in the clinic regarding your upcoming TAVR.     My recommendations after this visit include:     - report to [facility] on Tuesday morning at the instructed time      If you have questions or concerns, please call using the numbers below:    Valve Clinic Pool  [phone]    After Hours/Scheduling  [phone]    Otherwise you can dial the nurse directly at:    Mariela Banks RN  [phone]

## 2022-08-19 NOTE — LETTER
8/19/2022    Shahana Parker, NP  [address] [place] [address]    RE: Wendy Handy       Dear Colleague,     I had the pleasure of seeing Wendy Handy in the [facility].  HEART CARE ENCOUNTER NOTE       [facility]  [phone]    Assessment/Recommendations   Problem List Items Addressed This Visit        Endocrine    Hyperlipidemia       Circulatory    Aortic aneurysm (H)    Coronary artery disease due to calcified coronary lesion    Severe aortic stenosis       Immune    Systemic lupus erythematosus (H) - Primary          1.  Aortic Stenosis: This has become severe and is now causing minor symptoms, but most notably decreased exercise tolerance.  She has seen a decline in her functional status.  She has been evaluated by a multidisciplinary team and has been deemed a good candidate for transcatheter aortic valve replacement.  She has now undergone all the required workup for TAVR and wishes to proceed.   We discussed the procedure in detail,  including post-procedure care, restrictions and follow up.   She understands that there is a 4-5% risk of bleeding, infection, stroke, heart block requiring permanent pacemaker, cardiac perforation, aortic root rupture, dissection and death.  Patient also understands that if something unexpected happens, the procedure may need to be stopped or changed to help her.     All questions were answered   Consents were signed and witnessed by me.  Patient is full bailout, with urgent sternotomy and CPB, in the rare event of aortic dissection or aortic rupture  She has never had trouble with anesthesia in the past.    Labs today reveal Hgb 13.7, normal WBC, electrolytes WNL and creat 0.78    The plan will be for MAC with TTE imaging prior to and following valve deployment.  We will use the Killian BRADY valve, via the TF approach. Wendy Handy will report Tuesday morning for the procedure and all instructions regarding times and  medications were given by TAVR coordinator RN.     2. Chronic diastolic heart failure, NYHA class II -currently compensated.  Mild shortness of breath with activity but no other signs of fluid overload.    3. 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  Has been on Plaquenil in the past, but not for about 10 years.     4. Hyperlipidemia -cholesterol numbers look good on last lipid profile from March.  Continue atorvastatin 80 mg daily    5. Aortic aneurysm - stable at 4.1 cm and followed by yearly cardiac MRA studies    6. CAD - mild, non-obstructive disease.  Patient denies angina       History of Present Illness/Subjective    Wendy Handy is a 67 year old female who comes in today for history and physical prior to TAVR (transcatheter aortic valve replacement).  Her  accompanies her to the visit today    Wendy is a patient of Dr. Platt and has been followed by serial echocardiograms for her bicuspid aortic valve and ascending aortic aneurysm, which has been a stable size by yearly cardiac MRA studies.  She has history of aortic stenosis, subcutaneous lupus, hyperlipidemia and coronary artery disease.  On repeat echocardiogram in April, the aortic stenosis had progressed to the severe range and there was no evidence of pulmonary hypertension.  The patient has been essentially symptom-free, but has noticed that when she does her walking she has had to stop more often when compared to 1 year ago.  She has also noticed pain between her shoulder blades when she is walking, which is new for her.    Wendy Handy denies chest discomfort, palpitations, paroxysmal nocturnal dyspnea, orthopnea, lightheadedness, dizziness, pre-syncope, or syncope.  Wendy Handy also denies any weight loss, changes in appetite, nausea or vomiting.     Medical, surgical, family, social history, and medications were reviewed and updated as necessary.    ECG (personally reviewed): 8/19/2022 shows NSR    ECHOCARDIOGRAM  done on April 20, 2022:  Interpretation Summary     1. Normal left ventricular size and systolic performance with a visually  estimated ejection fraction of 70-75%.  2. There is severe aortic stenosis.  Â  The mean gradient is measured at 40 mmHg with peak velocity of 4.0 m/s.  Calculated valve area 0.80-0.84 cmÂ .  3. There is mild aortic insufficiency.  4. Normal right ventricular size and systolic performance.  5. There is mild enlargement of the proximal ascending aorta.  6. Right ventricular systolic pressure relative to right atrial pressure is  mildly increased. The pulmonary artery pressure is estimated to be 45 mmHg  plus right atrial pressure (the IVC is of normal caliber).     When compared to the prior real-time echocardiogram dated 29 April 2019, the  mean gradient across the aortic valve is increased from 22 mmHg to now 40  mmHg.    CATH done on June 9, 2022:  Coronary angiography today showed no significant coronary artery disease with mild atherosclerosis     Left main mild disease  LAD mild diffuse disease but no obstructive lesions  Left circumflex is nondominant with mild disease  RCA is large and dominant with mild disease     Of note, right radial access was challenging and if she does end up having TAVR, she would not be a candidate for sentinel via the right radial approach       Physical Examination Review of Systems   Vitals: /70 (BP Location: Right arm, Patient Position: Sitting, Cuff Size: Adult Regular)   Pulse 77   Resp 14   Wt 70.3 kg (155 lb)   BMI 27.46 kg/m    BMI= Body mass index is 27.46 kg/m .  Wt Readings from Last 3 Encounters:   08/19/22 70.3 kg (155 lb)   07/06/22 69.4 kg (153 lb)   06/09/22 67.8 kg (149 lb 6.4 oz)       General Appearance:   Alert, cooperative and in no acute distress   ENT/Mouth: membranes moist, no oral lesions or bleeding gums.      EYES:  no scleral icterus, normal conjunctivae   Neck: no carotid bruits.  Thyroid not visualized   Chest/Lungs:    lungs are clear to auscultation, no rales or wheezing   Cardiovascular:   Regular. Normal first and second heart sounds with 2/6 systolic murmur.  No rubs or gallops; the carotid, radial and posterior tibial pulses are intact, no edema bilaterally    Abdomen:  Soft and nontender. Bowel sounds are present in all quadrants   Extremities: no cyanosis or clubbing   Skin: no xanthelasma, warm.    Neurologic: normal gait, normal  bilateral, no tremors   Psychiatric: Normal mood and affect       Please refer above for cardiac ROS details.      Medical History  Surgical History Family History Social History   Past Medical History:   Diagnosis Date     Aortic valve disorder     echo 1/09  4.2 cm TAA-MRA 4/2016       Bicuspid aortic valve      Coronary artery disease due to calcified coronary lesion 5/5/2016    Echo 6/2016   Good function  CT Ca++ 172 LAD  Cardiology consult 4/016-nuclear stress     Family history of psychiatric condition      Family history of tremor      Family history of vascular disease      H/O LEEP 1/1/2007 2002-2006 LSIL paps with colposcopy, had LEEP in 2007. No records 6/2/10 ASCUS, +HR HPV 53 6/20/12 ASCUS, +HR HPV 53. Colposcopy outside / system? 6/23/14 NIL 6/24/15 NIL pap, neg HPV 6/27/16 ASCUS, neg HPV 6/29/17 NIL pap, neg HPV 7/9/18 ASCUS, +HR HPV 16. Unclear if colposcopy was completed 7/10/19 NIL pap, neg HPV 7/13/20 NIL pap, neg HPV 2/1/21 NIL pap, neg HPV. Plan: await provider     History of aortic stenosis      History of vitamin D deficiency      Hypercholesteremia      Hyperlipidemia      Laboratory test 8/18/2016    Reading Physician Reading Date Result Priority    Patricia Orta MD 8/16/2016       Narrative       Examination: Nuclear medicine DATscan for Dopamine Receptor Localization.    Examination: NM BRAIN IMAGING TOMOGRAPHIC (SPECT) DATSCAN  Date: 8/16/2016 3:38 PM  Indication: Right sided postural tremor.   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       Osteopenia 2/15/2015     Other and unspecified hyperlipidemia     Created by Conversion      Papanicolaou smear of cervix with low grade squamous intraepithelial lesion (LGSIL)      LGSIL 2002  colpoLGSIL 2003  colpoLGSIL 4/2006 colpoLGSIL 11/06 colpoAbnormal 5/07 colpo  CHCWLEEPendometrial biopsy 5/07      Plantar fasciitis of right foot      Primary insomnia      Snores      Tremor      Vertigo      Vitamin D deficiency      Wears glasses      Past Surgical History:   Procedure Laterality Date     BLEPHAROPLASTY       CARDIAC CATHETERIZATION  05/30/2017    No intervention     CARDIAC CATHETERIZATION N/A 5/30/2017    Procedure: Coronary Angiogram;  Surgeon: Torito Metzger MD;  Location: [facility] Lab;  Service:      CHOLECYSTECTOMY       CONIZATION CERVIX,LOOP ELECTRD      Description: Cervical Conization Loop Electrode Excision;  Recorded: 05/29/2008;     CV CORONARY ANGIOGRAM N/A 6/9/2022    Procedure: Coronary Angiogram;  Surgeon: Anabela Hernandez MD;  Location: [facility] CV     CV CORONARY ANGIOGRAM  6/9/2022    Procedure: ;  Surgeon: Anabela Hernandez MD;  Location: [facility] LAB CV     FACIAL RECONSTRUCTION SURGERY      forehead as well     HC CLOSED TX MANDIBLE FX W/O MANIP      Description: Closed Treatment Of Mandibular Fracture;  Recorded: 05/29/2008;     HC DILATION/CURETTAGE DIAG/THER NON OB      Description: Dilation And Curettage;  Recorded: 05/29/2008;  Comments: X 2  metrorhhagia     LAPAROSCOPIC CHOLECYSTECTOMY N/A 2/23/2015    Procedure: CHOLECYSTECTOMY LAPAROSCOPIC;  Surgeon: Doug Webber MD;  Location: [facility] OR;  Service:      [facility] ORAL SURGERY PROCEDURE      jaw fracture     Family History   Problem  Relation Age of Onset     Heart Disease Father      Heart Disease Sister      Heart Disease Sister      Mental Illness Mother 30.00        schizophrenia, bipolar     Mental Illness Sister         bipolar     Colon Cancer Father      Hyperlipidemia Brother      Hyperlipidemia Brother      Hyperlipidemia Sister      Cervical Cancer Daughter      Diabetes Type 2  Brother      Diabetes Type 2  Brother      Tremor Father      Tremor Paternal Aunt      Tremor Paternal Grandfather      Tremor Son      Tremor Cousin         Paternal 1st cousin     Attention Deficit Disorder Son      Neurologic Disorder Son         Tourette Syndrome      Schizophrenia Mother      Schizophrenia Sister      Mental Illness Sister         depression     Diabetes Brother         type 2     Mental Illness Brother         depression    Social History     Socioeconomic History     Marital status:      Spouse name: Not on file     Number of children: Not on file     Years of education: Not on file     Highest education level: Not on file   Occupational History     Not on file   Tobacco Use     Smoking status: Never Smoker     Smokeless tobacco: Never Used   Substance and Sexual Activity     Alcohol use: Yes     Alcohol/week: 2.0 standard drinks     Comment: occasion     Drug use: No     Sexual activity: Not Currently     Partners: Male     Birth control/protection: Abstinence   Other Topics Concern     Not on file   Social History Narrative    Living in [place] with her  and has been  for 11 yrs     This is her 3rd marriage    1st marriage had 2 kids: son and daughter - 33 y r old son and 31 yr old daughter    2nd marriage no kids        Not working presently. Retired    Had been  in the past.      Social Determinants of Health     Financial Resource Strain: Not on file   Food Insecurity: Not on file   Transportation Needs: Not on file   Physical Activity: Not on file   Stress: Not on file   Social Connections: Not on  file   Intimate Partner Violence: Not on file   Housing Stability: Not on file          Medications  Allergies   Current Outpatient Medications   Medication Sig Dispense Refill     aspirin 81 MG EC tablet Take 81 mg by mouth daily       atorvastatin (LIPITOR) 80 MG tablet Take 1 tablet (80 mg) by mouth At Bedtime 90 tablet 3     cyanocobalamin (VITAMIN B-12) 1000 MCG tablet Take 1,000 mcg by mouth daily        ezetimibe (ZETIA) 10 MG tablet Take 1 tablet (10 mg) by mouth daily 90 tablet 3     ibuprofen (ADVIL/MOTRIN) 800 MG tablet TAKE ONE TABLET BY MOUTH THREE TIMES DAILY AS NEEDED TAKE WITH FOOD 60 tablet 1     Omega-3 Fatty Acids (OMEGA-3 FISH OIL) 1200 MG CAPS Take 1 capsule by mouth daily        polyethylene glycol-propylene glycol (SYSTANE) 0.4-0.3 % SOLN ophthalmic solution Place 1 drop into both eyes as needed for dry eyes       traZODone (DESYREL) 50 MG tablet Take 1 tablet (50 mg) by mouth At Bedtime 90 tablet 3     vitamin C (ASCORBIC ACID) 1000 MG TABS Take 1,000 mg by mouth daily        VITAMIN D3 50 MCG (2000 UT) tablet TAKE 1 TABLET BY MOUTH EVERY DAY 90 tablet 3     vitamin E 400 UNIT capsule Take 400 Units by mouth daily        zinc gluconate 50 MG tablet Take 50 mg by mouth daily       No Known Allergies      Lab Results    Chemistry/lipid CBC Cardiac Enzymes/BNP/TSH/INR   Recent Labs   Lab Test 03/09/22  1415   CHOL 127   HDL 57   LDL 53   TRIG 87     Recent Labs   Lab Test 03/09/22  1415 02/01/21  1515 07/13/20  0751   LDL 53 78 77     Recent Labs   Lab Test 06/09/22  0846      POTASSIUM 3.9   CHLORIDE 111*   CO2 24   GLC 92   BUN 16   CR 0.74   GFRESTIMATED 88   FÉLIX 9.1     Recent Labs   Lab Test 06/09/22  0846 05/17/22  1319 03/09/22  1415   CR 0.74 0.78 0.84     No results for input(s): A1C in the last 68975 hours. Recent Labs   Lab Test 06/09/22  0846   WBC 5.6   HGB 12.3   HCT 36.7   MCV 94        Recent Labs   Lab Test 06/09/22  0846 05/17/22  1319 07/13/20  0751   HGB 12.3  13.4 13.3    No results for input(s): TROPONINI in the last 77780 hours.  No results for input(s): BNP, NTBNPI, NTBNP in the last 43726 hours.  Recent Labs   Lab Test 07/10/19  0757   TSH 1.53     No results for input(s): INR in the last 04961 hours.       51 minutes spent on the date of encounter doing education, consent signing, chart prep/review, review of test results, patient visit, documentation and discussion with family.      This note has been dictated using voice recognition software. Any grammatical or context distortions are unintentional and inherent to the software.               Thank you for allowing me to participate in the care of your patient.      Sincerely,     Aleah Kay PA-C     [facility]  cc:   No referring provider defined for this encounter.

## 2022-08-19 NOTE — PROGRESS NOTES
HEART CARE ENCOUNTER NOTE       [facility]  [phone]    Assessment/Recommendations   Problem List Items Addressed This Visit        Endocrine    Hyperlipidemia       Circulatory    Aortic aneurysm (H)    Coronary artery disease due to calcified coronary lesion    Severe aortic stenosis       Immune    Systemic lupus erythematosus (H) - Primary          1.  Aortic Stenosis: This has become severe and is now causing minor symptoms, but most notably decreased exercise tolerance.  She has seen a decline in her functional status.  She has been evaluated by a multidisciplinary team and has been deemed a good candidate for transcatheter aortic valve replacement.  She has now undergone all the required workup for TAVR and wishes to proceed.   We discussed the procedure in detail,  including post-procedure care, restrictions and follow up.   She understands that there is a 4-5% risk of bleeding, infection, stroke, heart block requiring permanent pacemaker, cardiac perforation, aortic root rupture, dissection and death.  Patient also understands that if something unexpected happens, the procedure may need to be stopped or changed to help her.     All questions were answered   Consents were signed and witnessed by me.  Patient is full bailout, with urgent sternotomy and CPB, in the rare event of aortic dissection or aortic rupture  She has never had trouble with anesthesia in the past.    Labs today reveal Hgb 13.7, normal WBC, electrolytes WNL and creat 0.78    The plan will be for MAC with TTE imaging prior to and following valve deployment.  We will use the Killian BRADY valve, via the TF approach. Wenyd BRIAN Handy will report Tuesday morning for the procedure and all instructions regarding times and medications were given by TAVR coordinator RN.     2. Chronic diastolic heart failure, NYHA class II -currently compensated.  Mild shortness of breath with activity but no other signs of fluid  overload.    3. 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  Has been on Plaquenil in the past, but not for about 10 years.     4. Hyperlipidemia -cholesterol numbers look good on last lipid profile from March.  Continue atorvastatin 80 mg daily    5. Aortic aneurysm - stable at 4.1 cm and followed by yearly cardiac MRA studies    6. CAD - mild, non-obstructive disease.  Patient denies angina       History of Present Illness/Subjective    Wendy Handy is a 67 year old female who comes in today for history and physical prior to TAVR (transcatheter aortic valve replacement).  Her  accompanies her to the visit today    Wendy is a patient of Dr. Platt and has been followed by serial echocardiograms for her bicuspid aortic valve and ascending aortic aneurysm, which has been a stable size by yearly cardiac MRA studies.  She has history of aortic stenosis, subcutaneous lupus, hyperlipidemia and coronary artery disease.  On repeat echocardiogram in April, the aortic stenosis had progressed to the severe range and there was no evidence of pulmonary hypertension.  The patient has been essentially symptom-free, but has noticed that when she does her walking she has had to stop more often when compared to 1 year ago.  She has also noticed pain between her shoulder blades when she is walking, which is new for her.    Wendy Handy denies chest discomfort, palpitations, paroxysmal nocturnal dyspnea, orthopnea, lightheadedness, dizziness, pre-syncope, or syncope.  Wendy Handy also denies any weight loss, changes in appetite, nausea or vomiting.     Medical, surgical, family, social history, and medications were reviewed and updated as necessary.    ECG (personally reviewed): 8/19/2022 shows NSR    ECHOCARDIOGRAM done on April 20, 2022:  Interpretation Summary     1. Normal left ventricular size and systolic performance with a visually  estimated ejection fraction of 70-75%.  2. There is severe aortic  stenosis.  Â  The mean gradient is measured at 40 mmHg with peak velocity of 4.0 m/s.  Calculated valve area 0.80-0.84 cmÂ .  3. There is mild aortic insufficiency.  4. Normal right ventricular size and systolic performance.  5. There is mild enlargement of the proximal ascending aorta.  6. Right ventricular systolic pressure relative to right atrial pressure is  mildly increased. The pulmonary artery pressure is estimated to be 45 mmHg  plus right atrial pressure (the IVC is of normal caliber).     When compared to the prior real-time echocardiogram dated 29 April 2019, the  mean gradient across the aortic valve is increased from 22 mmHg to now 40  mmHg.    CATH done on June 9, 2022:  Coronary angiography today showed no significant coronary artery disease with mild atherosclerosis     Left main mild disease  LAD mild diffuse disease but no obstructive lesions  Left circumflex is nondominant with mild disease  RCA is large and dominant with mild disease     Of note, right radial access was challenging and if she does end up having TAVR, she would not be a candidate for sentinel via the right radial approach       Physical Examination Review of Systems   Vitals: /70 (BP Location: Right arm, Patient Position: Sitting, Cuff Size: Adult Regular)   Pulse 77   Resp 14   Wt 70.3 kg (155 lb)   BMI 27.46 kg/m    BMI= Body mass index is 27.46 kg/m .  Wt Readings from Last 3 Encounters:   08/19/22 70.3 kg (155 lb)   07/06/22 69.4 kg (153 lb)   06/09/22 67.8 kg (149 lb 6.4 oz)       General Appearance:   Alert, cooperative and in no acute distress   ENT/Mouth: membranes moist, no oral lesions or bleeding gums.      EYES:  no scleral icterus, normal conjunctivae   Neck: no carotid bruits.  Thyroid not visualized   Chest/Lungs:   lungs are clear to auscultation, no rales or wheezing   Cardiovascular:   Regular. Normal first and second heart sounds with 2/6 systolic murmur.  No rubs or gallops; the carotid, radial and  posterior tibial pulses are intact, no edema bilaterally    Abdomen:  Soft and nontender. Bowel sounds are present in all quadrants   Extremities: no cyanosis or clubbing   Skin: no xanthelasma, warm.    Neurologic: normal gait, normal  bilateral, no tremors   Psychiatric: Normal mood and affect       Please refer above for cardiac ROS details.      Medical History  Surgical History Family History Social History   Past Medical History:   Diagnosis Date     Aortic valve disorder     echo 1/09  4.2 cm TAA-MRA 4/2016       Bicuspid aortic valve      Coronary artery disease due to calcified coronary lesion 5/5/2016    Echo 6/2016   Good function  CT Ca++ 172 LAD  Cardiology consult 4/016-nuclear stress     Family history of psychiatric condition      Family history of tremor      Family history of vascular disease      H/O LEEP 1/1/2007 2002-2006 LSIL paps with colposcopy, had LEEP in 2007. No records 6/2/10 ASCUS, +HR HPV 53 6/20/12 ASCUS, +HR HPV 53. Colposcopy outside / system? 6/23/14 NIL 6/24/15 NIL pap, neg HPV 6/27/16 ASCUS, neg HPV 6/29/17 NIL pap, neg HPV 7/9/18 ASCUS, +HR HPV 16. Unclear if colposcopy was completed 7/10/19 NIL pap, neg HPV 7/13/20 NIL pap, neg HPV 2/1/21 NIL pap, neg HPV. Plan: await provider     History of aortic stenosis      History of vitamin D deficiency      Hypercholesteremia      Hyperlipidemia      Laboratory test 8/18/2016    Reading Physician Reading Date Result Priority    Patricia Orta MD 8/16/2016       Narrative       Examination: Nuclear medicine DATscan for Dopamine Receptor Localization.    Examination: NM BRAIN IMAGING TOMOGRAPHIC (SPECT) DATSCAN  Date: 8/16/2016 3:38 PM  Indication: Right sided postural tremor.   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       Osteopenia 2/15/2015     Other and unspecified hyperlipidemia     Created by Conversion      Papanicolaou smear of cervix with low grade squamous intraepithelial lesion (LGSIL)      LGSIL 2002  colpoLGSIL 2003  colpoLGSIL 4/2006 colpoLGSIL 11/06 colpoAbnormal 5/07 colpo  CHCWLEEPendometrial biopsy 5/07      Plantar fasciitis of right foot      Primary insomnia      Snores      Tremor      Vertigo      Vitamin D deficiency      Wears glasses      Past Surgical History:   Procedure Laterality Date     BLEPHAROPLASTY       CARDIAC CATHETERIZATION  05/30/2017    No intervention     CARDIAC CATHETERIZATION N/A 5/30/2017    Procedure: Coronary Angiogram;  Surgeon: Torito Metzger MD;  Location: [facility] Cath Lab;  Service:      CHOLECYSTECTOMY       CONIZATION CERVIX,LOOP ELECTRD      Description: Cervical Conization Loop Electrode Excision;  Recorded: 05/29/2008;     CV CORONARY ANGIOGRAM N/A 6/9/2022    Procedure: Coronary Angiogram;  Surgeon: Anabela Hernandez MD;  Location: [facility] CV     CV CORONARY ANGIOGRAM  6/9/2022    Procedure: ;  Surgeon: Anabela Hernandez MD;  Location: [facility] LAB CV     FACIAL RECONSTRUCTION SURGERY      forehead as well     HC CLOSED TX MANDIBLE FX W/O MANIP      Description: Closed Treatment Of Mandibular Fracture;  Recorded: 05/29/2008;     HC DILATION/CURETTAGE DIAG/THER NON OB      Description: Dilation And Curettage;  Recorded: 05/29/2008;  Comments: X 2  metrorhhagia     LAPAROSCOPIC CHOLECYSTECTOMY N/A 2/23/2015    Procedure: CHOLECYSTECTOMY LAPAROSCOPIC;  Surgeon: Doug Webber MD;  Location: [facility] Main OR;  Service:      [facility] ORAL SURGERY PROCEDURE      jaw fracture     Family History   Problem Relation Age of Onset     Heart Disease Father      Heart Disease Sister      Heart Disease Sister      Mental Illness Mother 30.00        schizophrenia, bipolar     Mental Illness Sister          bipolar     Colon Cancer Father      Hyperlipidemia Brother      Hyperlipidemia Brother      Hyperlipidemia Sister      Cervical Cancer Daughter      Diabetes Type 2  Brother      Diabetes Type 2  Brother      Tremor Father      Tremor Paternal Aunt      Tremor Paternal Grandfather      Tremor Son      Tremor Cousin         Paternal 1st cousin     Attention Deficit Disorder Son      Neurologic Disorder Son         Tourette Syndrome      Schizophrenia Mother      Schizophrenia Sister      Mental Illness Sister         depression     Diabetes Brother         type 2     Mental Illness Brother         depression    Social History     Socioeconomic History     Marital status:      Spouse name: Not on file     Number of children: Not on file     Years of education: Not on file     Highest education level: Not on file   Occupational History     Not on file   Tobacco Use     Smoking status: Never Smoker     Smokeless tobacco: Never Used   Substance and Sexual Activity     Alcohol use: Yes     Alcohol/week: 2.0 standard drinks     Comment: occasion     Drug use: No     Sexual activity: Not Currently     Partners: Male     Birth control/protection: Abstinence   Other Topics Concern     Not on file   Social History Narrative    Living in [place] with her  and has been  for 11 yrs     This is her 3rd marriage    1st marriage had 2 kids: son and daughter - 33 y r old son and 31 yr old daughter    2nd marriage no kids        Not working presently. Retired    Had been  in the past.      Social Determinants of Health     Financial Resource Strain: Not on file   Food Insecurity: Not on file   Transportation Needs: Not on file   Physical Activity: Not on file   Stress: Not on file   Social Connections: Not on file   Intimate Partner Violence: Not on file   Housing Stability: Not on file          Medications  Allergies   Current Outpatient Medications   Medication Sig Dispense Refill     aspirin 81  MG EC tablet Take 81 mg by mouth daily       atorvastatin (LIPITOR) 80 MG tablet Take 1 tablet (80 mg) by mouth At Bedtime 90 tablet 3     cyanocobalamin (VITAMIN B-12) 1000 MCG tablet Take 1,000 mcg by mouth daily        ezetimibe (ZETIA) 10 MG tablet Take 1 tablet (10 mg) by mouth daily 90 tablet 3     ibuprofen (ADVIL/MOTRIN) 800 MG tablet TAKE ONE TABLET BY MOUTH THREE TIMES DAILY AS NEEDED TAKE WITH FOOD 60 tablet 1     Omega-3 Fatty Acids (OMEGA-3 FISH OIL) 1200 MG CAPS Take 1 capsule by mouth daily        polyethylene glycol-propylene glycol (SYSTANE) 0.4-0.3 % SOLN ophthalmic solution Place 1 drop into both eyes as needed for dry eyes       traZODone (DESYREL) 50 MG tablet Take 1 tablet (50 mg) by mouth At Bedtime 90 tablet 3     vitamin C (ASCORBIC ACID) 1000 MG TABS Take 1,000 mg by mouth daily        VITAMIN D3 50 MCG (2000 UT) tablet TAKE 1 TABLET BY MOUTH EVERY DAY 90 tablet 3     vitamin E 400 UNIT capsule Take 400 Units by mouth daily        zinc gluconate 50 MG tablet Take 50 mg by mouth daily       No Known Allergies      Lab Results    Chemistry/lipid CBC Cardiac Enzymes/BNP/TSH/INR   Recent Labs   Lab Test 03/09/22  1415   CHOL 127   HDL 57   LDL 53   TRIG 87     Recent Labs   Lab Test 03/09/22  1415 02/01/21  1515 07/13/20  0751   LDL 53 78 77     Recent Labs   Lab Test 06/09/22  0846      POTASSIUM 3.9   CHLORIDE 111*   CO2 24   GLC 92   BUN 16   CR 0.74   GFRESTIMATED 88   FÉLIX 9.1     Recent Labs   Lab Test 06/09/22  0846 05/17/22  1319 03/09/22  1415   CR 0.74 0.78 0.84     No results for input(s): A1C in the last 26618 hours. Recent Labs   Lab Test 06/09/22  0846   WBC 5.6   HGB 12.3   HCT 36.7   MCV 94        Recent Labs   Lab Test 06/09/22  0846 05/17/22  1319 07/13/20  0751   HGB 12.3 13.4 13.3    No results for input(s): TROPONINI in the last 11449 hours.  No results for input(s): BNP, NTBNPI, NTBNP in the last 92171 hours.  Recent Labs   Lab Test 07/10/19  0757   TSH 1.53      No results for input(s): INR in the last 04275 hours.       51 minutes spent on the date of encounter doing education, consent signing, chart prep/review, review of test results, patient visit, documentation and discussion with family.      This note has been dictated using voice recognition software. Any grammatical or context distortions are unintentional and inherent to the software.

## 2022-08-19 NOTE — H&P (VIEW-ONLY)
HEART CARE ENCOUNTER NOTE       [facility]  [phone]    Assessment/Recommendations   Problem List Items Addressed This Visit        Endocrine    Hyperlipidemia       Circulatory    Aortic aneurysm (H)    Coronary artery disease due to calcified coronary lesion    Severe aortic stenosis       Immune    Systemic lupus erythematosus (H) - Primary          1.  Aortic Stenosis: This has become severe and is now causing minor symptoms, but most notably decreased exercise tolerance.  She has seen a decline in her functional status.  She has been evaluated by a multidisciplinary team and has been deemed a good candidate for transcatheter aortic valve replacement.  She has now undergone all the required workup for TAVR and wishes to proceed.   We discussed the procedure in detail,  including post-procedure care, restrictions and follow up.   She understands that there is a 4-5% risk of bleeding, infection, stroke, heart block requiring permanent pacemaker, cardiac perforation, aortic root rupture, dissection and death.  Patient also understands that if something unexpected happens, the procedure may need to be stopped or changed to help her.     All questions were answered   Consents were signed and witnessed by me.  Patient is full bailout, with urgent sternotomy and CPB, in the rare event of aortic dissection or aortic rupture  She has never had trouble with anesthesia in the past.    Labs today reveal Hgb 13.7, normal WBC, electrolytes WNL and creat 0.78    The plan will be for MAC with TTE imaging prior to and following valve deployment.  We will use the Killian BRADY valve, via the TF approach. Wendy BRIAN Handy will report Tuesday morning for the procedure and all instructions regarding times and medications were given by TAVR coordinator RN.     2. Chronic diastolic heart failure, NYHA class II -currently compensated.  Mild shortness of breath with activity but no other signs of fluid  overload.    3. 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  Has been on Plaquenil in the past, but not for about 10 years.     4. Hyperlipidemia -cholesterol numbers look good on last lipid profile from March.  Continue atorvastatin 80 mg daily    5. Aortic aneurysm - stable at 4.1 cm and followed by yearly cardiac MRA studies    6. CAD - mild, non-obstructive disease.  Patient denies angina       History of Present Illness/Subjective    Wendy Handy is a 67 year old female who comes in today for history and physical prior to TAVR (transcatheter aortic valve replacement).  Her  accompanies her to the visit today    Wendy is a patient of Dr. Platt and has been followed by serial echocardiograms for her bicuspid aortic valve and ascending aortic aneurysm, which has been a stable size by yearly cardiac MRA studies.  She has history of aortic stenosis, subcutaneous lupus, hyperlipidemia and coronary artery disease.  On repeat echocardiogram in April, the aortic stenosis had progressed to the severe range and there was no evidence of pulmonary hypertension.  The patient has been essentially symptom-free, but has noticed that when she does her walking she has had to stop more often when compared to 1 year ago.  She has also noticed pain between her shoulder blades when she is walking, which is new for her.    Wendy Handy denies chest discomfort, palpitations, paroxysmal nocturnal dyspnea, orthopnea, lightheadedness, dizziness, pre-syncope, or syncope.  Wendy Handy also denies any weight loss, changes in appetite, nausea or vomiting.     Medical, surgical, family, social history, and medications were reviewed and updated as necessary.    ECG (personally reviewed): 8/19/2022 shows NSR    ECHOCARDIOGRAM done on April 20, 2022:  Interpretation Summary     1. Normal left ventricular size and systolic performance with a visually  estimated ejection fraction of 70-75%.  2. There is severe aortic  stenosis.  Â  The mean gradient is measured at 40 mmHg with peak velocity of 4.0 m/s.  Calculated valve area 0.80-0.84 cmÂ .  3. There is mild aortic insufficiency.  4. Normal right ventricular size and systolic performance.  5. There is mild enlargement of the proximal ascending aorta.  6. Right ventricular systolic pressure relative to right atrial pressure is  mildly increased. The pulmonary artery pressure is estimated to be 45 mmHg  plus right atrial pressure (the IVC is of normal caliber).     When compared to the prior real-time echocardiogram dated 29 April 2019, the  mean gradient across the aortic valve is increased from 22 mmHg to now 40  mmHg.    CATH done on June 9, 2022:  Coronary angiography today showed no significant coronary artery disease with mild atherosclerosis     Left main mild disease  LAD mild diffuse disease but no obstructive lesions  Left circumflex is nondominant with mild disease  RCA is large and dominant with mild disease     Of note, right radial access was challenging and if she does end up having TAVR, she would not be a candidate for sentinel via the right radial approach       Physical Examination Review of Systems   Vitals: /70 (BP Location: Right arm, Patient Position: Sitting, Cuff Size: Adult Regular)   Pulse 77   Resp 14   Wt 70.3 kg (155 lb)   BMI 27.46 kg/m    BMI= Body mass index is 27.46 kg/m .  Wt Readings from Last 3 Encounters:   08/19/22 70.3 kg (155 lb)   07/06/22 69.4 kg (153 lb)   06/09/22 67.8 kg (149 lb 6.4 oz)       General Appearance:   Alert, cooperative and in no acute distress   ENT/Mouth: membranes moist, no oral lesions or bleeding gums.      EYES:  no scleral icterus, normal conjunctivae   Neck: no carotid bruits.  Thyroid not visualized   Chest/Lungs:   lungs are clear to auscultation, no rales or wheezing   Cardiovascular:   Regular. Normal first and second heart sounds with 2/6 systolic murmur.  No rubs or gallops; the carotid, radial and  posterior tibial pulses are intact, no edema bilaterally    Abdomen:  Soft and nontender. Bowel sounds are present in all quadrants   Extremities: no cyanosis or clubbing   Skin: no xanthelasma, warm.    Neurologic: normal gait, normal  bilateral, no tremors   Psychiatric: Normal mood and affect       Please refer above for cardiac ROS details.      Medical History  Surgical History Family History Social History   Past Medical History:   Diagnosis Date     Aortic valve disorder     echo 1/09  4.2 cm TAA-MRA 4/2016       Bicuspid aortic valve      Coronary artery disease due to calcified coronary lesion 5/5/2016    Echo 6/2016   Good function  CT Ca++ 172 LAD  Cardiology consult 4/016-nuclear stress     Family history of psychiatric condition      Family history of tremor      Family history of vascular disease      H/O LEEP 1/1/2007 2002-2006 LSIL paps with colposcopy, had LEEP in 2007. No records 6/2/10 ASCUS, +HR HPV 53 6/20/12 ASCUS, +HR HPV 53. Colposcopy outside / system? 6/23/14 NIL 6/24/15 NIL pap, neg HPV 6/27/16 ASCUS, neg HPV 6/29/17 NIL pap, neg HPV 7/9/18 ASCUS, +HR HPV 16. Unclear if colposcopy was completed 7/10/19 NIL pap, neg HPV 7/13/20 NIL pap, neg HPV 2/1/21 NIL pap, neg HPV. Plan: await provider     History of aortic stenosis      History of vitamin D deficiency      Hypercholesteremia      Hyperlipidemia      Laboratory test 8/18/2016    Reading Physician Reading Date Result Priority    Patricia Orta MD 8/16/2016       Narrative       Examination: Nuclear medicine DATscan for Dopamine Receptor Localization.    Examination: NM BRAIN IMAGING TOMOGRAPHIC (SPECT) DATSCAN  Date: 8/16/2016 3:38 PM  Indication: Right sided postural tremor.   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       Osteopenia 2/15/2015     Other and unspecified hyperlipidemia     Created by Conversion      Papanicolaou smear of cervix with low grade squamous intraepithelial lesion (LGSIL)      LGSIL 2002  colpoLGSIL 2003  colpoLGSIL 4/2006 colpoLGSIL 11/06 colpoAbnormal 5/07 colpo  CHCWLEEPendometrial biopsy 5/07      Plantar fasciitis of right foot      Primary insomnia      Snores      Tremor      Vertigo      Vitamin D deficiency      Wears glasses      Past Surgical History:   Procedure Laterality Date     BLEPHAROPLASTY       CARDIAC CATHETERIZATION  05/30/2017    No intervention     CARDIAC CATHETERIZATION N/A 5/30/2017    Procedure: Coronary Angiogram;  Surgeon: Torito Metzger MD;  Location: [facility] Cath Lab;  Service:      CHOLECYSTECTOMY       CONIZATION CERVIX,LOOP ELECTRD      Description: Cervical Conization Loop Electrode Excision;  Recorded: 05/29/2008;     CV CORONARY ANGIOGRAM N/A 6/9/2022    Procedure: Coronary Angiogram;  Surgeon: Anabela Hernandez MD;  Location: [facility] CV     CV CORONARY ANGIOGRAM  6/9/2022    Procedure: ;  Surgeon: Anabela Hernandez MD;  Location: [facility] LAB CV     FACIAL RECONSTRUCTION SURGERY      forehead as well     HC CLOSED TX MANDIBLE FX W/O MANIP      Description: Closed Treatment Of Mandibular Fracture;  Recorded: 05/29/2008;     HC DILATION/CURETTAGE DIAG/THER NON OB      Description: Dilation And Curettage;  Recorded: 05/29/2008;  Comments: X 2  metrorhhagia     LAPAROSCOPIC CHOLECYSTECTOMY N/A 2/23/2015    Procedure: CHOLECYSTECTOMY LAPAROSCOPIC;  Surgeon: Doug Webber MD;  Location: [facility] Main OR;  Service:      [facility] ORAL SURGERY PROCEDURE      jaw fracture     Family History   Problem Relation Age of Onset     Heart Disease Father      Heart Disease Sister      Heart Disease Sister      Mental Illness Mother 30.00        schizophrenia, bipolar     Mental Illness Sister          bipolar     Colon Cancer Father      Hyperlipidemia Brother      Hyperlipidemia Brother      Hyperlipidemia Sister      Cervical Cancer Daughter      Diabetes Type 2  Brother      Diabetes Type 2  Brother      Tremor Father      Tremor Paternal Aunt      Tremor Paternal Grandfather      Tremor Son      Tremor Cousin         Paternal 1st cousin     Attention Deficit Disorder Son      Neurologic Disorder Son         Tourette Syndrome      Schizophrenia Mother      Schizophrenia Sister      Mental Illness Sister         depression     Diabetes Brother         type 2     Mental Illness Brother         depression    Social History     Socioeconomic History     Marital status:      Spouse name: Not on file     Number of children: Not on file     Years of education: Not on file     Highest education level: Not on file   Occupational History     Not on file   Tobacco Use     Smoking status: Never Smoker     Smokeless tobacco: Never Used   Substance and Sexual Activity     Alcohol use: Yes     Alcohol/week: 2.0 standard drinks     Comment: occasion     Drug use: No     Sexual activity: Not Currently     Partners: Male     Birth control/protection: Abstinence   Other Topics Concern     Not on file   Social History Narrative    Living in [place] with her  and has been  for 11 yrs     This is her 3rd marriage    1st marriage had 2 kids: son and daughter - 33 y r old son and 31 yr old daughter    2nd marriage no kids        Not working presently. Retired    Had been  in the past.      Social Determinants of Health     Financial Resource Strain: Not on file   Food Insecurity: Not on file   Transportation Needs: Not on file   Physical Activity: Not on file   Stress: Not on file   Social Connections: Not on file   Intimate Partner Violence: Not on file   Housing Stability: Not on file          Medications  Allergies   Current Outpatient Medications   Medication Sig Dispense Refill     aspirin 81  MG EC tablet Take 81 mg by mouth daily       atorvastatin (LIPITOR) 80 MG tablet Take 1 tablet (80 mg) by mouth At Bedtime 90 tablet 3     cyanocobalamin (VITAMIN B-12) 1000 MCG tablet Take 1,000 mcg by mouth daily        ezetimibe (ZETIA) 10 MG tablet Take 1 tablet (10 mg) by mouth daily 90 tablet 3     ibuprofen (ADVIL/MOTRIN) 800 MG tablet TAKE ONE TABLET BY MOUTH THREE TIMES DAILY AS NEEDED TAKE WITH FOOD 60 tablet 1     Omega-3 Fatty Acids (OMEGA-3 FISH OIL) 1200 MG CAPS Take 1 capsule by mouth daily        polyethylene glycol-propylene glycol (SYSTANE) 0.4-0.3 % SOLN ophthalmic solution Place 1 drop into both eyes as needed for dry eyes       traZODone (DESYREL) 50 MG tablet Take 1 tablet (50 mg) by mouth At Bedtime 90 tablet 3     vitamin C (ASCORBIC ACID) 1000 MG TABS Take 1,000 mg by mouth daily        VITAMIN D3 50 MCG (2000 UT) tablet TAKE 1 TABLET BY MOUTH EVERY DAY 90 tablet 3     vitamin E 400 UNIT capsule Take 400 Units by mouth daily        zinc gluconate 50 MG tablet Take 50 mg by mouth daily       No Known Allergies      Lab Results    Chemistry/lipid CBC Cardiac Enzymes/BNP/TSH/INR   Recent Labs   Lab Test 03/09/22  1415   CHOL 127   HDL 57   LDL 53   TRIG 87     Recent Labs   Lab Test 03/09/22  1415 02/01/21  1515 07/13/20  0751   LDL 53 78 77     Recent Labs   Lab Test 06/09/22  0846      POTASSIUM 3.9   CHLORIDE 111*   CO2 24   GLC 92   BUN 16   CR 0.74   GFRESTIMATED 88   FÉLIX 9.1     Recent Labs   Lab Test 06/09/22  0846 05/17/22  1319 03/09/22  1415   CR 0.74 0.78 0.84     No results for input(s): A1C in the last 83661 hours. Recent Labs   Lab Test 06/09/22  0846   WBC 5.6   HGB 12.3   HCT 36.7   MCV 94        Recent Labs   Lab Test 06/09/22  0846 05/17/22  1319 07/13/20  0751   HGB 12.3 13.4 13.3    No results for input(s): TROPONINI in the last 92148 hours.  No results for input(s): BNP, NTBNPI, NTBNP in the last 40561 hours.  Recent Labs   Lab Test 07/10/19  0757   TSH 1.53      No results for input(s): INR in the last 99372 hours.       51 minutes spent on the date of encounter doing education, consent signing, chart prep/review, review of test results, patient visit, documentation and discussion with family.      This note has been dictated using voice recognition software. Any grammatical or context distortions are unintentional and inherent to the software.

## 2022-08-23 ENCOUNTER — HOSPITAL ENCOUNTER (INPATIENT)
Facility: HOSPITAL | Age: 67
LOS: 1 days | Discharge: HOME | DRG: 267 | End: 2022-08-24
Attending: INTERNAL MEDICINE | Admitting: THORACIC SURGERY (CARDIOTHORACIC VASCULAR SURGERY)
Payer: COMMERCIAL

## 2022-08-23 ENCOUNTER — HOSPITAL ENCOUNTER (OUTPATIENT)
Dept: CARDIOLOGY | Facility: HOSPITAL | Age: 67
Discharge: HOME | DRG: 267 | End: 2022-08-23
Attending: INTERNAL MEDICINE | Admitting: INTERNAL MEDICINE
Payer: COMMERCIAL

## 2022-08-23 ENCOUNTER — ANESTHESIA (OUTPATIENT)
Dept: CARDIOLOGY | Facility: HOSPITAL | Age: 67
DRG: 267 | End: 2022-08-23
Payer: COMMERCIAL

## 2022-08-23 ENCOUNTER — ANESTHESIA EVENT (OUTPATIENT)
Dept: CARDIOLOGY | Facility: HOSPITAL | Age: 67
DRG: 267 | End: 2022-08-23
Payer: COMMERCIAL

## 2022-08-23 DIAGNOSIS — I35.0 SEVERE AORTIC STENOSIS: ICD-10-CM

## 2022-08-23 DIAGNOSIS — Z95.2 S/P TAVR (TRANSCATHETER AORTIC VALVE REPLACEMENT): Primary | ICD-10-CM

## 2022-08-23 LAB
ACT BLD: 309 SECONDS (ref 74–150)
ALBUMIN SERPL-MCNC: 3.6 G/DL (ref 3.5–5)
ALP SERPL-CCNC: 69 U/L (ref 45–120)
ALT SERPL W P-5'-P-CCNC: 34 U/L (ref 0–45)
ANION GAP SERPL CALCULATED.3IONS-SCNC: 10 MMOL/L (ref 5–18)
AST SERPL W P-5'-P-CCNC: 29 U/L (ref 0–40)
ATRIAL RATE - MUSE: 77 BPM
BILIRUB SERPL-MCNC: 1 MG/DL (ref 0–1)
BLD PROD TYP BPU: NORMAL
BLD PROD TYP BPU: NORMAL
BLOOD COMPONENT TYPE: NORMAL
BLOOD COMPONENT TYPE: NORMAL
BUN SERPL-MCNC: 12 MG/DL (ref 8–22)
CALCIUM SERPL-MCNC: 8.5 MG/DL (ref 8.5–10.5)
CHLORIDE BLD-SCNC: 102 MMOL/L (ref 98–107)
CO2 SERPL-SCNC: 25 MMOL/L (ref 22–31)
CODING SYSTEM: NORMAL
CODING SYSTEM: NORMAL
CREAT SERPL-MCNC: 0.81 MG/DL (ref 0.6–1.1)
CROSSMATCH: NORMAL
CROSSMATCH: NORMAL
DIASTOLIC BLOOD PRESSURE - MUSE: NORMAL MMHG
ERYTHROCYTE [DISTWIDTH] IN BLOOD BY AUTOMATED COUNT: 12.6 % (ref 10–15)
GFR SERPL CREATININE-BSD FRML MDRD: 79 ML/MIN/1.73M2
GLUCOSE BLD-MCNC: 86 MG/DL (ref 70–125)
HCT VFR BLD AUTO: 36.8 % (ref 35–47)
HGB BLD-MCNC: 12.3 G/DL (ref 11.7–15.7)
INTERPRETATION ECG - MUSE: NORMAL
ISSUE DATE AND TIME: NORMAL
ISSUE DATE AND TIME: NORMAL
LVEF ECHO: NORMAL
MAGNESIUM SERPL-MCNC: 1.8 MG/DL (ref 1.8–2.6)
MCH RBC QN AUTO: 31.1 PG (ref 26.5–33)
MCHC RBC AUTO-ENTMCNC: 33.4 G/DL (ref 31.5–36.5)
MCV RBC AUTO: 93 FL (ref 78–100)
P AXIS - MUSE: 51 DEGREES
PLATELET # BLD AUTO: 181 10E3/UL (ref 150–450)
POTASSIUM BLD-SCNC: 3.9 MMOL/L (ref 3.5–5)
PR INTERVAL - MUSE: 162 MS
PROT SERPL-MCNC: 5.9 G/DL (ref 6–8)
QRS DURATION - MUSE: 72 MS
QT - MUSE: 380 MS
QTC - MUSE: 430 MS
R AXIS - MUSE: 16 DEGREES
RBC # BLD AUTO: 3.96 10E6/UL (ref 3.8–5.2)
SODIUM SERPL-SCNC: 137 MMOL/L (ref 136–145)
SYSTOLIC BLOOD PRESSURE - MUSE: NORMAL MMHG
T AXIS - MUSE: 71 DEGREES
UNIT ABO/RH: NORMAL
UNIT ABO/RH: NORMAL
UNIT NUMBER: NORMAL
UNIT NUMBER: NORMAL
UNIT STATUS: NORMAL
UNIT STATUS: NORMAL
UNIT TYPE ISBT: 5100
UNIT TYPE ISBT: 5100
VENTRICULAR RATE- MUSE: 77 BPM
WBC # BLD AUTO: 7.1 10E3/UL (ref 4–11)

## 2022-08-23 PROCEDURE — 710N000010 HC RECOVERY PHASE 1, LEVEL 2, PER MIN

## 2022-08-23 PROCEDURE — 272N000001 HC OR GENERAL SUPPLY STERILE: Performed by: INTERNAL MEDICINE

## 2022-08-23 PROCEDURE — 210N000001 HC R&B IMCU HEART CARE

## 2022-08-23 PROCEDURE — 93325 DOPPLER ECHO COLOR FLOW MAPG: CPT | Mod: 26 | Performed by: INTERNAL MEDICINE

## 2022-08-23 PROCEDURE — 85014 HEMATOCRIT: CPT | Performed by: INTERNAL MEDICINE

## 2022-08-23 PROCEDURE — C1769 GUIDE WIRE: HCPCS | Performed by: INTERNAL MEDICINE

## 2022-08-23 PROCEDURE — 250N000011 HC RX IP 250 OP 636: Performed by: INTERNAL MEDICINE

## 2022-08-23 PROCEDURE — 33361 REPLACE AORTIC VALVE PERQ: CPT | Mod: 62 | Performed by: THORACIC SURGERY (CARDIOTHORACIC VASCULAR SURGERY)

## 2022-08-23 PROCEDURE — C1894 INTRO/SHEATH, NON-LASER: HCPCS | Performed by: INTERNAL MEDICINE

## 2022-08-23 PROCEDURE — C1760 CLOSURE DEV, VASC: HCPCS | Performed by: INTERNAL MEDICINE

## 2022-08-23 PROCEDURE — 36415 COLL VENOUS BLD VENIPUNCTURE: CPT | Performed by: INTERNAL MEDICINE

## 2022-08-23 PROCEDURE — 04QK3ZZ REPAIR RIGHT FEMORAL ARTERY, PERCUTANEOUS APPROACH: ICD-10-PCS | Performed by: THORACIC SURGERY (CARDIOTHORACIC VASCULAR SURGERY)

## 2022-08-23 PROCEDURE — 93308 TTE F-UP OR LMTD: CPT

## 2022-08-23 PROCEDURE — 250N000013 HC RX MED GY IP 250 OP 250 PS 637: Performed by: INTERNAL MEDICINE

## 2022-08-23 PROCEDURE — 02RF38Z REPLACEMENT OF AORTIC VALVE WITH ZOOPLASTIC TISSUE, PERCUTANEOUS APPROACH: ICD-10-PCS | Performed by: THORACIC SURGERY (CARDIOTHORACIC VASCULAR SURGERY)

## 2022-08-23 PROCEDURE — 250N000009 HC RX 250: Performed by: INTERNAL MEDICINE

## 2022-08-23 PROCEDURE — C1887 CATHETER, GUIDING: HCPCS | Performed by: INTERNAL MEDICINE

## 2022-08-23 PROCEDURE — C1733 CATH, EP, OTHR THAN COOL-TIP: HCPCS | Performed by: INTERNAL MEDICINE

## 2022-08-23 PROCEDURE — 99223 1ST HOSP IP/OBS HIGH 75: CPT | Performed by: INTERNAL MEDICINE

## 2022-08-23 PROCEDURE — 33361 REPLACE AORTIC VALVE PERQ: CPT | Mod: 62 | Performed by: INTERNAL MEDICINE

## 2022-08-23 PROCEDURE — 93308 TTE F-UP OR LMTD: CPT | Mod: 26 | Performed by: INTERNAL MEDICINE

## 2022-08-23 PROCEDURE — 85347 COAGULATION TIME ACTIVATED: CPT

## 2022-08-23 PROCEDURE — 80053 COMPREHEN METABOLIC PANEL: CPT | Performed by: INTERNAL MEDICINE

## 2022-08-23 PROCEDURE — 250N000011 HC RX IP 250 OP 636: Performed by: NURSE ANESTHETIST, CERTIFIED REGISTERED

## 2022-08-23 PROCEDURE — 93325 DOPPLER ECHO COLOR FLOW MAPG: CPT

## 2022-08-23 PROCEDURE — 83735 ASSAY OF MAGNESIUM: CPT | Performed by: INTERNAL MEDICINE

## 2022-08-23 PROCEDURE — 258N000003 HC RX IP 258 OP 636: Performed by: NURSE ANESTHETIST, CERTIFIED REGISTERED

## 2022-08-23 PROCEDURE — 93321 DOPPLER ECHO F-UP/LMTD STD: CPT | Mod: 26 | Performed by: INTERNAL MEDICINE

## 2022-08-23 PROCEDURE — 33361 REPLACE AORTIC VALVE PERQ: CPT | Performed by: INTERNAL MEDICINE

## 2022-08-23 PROCEDURE — 999N000127 HC STATISTIC PERIPHERAL IV START W US GUIDANCE

## 2022-08-23 PROCEDURE — 278N000051 HC OR IMPLANT GENERAL: Performed by: INTERNAL MEDICINE

## 2022-08-23 PROCEDURE — 370N000017 HC ANESTHESIA TECHNICAL FEE, PER MIN: Performed by: INTERNAL MEDICINE

## 2022-08-23 PROCEDURE — 258N000003 HC RX IP 258 OP 636: Performed by: INTERNAL MEDICINE

## 2022-08-23 PROCEDURE — 93005 ELECTROCARDIOGRAM TRACING: CPT

## 2022-08-23 DEVICE — VALVE HEART SAPIEN 3 23MM 9600TFX23A: Type: IMPLANTABLE DEVICE | Site: HEART | Status: FUNCTIONAL

## 2022-08-23 DEVICE — DEVICE CLOSURE VASCULAR MANTA 18FR 2115: Type: IMPLANTABLE DEVICE | Site: HEART | Status: FUNCTIONAL

## 2022-08-23 DEVICE — CLOSURE ANGIOSEAL 6FR 610130: Type: IMPLANTABLE DEVICE | Site: HEART | Status: FUNCTIONAL

## 2022-08-23 RX ORDER — NALOXONE HYDROCHLORIDE 0.4 MG/ML
0.4 INJECTION, SOLUTION INTRAMUSCULAR; INTRAVENOUS; SUBCUTANEOUS
Status: DISCONTINUED | OUTPATIENT
Start: 2022-08-23 | End: 2022-08-24 | Stop reason: HOSPADM

## 2022-08-23 RX ORDER — OXYCODONE HYDROCHLORIDE 5 MG/1
10 TABLET ORAL EVERY 4 HOURS PRN
Status: DISCONTINUED | OUTPATIENT
Start: 2022-08-23 | End: 2022-08-24 | Stop reason: HOSPADM

## 2022-08-23 RX ORDER — LIDOCAINE 40 MG/G
CREAM TOPICAL
Status: DISCONTINUED | OUTPATIENT
Start: 2022-08-23 | End: 2022-08-23 | Stop reason: HOSPADM

## 2022-08-23 RX ORDER — OXYCODONE HYDROCHLORIDE 5 MG/1
5 TABLET ORAL EVERY 4 HOURS PRN
Status: DISCONTINUED | OUTPATIENT
Start: 2022-08-23 | End: 2022-08-24 | Stop reason: HOSPADM

## 2022-08-23 RX ORDER — SODIUM CHLORIDE, SODIUM LACTATE, POTASSIUM CHLORIDE, CALCIUM CHLORIDE 600; 310; 30; 20 MG/100ML; MG/100ML; MG/100ML; MG/100ML
INJECTION, SOLUTION INTRAVENOUS CONTINUOUS
Status: DISCONTINUED | OUTPATIENT
Start: 2022-08-23 | End: 2022-08-23 | Stop reason: HOSPADM

## 2022-08-23 RX ORDER — TRAZODONE HYDROCHLORIDE 50 MG/1
50 TABLET, FILM COATED ORAL AT BEDTIME
Status: DISCONTINUED | OUTPATIENT
Start: 2022-08-23 | End: 2022-08-24 | Stop reason: HOSPADM

## 2022-08-23 RX ORDER — ONDANSETRON 2 MG/ML
INJECTION INTRAMUSCULAR; INTRAVENOUS PRN
Status: DISCONTINUED | OUTPATIENT
Start: 2022-08-23 | End: 2022-08-23

## 2022-08-23 RX ORDER — ONDANSETRON 2 MG/ML
4 INJECTION INTRAMUSCULAR; INTRAVENOUS EVERY 30 MIN PRN
Status: DISCONTINUED | OUTPATIENT
Start: 2022-08-23 | End: 2022-08-24 | Stop reason: HOSPADM

## 2022-08-23 RX ORDER — FENTANYL CITRATE 50 UG/ML
25 INJECTION, SOLUTION INTRAMUSCULAR; INTRAVENOUS
Status: DISCONTINUED | OUTPATIENT
Start: 2022-08-23 | End: 2022-08-23 | Stop reason: CLARIF

## 2022-08-23 RX ORDER — FENTANYL CITRATE 50 UG/ML
25 INJECTION, SOLUTION INTRAMUSCULAR; INTRAVENOUS EVERY 5 MIN PRN
Status: DISCONTINUED | OUTPATIENT
Start: 2022-08-23 | End: 2022-08-23 | Stop reason: CLARIF

## 2022-08-23 RX ORDER — SODIUM CHLORIDE, SODIUM LACTATE, POTASSIUM CHLORIDE, CALCIUM CHLORIDE 600; 310; 30; 20 MG/100ML; MG/100ML; MG/100ML; MG/100ML
INJECTION, SOLUTION INTRAVENOUS CONTINUOUS
Status: DISCONTINUED | OUTPATIENT
Start: 2022-08-23 | End: 2022-08-24 | Stop reason: HOSPADM

## 2022-08-23 RX ORDER — FENTANYL CITRATE 50 UG/ML
INJECTION, SOLUTION INTRAMUSCULAR; INTRAVENOUS PRN
Status: DISCONTINUED | OUTPATIENT
Start: 2022-08-23 | End: 2022-08-23

## 2022-08-23 RX ORDER — ONDANSETRON 2 MG/ML
4 INJECTION INTRAMUSCULAR; INTRAVENOUS EVERY 6 HOURS PRN
Status: DISCONTINUED | OUTPATIENT
Start: 2022-08-23 | End: 2022-08-24 | Stop reason: HOSPADM

## 2022-08-23 RX ORDER — SODIUM CHLORIDE 9 MG/ML
INJECTION, SOLUTION INTRAVENOUS CONTINUOUS
Status: ACTIVE | OUTPATIENT
Start: 2022-08-23 | End: 2022-08-23

## 2022-08-23 RX ORDER — ASPIRIN 325 MG
325 TABLET ORAL DAILY
Status: DISCONTINUED | OUTPATIENT
Start: 2022-08-23 | End: 2022-08-23 | Stop reason: HOSPADM

## 2022-08-23 RX ORDER — LANOLIN ALCOHOL/MO/W.PET/CERES
1000 CREAM (GRAM) TOPICAL DAILY
Status: DISCONTINUED | OUTPATIENT
Start: 2022-08-24 | End: 2022-08-24 | Stop reason: HOSPADM

## 2022-08-23 RX ORDER — CEFAZOLIN SODIUM/WATER 2 G/20 ML
2 SYRINGE (ML) INTRAVENOUS
Status: DISCONTINUED | OUTPATIENT
Start: 2022-08-23 | End: 2022-08-23 | Stop reason: HOSPADM

## 2022-08-23 RX ORDER — POTASSIUM CHLORIDE 7.45 MG/ML
10 INJECTION INTRAVENOUS
Status: DISCONTINUED | OUTPATIENT
Start: 2022-08-23 | End: 2022-08-24 | Stop reason: HOSPADM

## 2022-08-23 RX ORDER — POTASSIUM CHLORIDE 1.5 G/1.58G
20-40 POWDER, FOR SOLUTION ORAL
Status: DISCONTINUED | OUTPATIENT
Start: 2022-08-23 | End: 2022-08-24 | Stop reason: HOSPADM

## 2022-08-23 RX ORDER — NALOXONE HYDROCHLORIDE 0.4 MG/ML
0.2 INJECTION, SOLUTION INTRAMUSCULAR; INTRAVENOUS; SUBCUTANEOUS
Status: DISCONTINUED | OUTPATIENT
Start: 2022-08-23 | End: 2022-08-24 | Stop reason: HOSPADM

## 2022-08-23 RX ORDER — MEPERIDINE HYDROCHLORIDE 25 MG/ML
12.5 INJECTION INTRAMUSCULAR; INTRAVENOUS; SUBCUTANEOUS
Status: DISCONTINUED | OUTPATIENT
Start: 2022-08-23 | End: 2022-08-23 | Stop reason: CLARIF

## 2022-08-23 RX ORDER — MAGNESIUM SULFATE 4 G/50ML
4 INJECTION INTRAVENOUS EVERY 4 HOURS PRN
Status: DISCONTINUED | OUTPATIENT
Start: 2022-08-23 | End: 2022-08-24 | Stop reason: HOSPADM

## 2022-08-23 RX ORDER — HYDROMORPHONE HYDROCHLORIDE 1 MG/ML
0.2 INJECTION, SOLUTION INTRAMUSCULAR; INTRAVENOUS; SUBCUTANEOUS EVERY 5 MIN PRN
Status: DISCONTINUED | OUTPATIENT
Start: 2022-08-23 | End: 2022-08-24 | Stop reason: HOSPADM

## 2022-08-23 RX ORDER — EZETIMIBE 10 MG/1
10 TABLET ORAL DAILY
Status: DISCONTINUED | OUTPATIENT
Start: 2022-08-24 | End: 2022-08-24 | Stop reason: HOSPADM

## 2022-08-23 RX ORDER — ACETAMINOPHEN 325 MG/1
650 TABLET ORAL EVERY 4 HOURS PRN
Status: DISCONTINUED | OUTPATIENT
Start: 2022-08-23 | End: 2022-08-24 | Stop reason: HOSPADM

## 2022-08-23 RX ORDER — SODIUM CHLORIDE 9 MG/ML
INJECTION, SOLUTION INTRAVENOUS CONTINUOUS
Status: DISCONTINUED | OUTPATIENT
Start: 2022-08-23 | End: 2022-08-23 | Stop reason: HOSPADM

## 2022-08-23 RX ORDER — HYDRALAZINE HYDROCHLORIDE 20 MG/ML
10 INJECTION INTRAMUSCULAR; INTRAVENOUS
Status: DISCONTINUED | OUTPATIENT
Start: 2022-08-23 | End: 2022-08-24 | Stop reason: HOSPADM

## 2022-08-23 RX ORDER — ONDANSETRON 4 MG/1
4 TABLET, ORALLY DISINTEGRATING ORAL EVERY 6 HOURS PRN
Status: DISCONTINUED | OUTPATIENT
Start: 2022-08-23 | End: 2022-08-24 | Stop reason: HOSPADM

## 2022-08-23 RX ORDER — ATORVASTATIN CALCIUM 40 MG/1
80 TABLET, FILM COATED ORAL AT BEDTIME
Status: DISCONTINUED | OUTPATIENT
Start: 2022-08-23 | End: 2022-08-24 | Stop reason: HOSPADM

## 2022-08-23 RX ORDER — ONDANSETRON 4 MG/1
4 TABLET, ORALLY DISINTEGRATING ORAL EVERY 30 MIN PRN
Status: DISCONTINUED | OUTPATIENT
Start: 2022-08-23 | End: 2022-08-24 | Stop reason: HOSPADM

## 2022-08-23 RX ORDER — POTASSIUM CHLORIDE 1500 MG/1
20-40 TABLET, EXTENDED RELEASE ORAL
Status: DISCONTINUED | OUTPATIENT
Start: 2022-08-23 | End: 2022-08-24 | Stop reason: HOSPADM

## 2022-08-23 RX ORDER — POTASSIUM CHLORIDE 29.8 MG/ML
20 INJECTION INTRAVENOUS
Status: DISCONTINUED | OUTPATIENT
Start: 2022-08-23 | End: 2022-08-23

## 2022-08-23 RX ORDER — ASPIRIN 81 MG/1
81 TABLET ORAL DAILY
Status: DISCONTINUED | OUTPATIENT
Start: 2022-08-24 | End: 2022-08-24 | Stop reason: HOSPADM

## 2022-08-23 RX ORDER — HEPARIN SODIUM 1000 [USP'U]/ML
INJECTION, SOLUTION INTRAVENOUS; SUBCUTANEOUS PRN
Status: DISCONTINUED | OUTPATIENT
Start: 2022-08-23 | End: 2022-08-23

## 2022-08-23 RX ORDER — NITROGLYCERIN 0.4 MG/1
0.4 TABLET SUBLINGUAL EVERY 5 MIN PRN
Status: DISCONTINUED | OUTPATIENT
Start: 2022-08-23 | End: 2022-08-24 | Stop reason: HOSPADM

## 2022-08-23 RX ADMIN — ACETAMINOPHEN 650 MG: 325 TABLET, FILM COATED ORAL at 14:14

## 2022-08-23 RX ADMIN — MIDAZOLAM 1 MG: 1 INJECTION INTRAMUSCULAR; INTRAVENOUS at 12:11

## 2022-08-23 RX ADMIN — TRAZODONE HYDROCHLORIDE 50 MG: 50 TABLET ORAL at 21:19

## 2022-08-23 RX ADMIN — ASPIRIN 325 MG: 325 TABLET ORAL at 10:54

## 2022-08-23 RX ADMIN — ACETAMINOPHEN 650 MG: 325 TABLET, FILM COATED ORAL at 19:16

## 2022-08-23 RX ADMIN — OXYCODONE HYDROCHLORIDE 5 MG: 5 TABLET ORAL at 15:13

## 2022-08-23 RX ADMIN — ATORVASTATIN CALCIUM 80 MG: 40 TABLET, FILM COATED ORAL at 21:19

## 2022-08-23 RX ADMIN — MIDAZOLAM 1 MG: 1 INJECTION INTRAMUSCULAR; INTRAVENOUS at 12:26

## 2022-08-23 RX ADMIN — SODIUM CHLORIDE: 9 INJECTION, SOLUTION INTRAVENOUS at 12:07

## 2022-08-23 RX ADMIN — Medication 2 G: at 12:34

## 2022-08-23 RX ADMIN — PHENYLEPHRINE HYDROCHLORIDE 0.5 MCG/KG/MIN: 10 INJECTION INTRAVENOUS at 12:22

## 2022-08-23 RX ADMIN — HEPARIN SODIUM 8000 UNITS: 1000 INJECTION INTRAVENOUS; SUBCUTANEOUS at 12:58

## 2022-08-23 RX ADMIN — ONDANSETRON 4 MG: 2 INJECTION INTRAMUSCULAR; INTRAVENOUS at 13:24

## 2022-08-23 RX ADMIN — ACETAMINOPHEN 650 MG: 325 TABLET, FILM COATED ORAL at 23:13

## 2022-08-23 RX ADMIN — FENTANYL CITRATE 50 MCG: 50 INJECTION, SOLUTION INTRAMUSCULAR; INTRAVENOUS at 12:55

## 2022-08-23 RX ADMIN — FENTANYL CITRATE 50 MCG: 50 INJECTION, SOLUTION INTRAMUSCULAR; INTRAVENOUS at 12:38

## 2022-08-23 ASSESSMENT — ACTIVITIES OF DAILY LIVING (ADL)
WEAR_GLASSES_OR_BLIND: NO
ADLS_ACUITY_SCORE: 35
TOILETING_ISSUES: NO
FALL_HISTORY_WITHIN_LAST_SIX_MONTHS: NO
DRESSING/BATHING_DIFFICULTY: NO
DOING_ERRANDS_INDEPENDENTLY_DIFFICULTY: NO
HEARING_DIFFICULTY_OR_DEAF: NO
DIFFICULTY_EATING/SWALLOWING: NO
ADLS_ACUITY_SCORE: 18
CONCENTRATING,_REMEMBERING_OR_MAKING_DECISIONS_DIFFICULTY: NO
CHANGE_IN_FUNCTIONAL_STATUS_SINCE_ONSET_OF_CURRENT_ILLNESS/INJURY: NO
ADLS_ACUITY_SCORE: 35
ADLS_ACUITY_SCORE: 18
WALKING_OR_CLIMBING_STAIRS_DIFFICULTY: NO
DIFFICULTY_COMMUNICATING: NO
ADLS_ACUITY_SCORE: 35
ADLS_ACUITY_SCORE: 18
ADLS_ACUITY_SCORE: 35

## 2022-08-23 NOTE — Clinical Note
Prepped per 's instructions. Valve secured onto the delivery system balloon and correct orientation confirmed (blue to blue). Killian BRADY 3, SN: [id number], expiration date 2025-05-15.

## 2022-08-23 NOTE — Clinical Note
Temporary pacemaker Rate= 40bpmPaced mA= 10Pacer wire was captured appropriately, Pacer is set, Demand on Standby and Pacing catheter was removed per order

## 2022-08-23 NOTE — Clinical Note
Prepped: groin, chest and abdomen. Prepped with: DuraPrep. The patient was draped. .Pre-procedure site marking:N/A

## 2022-08-23 NOTE — ANESTHESIA CARE TRANSFER NOTE
Patient: Wendy Handy    Procedure: Procedure(s):  Transcatheter Aortic Valve Replacement-Femoral Approach  OR TRANSCATHETER AORTIC VALVE REPLACEMENT, FEMORAL PERCUTANEOUS APPROACH (STANDBY)       Diagnosis: valvular disease  Diagnosis Additional Information: No value filed.    Anesthesia Type:   MAC     Note:    Oropharynx: oropharynx clear of all foreign objects and spontaneously breathing  Level of Consciousness: awake  Oxygen Supplementation: face mask  Level of Supplemental Oxygen (L/min / FiO2): 8  Independent Airway: airway patency satisfactory and stable  Dentition: dentition unchanged  Vital Signs Stable: post-procedure vital signs reviewed and stable  Report to RN Given: handoff report given  Patient transferred to: Cardiac Special Care          Vitals:  Vitals Value Taken Time   /59 08/23/22 1347   Temp     Pulse 69 08/23/22 1347   Resp 14 08/23/22 1347   SpO2 100 % 08/23/22 1347       Electronically Signed By: FER Huggins CRNA  August 23, 2022  1:48 PM

## 2022-08-23 NOTE — PROGRESS NOTES
Bedrest x 6 hours.  Start 1400, bedrest complete at 2000 tonight.   Thus far pt tolerating well, given tylenol for mild back discomfort due to positioning. Pt offered oxycodone but declined for now.

## 2022-08-23 NOTE — INTERVAL H&P NOTE
"I have reviewed the surgical (or preoperative) H&P that is linked to this encounter, and examined the patient. There are no significant changes    Clinical Conditions Present on Arrival:  Clinically Significant Risk Factors Present on Admission                   # Overweight: Estimated body mass index is 27.46 kg/m  as calculated from the following:    Height as of this encounter: 1.6 m (5' 3\").    Weight as of this encounter: 70.3 kg (155 lb).         "

## 2022-08-23 NOTE — ANESTHESIA POSTPROCEDURE EVALUATION
Patient: Wendy Handy    Procedure: Procedure(s):  Transcatheter Aortic Valve Replacement-Femoral Approach  OR TRANSCATHETER AORTIC VALVE REPLACEMENT, FEMORAL PERCUTANEOUS APPROACH (STANDBY)       Anesthesia Type:  MAC    Note:  Disposition: Outpatient   Postop Pain Control: Uneventful            Sign Out: Well controlled pain   PONV: No   Neuro/Psych: Uneventful            Sign Out: Acceptable/Baseline neuro status   Airway/Respiratory: Uneventful            Sign Out: Acceptable/Baseline resp. status   CV/Hemodynamics: Uneventful            Sign Out: Acceptable CV status; No obvious hypovolemia; No obvious fluid overload   Other NRE: NONE   DID A NON-ROUTINE EVENT OCCUR? No           Last vitals:  Vitals Value Taken Time   /63 08/23/22 1500   Temp 36.7  C (98  F) 08/23/22 1400   Pulse 71 08/23/22 1512   Resp 23 08/23/22 1512   SpO2 99 % 08/23/22 1512   Vitals shown include unvalidated device data.    Electronically Signed By: Krystal Velazquez MD  August 23, 2022  3:14 PM

## 2022-08-23 NOTE — DISCHARGE INSTRUCTIONS
Patient Instructions - Going Home after TAVR:    Going Home: It s normal to feel a little anxious after leaving the hospital and when fewer people are nearby. People do much better when they feel as though they have support.  If you live alone we suggest you arrange to have someone stay with you for a day or two to help you recover.     Medications:  Take all of your medications as directed in your discharge summary. Do not stop taking these medications without speaking with your cardiologist. If you have any questions about any of your medications, speak to your pharmacist or provider.     Follow up Appointments  You will follow up with Aleah Kay PA-C on September 9 at 8:30 am (arrive at 8:15).  You will have a repeat echocardiogram on September 21 at 9 am.  You will have a follow up with Dr. Hernandez on September 28 at 12:50 pm (arrive at 12:35).      If you need to reschedule any of these appointments, please call:  [phone]    See your regular cardiologist in 6 months.  Your regular heart doctor will continue to be your heart specialist.   See your family doctor in 2 weeks.  Make an appointment once you get home.  You will receive a temporary  heart valve  wallet card. We recommend that you keep it in your wallet or purse at all times. You will be receiving a permanent wallet card from the  within 6 months.   Before an MRI (magnetic resonance imaging) procedure, always notify the doctor (or medical technician) that you have an implanted heart valve.     Site Care: Shower every day.  Let the soap and water run over your incision or access site, but do not rub the area. No tub baths, pools or hot-tubs for the 1st week you are at home. Do not put ointments, powders, or lotions on your access site and/or incision.  It is normal to feel a small lump the size of a marble in the groin access site.  That is the closure device used to close the vein/artery.  If you are experiencing  discomfort, bleeding, drainage, redness or increasing swelling, please call us.    Dental Work: Avoid major dental work for the next 6 months if possible. You will need antibiotics before any dental cleanings, work or surgery. This will decrease the risk of infection.     Driving:  You should not drive for the first 2 weeks after your procedure. The first time you drive, you must have someone with you. You may ride in a car immediately after your procedure.      Activity: People recover at different rates depending on their general health and the type of heart valve procedure. Most people take about 4-10 weeks to feel fully recovered. Daily activity and exercise are an important part of your recovery.  Do not lift, push or pull anything > 10 lb. for the first 2 weeks.        CALL THE VALVE CLINIC IF YOU HAVE ANY QUESTIONS OR CONCERNS:  [phone]  For the first 2 weeks after TAVR     Do Not:   Lift, push, or pull more than ten pounds (including pets, laundry, groceries, etc)  Garden, including lawn mowing and raking  Excessively bear down or strain when having a bowel movement   Ride a bike   Do:  Weigh yourself every day in the morning after using the bathroom.  If you notice weight gain of more than 3 pounds in 1 day or more than 5 pounds in 1 week, call the cardiology clinic.   Get up and get dressed every day. Do not stay in bed.  Set a daily routine.   Walk as much as you can. You may walk up and down stairs. When you are tired, rest.   Cough and deep breathe. Use your incentive spirometer 5-10 times each hour when you are awake.   We strongly recommend you participate in a cardiac rehabilitation program. This type of program will help you learn about your heart health, prevent more heart problems, participate in safe and heart-healthy activities, and learn how to return to your activities of daily living and hobbies.   Let the cardiology clinic know if you need to leave town before your first follow-up  visit.     When to call the Cardiology Clinic to speak with a nurse?   Swelling of the legs, ankles, or feet  Increasing shortness of breath  Change in the color or temperature of your lower legs and feet  Abdominal pain or unrelieved nausea and/or vomiting  Redness and warmth around your access site and/or incision that does not go away  Yellow or green drainage from your access site and/or incision   Weight gain of 3 pounds in one day or 5 pounds in one week  Develop any numbness, tingling, or limited movement of your arms or legs.   Chest pain that does not go away  New confusion or you cannot think clearly  Fever and chills         [facility]:  [phone]  If you are calling after hours, please listen to the entire voicemail, a live  will answer at the end of the message  Interventional Cardiology  Coronary Angiogram/Angioplasty/Stent/Atherectomy Discharge Instructions -   Femoral Approach    The instructions below are to help you understand how to take care of yourself. There is also information about when to call the doctor or emergency services    **Do not stop your aspirin or platelet inhibitor unless directed by your Cardiologist.  These medications help to prevent platelets in your blood from sticking together and forming a clot.  Examples of these medications are:  Ticagrelor (Brilinta), Clopidigrel (Plavix), Prasugrel (Effient)          For the first 72 hours after your procedure:  No driving for 24 hours  Do not lift more than 15 pounds.  If you usually lift 50 pounds or more daily, please contact your cardiologist  Avoid any hard work or tiring activities, this includes, yard work, jogging, biking, sexual activity  During the day get up and walk around every 2 hours  You may return to work after 72 hours if you are feeling well and your job does not involve heavy lifting          Groin Site / Wound Care / Bleeding    You may take off the dressing on your groin the day after  your procedure  Keep the area dry and clean  It is ok to shower with regular soap.  Pat dry, do not rub  You do not need to use a bandage  No tub/pool or hot tub for 1 week  If your groin starts to bleed or begins to swell suddenly after leaving the hospital, lie flat and apply firm pressure above the site for 15 minutes.  If bleeding continues, call 9-1-1  Bruising around the groin area is normal.  It may take 2-3 weeks for this to go away.  It is normal for the bruised area to turn green and/or yellow as it is healing.  A small lump may also be present and may last 2-3 months.  Your leg may be sore or stiff for a few days.  You may take Tylenol or a pain medicine recommended by your doctor  If you have a fever over 100.4, that lasts more than one day - call your cardiologist.      Our Cardiac Rehab staff may visit briefly with you while your in the hospital.  If they miss you, someone will contact you after you are home.  You are encouraged to enroll in an Outpatient Cardiac Rehab Program       No driving for 24 hours      Your Procedural Physician was: Dr. Anabela Hernandez  the phone number is: [phone]      [facility]:  [phone]  If you are calling after hours, please listen to the entire voicemail, a live  will answer at the end of the message    ----------------  ABOUT YOUR LOW BLOOD PRESSURE:  Consider getting an automatic blood pressure cuff to monitor your blood pressure readings at home.  Over the next few days please check your blood pressure daily at varying times and keep a log of your readings and what time you took it.   -If the blood pressure top number (SBP) is 100-140 then you are at our goal.  -If the SBP is under 100, but you feel fine, then be sure to recheck the next day. If still under 100 then call your clinic for instructions.  -If the SBP is too low (under 90) but you feel ok and have no symptoms, then drink a full glass of water, wait 30-60  minutes, and recheck. If still under 90 then call your clinic for instructions.  -If the SBP is low (under 100) and you have symptoms (dizziness, shortness of breath, weakness, fast heartbeat) then drink a glass of water, elevate your legs, and notify a family member or 911. Recheck blood pressure after 15 minutes and if still symptomatic then call 911.

## 2022-08-23 NOTE — ANESTHESIA PREPROCEDURE EVALUATION
Anesthesia Pre-Procedure Evaluation    Patient: Wendy Handy   MRN: [id number] : 1955        Procedure : Procedure(s):  Transcatheter Aortic Valve Replacement-Femoral Approach  OR TRANSCATHETER AORTIC VALVE REPLACEMENT, FEMORAL PERCUTANEOUS APPROACH (STANDBY)          Past Medical History:   Diagnosis Date     Aortic valve disorder     echo   4.2 cm TAA-MRA 2016       Bicuspid aortic valve      Coronary artery disease due to calcified coronary lesion 2016    Echo 2016   Good function  CT Ca++ 172 LAD  Cardiology consult -nuclear stress     Family history of psychiatric condition      Family history of tremor      Family history of vascular disease      H/O LEEP 2007-2006 [facility]IL paps with colposcopy, had LEEP in . No records 6/2/10 ASCUS, +HR HPV 53 12 ASCUS, +HR HPV 53. Colposcopy outside / system? 14 NIL 6/24/15 NIL pap, neg HPV 16 ASCUS, neg HPV 17 NIL pap, neg HPV 18 ASCUS, +HR HPV 16. Unclear if colposcopy was completed 7/10/19 NIL pap, neg HPV 20 NIL pap, neg HPV 21 NIL pap, neg HPV. Plan: await provider     History of aortic stenosis      History of vitamin D deficiency      Hypercholesteremia      Hyperlipidemia      Laboratory test 2016    Reading Physician Reading Date Result Priority    Patricia Orta MD 2016       Narrative       Examination: Nuclear medicine DATscan for Dopamine Receptor Localization.    Examination: NM BRAIN IMAGING TOMOGRAPHIC (SPECT) DATSCAN  Date: 2016 3:38 PM  Indication: Right sided postural tremor.   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       Osteopenia 2/15/2015     Other and unspecified hyperlipidemia     Created by  Conversion      Papanicolaou smear of cervix with low grade squamous intraepithelial lesion (LGSIL)      LGSIL 2002  colpoLGSIL 2003  colpoLGSIL 4/2006 colpoLGSIL 11/06 colpoAbnormal 5/07 colpo  CHCWLEEPendometrial biopsy 5/07      Plantar fasciitis of right foot      Primary insomnia      Snores      Tremor      Vertigo      Vitamin D deficiency      Wears glasses       Past Surgical History:   Procedure Laterality Date     BLEPHAROPLASTY       CARDIAC CATHETERIZATION  05/30/2017    No intervention     CARDIAC CATHETERIZATION N/A 5/30/2017    Procedure: Coronary Angiogram;  Surgeon: Torito Metzger MD;  Location: [facility] Cath Lab;  Service:      CHOLECYSTECTOMY       CONIZATION CERVIX,LOOP ELECTRD      Description: Cervical Conization Loop Electrode Excision;  Recorded: 05/29/2008;     CV CORONARY ANGIOGRAM N/A 6/9/2022    Procedure: Coronary Angiogram;  Surgeon: Anabela Hernandez MD;  Location: [facility] CATH LAB CV     CV CORONARY ANGIOGRAM  6/9/2022    Procedure: ;  Surgeon: Anabela Hernandez MD;  Location: [facility] LAB CV     FACIAL RECONSTRUCTION SURGERY      forehead as well     HC CLOSED TX MANDIBLE FX W/O MANIP      Description: Closed Treatment Of Mandibular Fracture;  Recorded: 05/29/2008;     HC DILATION/CURETTAGE DIAG/THER NON OB      Description: Dilation And Curettage;  Recorded: 05/29/2008;  Comments: X 2  metrorhhagia     LAPAROSCOPIC CHOLECYSTECTOMY N/A 2/23/2015    Procedure: CHOLECYSTECTOMY LAPAROSCOPIC;  Surgeon: Doug Webber MD;  Location: [facility];  Service:      [facility] ORAL SURGERY PROCEDURE      jaw fracture      No Known Allergies   Social History     Tobacco Use     Smoking status: Never Smoker     Smokeless tobacco: Never Used   Substance Use Topics     Alcohol use: Yes     Alcohol/week: 2.0 standard drinks     Comment: occasion      Wt Readings from Last 1 Encounters:   08/23/22 70.3 kg (155 lb)        Anesthesia Evaluation   Pt has had prior anesthetic.     No  history of anesthetic complications       ROS/MED HX  ENT/Pulmonary:       Neurologic:       Cardiovascular:     (+) --CAD ---valvular problems/murmurs type: AS     METS/Exercise Tolerance:     Hematologic:       Musculoskeletal:       GI/Hepatic:       Renal/Genitourinary:       Endo:       Psychiatric/Substance Use:       Infectious Disease:       Malignancy:       Other:            Physical Exam    Airway        Mallampati: II    Neck ROM: full     Respiratory Devices and Support         Dental  no notable dental history         Cardiovascular           (+) murmur       Pulmonary   pulmonary exam normal                OUTSIDE LABS:  CBC:   Lab Results   Component Value Date    WBC 5.8 08/19/2022    WBC 5.6 06/09/2022    HGB 13.7 08/19/2022    HGB 12.3 06/09/2022    HCT 41.3 08/19/2022    HCT 36.7 06/09/2022     08/19/2022     06/09/2022     BMP:   Lab Results   Component Value Date     08/19/2022     06/09/2022    POTASSIUM 4.1 08/19/2022    POTASSIUM 3.9 06/09/2022    CHLORIDE 108 (H) 08/19/2022    CHLORIDE 111 (H) 06/09/2022    CO2 28 08/19/2022    CO2 24 06/09/2022    BUN 11 08/19/2022    BUN 16 06/09/2022    CR 0.78 08/19/2022    CR 0.74 06/09/2022     08/19/2022    GLC 92 06/09/2022     COAGS:   Lab Results   Component Value Date    INR 1.07 08/19/2022     POC: No results found for: BGM, HCG, HCGS  HEPATIC:   Lab Results   Component Value Date    ALBUMIN 3.9 08/19/2022    PROTTOTAL 6.6 08/19/2022    ALT 20 08/19/2022    AST 21 08/19/2022    ALKPHOS 69 08/19/2022    BILITOTAL 1.3 (H) 08/19/2022     OTHER:   Lab Results   Component Value Date    FÉLIX 9.3 08/19/2022    MAG 2.1 08/19/2022    TSH 1.53 07/10/2019       Anesthesia Plan    ASA Status:  3      Anesthesia Type: MAC.              Consents    Anesthesia Plan(s) and associated risks, benefits, and realistic alternatives discussed. Questions answered and patient/representative(s) expressed understanding.     - Discussed:  Risks, Benefits and Alternatives for the PROCEDURE were discussed     - Discussed with:  Patient      - Extended Intubation/Ventilatory Support Discussed: No.      - Patient is DNR/DNI Status: No    Use of blood products discussed: No .     Postoperative Care    Pain management: Multi-modal analgesia.        Comments:                Krystal Velazquez MD

## 2022-08-23 NOTE — PHARMACY-ADMISSION MEDICATION HISTORY
Pharmacy Note - Admission Medication History   ______________________________________________________________________    Prior To Admission (PTA) med list completed and updated in EMR.       PTA Med List   Medication Sig Last Dose     aspirin 81 MG EC tablet Take 81 mg by mouth daily 8/22/2022 at Unknown time     atorvastatin (LIPITOR) 80 MG tablet Take 1 tablet (80 mg) by mouth At Bedtime 8/22/2022 at Unknown time     cyanocobalamin (VITAMIN B-12) 1000 MCG tablet Take 1,000 mcg by mouth daily  8/22/2022 at Unknown time     ezetimibe (ZETIA) 10 MG tablet Take 1 tablet (10 mg) by mouth daily 8/22/2022 at HS     ibuprofen (ADVIL/MOTRIN) 800 MG tablet TAKE ONE TABLET BY MOUTH THREE TIMES DAILY AS NEEDED TAKE WITH FOOD Past Month at Unknown time     Omega-3 Fatty Acids (OMEGA-3 FISH OIL) 1200 MG CAPS Take 1 capsule by mouth daily  8/22/2022 at Unknown time     polyethylene glycol-propylene glycol (SYSTANE) 0.4-0.3 % SOLN ophthalmic solution Place 1 drop into both eyes as needed for dry eyes 8/22/2022 at Unknown time     traZODone (DESYREL) 50 MG tablet Take 1 tablet (50 mg) by mouth At Bedtime 8/22/2022 at Unknown time     vitamin C (ASCORBIC ACID) 1000 MG TABS Take 1,000 mg by mouth daily  8/22/2022 at Unknown time     VITAMIN D3 50 MCG (2000 UT) tablet TAKE 1 TABLET BY MOUTH EVERY DAY 8/22/2022 at Unknown time     vitamin E 400 UNIT capsule Take 400 Units by mouth daily  8/22/2022 at Unknown time     zinc gluconate 50 MG tablet Take 50 mg by mouth daily  8/22/2022 at Unknown time       Information source(s): Patient, Hospital records and CareEverywhere/[facility]    Method of interview communication: in-person    Patient was asked about OTC/herbal products specifically.  PTA med list reflects this.    Based on the pharmacist's assessment, the PTA med list information appears reliable    Allergies were reviewed, assessed, and updated with the patient.      Patient does not use any multi-dose medications prior to  admission.     Thank you for the opportunity to participate in the care of this patient.      Anna Blackburn, [facility]     8/23/2022     11:02 AM

## 2022-08-23 NOTE — PLAN OF CARE
Goal Outcome Evaluation:        Pt admitted for   TAVR due to aortic stenosis. Pts  Omar at bedside. Pt prepped and ready for procedure.      Anneliese Cabrera RN

## 2022-08-23 NOTE — CONSULTS
"[facility]  Consult Note - Hospitalist Service  Date of Admission:  8/23/2022  Consult Requested by: Dr Kay  Reason for Consult: postop medical management    Assessment & Plan   Wendy Handy is a 67 year old female admitted on 8/23/2022. She underwent TAVR for aortic stenosis.    Aortic stenosis  -s/p TAVR    Borderline hypotension  -no symptoms  -encourage oral intake  -might restart ivf if bp not improving as expected    CAD  -no cp     The patient's care was discussed with the Patient.    Lan Guzman MD  [facility]  Securely message with the Vocera Web Console (learn more here)  Text page via [facility] Paging/Directory       Hospitalist Service    Clinically Significant Risk Factors Present on Admission                    # Overweight: Estimated body mass index is 27.46 kg/m  as calculated from the following:    Height as of this encounter: 1.6 m (5' 3\").    Weight as of this encounter: 70.3 kg (155 lb).        ______________________________________________________________________    Chief Complaint   Postop medical management    History is obtained from the patient    History of Present Illness   Wendy Handy is a 67 year old female who was admitted for aortic stenosis, s/p TAVR.    Patient tolerated surgery without complications. Currently denies cp/sob/palpitation. No n/v.     Review of Systems   The 10 point Review of Systems is negative other than noted in the HPI or here.     Past Medical History    I have reviewed this patient's medical history and updated it with pertinent information if needed.   Past Medical History:   Diagnosis Date     Aortic valve disorder     echo 1/09  4.2 cm TAA-MRA 4/2016       Bicuspid aortic valve      Coronary artery disease due to calcified coronary lesion 5/5/2016    Echo 6/2016   Good function  CT Ca++ 172 LAD  Cardiology consult 4/016-nuclear stress     Family history of psychiatric condition      Family history of " tremor      Family history of vascular disease      H/O LEEP 1/1/2007 2002-2006 LSIL paps with colposcopy, had LEEP in 2007. No records 6/2/10 ASCUS, +HR HPV 53 6/20/12 ASCUS, +HR HPV 53. Colposcopy outside HE/ system? 6/23/14 NIL 6/24/15 NIL pap, neg HPV 6/27/16 ASCUS, neg HPV 6/29/17 NIL pap, neg HPV 7/9/18 ASCUS, +HR HPV 16. Unclear if colposcopy was completed 7/10/19 NIL pap, neg HPV 7/13/20 NIL pap, neg HPV 2/1/21 NIL pap, neg HPV. Plan: await provider     History of aortic stenosis      History of vitamin D deficiency      Hypercholesteremia      Hyperlipidemia      Laboratory test 8/18/2016    Reading Physician Reading Date Result Priority    Patricia Orta MD 8/16/2016       Narrative       Examination: Nuclear medicine DATscan for Dopamine Receptor Localization.    Examination: NM BRAIN IMAGING TOMOGRAPHIC (SPECT) DATSCAN  Date: 8/16/2016 3:38 PM  Indication: Right sided postural tremor.   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  Past Surgical History:   Procedure Laterality Date     BLEPHAROPLASTY       CARDIAC CATHETERIZATION  05/30/2017    No intervention      CARDIAC CATHETERIZATION N/A 5/30/2017    Procedure: Coronary Angiogram;  Surgeon: Torito Metzger MD;  Location: [facility] Cath Lab;  Service:      CHOLECYSTECTOMY       CONIZATION CERVIX,LOOP ELECTRD      Description: Cervical Conization Loop Electrode Excision;  Recorded: 05/29/2008;     CV CORONARY ANGIOGRAM N/A 6/9/2022    Procedure: Coronary Angiogram;  Surgeon: Anabela Hernandez MD;  Location: [facility] LAB CV     CV CORONARY ANGIOGRAM  6/9/2022    Procedure: ;  Surgeon: Anabela Hernandez MD;  Location: [facility] LAB CV     FACIAL RECONSTRUCTION SURGERY      forehead as well     HC CLOSED TX MANDIBLE FX W/O MANIP      Description: Closed Treatment Of Mandibular Fracture;  Recorded: 05/29/2008;     HC DILATION/CURETTAGE DIAG/THER NON OB      Description: Dilation And Curettage;  Recorded: 05/29/2008;  Comments: X 2  metrorhhagia     LAPAROSCOPIC CHOLECYSTECTOMY N/A 2/23/2015    Procedure: CHOLECYSTECTOMY LAPAROSCOPIC;  Surgeon: Doug Webber MD;  Location: [facility];  Service:      [facility] ORAL SURGERY PROCEDURE      jaw fracture       Social History   I have reviewed this patient's social history and updated it with pertinent information if needed.  Social History     Tobacco Use     Smoking status: Never Smoker     Smokeless tobacco: Never Used   Substance Use Topics     Alcohol use: Yes     Alcohol/week: 2.0 standard drinks     Comment: occasion     Drug use: No       Family History   I have reviewed this patient's family history and updated it with pertinent information if needed.  Family History   Problem Relation Age of Onset     Heart Disease Father      Heart Disease Sister      Heart Disease Sister      Mental Illness Mother 30.00        schizophrenia, bipolar     Mental Illness Sister         bipolar     Colon Cancer Father      Hyperlipidemia Brother      Hyperlipidemia Brother      Hyperlipidemia Sister      Cervical Cancer Daughter      Diabetes Type 2  Brother      Diabetes  Type 2  Brother      Tremor Father      Tremor Paternal Aunt      Tremor Paternal Grandfather      Tremor Son      Tremor Cousin         Paternal 1st cousin     Attention Deficit Disorder Son      Neurologic Disorder Son         Tourette Syndrome      Schizophrenia Mother      Schizophrenia Sister      Mental Illness Sister         depression     Diabetes Brother         type 2     Mental Illness Brother         depression       Medications   Current Facility-Administered Medications   Medication     acetaminophen (TYLENOL) tablet 650 mg     [START ON 8/24/2022] aspirin EC tablet 81 mg     atorvastatin (LIPITOR) tablet 80 mg     [START ON 8/24/2022] cyanocobalamin (VITAMIN B-12) tablet 1,000 mcg     [START ON 8/24/2022] ezetimibe (ZETIA) tablet 10 mg     HOLD:  Metformin and metformin containing medications if patient received IV contrast with acute kidney injury or severe chronic kidney disease (stage IV or stage V; i.e., eGFR less than 30)     hydrALAZINE (APRESOLINE) injection 10 mg     HYDROmorphone (PF) (DILAUDID) injection 0.2 mg     lactated ringers infusion     magnesium sulfate 4 g in 50 mL sterile water (premade)     naloxone (NARCAN) injection 0.2 mg    Or     naloxone (NARCAN) injection 0.4 mg    Or     naloxone (NARCAN) injection 0.2 mg    Or     naloxone (NARCAN) injection 0.4 mg     nitroGLYcerin (NITROSTAT) sublingual tablet 0.4 mg     ondansetron (ZOFRAN ODT) ODT tab 4 mg    Or     ondansetron (ZOFRAN) injection 4 mg     ondansetron (ZOFRAN ODT) ODT tab 4 mg    Or     ondansetron (ZOFRAN) injection 4 mg     oxyCODONE (ROXICODONE) tablet 5 mg    Or     oxyCODONE (ROXICODONE) tablet 10 mg     oxyCODONE (ROXICODONE) tablet 5 mg     potassium chloride (KLOR-CON) Packet 20-40 mEq     potassium chloride 10 mEq in 100 mL sterile water intermittent infusion (premix)     potassium chloride ER (KLOR-CON M) CR tablet 20-40 mEq     prochlorperazine (COMPAZINE) injection 5 mg     traZODone (DESYREL) tablet 50 mg  "      Allergies   No Known Allergies    Physical Exam   Vital Signs: Temp: 97.9  F (36.6  C) Temp src: Oral BP: 109/63 Pulse: 63   Resp: 16 SpO2: 100 % O2 Device: None (Room air)    Weight: 155 lbs 0 oz    General.  Awake alert oriented not in acute distress.  HEENT.  Pupils equal round react to light, anicteric, EOM intact.  Neck supple no JVD.  CVS regular rhythm no murmur gallops.  Lungs.  Clear to auscultation bilateral no wheezing or rales.  Abdomen.  Soft nontender bowel sounds present.  Extremities.  No edema no calf tenderness.  Neurological.  Awake and alert. No focal deficit.  Skin no rash. No pallor.  Psych. Normal mood.       Data   Results for orders placed or performed during the hospital encounter of 08/23/22 (from the past 24 hour(s))   Activated clotting time celite, POCT   Result Value Ref Range    Activated Clotting Time (Celite) POCT 309 (H) 74 - 150 seconds   Cardiac Catheterization    Narrative    Procedure: Transcatheter aortic valve replacement    Indication: Severe symptomatic aortic stenosis with the patient felt to be   an appropriate candidate for transcatheter aortic valve replacement after   a thorough multidisciplinary approach, including a visit with CT surgery.    Operators:   Interventional Cardiology: Anabela Hernandez MD  CT Surgery: Katy Rojas MD  Echocardiography: TTE    Approach: R transfemoral    Anesthesia: MAC    Procedure Details:  Informed consent obtained before the patient was brought to the procedure   room.  Appropriate timeout was performed before the procedure was started.    Access was obtained in the L femoral artery and vein using the modified   Seldinger technique, and then in the R femoral artery under direct   fluoroscopic visualization.  The R femoral arteriotomy was then \"measured\"   for Manta closure.      A pigtail catheter was placed at the aortic annulus to determine the   coplanar angle.     After appropriate anticoagulation, the Killian E sheath was " placed through   the R femoral arteriotomy with the help of a stiff guidewire.  The aortic   valve was then crossed and a Safari 2 wire was placed at the LV apex.    A 23mm Elliot 3 valve was then successfully deployed in the aortic   position under rapid pacing.  Post deployment to the patient had good   hemodynamics, with no aortic insufficiency and a mean gradient of 5 mmHg.   Relative aortic/venticular implant ratio was 95/5.     The delivery system and sheath were then removed, with successful   hemostasis of the arteriotomy by deployment of a Manta device.    The L femoral artery and venous sheaths were removed as well with   successful hemostasis via the Angio-Seal device.        Conclusions:  1. Severe symptomatic aortic stenosis status post successful transcatheter   aortic valve replacement with a 23mm Elliot 3 valve, delivered via the R   transfemoral approach.    2. Trace to none aortic insufficiency post deployment with a mean gradient   of 5 mmHg.    3. No acute complications.    Postprocedure patient status:  Patient transferred to the post procedure area for ongoing management.      Anabela Hernandez MD               CBC with platelets   Result Value Ref Range    WBC Count 7.1 4.0 - 11.0 10e3/uL    RBC Count 3.96 3.80 - 5.20 10e6/uL    Hemoglobin 12.3 11.7 - 15.7 g/dL    Hematocrit 36.8 35.0 - 47.0 %    MCV 93 78 - 100 fL    MCH 31.1 26.5 - 33.0 pg    MCHC 33.4 31.5 - 36.5 g/dL    RDW 12.6 10.0 - 15.0 %    Platelet Count 181 150 - 450 10e3/uL   Comprehensive metabolic panel   Result Value Ref Range    Sodium 137 136 - 145 mmol/L    Potassium 3.9 3.5 - 5.0 mmol/L    Chloride 102 98 - 107 mmol/L    Carbon Dioxide (CO2) 25 22 - 31 mmol/L    Anion Gap 10 5 - 18 mmol/L    Urea Nitrogen 12 8 - 22 mg/dL    Creatinine 0.81 0.60 - 1.10 mg/dL    Calcium 8.5 8.5 - 10.5 mg/dL    Glucose 86 70 - 125 mg/dL    Alkaline Phosphatase 69 45 - 120 U/L    AST 29 0 - 40 U/L    ALT 34 0 - 45 U/L    Protein Total 5.9 (L) 6.0  - 8.0 g/dL    Albumin 3.6 3.5 - 5.0 g/dL    Bilirubin Total 1.0 0.0 - 1.0 mg/dL    GFR Estimate 79 >60 mL/min/1.73m2   Magnesium   Result Value Ref Range    Magnesium 1.8 1.8 - 2.6 mg/dL   ECG 12-LEAD WITH MUSE (LHE)   Result Value Ref Range    Systolic Blood Pressure  mmHg    Diastolic Blood Pressure  mmHg    Ventricular Rate 69 BPM    Atrial Rate 69 BPM    MO Interval 182 ms    QRS Duration 78 ms     ms    QTc 428 ms    P Axis 68 degrees    R AXIS -1 degrees    T Axis 87 degrees    Interpretation ECG       Sinus rhythm with occasional Premature ventricular complexes  Otherwise normal ECG  When compared with ECG of 19-AUG-2022 08:33,  Premature ventricular complexes are now Present

## 2022-08-23 NOTE — BRIEF OP NOTE
[facility]    Brief Operative Note    Pre-operative diagnosis: Aortic stenosis  Post-operative diagnosis: Same    Procedure: Right transfemoral transcatheter aortic valve replacement using a 23 mm Killian Elliot 3 Ultra valve at nominal  Surgeon: Surgeon(s) and Role:  Panel 1:     * Anabela Hernandez MD - Primary     * Pawan Boles MD  Panel 2:     * Katy Rojas MD - Primary  Anesthesia: MAC with local   Estimated Blood Loss: 100 mL  Drains: None   Specimens: None   Findings: Minimal gradient after valve deployment with no perivalvular leak. No pericardial effusion.  No narrowing or extravasation on DSA  Complications: None  Implants:   Implant Name Type Inv. Item Serial No.  Lot No. LRB No. Used Action   DEVICE CLOSURE VASCULAR MANTA 18FR 2115 - [id number] Occlusion Device DEVICE CLOSURE VASCULAR MANTA 18FR 2115  [facility] [id number] Right 1 Implanted   VALVE HEART ELLIOT 3 23MM [id number] - [id number] Valve VALVE HEART ELLIOT 3 23MM [id number]  [employer] [id number] N/A 1 Implanted   CLOSURE ANGIOSEAL 6FR [id number] - [id number]  CLOSURE ANGIOSEAL 6FR [id number]  [employer] [id number] Left 2 Implanted

## 2022-08-23 NOTE — PROGRESS NOTES
Patient was kept comfortable during post-procedure stay.VSS. Pt c/o mild back pain due to bedrest/activity restrictions and a mild headache, given tylenol and oxycodone for discomfort.   Right and Left femoral access sites remains dry & free from signs of bleeding.   Rudolph to remove suture from right groin tomorrow morning.   Pt to complete bedrest at 2000 tonight. Thus far tolerating oral fluids and crackers.  Appointments made & included in AVS. Rudolph was able to speak with patient and her  post-procedure. Belongings returned. Report called to Carolynn and pt Transferred to P3 in stable condition.

## 2022-08-23 NOTE — OP NOTE
Procedure Date: 08/23/2022    OPERATING AREA:  Room 3 in the cath lab.    PROCEDURE PERFORMED:  Right transfemoral transcatheter aortic valve replacement using a 23 mm Killian Elliot 3 Ultra valve at [facility].    ATTENDING SURGEON:  Katy Rojas MD    INTERVENTIONAL CARDIOLOGISTS:  Anabela Hernandez MD and Pawan Boles MD    ANESTHESIA:  MAC with local.    SKIN PREP:  Betadine and DuraPrep.    INCISIONS:  None.    DRAINS:  None.    SPECIMENS:  None.    CULTURES:  None.    CLOSURE:  MANTA device.    PREOPERATIVE DIAGNOSES:  1.  Severe aortic stenosis.  2.  Enlarged ascending aorta, stable at 4.1 cm.  3.  Bicuspid aortic valve.  4.  Coronary artery disease.    POSTOPERATIVE DIAGNOSES:  1.  Severe aortic stenosis.  2.  Enlarged ascending aorta, stable at 4.1 cm.  3.  Bicuspid aortic valve.  4.  Coronary artery disease.    BRIEF HISTORY:  Ms. Handy is a 67-year-old woman who has had progressive symptoms related to her severe aortic stenosis.  Given her age and multiple medical comorbidities, she was felt to be a better candidate to undergo transcatheter aortic valve replacement rather than open surgical aortic valve replacement and the above procedure was planned.    FINDINGS AT OPERATION:  By transthoracic echo, once the valve was deployed, there was a minimal gradient across the valve and no perivalvular leak.  There was also no pericardial effusion.  Once the MANTA device was deployed, DSA was performed which showed no extravasation or narrowing of the right femoral artery.    DESCRIPTION OF PROCEDURE:  The patient arrived in the operating room and MAC anesthesia was induced.  The patient's neck, chest, abdomen and both groins were prepped and draped in a standard sterile fashion.  Local anesthetic was applied to both groin areas by Dr. Hernandez.  The left femoral artery and vein were cannulated. Through the left femoral vein, a temporary pacing wire was placed in the right ventricle.  It was tested and captured  well.  The right femoral artery was then cannulated and the necessary intracardiac wires placed.  The Killian eSheath was then passed up the right femoral artery.  Heparin was administered.  On the back table, a 23 mm Killian Elliot 3 Ultra valve was prepared at nominal.  When the ACT was appropriate, it was brought onto the operative field and passed up the Killian eSheath.  It was prepared and then positioned.  Rapid pacing was done and the valve was deployed. A transthoracic echo was then carried out.  There was no perivalvular leak.  The valve deployment apparatus and intracardiac wires were removed.  The Killian eSheath was then removed and the MANTA device deployed.  DSA was then performed with the above findings. The estimated blood loss was 100 mL. Protamine was administered to reverse the heparin.  Ms. Humphries was brought to the [facility] in satisfactory condition.    Katy Rojas MD        D: 2022   T: 2022   MT: tor    Name:     IRINA HUMPHRIES  MRN:      [id number]        Account:        [id number]   :      1955           Procedure Date: 2022     Document: [id number]

## 2022-08-24 ENCOUNTER — APPOINTMENT (OUTPATIENT)
Dept: RADIOLOGY | Facility: HOSPITAL | Age: 67
DRG: 267 | End: 2022-08-24
Attending: INTERNAL MEDICINE
Payer: COMMERCIAL

## 2022-08-24 ENCOUNTER — APPOINTMENT (OUTPATIENT)
Dept: OCCUPATIONAL THERAPY | Facility: HOSPITAL | Age: 67
DRG: 267 | End: 2022-08-24
Attending: INTERNAL MEDICINE
Payer: COMMERCIAL

## 2022-08-24 ENCOUNTER — APPOINTMENT (OUTPATIENT)
Dept: CARDIOLOGY | Facility: HOSPITAL | Age: 67
DRG: 267 | End: 2022-08-24
Attending: INTERNAL MEDICINE
Payer: COMMERCIAL

## 2022-08-24 VITALS
TEMPERATURE: 98.1 F | WEIGHT: 153.6 LBS | OXYGEN SATURATION: 99 % | DIASTOLIC BLOOD PRESSURE: 60 MMHG | BODY MASS INDEX: 27.21 KG/M2 | RESPIRATION RATE: 20 BRPM | SYSTOLIC BLOOD PRESSURE: 111 MMHG | HEIGHT: 63 IN | HEART RATE: 69 BPM

## 2022-08-24 DIAGNOSIS — Z95.2 S/P TAVR (TRANSCATHETER AORTIC VALVE REPLACEMENT): Primary | ICD-10-CM

## 2022-08-24 LAB
ANION GAP SERPL CALCULATED.3IONS-SCNC: 7 MMOL/L (ref 5–18)
ATRIAL RATE - MUSE: 65 BPM
ATRIAL RATE - MUSE: 69 BPM
BUN SERPL-MCNC: 15 MG/DL (ref 8–22)
CALCIUM SERPL-MCNC: 8.6 MG/DL (ref 8.5–10.5)
CHLORIDE BLD-SCNC: 107 MMOL/L (ref 98–107)
CO2 SERPL-SCNC: 26 MMOL/L (ref 22–31)
CREAT SERPL-MCNC: 0.77 MG/DL (ref 0.6–1.1)
DIASTOLIC BLOOD PRESSURE - MUSE: NORMAL MMHG
DIASTOLIC BLOOD PRESSURE - MUSE: NORMAL MMHG
ERYTHROCYTE [DISTWIDTH] IN BLOOD BY AUTOMATED COUNT: 12.6 % (ref 10–15)
GFR SERPL CREATININE-BSD FRML MDRD: 84 ML/MIN/1.73M2
GLUCOSE BLD-MCNC: 93 MG/DL (ref 70–125)
HCT VFR BLD AUTO: 36.1 % (ref 35–47)
HGB BLD-MCNC: 12.1 G/DL (ref 11.7–15.7)
INTERPRETATION ECG - MUSE: NORMAL
INTERPRETATION ECG - MUSE: NORMAL
MAGNESIUM SERPL-MCNC: 1.9 MG/DL (ref 1.8–2.6)
MCH RBC QN AUTO: 31.6 PG (ref 26.5–33)
MCHC RBC AUTO-ENTMCNC: 33.5 G/DL (ref 31.5–36.5)
MCV RBC AUTO: 94 FL (ref 78–100)
P AXIS - MUSE: 68 DEGREES
P AXIS - MUSE: 70 DEGREES
PLATELET # BLD AUTO: 145 10E3/UL (ref 150–450)
POTASSIUM BLD-SCNC: 4.1 MMOL/L (ref 3.5–5)
PR INTERVAL - MUSE: 170 MS
PR INTERVAL - MUSE: 182 MS
QRS DURATION - MUSE: 72 MS
QRS DURATION - MUSE: 78 MS
QT - MUSE: 400 MS
QT - MUSE: 400 MS
QTC - MUSE: 416 MS
QTC - MUSE: 428 MS
R AXIS - MUSE: -1 DEGREES
R AXIS - MUSE: 11 DEGREES
RBC # BLD AUTO: 3.83 10E6/UL (ref 3.8–5.2)
SODIUM SERPL-SCNC: 140 MMOL/L (ref 136–145)
SYSTOLIC BLOOD PRESSURE - MUSE: NORMAL MMHG
SYSTOLIC BLOOD PRESSURE - MUSE: NORMAL MMHG
T AXIS - MUSE: 75 DEGREES
T AXIS - MUSE: 87 DEGREES
VENTRICULAR RATE- MUSE: 65 BPM
VENTRICULAR RATE- MUSE: 69 BPM
WBC # BLD AUTO: 8.1 10E3/UL (ref 4–11)

## 2022-08-24 PROCEDURE — 36415 COLL VENOUS BLD VENIPUNCTURE: CPT | Performed by: INTERNAL MEDICINE

## 2022-08-24 PROCEDURE — 97535 SELF CARE MNGMENT TRAINING: CPT | Mod: GO

## 2022-08-24 PROCEDURE — 99239 HOSP IP/OBS DSCHRG MGMT >30: CPT | Performed by: INTERNAL MEDICINE

## 2022-08-24 PROCEDURE — 93306 TTE W/DOPPLER COMPLETE: CPT

## 2022-08-24 PROCEDURE — 99233 SBSQ HOSP IP/OBS HIGH 50: CPT | Performed by: HOSPITALIST

## 2022-08-24 PROCEDURE — 93306 TTE W/DOPPLER COMPLETE: CPT | Mod: 26 | Performed by: INTERNAL MEDICINE

## 2022-08-24 PROCEDURE — 93010 ELECTROCARDIOGRAM REPORT: CPT | Performed by: INTERNAL MEDICINE

## 2022-08-24 PROCEDURE — 97165 OT EVAL LOW COMPLEX 30 MIN: CPT | Mod: GO

## 2022-08-24 PROCEDURE — 71045 X-RAY EXAM CHEST 1 VIEW: CPT

## 2022-08-24 PROCEDURE — 93005 ELECTROCARDIOGRAM TRACING: CPT

## 2022-08-24 PROCEDURE — 85027 COMPLETE CBC AUTOMATED: CPT | Performed by: INTERNAL MEDICINE

## 2022-08-24 PROCEDURE — 97110 THERAPEUTIC EXERCISES: CPT | Mod: GO

## 2022-08-24 PROCEDURE — 80048 BASIC METABOLIC PNL TOTAL CA: CPT | Performed by: INTERNAL MEDICINE

## 2022-08-24 PROCEDURE — 250N000013 HC RX MED GY IP 250 OP 250 PS 637: Performed by: INTERNAL MEDICINE

## 2022-08-24 PROCEDURE — 83735 ASSAY OF MAGNESIUM: CPT | Performed by: INTERNAL MEDICINE

## 2022-08-24 RX ADMIN — ACETAMINOPHEN 650 MG: 325 TABLET, FILM COATED ORAL at 05:19

## 2022-08-24 RX ADMIN — ACETAMINOPHEN 650 MG: 325 TABLET, FILM COATED ORAL at 12:59

## 2022-08-24 RX ADMIN — Medication 1000 MCG: at 09:57

## 2022-08-24 RX ADMIN — EZETIMIBE 10 MG: 10 TABLET ORAL at 09:57

## 2022-08-24 RX ADMIN — Medication 81 MG: at 09:57

## 2022-08-24 ASSESSMENT — ACTIVITIES OF DAILY LIVING (ADL)
ADLS_ACUITY_SCORE: 18

## 2022-08-24 NOTE — PROGRESS NOTES
SP[facility]UAL [facility] Progress Note      Saw pt Wendy Handy per admission screening.    Illness Narrative - Wendy shared about her non-invasive heart procedure yesterday and reports doing very well today. She is hopeful of discharging home if all is well.     Distress - Ongoing recovery, otherwise no other distress noted at this time.     Coping - Family is source of support. Wendy shared about growing up in a predominately [place] area of [place]. Patient expressed appreciation for the visit.    Plan - No further plans to visit at this time, but  staff available if further needs arise.     Tray Romo MDiv, [facility]  Lead Staff , [facility]  [phone]

## 2022-08-24 NOTE — PLAN OF CARE
Problem: Bleeding (Cardiac Catheterization)  Goal: Absence of Bleeding  Outcome: Ongoing, Progressing     Problem: Arrhythmia/Dysrhythmia (Cardiac Catheterization)  Goal: Stable Heart Rate and Rhythm  Outcome: Ongoing, Progressing     Goal Outcome Evaluation:    Pt A/O x 4. Vital signs stable. On room air. Neuros intact. Bedrest completed at 2000. Ambulated SBA in  x 2. Hemostasis maintained to bilateral femoral sites. Pt c/o mild headache which improved with PRN tylenol. Voiding adequately and without difficulty. Plan to discharge home tomorrow.

## 2022-08-24 NOTE — PROGRESS NOTES
[facility]    Medicine Progress Note - Hospitalist Service       Date of Admission:  8/23/2022    Assessment & Plan            Wendy Handy is a 67 year old female admitted on 8/23/2022. She has history of SLE, HLD, CAD, chronic diastolic heart failure, severe aortic stenosis presented for scheduled TAVR. [facility] [place] consulted for co management. Hospital Day: 2     Ok from my perspective to discharge when cleared by cardiology. [facility] will sign off. Feel free to contact us if additional issues arise that would benefit from our input.    #Severe aortic stenosis now s/p TAVR  Doing well postop. Repeat echo planned today per cardiology.    #Borderline hypotension  BPs a bit soft, but improved now that she has had breakfast, and completely asymptomatic. Not far off her baseline.  Patient encouraged to purchase a BP cuff. Will write out monitoring parameters on her AVS. Return precautions discussed.  Patient has followup with PCP next month and this is perfect.    #Chronic diastolic heart failure, well controlled, management per cardiology  #HLD, home atorvastatin, Zetia  #SLE, was on Plaquenil in past but not for years. Asymptomatic.  #CAD, home ASA and statin  #Insomnia, home trazodone       Diet: Regular Diet Adult    DVT Prophylaxis: Moderate risk. Prophylaxis per surgeon   Rocha Catheter: Not present  Central Lines: None  Code Status: Full Code        Disposition Plan   Disposition: Home  { TIP  Expected discharge date has passed or is blank! Click here to update expected discharge date and refresh note (tipsheet)     :[id number]}   Medically ready to discharge today: Yes in my opinion but defer to cardiology     The patient's care was discussed with the Patient.    Sienna Hancock MD  Hospitalist Service  [facility]  Securely message with the Vocera Web Console (learn more here)  Text page via [employer] Paging/Directory         Clinically Significant Risk Factors Present on  "Admission                    # Overweight: Estimated body mass index is 27.21 kg/m  as calculated from the following:    Height as of this encounter: 1.6 m (5' 3\").    Weight as of this encounter: 69.7 kg (153 lb 9.6 oz).        ____________        Physical Exam   Vital Signs: Temp: 98.2  F (36.8  C) Temp src: Oral BP: 94/54 Pulse: 71   Resp: 16 SpO2: 96 % O2 Device: None (Room air)    Weight: 153 lbs 9.6 oz  General: in no apparent distress, non-toxic and alert female lying in hospital bed oriented x3  HEENT: Head normocephalic atraumatic, oral mucosa moist. Sclerae anicteric  CV: Regular rhythm, normal rate, no murmurs  Resp: No wheezes, no rales or rhonchi, no focal consolidations  GI: Belly soft, nondistended, nontender, bowel sounds present  Skin: No rashes or lesions  Extremities: Trace ankle edema bilaterally  Psych: Normal affect, mood euthymic  Neuro: CNII-XII grossly intact, moving all 4 extremities      Data   Recent Results (from the past 24 hour(s))   Prepare red blood cells (unit)    Collection Time: 08/23/22 10:29 AM   Result Value Ref Range    Blood Component Type Red Blood Cells     Product Code M4390E82     Unit Status Ready for issue     Unit Number [id number]     CROSSMATCH Compatible     CODING SYSTEM [id number]     ISSUE DATE AND TIME 20220823115600     UNIT ABO/RH O+     UNIT TYPE ISBT 5100    Prepare red blood cells (unit)    Collection Time: 08/23/22 10:29 AM   Result Value Ref Range    Blood Component Type Red Blood Cells     Product Code B1526D15     Unit Status Ready for issue     Unit Number [id number]     CROSSMATCH Compatible     CODING SYSTEM [id number]     ISSUE DATE AND TIME 34553426019685     UNIT ABO/RH O+     UNIT TYPE ISBT 5100    Activated clotting time celite, POCT    Collection Time: 08/23/22  1:05 PM   Result Value Ref Range    Activated Clotting Time (Celite) POCT 309 (H) 74 - 150 seconds   Echocardiogram Complete    Collection Time: 08/23/22  1:44 PM   Result Value Ref " Range    LVEF  > 65%    CBC with platelets    Collection Time: 08/23/22  2:02 PM   Result Value Ref Range    WBC Count 7.1 4.0 - 11.0 10e3/uL    RBC Count 3.96 3.80 - 5.20 10e6/uL    Hemoglobin 12.3 11.7 - 15.7 g/dL    Hematocrit 36.8 35.0 - 47.0 %    MCV 93 78 - 100 fL    MCH 31.1 26.5 - 33.0 pg    MCHC 33.4 31.5 - 36.5 g/dL    RDW 12.6 10.0 - 15.0 %    Platelet Count 181 150 - 450 10e3/uL   Comprehensive metabolic panel    Collection Time: 08/23/22  2:02 PM   Result Value Ref Range    Sodium 137 136 - 145 mmol/L    Potassium 3.9 3.5 - 5.0 mmol/L    Chloride 102 98 - 107 mmol/L    Carbon Dioxide (CO2) 25 22 - 31 mmol/L    Anion Gap 10 5 - 18 mmol/L    Urea Nitrogen 12 8 - 22 mg/dL    Creatinine 0.81 0.60 - 1.10 mg/dL    Calcium 8.5 8.5 - 10.5 mg/dL    Glucose 86 70 - 125 mg/dL    Alkaline Phosphatase 69 45 - 120 U/L    AST 29 0 - 40 U/L    ALT 34 0 - 45 U/L    Protein Total 5.9 (L) 6.0 - 8.0 g/dL    Albumin 3.6 3.5 - 5.0 g/dL    Bilirubin Total 1.0 0.0 - 1.0 mg/dL    GFR Estimate 79 >60 mL/min/1.73m2   Magnesium    Collection Time: 08/23/22  2:02 PM   Result Value Ref Range    Magnesium 1.8 1.8 - 2.6 mg/dL   ECG 12-LEAD WITH MUSE (LHE)    Collection Time: 08/23/22  2:52 PM   Result Value Ref Range    Systolic Blood Pressure  mmHg    Diastolic Blood Pressure  mmHg    Ventricular Rate 69 BPM    Atrial Rate 69 BPM    IL Interval 182 ms    QRS Duration 78 ms     ms    QTc 428 ms    P Axis 68 degrees    R AXIS -1 degrees    T Axis 87 degrees    Interpretation ECG       Sinus rhythm with occasional Premature ventricular complexes  Otherwise normal ECG  When compared with ECG of 19-AUG-2022 08:33,  Premature ventricular complexes are now Present     CBC with platelets    Collection Time: 08/24/22  5:02 AM   Result Value Ref Range    WBC Count 8.1 4.0 - 11.0 10e3/uL    RBC Count 3.83 3.80 - 5.20 10e6/uL    Hemoglobin 12.1 11.7 - 15.7 g/dL    Hematocrit 36.1 35.0 - 47.0 %    MCV 94 78 - 100 fL    MCH 31.6 26.5 - 33.0  pg    MCHC 33.5 31.5 - 36.5 g/dL    RDW 12.6 10.0 - 15.0 %    Platelet Count 145 (L) 150 - 450 10e3/uL   Basic metabolic panel    Collection Time: 08/24/22  5:02 AM   Result Value Ref Range    Sodium 140 136 - 145 mmol/L    Potassium 4.1 3.5 - 5.0 mmol/L    Chloride 107 98 - 107 mmol/L    Carbon Dioxide (CO2) 26 22 - 31 mmol/L    Anion Gap 7 5 - 18 mmol/L    Urea Nitrogen 15 8 - 22 mg/dL    Creatinine 0.77 0.60 - 1.10 mg/dL    Calcium 8.6 8.5 - 10.5 mg/dL    Glucose 93 70 - 125 mg/dL    GFR Estimate 84 >60 mL/min/1.73m2   Magnesium    Collection Time: 08/24/22  5:02 AM   Result Value Ref Range    Magnesium 1.9 1.8 - 2.6 mg/dL   ECG 12-LEAD WITH MUSE (LHE)    Collection Time: 08/24/22  7:51 AM   Result Value Ref Range    Systolic Blood Pressure  mmHg    Diastolic Blood Pressure  mmHg    Ventricular Rate 65 BPM    Atrial Rate 65 BPM    LA Interval 170 ms    QRS Duration 72 ms     ms    QTc 416 ms    P Axis 70 degrees    R AXIS 11 degrees    T Axis 75 degrees    Interpretation ECG       Sinus rhythm  Normal ECG  When compared with ECG of 23-AUG-2022 14:52,  Premature ventricular complexes are no longer Present       ____________  Interval History   Data reviewed today: I reviewed all medications, new labs and imaging results over the last 24 hours. I personally reviewed no images or EKG's today.  Patient states doing fine today. Has no complaints. Worked with rehab services and did stairs etc and was not dizzy or SOB. BP has been soft but she states this is close to her baseline- never runs high. BP has improved to 109/59 after she ate breakfast. Patient does not have a BP cuff at home.

## 2022-08-24 NOTE — DISCHARGE SUMMARY
HEART CARE INPATIENT ENCOUNTER NOTE         Cardiology Discharge Summary      Wendy Handy MRN# [id number]   YOB: 1955 Age: 67 year old       Admission Date: 8/23/2022  Discharge Date: 08/24/22    Discharge Diagnoses:  # Severe aortic stenosis, now s/p transcatheter aortic valve replacement  # Bicuspid aortic valve  #Ascending aortic aneurysm  #Mild nonobstructive CAD  #HLD  #Chronic diastolic heart failure  #Subcutaneous lupus  #Headache    Brief HPI:  Wendy Handy is a 67 year old female with a history of CAD, bicuspid aortic valve, ascending aortic aneurysm stable at 4.1 cm, HFpEF, HLD and subcutaneous lupus, admitted 8/23/22 for planned TAVR procedure. Post-procedure was uncomplicated.  Plan for patient to discharge today to prior living arrangement.     Hospital Course by Diagnosis:    #Severe aortic stenosis, now s/p TAVR  # Bicuspid aortic valve  #Chronic diastolic heart failure  Prior to procedure, pt noted to have a mean gradient 40 mmHG, VELMA 0.8 Cm^2, peak velocity 4.0 M/s. Pt now s/p 23 ES3 valve. Sheath sites soft without hematoma. Neuro's intact. Pt reports feeling well today, denies any SOB, FONTANEZ. POD 1 echo with no paravalvular leak, MG 15 mmHg.      - ASA for anti-platelet therapy  - OP Cardiac rehab  - Daily weights  - Lifelong antibiotic prophylaxis prior to all dental procedures.  - okay to use acetaminophen and PTA ibuprofen for headaches upon d/c  - Structural Cardiology Follow up 9/9, 9/28 with repeat echocardiogram 9/21    #Coronary atherosclerosis  #HLD  Pre-TAVR angiogram revealed no significant CAD, with only mild atherosclerosis.    - Continue 81 mg ASA daily  - Statin: Continue PTA atorvastatin 80 mg daily, Zetia 10 mg daily and omega-3 fish oil 1200 mg daily    Chronic conditions  Ascending aortic aneurysm: Continue yearly screening cardiac Mrs  Subcutaneous lupus    Pertinent Procedures:  TAVR 8/23/22, via the right TF approach, using a 23 ES3 valve, no  sentinel    Consults:  Hospitalist  IP Cardiac Rehab    Discharge medications:   Current Discharge Medication List      CONTINUE these medications which have NOT CHANGED    Details   aspirin 81 MG EC tablet Take 81 mg by mouth daily      atorvastatin (LIPITOR) 80 MG tablet Take 1 tablet (80 mg) by mouth At Bedtime  Qty: 90 tablet, Refills: 3    Associated Diagnoses: Hyperlipidemia, unspecified hyperlipidemia type      cyanocobalamin (VITAMIN B-12) 1000 MCG tablet Take 1,000 mcg by mouth daily       ezetimibe (ZETIA) 10 MG tablet Take 1 tablet (10 mg) by mouth daily  Qty: 90 tablet, Refills: 3    Associated Diagnoses: Hypercholesterolemia      ibuprofen (ADVIL/MOTRIN) 800 MG tablet TAKE ONE TABLET BY MOUTH THREE TIMES DAILY AS NEEDED TAKE WITH FOOD  Qty: 60 tablet, Refills: 1    Associated Diagnoses: Lateral epicondylitis of right elbow      Omega-3 Fatty Acids (OMEGA-3 FISH OIL) 1200 MG CAPS Take 1 capsule by mouth daily       polyethylene glycol-propylene glycol (SYSTANE) 0.4-0.3 % SOLN ophthalmic solution Place 1 drop into both eyes as needed for dry eyes      traZODone (DESYREL) 50 MG tablet Take 1 tablet (50 mg) by mouth At Bedtime  Qty: 90 tablet, Refills: 3    Associated Diagnoses: Insomnia, unspecified type      vitamin C (ASCORBIC ACID) 1000 MG TABS Take 1,000 mg by mouth daily       VITAMIN D3 50 MCG (2000 UT) tablet TAKE 1 TABLET BY MOUTH EVERY DAY  Qty: 90 tablet, Refills: 3    Associated Diagnoses: Vitamin D deficiency, unspecified      vitamin E 400 UNIT capsule Take 400 Units by mouth daily       zinc gluconate 50 MG tablet Take 50 mg by mouth daily              Follow-up:  9/9/22 with Aleah Kay PA-C for post-procedure visit  9/28/22 with Dr. Hernandez for 1 month TAVR visit    Labs or imaging requiring follow-up after discharge:  Echocardiogram on 9/21/22    Code status:  FULL    Condition on discharge  Temp:  [97.8  F (36.6  C)-98.2  F (36.8  C)] 98.2  F (36.8  C)  Pulse:  [60-75] 71  Resp:   [13-19] 16  BP: ()/(50-73) 94/54  SpO2:  [96 %-100 %] 96 %  General: Alert, interactive, NAD  Eyes: sclera anicteric, EOMI  Neck: no carotid bruits.  Thyroid not visualized  Cardiovascular: regular rate and rhythm, normal S1 and S2, no murmurs, gallops, or rubs  Resp: clear to auscultation bilaterally, no rales, wheezes, or rhonchi  GI: Soft, nontender, nondistended. +BS.  No HSM or masses, no rebound or guarding.  Extremities: no edema, no cyanosis or clubbing, dorsalis pedis and posterior tibialis pulses 2+ bilaterally  Skin: Warm and dry, no jaundice or rash  Neuro: CN 2-12 intact, moves all extremities equally  Psych: Alert & oriented x 3    Imaging with results:  Echocardiogram 8/24/22:  Interpretation Summary     1. Normal left ventricular size and systolic performance with a visually  estimated ejection fraction of 65-70%.  2. There is a bio-prosthetic aortic valve (documented 23 mm Killian Elliot 3  tissue valve).  Â  Normal aortic valve prosthesis metrics with a mean systolic gradient of 15  mmHg and a peak anterograde velocity of 2.6 m/sec.  Â  No aortic insufficiency is detected.  3. Normal right ventricular size and systolic performance.    EKG post-procedure 8/23/22:      EKG POD #1 8/24/22:      CXR 8/24/22:  EXAM: XR CHEST PORT 1 VIEW  LOCATION: [facility]  DATE/TIME: 8/24/2022 6:30 AM     INDICATION: S P Transcatheter Aortic Valve Replacement  COMPARISON: Prior study dated 4/30/2019                                                                      IMPRESSION: The cardiac silhouette is normal in size and contour. An aortic valve prosthesis is present which is new from prior exam. The lungs are clear bilaterally.        Most Recent 3 CBC's:Recent Labs   Lab Test 08/24/22  0502 08/23/22  1402 08/19/22  0828   WBC 8.1 7.1 5.8   HGB 12.1 12.3 13.7   MCV 94 93 93   * 181 189     Most Recent 3 BMP's:Recent Labs   Lab Test 08/24/22  0502 08/23/22  1402 08/19/22  0828     137 141   POTASSIUM 4.1 3.9 4.1   CHLORIDE 107 102 108*   CO2 26 25 28   BUN 15 12 11   CR 0.77 0.81 0.78   ANIONGAP 7 10 5   FÉLIX 8.6 8.5 9.3   GLC 93 86 105       Patient Care Team:  Shahana Parker NP as PCP - General (Family Medicine)  Yolanda Gonzalez MD as Assigned PCP  Shahana Parker NP as Nurse Practitioner (Family Medicine)  Katy Rojas MD as Assigned Heart and Vascular Provider          Time Spent on this Encounter   I, Aleah Kay PA-C, personally saw the patient today and spent greater than 30 minutes discharging this patient.

## 2022-08-24 NOTE — PLAN OF CARE
Problem: Plan of Care - These are the overarching goals to be used throughout the patient stay.    Goal: Optimal Comfort and Wellbeing  Outcome: Adequate for Care Transition     Problem: Bleeding (Cardiac Catheterization)  Goal: Absence of Bleeding  Outcome: Adequate for Care Transition     Problem: Pain (Cardiac Catheterization)  Goal: Acceptable Pain Control  Outcome: Adequate for Care Transition   Goal Outcome Evaluation:      Pt's pain managed with PRN acetaminophen. Pt had an echocardiogram this AM shift. EF now 65-70% following TAVR surgery. Pt has no bleeding or symptoms of hematoma present on angio seal or previous manta suture site on femoral site. Pt's vital signs were stable. Pt discharged home via personal vehicle driven by .   Alexei Wesley RN  8/24/2022  2:28 PM

## 2022-08-24 NOTE — PLAN OF CARE
Problem: Plan of Care - These are the overarching goals to be used throughout the patient stay.    Goal: Absence of Hospital-Acquired Illness or Injury  Intervention: Identify and Manage Fall Risk  Recent Flowsheet Documentation  Taken 8/23/2022 2309 by Jeannie Nichols RN  Safety Promotion/Fall Prevention:   bed alarm on   activity supervised   lighting adjusted   nonskid shoes/slippers when out of bed   room near nurse's station     Problem: Arrhythmia/Dysrhythmia (Cardiac Catheterization)  Goal: Stable Heart Rate and Rhythm  Outcome: Ongoing, Progressing     Problem: Embolism (Cardiac Catheterization)  Goal: Absence of Embolism Signs and Symptoms  Outcome: Ongoing, Progressing     Problem: Vascular Access Protection (Cardiac Catheterization)  Goal: Absence of Vascular Access Complication  Outcome: Ongoing, Progressing   Goal Outcome Evaluation:      Pt. Denies pain, VSS  Angio sites WDL, CMS and neuros intact  Up to BR with SBA  Grouping cares to help promote sleep

## 2022-08-24 NOTE — PLAN OF CARE
Cardiac Rehab Discharge Summary    Reason for therapy discharge:    Discharged from hospital.    Progress towards therapy goal(s). See goals on Care Plan in [facility] electronic health record for goal details.  Goals met    Therapy recommendation(s):    Recommend (ordered but not scheduled) outpatient cardiac rehab.

## 2022-08-24 NOTE — PROGRESS NOTES
"Occupational Therapy/Cardiac Rehab     08/24/22 0900   Quick Adds   Type of Visit Initial Occupational Therapy Evaluation   Living Environment   People in Home spouse   Current Living Arrangements house   Home Accessibility stairs to enter home;stairs within home   Number of Stairs, Main Entrance 2   Number of Stairs, Within Home, Primary greater than 10 stairs   Stair Railings, Within Home, Primary railing on right side (ascending)   Self-Care   Usual Activity Tolerance good   Current Activity Tolerance moderate   Regular Exercise Yes   Activity/Exercise Type walking;swimming   Equipment Currently Used at Home none   Activity/Exercise/Self-Care Comment prior to admit, retired & independent with ADLs/IADLs. Spouse is also retired & can A PRN.   General Information   Onset of Illness/Injury or Date of Surgery 08/23/22   Referring Physician Aleah Kay PA-C   Patient/Family Therapy Goal Statement (OT) planning on DC home today.   Existing Precautions/Restrictions cardiac  (s/p TAVR-R femoral approach)   General Observations and Info Per chart:  \"67 year old female admitted on 8/23/2022. She has history of SLE, HLD, CAD, chronic diastolic heart failure, severe aortic stenosis presented for scheduled TAVR.\"   Cognitive Status Examination   Follows Commands WNL   Pain Assessment   Patient Currently in Pain   (slight discomfort R groin.)   Posture   Posture not impaired   Bed Mobility   Comment (Bed Mobility) SBA   Transfers   Transfer Comments SBA/mod I   Balance   Balance Comments initially slow pace-SBA room distances; no unsteadiness.   Clinical Impression   Criteria for Skilled Therapeutic Interventions Met (OT) Yes, treatment indicated   OT Diagnosis decreased ADLs   OT Problem List-Impairments impacting ADL activity tolerance impaired;mobility;post-surgical precautions   Assessment of Occupational Performance 1-3 Performance Deficits   Identified Performance Deficits activity tolerance   Planned Therapy " Interventions (OT) bed mobility training;transfer training;progressive activity/exercise;home program guidelines   Clinical Decision Making Complexity (OT) low complexity   Risk & Benefits of therapy have been explained evaluation/treatment results reviewed;care plan/treatment goals reviewed;risks/benefits reviewed;current/potential barriers reviewed;participants voiced agreement with care plan;patient   OT Discharge Planning   OT Discharge Recommendation (DC Rec) home with outpatient cardiac rehab   Total Evaluation Time (Minutes)   Total Evaluation Time (Minutes) 12

## 2022-08-25 ENCOUNTER — PATIENT OUTREACH (OUTPATIENT)
Dept: CARE COORDINATION | Facility: CLINIC | Age: 67
End: 2022-08-25

## 2022-08-25 NOTE — PROGRESS NOTES
Clinic Care Coordination Contact  [facility]: Post-Discharge Note  SITUATION                                                      Admission:    Admission Date: 08/23/22   Reason for Admission: New valve  Discharge:   Discharge Date: 08/24/22  Discharge Diagnosis: New valve    BACKGROUND                                                      Per hospital discharge summary and inpatient provider notes:    Wendy Handy is a 67 year old female with a history of CAD, bicuspid aortic valve, ascending aortic aneurysm stable at 4.1 cm, HFpEF, HLD and subcutaneous lupus, admitted 8/23/22 for planned TAVR procedure. Post-procedure was uncomplicated.  Plan for patient to discharge today to prior living arrangement.     ASSESSMENT           Discharge Assessment  How are you doing now that you are home?: I am doing good. I can move around, eat and take a shower. I do have a little bit of a headache but otherwise I am doing well.  How are your symptoms? (Red Flag symptoms escalate to triage hotline per guidelines): Improved  Do you feel your condition is stable enough to be safe at home until your provider visit?: Yes  Does the patient have their discharge instructions? : Yes  Does the patient have questions regarding their discharge instructions? : No  Were you started on any new medications or were there changes to any of your previous medications? : No (Patient did not start any new medications.)  Discharge follow-up appointment scheduled within 14 calendar days? : Yes  Discharge Follow Up Appointment Date: 09/07/22  Discharge Follow Up Appointment Scheduled with?: Specialty Care Provider                  PLAN                                                      Outpatient Plan: Follow up - refer to AVS for appointments already made    Future Appointments   Date Time Provider Department Center   9/7/2022  8:30 AM [facility] EDUCATION ROOM [facility]   9/9/2022  8:30 AM Aleah Kay PA-C [facility]   9/21/2022   9:00 AM [facility] ECHO STAFF [facility]   9/22/2022  1:00 PM Shahana Parker NP [facility]   9/28/2022 12:50 PM [facility]   10/20/2022  7:40 AM Shahana Parker NP [facility]         For any urgent concerns, please contact our 24 hour nurse triage line: [phone] ([phone])         JACQUE Dai  [phone]  [facility]

## 2022-08-31 DIAGNOSIS — M77.11 LATERAL EPICONDYLITIS OF RIGHT ELBOW: ICD-10-CM

## 2022-08-31 NOTE — TELEPHONE ENCOUNTER
"Routing refill request to provider for review/approval because:  Labs out of range:  Cbc  age    Last Written Prescription Date:  7/19/21  Last Fill Quantity: 60,  # refills: 1   Last office visit provider:  3/9/22     Requested Prescriptions   Pending Prescriptions Disp Refills     ibuprofen (ADVIL/MOTRIN) 800 MG tablet 60 tablet 1       NSAID Medications Failed - 8/31/2022  2:04 PM        Failed - Patient is age 6-64 years        Failed - Normal CBC on file in past 12 months     Recent Labs   Lab Test 08/24/22  0502   WBC 8.1   RBC 3.83   HGB 12.1   HCT 36.1   *                 Passed - Blood pressure under 140/90 in past 12 months     BP Readings from Last 3 Encounters:   08/24/22 111/60   08/19/22 112/70   07/06/22 106/70                 Passed - Normal ALT on file in past 12 months     Recent Labs   Lab Test 08/23/22  1402   ALT 34             Passed - Normal AST on file in past 12 months     Recent Labs   Lab Test 08/23/22  1402   AST 29             Passed - Recent (12 mo) or future (30 days) visit within the authorizing provider's specialty     Patient has had an office visit with the authorizing provider or a provider within the authorizing providers department within the previous 12 mos or has a future within next 30 days. See \"Patient Info\" tab in inbasket, or \"Choose Columns\" in Meds & Orders section of the refill encounter.              Passed - Medication is active on med list        Passed - No active pregnancy on record        Passed - Normal serum creatinine on file in past 12 months     Recent Labs   Lab Test 08/24/22  0502   CR 0.77       Ok to refill medication if creatinine is low          Passed - No positive pregnancy test in past 12 months             Sarah Bates, RN 08/31/22 6:09 PM  "

## 2022-09-01 ENCOUNTER — OFFICE VISIT (OUTPATIENT)
Dept: CARDIOLOGY | Facility: CLINIC | Age: 67
End: 2022-09-01
Payer: COMMERCIAL

## 2022-09-01 VITALS
HEART RATE: 81 BPM | WEIGHT: 153 LBS | DIASTOLIC BLOOD PRESSURE: 70 MMHG | RESPIRATION RATE: 16 BRPM | BODY MASS INDEX: 27.11 KG/M2 | HEIGHT: 63 IN | SYSTOLIC BLOOD PRESSURE: 112 MMHG

## 2022-09-01 DIAGNOSIS — Z95.2 S/P TAVR (TRANSCATHETER AORTIC VALVE REPLACEMENT): ICD-10-CM

## 2022-09-01 DIAGNOSIS — I50.32 CHRONIC DIASTOLIC HEART FAILURE (H): ICD-10-CM

## 2022-09-01 DIAGNOSIS — I35.0 AORTIC STENOSIS, SEVERE: Primary | ICD-10-CM

## 2022-09-01 DIAGNOSIS — I71.20 THORACIC AORTIC ANEURYSM WITHOUT RUPTURE (H): ICD-10-CM

## 2022-09-01 PROCEDURE — 99214 OFFICE O/P EST MOD 30 MIN: CPT | Performed by: INTERNAL MEDICINE

## 2022-09-01 RX ORDER — IBUPROFEN 800 MG/1
TABLET, FILM COATED ORAL
Qty: 60 TABLET | Refills: 0 | Status: SHIPPED | OUTPATIENT
Start: 2022-09-01 | End: 2023-01-30

## 2022-09-01 NOTE — PROGRESS NOTES
HEART CARE ENCOUNTER NOTE       [facility]  [phone]    Assessment/Recommendations   Assessment:  Severe aortic stenosis now s/p TAVR with #23 Killian ELLIOT valve  2.   Chronic diastolic heart failure  3.   Subcutaneous lupus  4.   Hyperlipidemia  5.   Mild non-obstructive coronary artery disease  6.   Aortic aneurysm, stable by last cardiac MRA     Plan:  Doing well from a procedural standpoint.  Okay to resume all activities at this time with no restrictions  Cardiac rehab starting September 7  Continue aspirin 81 mg daily, indefinitely  Continue atorvastatin 80 mg daily and Zetia 10 mg daily  Continue yearly surveillance of ascending aorta  Repeat echocardiogram on September 21 and 1 year follow-up visit with Dr. Hernandez on September 28.  She should see Dr. Platt in about 6 months.    Thank you for the opportunity to participate in the care of Wendy Handy. It's been a pleasure working with her.  Please do not hesitate to call with any questions or concerns.       History of Present Illness/Subjective    I had the opportunity to see Wendy Handy at the [facility] for her 1 week follow-up visit after transcatheter aortic valve replacement.  Her  accompanies her to the visit today.    Wendy is a very pleasant 67-year-old female who has been followed by serial echocardiograms for her bicuspid aortic valve disease and ascending aortic aneurysm.  She also has history of subcutaneous lupus, hyperlipidemia and coronary artery disease.  Repeat echocardiogram this past April showed progression of her aortic stenosis to the severe range as well as new evidence of pulmonary hypertension.  The patient was complaining of progressive dyspnea on exertion and inability to do the activities she was able to do 1 year ago.    She underwent transcatheter aortic valve replacement on August 23 via the right transfemoral approach and using #23 Elliot valve.  Postprocedural postoperative  "day #1 mean gradient was 15 mmHg.    Patient says that since being home she has felt really well.  She has been walking and is now up to 20 minutes a day.  She has not walked her previous route with hills quite yet because she was waiting to be cleared from us.  She has had minor discomfort in her groin sites.  No drainage noted.  She denies chest discomfort, palpitations, shortness of breath at rest, PND, orthopnea, lightheadedness, dizziness, pre-syncope or syncope.  She has had no weight loss, changes in appetite, nausea or vomiting.   ____________________________________________________________  Pre-discharge echo from 8/24/22:  Interpretation Summary     1. Normal left ventricular size and systolic performance with a visually  estimated ejection fraction of 65-70%.  2. There is a bio-prosthetic aortic valve (documented 23 mm Killian Elliot 3  tissue valve).  Â  Normal aortic valve prosthesis metrics with a mean systolic gradient of 15  mmHg and a peak anterograde velocity of 2.6 m/sec.  Â  No aortic insufficiency is detected.  3. Normal right ventricular size and systolic performance.     Physical Examination Review of Systems   Vitals: /70 (BP Location: Left arm, Patient Position: Sitting, Cuff Size: Adult Regular)   Pulse 81   Resp 16   Ht 1.6 m (5' 3\")   Wt 69.4 kg (153 lb)   BMI 27.10 kg/m    BMI= Body mass index is 27.1 kg/m .  Wt Readings from Last 3 Encounters:   09/01/22 69.4 kg (153 lb)   08/24/22 69.7 kg (153 lb 9.6 oz)   08/19/22 70.3 kg (155 lb)       General Appearance:   Alert, cooperative and in no acute distress   ENT/Mouth: membranes moist, no oral lesions or bleeding gums.      EYES:  no scleral icterus, normal conjunctivae   Neck: Supple without lymphadenopathy.  Thyroid not visualized   Chest/Lungs:   lungs are clear to auscultation, no rales or wheezing   Cardiovascular:   Regular. Normal first and second heart sounds with no murmurs, rubs, or gallops; the carotid, radial and " posterior tibial pulses are intact, no edema bilaterally    Abdomen:  Soft and nontender. Bowel sounds are present in all quadrants   Extremities: no cyanosis or clubbing.  Puncture site is soft and nontender bilaterally.  No residual bruising.  Manta device palpated in right groin; no hematoma.    Skin: no xanthelasma, warm.    Neurologic: normal gait, normal  bilateral, no tremors   Psychiatric: Normal mood and affect       Please refer above for cardiac ROS details.      Medical History  Surgical History Family History Social History   Past Medical History:   Diagnosis Date     Aortic valve disorder     echo 1/09  4.2 cm TAA-MRA 4/2016       Bicuspid aortic valve      Coronary artery disease due to calcified coronary lesion 5/5/2016    Echo 6/2016   Good function  CT Ca++ 172 LAD  Cardiology consult 4/016-nuclear stress     Family history of psychiatric condition      Family history of tremor      Family history of vascular disease      H/O LEEP 1/1/2007 2002-2006 LSIL paps with colposcopy, had LEEP in 2007. No records 6/2/10 ASCUS, +HR HPV 53 6/20/12 ASCUS, +HR HPV 53. Colposcopy outside / system? 6/23/14 NIL 6/24/15 NIL pap, neg HPV 6/27/16 ASCUS, neg HPV 6/29/17 NIL pap, neg HPV 7/9/18 ASCUS, +HR HPV 16. Unclear if colposcopy was completed 7/10/19 NIL pap, neg HPV 7/13/20 NIL pap, neg HPV 2/1/21 NIL pap, neg HPV. Plan: await provider     History of aortic stenosis      History of vitamin D deficiency      Hypercholesteremia      Hyperlipidemia      Laboratory test 8/18/2016    Reading Physician Reading Date Result Priority    Patricia Orta MD 8/16/2016       Narrative       Examination: Nuclear medicine DATscan for Dopamine Receptor Localization.    Examination: NM BRAIN IMAGING TOMOGRAPHIC (SPECT) DATSCAN  Date: 8/16/2016 3:38 PM  Indication: Right sided postural tremor.   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       Osteopenia 2/15/2015     Other and unspecified hyperlipidemia     Created by Conversion      Papanicolaou smear of cervix with low grade squamous intraepithelial lesion (LGSIL)      LGSIL 2002  colpoLGSIL 2003  colpoLGSIL 4/2006 colpoLGSIL 11/06 colpoAbnormal 5/07 colpo  CHCWLEEPendometrial biopsy 5/07      Plantar fasciitis of right foot      Primary insomnia      Snores      Tremor      Vertigo      Vitamin D deficiency      Wears glasses      Past Surgical History:   Procedure Laterality Date     BLEPHAROPLASTY       CARDIAC CATHETERIZATION  05/30/2017    No intervention     CARDIAC CATHETERIZATION N/A 5/30/2017    Procedure: Coronary Angiogram;  Surgeon: Torito Metzger MD;  Location: [facility];  Service:      CHOLECYSTECTOMY       CONIZATION CERVIX,LOOP ELECTRD      Description: Cervical Conization Loop Electrode Excision;  Recorded: 05/29/2008;     CV CORONARY ANGIOGRAM N/A 6/9/2022    Procedure: Coronary Angiogram;  Surgeon: Anabela Hernandez MD;  Location: [facility] CV     CV CORONARY ANGIOGRAM  6/9/2022    Procedure: ;  Surgeon: Anabela Hernandez MD;  Location: [facility] CV     CV TRANSCATHETER AORTIC VALVE REPLACEMENT-FEMORAL APPROACH N/A 8/23/2022    Procedure: Transcatheter Aortic Valve Replacement-Femoral Approach;  Surgeon: Anabela Hernandez MD;  Location: [facility] CV     FACIAL RECONSTRUCTION SURGERY      forehead as well     HC CLOSED TX MANDIBLE FX W/O MANIP      Description: Closed Treatment Of Mandibular Fracture;  Recorded: 05/29/2008;     HC DILATION/CURETTAGE DIAG/THER NON OB      Description: Dilation And Curettage;  Recorded: 05/29/2008;  Comments: X 2  metrorhhagia     LAPAROSCOPIC CHOLECYSTECTOMY N/A 2/23/2015    Procedure: CHOLECYSTECTOMY LAPAROSCOPIC;  Surgeon:  Doug Webber MD;  Location: [facility] OR;  Service:      OR TRANSCATHETER AORTIC VALVE REPLACEMENT, FEMORAL PERCUTANEOUS APPROACH (STANDBY) N/A 8/23/2022    Procedure: OR TRANSCATHETER AORTIC VALVE REPLACEMENT, FEMORAL PERCUTANEOUS APPROACH (STANDBY);  Surgeon: Katy Rojas MD;  Location: [facility] ORAL SURGERY PROCEDURE      jaw fracture     Family History   Problem Relation Age of Onset     Heart Disease Father      Heart Disease Sister      Heart Disease Sister      Mental Illness Mother 30.00        schizophrenia, bipolar     Mental Illness Sister         bipolar     Colon Cancer Father      Hyperlipidemia Brother      Hyperlipidemia Brother      Hyperlipidemia Sister      Cervical Cancer Daughter      Diabetes Type 2  Brother      Diabetes Type 2  Brother      Tremor Father      Tremor Paternal Aunt      Tremor Paternal Grandfather      Tremor Son      Tremor Cousin         Paternal 1st cousin     Attention Deficit Disorder Son      Neurologic Disorder Son         Tourette Syndrome      Schizophrenia Mother      Schizophrenia Sister      Mental Illness Sister         depression     Diabetes Brother         type 2     Mental Illness Brother         depression    Social History     Socioeconomic History     Marital status:      Spouse name: Not on file     Number of children: Not on file     Years of education: Not on file     Highest education level: Not on file   Occupational History     Not on file   Tobacco Use     Smoking status: Never Smoker     Smokeless tobacco: Never Used   Substance and Sexual Activity     Alcohol use: Yes     Alcohol/week: 2.0 standard drinks     Comment: occasion     Drug use: No     Sexual activity: Not Currently     Partners: Male     Birth control/protection: Abstinence   Other Topics Concern     Not on file   Social History Narrative    Living in [place] with her  and has been  for 11 yrs     This is her 3rd marriage     1st marriage had 2 kids: son and daughter - 33 y r old son and 31 yr old daughter    2nd marriage no kids        Not working presently. Retired    Had been  in the past.      Social Determinants of Health     Financial Resource Strain: Not on file   Food Insecurity: Not on file   Transportation Needs: Not on file   Physical Activity: Not on file   Stress: Not on file   Social Connections: Not on file   Intimate Partner Violence: Not on file   Housing Stability: Not on file          Medications  Allergies   Current Outpatient Medications   Medication Sig Dispense Refill     aspirin 81 MG EC tablet Take 81 mg by mouth daily       atorvastatin (LIPITOR) 80 MG tablet Take 1 tablet (80 mg) by mouth At Bedtime 90 tablet 3     cyanocobalamin (VITAMIN B-12) 1000 MCG tablet Take 1,000 mcg by mouth daily        ezetimibe (ZETIA) 10 MG tablet Take 1 tablet (10 mg) by mouth daily 90 tablet 3     ibuprofen (ADVIL/MOTRIN) 800 MG tablet TAKE ONE TABLET BY MOUTH THREE TIMES DAILY AS NEEDED TAKE WITH FOOD 60 tablet 0     Omega-3 Fatty Acids (OMEGA-3 FISH OIL) 1200 MG CAPS Take 1 capsule by mouth daily        polyethylene glycol-propylene glycol (SYSTANE) 0.4-0.3 % SOLN ophthalmic solution Place 1 drop into both eyes as needed for dry eyes       traZODone (DESYREL) 50 MG tablet Take 1 tablet (50 mg) by mouth At Bedtime 90 tablet 3     vitamin C (ASCORBIC ACID) 1000 MG TABS Take 1,000 mg by mouth daily        VITAMIN D3 50 MCG (2000 UT) tablet TAKE 1 TABLET BY MOUTH EVERY DAY 90 tablet 3     vitamin E 400 UNIT capsule Take 400 Units by mouth daily        zinc gluconate 50 MG tablet Take 50 mg by mouth daily       No Known Allergies      Lab Results    Chemistry/lipid CBC Cardiac Enzymes/BNP/TSH/INR   Recent Labs   Lab Test 03/09/22  1415   CHOL 127   HDL 57   LDL 53   TRIG 87     Recent Labs   Lab Test 03/09/22  1415 02/01/21  1515 07/13/20  0751   LDL 53 78 77     Recent Labs   Lab Test 08/24/22  0502      POTASSIUM  4.1   CHLORIDE 107   CO2 26   GLC 93   BUN 15   CR 0.77   GFRESTIMATED 84   FÉLIX 8.6     Recent Labs   Lab Test 08/24/22  0502 08/23/22  1402 08/19/22  0828   CR 0.77 0.81 0.78     No results for input(s): A1C in the last 54421 hours. Recent Labs   Lab Test 08/24/22  0502   WBC 8.1   HGB 12.1   HCT 36.1   MCV 94   *     Recent Labs   Lab Test 08/24/22  0502 08/23/22  1402 08/19/22  0828   HGB 12.1 12.3 13.7    No results for input(s): TROPONINI in the last 75160 hours.  Recent Labs   Lab Test 08/19/22  0828   BNP 56     Recent Labs   Lab Test 07/10/19  0757   TSH 1.53     Recent Labs   Lab Test 08/19/22  0828   INR 1.07          This note has been dictated using voice recognition software. Any grammatical or context distortions are unintentional and inherent to the software.

## 2022-09-01 NOTE — PATIENT INSTRUCTIONS
Post-Op Assessment Note    CV Status:  Stable    Pain management: adequate     Mental Status:  Alert and awake   Hydration Status:  Euvolemic   PONV Controlled:  Controlled   Airway Patency:  Patent   Post Op Vitals Reviewed: Yes      Staff: Anesthesiologist           BP      Temp      Pulse     Resp      SpO2 Wendy Handy,    It was a pleasure to see you today in the clinic regarding your recent TAVR.     My recommendations after this visit include:     - no changes in medications    You should followup with Dr. Hernandez on September 28      If you have questions or concerns, please call using the numbers below:    Valve Clinic Pool  [phone]    After Hours/Scheduling  [phone]    Otherwise you can dial the nurse directly at:    Mariela Banks RN  [phone]

## 2022-09-01 NOTE — LETTER
9/1/2022    Shahana Parker, NP  [address] [place] [address]    RE: Wendy Mejiaohn       Dear Colleague,     I had the pleasure of seeing Wendy Handy in the [facility].  HEART CARE ENCOUNTER NOTE       [facility]  [phone]    Assessment/Recommendations   Assessment:  1. Severe aortic stenosis now s/p TAVR with #23 Killian BRADY valve  2.   Chronic diastolic heart failure  3.   Subcutaneous lupus  4.   Hyperlipidemia  5.   Mild non-obstructive coronary artery disease  6.   Aortic aneurysm, stable by last cardiac MRA     Plan:  1. Doing well from a procedural standpoint.  Okay to resume all activities at this time with no restrictions  2. Cardiac rehab starting September 7  3. Continue aspirin 81 mg daily, indefinitely  2. Continue atorvastatin 80 mg daily and Zetia 10 mg daily  4. Continue yearly surveillance of ascending aorta  5. Repeat echocardiogram on September 21 and 1 year follow-up visit with Dr. Hernandez on September 28.  6. She should see Dr. Platt in about 6 months.    Thank you for the opportunity to participate in the care of Wendy Handy. It's been a pleasure working with her.  Please do not hesitate to call with any questions or concerns.       History of Present Illness/Subjective    I had the opportunity to see Wendy Handy at the [facility] for her 1 week follow-up visit after transcatheter aortic valve replacement.  Her  accompanies her to the visit today.    Wendy is a very pleasant 67-year-old female who has been followed by serial echocardiograms for her bicuspid aortic valve disease and ascending aortic aneurysm.  She also has history of subcutaneous lupus, hyperlipidemia and coronary artery disease.  Repeat echocardiogram this past April showed progression of her aortic stenosis to the severe range as well as new evidence of pulmonary hypertension.  The patient was complaining of progressive dyspnea on exertion and  "inability to do the activities she was able to do 1 year ago.    She underwent transcatheter aortic valve replacement on August 23 via the right transfemoral approach and using #23 Elliot valve.  Postprocedural postoperative day #1 mean gradient was 15 mmHg.    Patient says that since being home she has felt really well.  She has been walking and is now up to 20 minutes a day.  She has not walked her previous route with hills quite yet because she was waiting to be cleared from us.  She has had minor discomfort in her groin sites.  No drainage noted.  She denies chest discomfort, palpitations, shortness of breath at rest, PND, orthopnea, lightheadedness, dizziness, pre-syncope or syncope.  She has had no weight loss, changes in appetite, nausea or vomiting.   ____________________________________________________________  Pre-discharge echo from 8/24/22:  Interpretation Summary     1. Normal left ventricular size and systolic performance with a visually  estimated ejection fraction of 65-70%.  2. There is a bio-prosthetic aortic valve (documented 23 mm Killian Elliot 3  tissue valve).  Â  Normal aortic valve prosthesis metrics with a mean systolic gradient of 15  mmHg and a peak anterograde velocity of 2.6 m/sec.  Â  No aortic insufficiency is detected.  3. Normal right ventricular size and systolic performance.     Physical Examination Review of Systems   Vitals: /70 (BP Location: Left arm, Patient Position: Sitting, Cuff Size: Adult Regular)   Pulse 81   Resp 16   Ht 1.6 m (5' 3\")   Wt 69.4 kg (153 lb)   BMI 27.10 kg/m    BMI= Body mass index is 27.1 kg/m .  Wt Readings from Last 3 Encounters:   09/01/22 69.4 kg (153 lb)   08/24/22 69.7 kg (153 lb 9.6 oz)   08/19/22 70.3 kg (155 lb)       General Appearance:   Alert, cooperative and in no acute distress   ENT/Mouth: membranes moist, no oral lesions or bleeding gums.      EYES:  no scleral icterus, normal conjunctivae   Neck: Supple without " lymphadenopathy.  Thyroid not visualized   Chest/Lungs:   lungs are clear to auscultation, no rales or wheezing   Cardiovascular:   Regular. Normal first and second heart sounds with no murmurs, rubs, or gallops; the carotid, radial and posterior tibial pulses are intact, no edema bilaterally    Abdomen:  Soft and nontender. Bowel sounds are present in all quadrants   Extremities: no cyanosis or clubbing.  Puncture site is soft and nontender bilaterally.  No residual bruising.  Manta device palpated in right groin; no hematoma.    Skin: no xanthelasma, warm.    Neurologic: normal gait, normal  bilateral, no tremors   Psychiatric: Normal mood and affect       Please refer above for cardiac ROS details.      Medical History  Surgical History Family History Social History   Past Medical History:   Diagnosis Date     Aortic valve disorder     echo 1/09  4.2 cm TAA-MRA 4/2016       Bicuspid aortic valve      Coronary artery disease due to calcified coronary lesion 5/5/2016    Echo 6/2016   Good function  CT Ca++ 172 LAD  Cardiology consult 4/016-nuclear stress     Family history of psychiatric condition      Family history of tremor      Family history of vascular disease      H/O LEEP 1/1/2007 2002-2006 LSIL paps with colposcopy, had LEEP in 2007. No records 6/2/10 ASCUS, +HR HPV 53 6/20/12 ASCUS, +HR HPV 53. Colposcopy outside HE/ system? 6/23/14 NIL 6/24/15 NIL pap, neg HPV 6/27/16 ASCUS, neg HPV 6/29/17 NIL pap, neg HPV 7/9/18 ASCUS, +HR HPV 16. Unclear if colposcopy was completed 7/10/19 NIL pap, neg HPV 7/13/20 NIL pap, neg HPV 2/1/21 NIL pap, neg HPV. Plan: await provider     History of aortic stenosis      History of vitamin D deficiency      Hypercholesteremia      Hyperlipidemia      Laboratory test 8/18/2016    Reading Physician Reading Date Result Priority    Patricia Orta MD 8/16/2016       Narrative       Examination: Nuclear medicine DATscan for Dopamine Receptor Localization.    Examination: NM  BRAIN IMAGING TOMOGRAPHIC (SPECT) DATSCAN  Date: 8/16/2016 3:38 PM  Indication: Right sided postural tremor.   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       Osteopenia 2/15/2015     Other and unspecified hyperlipidemia     Created by Conversion      Papanicolaou smear of cervix with low grade squamous intraepithelial lesion (LGSIL)      LGSIL 2002  colpoLGSIL 2003  colpoLGSIL 4/2006 colpoLGSIL 11/06 colpoAbnormal 5/07 colpo  CHCWLEEPendometrial biopsy 5/07      Plantar fasciitis of right foot      Primary insomnia      Snores      Tremor      Vertigo      Vitamin D deficiency      Wears glasses      Past Surgical History:   Procedure Laterality Date     BLEPHAROPLASTY       CARDIAC CATHETERIZATION  05/30/2017    No intervention     CARDIAC CATHETERIZATION N/A 5/30/2017    Procedure: Coronary Angiogram;  Surgeon: Torito Metzger MD;  Location: [facility];  Service:      CHOLECYSTECTOMY       CONIZATION CERVIX,LOOP ELECTRD      Description: Cervical Conization Loop Electrode Excision;  Recorded: 05/29/2008;     CV CORONARY ANGIOGRAM N/A 6/9/2022    Procedure: Coronary Angiogram;  Surgeon: Anabela Hernandez MD;  Location: [facility]     CV CORONARY ANGIOGRAM  6/9/2022    Procedure: ;  Surgeon: Anabela Hernandez MD;  Location: [facility]     CV TRANSCATHETER AORTIC VALVE REPLACEMENT-FEMORAL APPROACH N/A 8/23/2022    Procedure: Transcatheter Aortic Valve Replacement-Femoral Approach;  Surgeon: Anabela Hernandez MD;  Location: [facility]     FACIAL RECONSTRUCTION SURGERY      forehead as well     HC CLOSED TX MANDIBLE FX W/O MANIP      Description: Closed Treatment Of Mandibular Fracture;  Recorded: 05/29/2008;      HC DILATION/CURETTAGE DIAG/THER NON OB      Description: Dilation And Curettage;  Recorded: 05/29/2008;  Comments: X 2  metrorhhagia     LAPAROSCOPIC CHOLECYSTECTOMY N/A 2/23/2015    Procedure: CHOLECYSTECTOMY LAPAROSCOPIC;  Surgeon: Doug Webber MD;  Location: [facility];  Service:      OR TRANSCATHETER AORTIC VALVE REPLACEMENT, FEMORAL PERCUTANEOUS APPROACH (STANDBY) N/A 8/23/2022    Procedure: OR TRANSCATHETER AORTIC VALVE REPLACEMENT, [facility]  Surgeon: Katy Rojas MD;  Location: [facility] ORAL SURGERY PROCEDURE      jaw fracture     Family History   Problem Relation Age of Onset     Heart Disease Father      Heart Disease Sister      Heart Disease Sister      Mental Illness Mother 30.00        schizophrenia, bipolar     Mental Illness Sister         bipolar     Colon Cancer Father      Hyperlipidemia Brother      Hyperlipidemia Brother      Hyperlipidemia Sister      Cervical Cancer Daughter      Diabetes Type 2  Brother      Diabetes Type 2  Brother      Tremor Father      Tremor Paternal Aunt      Tremor Paternal Grandfather      Tremor Son      Tremor Cousin         Paternal 1st cousin     Attention Deficit Disorder Son      Neurologic Disorder Son         Tourette Syndrome      Schizophrenia Mother      Schizophrenia Sister      Mental Illness Sister         depression     Diabetes Brother         type 2     Mental Illness Brother         depression    Social History     Socioeconomic History     Marital status:      Spouse name: Not on file     Number of children: Not on file     Years of education: Not on file     Highest education level: Not on file   Occupational History     Not on file   Tobacco Use     Smoking status: Never Smoker     Smokeless tobacco: Never Used   Substance and Sexual Activity     Alcohol use: Yes     Alcohol/week: 2.0 standard drinks     Comment: occasion     Drug use: No     Sexual activity: Not  Currently     Partners: Male     Birth control/protection: Abstinence   Other Topics Concern     Not on file   Social History Narrative    Living in [place] with her  and has been  for 11 yrs     This is her 3rd marriage    1st marriage had 2 kids: son and daughter - 33 y r old son and 31 yr old daughter    2nd marriage no kids        Not working presently. Retired    Had been  in the past.      Social Determinants of Health     Financial Resource Strain: Not on file   Food Insecurity: Not on file   Transportation Needs: Not on file   Physical Activity: Not on file   Stress: Not on file   Social Connections: Not on file   Intimate Partner Violence: Not on file   Housing Stability: Not on file          Medications  Allergies   Current Outpatient Medications   Medication Sig Dispense Refill     aspirin 81 MG EC tablet Take 81 mg by mouth daily       atorvastatin (LIPITOR) 80 MG tablet Take 1 tablet (80 mg) by mouth At Bedtime 90 tablet 3     cyanocobalamin (VITAMIN B-12) 1000 MCG tablet Take 1,000 mcg by mouth daily        ezetimibe (ZETIA) 10 MG tablet Take 1 tablet (10 mg) by mouth daily 90 tablet 3     ibuprofen (ADVIL/MOTRIN) 800 MG tablet TAKE ONE TABLET BY MOUTH THREE TIMES DAILY AS NEEDED TAKE WITH FOOD 60 tablet 0     Omega-3 Fatty Acids (OMEGA-3 FISH OIL) 1200 MG CAPS Take 1 capsule by mouth daily        polyethylene glycol-propylene glycol (SYSTANE) 0.4-0.3 % SOLN ophthalmic solution Place 1 drop into both eyes as needed for dry eyes       traZODone (DESYREL) 50 MG tablet Take 1 tablet (50 mg) by mouth At Bedtime 90 tablet 3     vitamin C (ASCORBIC ACID) 1000 MG TABS Take 1,000 mg by mouth daily        VITAMIN D3 50 MCG (2000 UT) tablet TAKE 1 TABLET BY MOUTH EVERY DAY 90 tablet 3     vitamin E 400 UNIT capsule Take 400 Units by mouth daily        zinc gluconate 50 MG tablet Take 50 mg by mouth daily       No Known Allergies      Lab Results    Chemistry/lipid CBC Cardiac  Enzymes/BNP/TSH/INR   Recent Labs   Lab Test 03/09/22  1415   CHOL 127   HDL 57   LDL 53   TRIG 87     Recent Labs   Lab Test 03/09/22  1415 02/01/21  1515 07/13/20  0751   LDL 53 78 77     Recent Labs   Lab Test 08/24/22  0502      POTASSIUM 4.1   CHLORIDE 107   CO2 26   GLC 93   BUN 15   CR 0.77   GFRESTIMATED 84   FÉLIX 8.6     Recent Labs   Lab Test 08/24/22  0502 08/23/22  1402 08/19/22  0828   CR 0.77 0.81 0.78     No results for input(s): A1C in the last 18659 hours. Recent Labs   Lab Test 08/24/22  0502   WBC 8.1   HGB 12.1   HCT 36.1   MCV 94   *     Recent Labs   Lab Test 08/24/22  0502 08/23/22  1402 08/19/22  0828   HGB 12.1 12.3 13.7    No results for input(s): TROPONINI in the last 35703 hours.  Recent Labs   Lab Test 08/19/22  0828   BNP 56     Recent Labs   Lab Test 07/10/19  0757   TSH 1.53     Recent Labs   Lab Test 08/19/22  0828   INR 1.07          This note has been dictated using voice recognition software. Any grammatical or context distortions are unintentional and inherent to the software.      Thank you for allowing me to participate in the care of your patient.    Sincerely,   [facility]

## 2022-09-02 RX ORDER — IBUPROFEN 800 MG/1
TABLET, FILM COATED ORAL
Qty: 60 TABLET | Refills: 1 | OUTPATIENT
Start: 2022-09-02

## 2022-09-07 ENCOUNTER — HOSPITAL ENCOUNTER (OUTPATIENT)
Dept: CARDIAC REHAB | Facility: CLINIC | Age: 67
Discharge: HOME | End: 2022-09-07
Attending: INTERNAL MEDICINE
Payer: COMMERCIAL

## 2022-09-07 DIAGNOSIS — Z95.2 S/P TAVR (TRANSCATHETER AORTIC VALVE REPLACEMENT): ICD-10-CM

## 2022-09-07 PROCEDURE — 93798 PHYS/QHP OP CAR RHAB W/ECG: CPT

## 2022-09-07 PROCEDURE — 93797 PHYS/QHP OP CAR RHAB WO ECG: CPT

## 2022-09-09 ENCOUNTER — HOSPITAL ENCOUNTER (OUTPATIENT)
Dept: CARDIAC REHAB | Facility: CLINIC | Age: 67
Discharge: HOME | End: 2022-09-09
Attending: INTERNAL MEDICINE
Payer: COMMERCIAL

## 2022-09-09 PROCEDURE — 93798 PHYS/QHP OP CAR RHAB W/ECG: CPT

## 2022-09-11 ENCOUNTER — HEALTH MAINTENANCE LETTER (OUTPATIENT)
Age: 67
End: 2022-09-11

## 2022-09-14 ENCOUNTER — HOSPITAL ENCOUNTER (OUTPATIENT)
Dept: CARDIAC REHAB | Facility: CLINIC | Age: 67
Discharge: HOME | End: 2022-09-14
Attending: INTERNAL MEDICINE
Payer: COMMERCIAL

## 2022-09-14 PROCEDURE — 93798 PHYS/QHP OP CAR RHAB W/ECG: CPT

## 2022-09-16 ENCOUNTER — HOSPITAL ENCOUNTER (OUTPATIENT)
Dept: CARDIAC REHAB | Facility: CLINIC | Age: 67
Discharge: HOME | End: 2022-09-16
Attending: INTERNAL MEDICINE
Payer: COMMERCIAL

## 2022-09-16 PROCEDURE — 93798 PHYS/QHP OP CAR RHAB W/ECG: CPT

## 2022-09-19 ENCOUNTER — HOSPITAL ENCOUNTER (OUTPATIENT)
Dept: CARDIAC REHAB | Facility: CLINIC | Age: 67
Discharge: HOME | End: 2022-09-19
Attending: INTERNAL MEDICINE
Payer: COMMERCIAL

## 2022-09-19 PROCEDURE — 93797 PHYS/QHP OP CAR RHAB WO ECG: CPT

## 2022-09-19 PROCEDURE — 93798 PHYS/QHP OP CAR RHAB W/ECG: CPT

## 2022-09-21 ENCOUNTER — HOSPITAL ENCOUNTER (OUTPATIENT)
Dept: CARDIAC REHAB | Facility: CLINIC | Age: 67
Discharge: HOME | End: 2022-09-21
Attending: INTERNAL MEDICINE
Payer: COMMERCIAL

## 2022-09-21 ENCOUNTER — HOSPITAL ENCOUNTER (OUTPATIENT)
Dept: CARDIOLOGY | Facility: CLINIC | Age: 67
Discharge: HOME | End: 2022-09-21
Attending: INTERNAL MEDICINE | Admitting: INTERNAL MEDICINE
Payer: COMMERCIAL

## 2022-09-21 DIAGNOSIS — I35.0 SEVERE AORTIC STENOSIS: ICD-10-CM

## 2022-09-21 LAB — LVEF ECHO: NORMAL

## 2022-09-21 PROCEDURE — 93306 TTE W/DOPPLER COMPLETE: CPT

## 2022-09-21 PROCEDURE — 93798 PHYS/QHP OP CAR RHAB W/ECG: CPT

## 2022-09-21 PROCEDURE — 93306 TTE W/DOPPLER COMPLETE: CPT | Mod: 26 | Performed by: INTERNAL MEDICINE

## 2022-09-22 ENCOUNTER — OFFICE VISIT (OUTPATIENT)
Dept: FAMILY MEDICINE | Facility: CLINIC | Age: 67
End: 2022-09-22
Payer: COMMERCIAL

## 2022-09-22 VITALS
OXYGEN SATURATION: 96 % | DIASTOLIC BLOOD PRESSURE: 74 MMHG | HEART RATE: 72 BPM | SYSTOLIC BLOOD PRESSURE: 111 MMHG | BODY MASS INDEX: 27.46 KG/M2 | WEIGHT: 155 LBS

## 2022-09-22 DIAGNOSIS — I71.20 THORACIC AORTIC ANEURYSM WITHOUT RUPTURE (H): ICD-10-CM

## 2022-09-22 DIAGNOSIS — I25.10 CORONARY ARTERY DISEASE DUE TO CALCIFIED CORONARY LESION: ICD-10-CM

## 2022-09-22 DIAGNOSIS — I20.89 ANGINA OF EFFORT (H): ICD-10-CM

## 2022-09-22 DIAGNOSIS — Z95.2 S/P TAVR (TRANSCATHETER AORTIC VALVE REPLACEMENT): ICD-10-CM

## 2022-09-22 DIAGNOSIS — I25.84 CORONARY ARTERY DISEASE DUE TO CALCIFIED CORONARY LESION: ICD-10-CM

## 2022-09-22 DIAGNOSIS — Z76.89 ENCOUNTER TO ESTABLISH CARE: Primary | ICD-10-CM

## 2022-09-22 DIAGNOSIS — F51.01 PRIMARY INSOMNIA: ICD-10-CM

## 2022-09-22 PROCEDURE — 99214 OFFICE O/P EST MOD 30 MIN: CPT | Performed by: NURSE PRACTITIONER

## 2022-09-22 NOTE — PROGRESS NOTES
"  Assessment & Plan     Encounter to establish care    S/P TAVR (transcatheter aortic valve replacement)  Patient is doing very well post surgery.  Continues regular follow up with cardiology.    Thoracic aortic aneurysm without rupture  Monitored by cardiology.     Angina of effort (H)  This resolved with recent valve replacement.    Coronary artery disease due to calcified coronary lesion  On a statin and Zetia.    Primary insomnia  On Trazodone.  Discussed going up to 100 mg on nights when she is sleeping worse.         BMI:   Estimated body mass index is 27.46 kg/m  as calculated from the following:    Height as of 9/1/22: 1.6 m (5' 3\").    Weight as of this encounter: 70.3 kg (155 lb).       Return in about 6 months (around 3/22/2023) for Routine preventive, with me, in person.    Shahana Parker NP  [facility]    Ludy Nguyen is a 67 year old who presents to establish care and for a hospital follow-up.  Patient was previously following with Dr. Gonzalez.    Patient is  with adult children.    She underwent a TAVR last month due to severe aortic stenosis.  They had been monitoring this for several years.  The procedure went very well and patient is feeling good.  She did not really have symptoms prior to surgery other than some exertional dyspnea with walking up hills.  She did not have any chest pain.  Patient had a repeat echocardiogram yesterday.  She will need antibiotic prophylaxis for any future dental work or cleanings.    Patient has a thoracic aortic aneurysm that is being monitored by cardiology.    On atorvastatin and Zetia for high cholesterol.  She is tolerating these well.    Patient utilizes trazodone 50 mg at bedtime.  She has chronically had issues with falling asleep.  The trazodone does help, but it often takes her over an hour to fall asleep.    Uses ibuprofen intermittently for back pain.    Patient has no questions or concerns today.  She will " plan on getting her influenza and COVID booster at her local pharmacy.    Review of Systems   Pertinent items in HPI      Objective    /74 (BP Location: Right arm, Patient Position: Sitting, Cuff Size: Adult Regular)   Pulse 72   Wt 70.3 kg (155 lb)   SpO2 96%   BMI 27.46 kg/m    Body mass index is 27.46 kg/m .  Physical Exam   GENERAL: healthy, alert and no distress  RESP: lungs clear to auscultation - no rales, rhonchi or wheezes  CV: regular rate and rhythm, normal S1 S2, no S3 or S4, no murmur, click or rub, no peripheral edema

## 2022-09-23 DIAGNOSIS — Z95.2 S/P TAVR (TRANSCATHETER AORTIC VALVE REPLACEMENT): Primary | ICD-10-CM

## 2022-09-28 ENCOUNTER — OFFICE VISIT (OUTPATIENT)
Dept: CARDIOLOGY | Facility: CLINIC | Age: 67
End: 2022-09-28
Payer: COMMERCIAL

## 2022-09-28 ENCOUNTER — HOSPITAL ENCOUNTER (OUTPATIENT)
Dept: CARDIAC REHAB | Facility: CLINIC | Age: 67
Discharge: HOME | End: 2022-09-28
Attending: INTERNAL MEDICINE
Payer: COMMERCIAL

## 2022-09-28 VITALS
HEIGHT: 63 IN | SYSTOLIC BLOOD PRESSURE: 108 MMHG | WEIGHT: 155 LBS | RESPIRATION RATE: 16 BRPM | HEART RATE: 76 BPM | DIASTOLIC BLOOD PRESSURE: 62 MMHG | BODY MASS INDEX: 27.46 KG/M2

## 2022-09-28 DIAGNOSIS — Z95.2 S/P TAVR (TRANSCATHETER AORTIC VALVE REPLACEMENT): ICD-10-CM

## 2022-09-28 LAB
ANION GAP SERPL CALCULATED.3IONS-SCNC: 12 MMOL/L (ref 7–15)
ATRIAL RATE - MUSE: 76 BPM
BUN SERPL-MCNC: 15 MG/DL (ref 8–23)
CALCIUM SERPL-MCNC: 9.6 MG/DL (ref 8.8–10.2)
CHLORIDE SERPL-SCNC: 100 MMOL/L (ref 98–107)
CREAT SERPL-MCNC: 0.83 MG/DL (ref 0.51–0.95)
DEPRECATED HCO3 PLAS-SCNC: 28 MMOL/L (ref 22–29)
DIASTOLIC BLOOD PRESSURE - MUSE: NORMAL MMHG
ERYTHROCYTE [DISTWIDTH] IN BLOOD BY AUTOMATED COUNT: 12.7 % (ref 10–15)
GFR SERPL CREATININE-BSD FRML MDRD: 77 ML/MIN/1.73M2
GLUCOSE SERPL-MCNC: 93 MG/DL (ref 70–99)
HCT VFR BLD AUTO: 39 % (ref 35–47)
HGB BLD-MCNC: 13 G/DL (ref 11.7–15.7)
INTERPRETATION ECG - MUSE: NORMAL
MCH RBC QN AUTO: 31.4 PG (ref 26.5–33)
MCHC RBC AUTO-ENTMCNC: 33.3 G/DL (ref 31.5–36.5)
MCV RBC AUTO: 94 FL (ref 78–100)
P AXIS - MUSE: 62 DEGREES
PLATELET # BLD AUTO: 154 10E3/UL (ref 150–450)
POTASSIUM SERPL-SCNC: 3.8 MMOL/L (ref 3.4–5.3)
PR INTERVAL - MUSE: 168 MS
QRS DURATION - MUSE: 76 MS
QT - MUSE: 364 MS
QTC - MUSE: 409 MS
R AXIS - MUSE: -5 DEGREES
RBC # BLD AUTO: 4.14 10E6/UL (ref 3.8–5.2)
SODIUM SERPL-SCNC: 140 MMOL/L (ref 136–145)
SYSTOLIC BLOOD PRESSURE - MUSE: NORMAL MMHG
T AXIS - MUSE: 62 DEGREES
VENTRICULAR RATE- MUSE: 76 BPM
WBC # BLD AUTO: 8.3 10E3/UL (ref 4–11)

## 2022-09-28 PROCEDURE — 99214 OFFICE O/P EST MOD 30 MIN: CPT | Performed by: INTERNAL MEDICINE

## 2022-09-28 PROCEDURE — 85027 COMPLETE CBC AUTOMATED: CPT | Performed by: INTERNAL MEDICINE

## 2022-09-28 PROCEDURE — 36415 COLL VENOUS BLD VENIPUNCTURE: CPT | Performed by: INTERNAL MEDICINE

## 2022-09-28 PROCEDURE — 80048 BASIC METABOLIC PNL TOTAL CA: CPT | Performed by: INTERNAL MEDICINE

## 2022-09-28 PROCEDURE — 93797 PHYS/QHP OP CAR RHAB WO ECG: CPT

## 2022-09-28 PROCEDURE — 93000 ELECTROCARDIOGRAM COMPLETE: CPT | Performed by: INTERNAL MEDICINE

## 2022-09-28 PROCEDURE — 93798 PHYS/QHP OP CAR RHAB W/ECG: CPT

## 2022-09-28 NOTE — PROGRESS NOTES
HEART CARE ENCOUNTER CONSULTATON MARCIA      [facility]  [phone]      Assessment/Recommendations   Assessment/Plan:    Severe symptomatic aortic valve stenosis status post transcatheter aortic valve replacement: Ms Handy is doing well from the cardiac standpoint, reporting an improvement in her symptoms and a better functional capacity following her TAVR.  Her 30-day echocardiogram shows normal function of her aortic bioprosthesis.    She has been asked to continue her current medical regimen, and knows to call if there is a change in her condition or worsening symptoms.    She will continue to follow with her primary cardiologist, Dr. Platt, and will return for a valve clinic follow-up in 1 year with repeat echocardiogram at that time.    Thank you for the opportunity to participate in the care of Ms. Handy.  Please do not hesitate to call with any questions or concerns regarding her cardiovascular status.       History of Present Illness/Subjective    HPI: Wendy Handy is a pleasant 67 year old female with a history of a bicuspid aortic valve, that progressed to being severely stenotic earlier this summer.  Her symptoms included dyspnea on exertion with a resultant change in her functional capacity.    She was also noted to have a 41 mm ascending aorta, and the options of surgical versus transcatheter aortic valve replacement were presented.  Given the stability of her ascending aortic size and her wishes to avoid surgery if possible, after multidisciplinary evaluation, it was felt that she would be a reasonable candidate for TAVR.    She underwent the procedure on August 23, 2022 receiving a 23 mm BRADY 3 valve via the right transfemoral approach.  Her postoperative course was unremarkable, and since then she has noticed an improvement in her functional capacity with less dyspnea when she tries to exert herself.    Her 30-day echocardiogram was reviewed and shows normal function of her  "aortic bioprosthesis with a mean gradient of 12 mmHg in the setting of normal left ventricular systolic function.           Physical Examination  Review of Systems   Vitals: /62 (BP Location: Right arm, Patient Position: Sitting, Cuff Size: Adult Regular)   Pulse 76   Resp 16   Ht 1.6 m (5' 3\")   Wt 70.3 kg (155 lb)   BMI 27.46 kg/m    BMI= Body mass index is 27.46 kg/m .  Wt Readings from Last 3 Encounters:   09/28/22 70.3 kg (155 lb)   09/22/22 70.3 kg (155 lb)   09/01/22 69.4 kg (153 lb)       General Appearance:   no distress, normal body habitus, upright.   ENT/Mouth: membranes moist, no nasal discharge or bleeding gums.  Normal head shape, no evidence of injury or laceration.     EYES:  no scleral icterus, normal conjunctivae   Neck: no evidence of thyromegaly.  Supple   Chest/Lungs:   No audible wheezing equal chest wall expansion. Non labored breathing.  No cough.   Cardiovascular:   No evidence of elevated jugular venous pressure.  No evidence of pitting edema bilaterally    Abdomen:  no evidence of abdominal distention. No observe juandice.     Extremities: no cyanosis or clubbing noted.    Skin: no xanthelasma, normal skin color. No evidence of facial lacerations.      Neurologic: Normal arm motion bilateral, no tremors.  No evidence of focal defect.       Psychiatric: alert and oriented x3, calm        Please refer above for cardiac ROS details.        Medical History  Surgical History Family History Social History   Past Medical History:   Diagnosis Date     Aortic valve disorder     echo 1/09  4.2 cm TAA-MRA 4/2016       Bicuspid aortic valve      Coronary artery disease due to calcified coronary lesion 5/5/2016    Echo 6/2016   Good function  CT Ca++ 172 LAD  Cardiology consult 4/016-nuclear stress     Family history of psychiatric condition      Family history of tremor      Family history of vascular disease      H/O LEEP 1/1/2007 2002-2006 LSIL paps with colposcopy, had LEEP in 2007. " No records 6/2/10 ASCUS, +HR HPV 53 6/20/12 ASCUS, +HR HPV 53. Colposcopy outside / system? 6/23/14 NIL 6/24/15 NIL pap, neg HPV 6/27/16 ASCUS, neg HPV 6/29/17 NIL pap, neg HPV 7/9/18 ASCUS, +HR HPV 16. Unclear if colposcopy was completed 7/10/19 NIL pap, neg HPV 7/13/20 NIL pap, neg HPV 2/1/21 NIL pap, neg HPV. Plan: await provider     History of aortic stenosis      History of vitamin D deficiency      Hypercholesteremia      Hyperlipidemia      Laboratory test 8/18/2016    Reading Physician Reading Date Result Priority    Patricia Orta MD 8/16/2016       Narrative       Examination: Nuclear medicine DATscan for Dopamine Receptor Localization.    Examination: NM BRAIN IMAGING TOMOGRAPHIC (SPECT) DATSCAN  Date: 8/16/2016 3:38 PM  Indication: Right sided postural tremor.   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       Osteopenia 2/15/2015     Other and unspecified hyperlipidemia     Created by Conversion      Papanicolaou smear of cervix with low grade squamous intraepithelial lesion (LGSIL)      LGSIL 2002  colpoLGSIL 2003  colpoLGSIL 4/2006 colpoLGSIL 11/06 colpoAbnormal 5/07 colpo  CHCWLEEPendometrial biopsy 5/07      Plantar fasciitis of right foot      Primary insomnia      Snores      Tremor      Vertigo      Vitamin D deficiency      Wears glasses      Past Surgical History:   Procedure Laterality Date     BLEPHAROPLASTY       CARDIAC CATHETERIZATION  05/30/2017    No intervention     CARDIAC CATHETERIZATION N/A 5/30/2017    Procedure: Coronary Angiogram;  Surgeon: Torito Metzger MD;  Location: [facility];  Service:      CHOLECYSTECTOMY       CONIZATION CERVIX,LOOP ELECTRD      Description: Cervical Conization Loop  Electrode Excision;  Recorded: 05/29/2008;     CV CORONARY ANGIOGRAM N/A 6/9/2022    Procedure: Coronary Angiogram;  Surgeon: Anabela Hernandez MD;  Location: [facility]     CV CORONARY ANGIOGRAM  6/9/2022    Procedure: ;  Surgeon: Anabela Hernandez MD;  Location: [facility]     CV TRANSCATHETER AORTIC VALVE REPLACEMENT-FEMORAL APPROACH N/A 8/23/2022    Procedure: Transcatheter Aortic Valve Replacement-Femoral Approach;  Surgeon: Anabela Hernandez MD;  Location: [facility]     FACIAL RECONSTRUCTION SURGERY      forehead as well     HC CLOSED TX MANDIBLE FX W/O MANIP      Description: Closed Treatment Of Mandibular Fracture;  Recorded: 05/29/2008;     HC DILATION/CURETTAGE DIAG/THER NON OB      Description: Dilation And Curettage;  Recorded: 05/29/2008;  Comments: X 2  metrorhhagia     LAPAROSCOPIC CHOLECYSTECTOMY N/A 2/23/2015    Procedure: CHOLECYSTECTOMY LAPAROSCOPIC;  Surgeon: Doug Webber MD;  Location: [facility];  Service:      OR TRANSCATHETER AORTIC VALVE REPLACEMENT, FEMORAL PERCUTANEOUS APPROACH (STANDBY) N/A 8/23/2022    Procedure: OR TRANSCATHETER AORTIC VALVE REPLACEMENT, FEMORAL PERCUTANEOUS APPROACH (STANDBY);  Surgeon: Katy Rojas MD;  Location: [facility]     ZZC ORAL SURGERY PROCEDURE      jaw fracture     Family History   Problem Relation Age of Onset     Heart Disease Father      Heart Disease Sister      Heart Disease Sister      Mental Illness Mother 30.00        schizophrenia, bipolar     Mental Illness Sister         bipolar     Colon Cancer Father      Hyperlipidemia Brother      Hyperlipidemia Brother      Hyperlipidemia Sister      Cervical Cancer Daughter      Diabetes Type 2  Brother      Diabetes Type 2  Brother      Tremor Father      Tremor Paternal Aunt      Tremor Paternal Grandfather      Tremor Son      Tremor Cousin         Paternal 1st cousin     Attention Deficit Disorder Son      Neurologic Disorder Son         Tourette  Syndrome      Schizophrenia Mother      Schizophrenia Sister      Mental Illness Sister         depression     Diabetes Brother         type 2     Mental Illness Brother         depression        Social History     Socioeconomic History     Marital status:      Spouse name: Not on file     Number of children: Not on file     Years of education: Not on file     Highest education level: Not on file   Occupational History     Not on file   Tobacco Use     Smoking status: Never Smoker     Smokeless tobacco: Never Used   Substance and Sexual Activity     Alcohol use: Yes     Alcohol/week: 2.0 standard drinks     Comment: occasion     Drug use: No     Sexual activity: Not Currently     Partners: Male     Birth control/protection: Abstinence   Other Topics Concern     Not on file   Social History Narrative    Living in [place] with her  and has been  for 11 yrs     This is her 3rd marriage    1st marriage had 2 kids: son and daughter - 33 y r old son and 31 yr old daughter    2nd marriage no kids        Not working presently. Retired    Had been  in the past.      Social Determinants of Health     Financial Resource Strain: Not on file   Food Insecurity: Not on file   Transportation Needs: Not on file   Physical Activity: Not on file   Stress: Not on file   Social Connections: Not on file   Intimate Partner Violence: Not on file   Housing Stability: Not on file           Medications  Allergies   Current Outpatient Medications   Medication Sig Dispense Refill     aspirin 81 MG EC tablet Take 81 mg by mouth daily       atorvastatin (LIPITOR) 80 MG tablet Take 1 tablet (80 mg) by mouth At Bedtime 90 tablet 3     cyanocobalamin (VITAMIN B-12) 1000 MCG tablet Take 1,000 mcg by mouth daily        ezetimibe (ZETIA) 10 MG tablet Take 1 tablet (10 mg) by mouth daily 90 tablet 3     ibuprofen (ADVIL/MOTRIN) 800 MG tablet TAKE ONE TABLET BY MOUTH THREE TIMES DAILY AS NEEDED TAKE WITH FOOD (Patient  taking differently: Take 800 mg by mouth as needed TAKE ONE TABLET BY MOUTH THREE TIMES DAILY AS NEEDED TAKE WITH FOOD) 60 tablet 0     Omega-3 Fatty Acids (OMEGA-3 FISH OIL) 1200 MG CAPS Take 1 capsule by mouth daily        polyethylene glycol-propylene glycol (SYSTANE) 0.4-0.3 % SOLN ophthalmic solution Place 1 drop into both eyes as needed for dry eyes       traZODone (DESYREL) 50 MG tablet Take 1 tablet (50 mg) by mouth At Bedtime 90 tablet 3     vitamin C (ASCORBIC ACID) 1000 MG TABS Take 1,000 mg by mouth daily        VITAMIN D3 50 MCG (2000 UT) tablet TAKE 1 TABLET BY MOUTH EVERY DAY 90 tablet 3     vitamin E 400 UNIT capsule Take 400 Units by mouth daily        zinc gluconate 50 MG tablet Take 50 mg by mouth daily        No Known Allergies       Lab Results    Chemistry/lipid CBC Cardiac Enzymes/BNP/TSH/INR   Recent Labs   Lab Test 03/09/22  1415   CHOL 127   HDL 57   LDL 53   TRIG 87     Recent Labs   Lab Test 03/09/22  1415 02/01/21  1515 07/13/20  0751   LDL 53 78 77     Recent Labs   Lab Test 08/24/22  0502      POTASSIUM 4.1   CHLORIDE 107   CO2 26   GLC 93   BUN 15   CR 0.77   GFRESTIMATED 84   FÉLIX 8.6     Recent Labs   Lab Test 08/24/22  0502 08/23/22  1402 08/19/22  0828   CR 0.77 0.81 0.78     No results for input(s): A1C in the last 21193 hours.       Recent Labs   Lab Test 08/24/22  0502   WBC 8.1   HGB 12.1   HCT 36.1   MCV 94   *     Recent Labs   Lab Test 08/24/22  0502 08/23/22  1402 08/19/22  0828   HGB 12.1 12.3 13.7    No results for input(s): TROPONINI in the last 69245 hours.  Recent Labs   Lab Test 08/19/22  0828   BNP 56     Recent Labs   Lab Test 07/10/19  0757   TSH 1.53     Recent Labs   Lab Test 08/19/22  0828   INR 1.07        Anabela Hernandez MD

## 2022-09-28 NOTE — LETTER
9/28/2022    Shahana Parker, NP  [address] [place] [address]    RE: Wendy Handy       Dear Colleague,     I had the pleasure of seeing Wendy Hnady in the [facility].    HEART CARE ENCOUNTER CONSULTATON NOTE      [facility]  [phone]      Assessment/Recommendations   Assessment/Plan:    Severe symptomatic aortic valve stenosis status post transcatheter aortic valve replacement: Ms Handy is doing well from the cardiac standpoint, reporting an improvement in her symptoms and a better functional capacity following her TAVR.  Her 30-day echocardiogram shows normal function of her aortic bioprosthesis.    She has been asked to continue her current medical regimen, and knows to call if there is a change in her condition or worsening symptoms.    She will continue to follow with her primary cardiologist, Dr. Platt, and will return for a valve clinic follow-up in 1 year with repeat echocardiogram at that time.    Thank you for the opportunity to participate in the care of Ms. Handy.  Please do not hesitate to call with any questions or concerns regarding her cardiovascular status.       History of Present Illness/Subjective    HPI: Wendy Handy is a pleasant 67 year old female with a history of a bicuspid aortic valve, that progressed to being severely stenotic earlier this summer.  Her symptoms included dyspnea on exertion with a resultant change in her functional capacity.    She was also noted to have a 41 mm ascending aorta, and the options of surgical versus transcatheter aortic valve replacement were presented.  Given the stability of her ascending aortic size and her wishes to avoid surgery if possible, after multidisciplinary evaluation, it was felt that she would be a reasonable candidate for TAVR.    She underwent the procedure on August 23, 2022 receiving a 23 mm BRADY 3 valve via the right transfemoral approach.  Her postoperative course was unremarkable,  "and since then she has noticed an improvement in her functional capacity with less dyspnea when she tries to exert herself.    Her 30-day echocardiogram was reviewed and shows normal function of her aortic bioprosthesis with a mean gradient of 12 mmHg in the setting of normal left ventricular systolic function.           Physical Examination  Review of Systems   Vitals: /62 (BP Location: Right arm, Patient Position: Sitting, Cuff Size: Adult Regular)   Pulse 76   Resp 16   Ht 1.6 m (5' 3\")   Wt 70.3 kg (155 lb)   BMI 27.46 kg/m    BMI= Body mass index is 27.46 kg/m .  Wt Readings from Last 3 Encounters:   09/28/22 70.3 kg (155 lb)   09/22/22 70.3 kg (155 lb)   09/01/22 69.4 kg (153 lb)       General Appearance:   no distress, normal body habitus, upright.   ENT/Mouth: membranes moist, no nasal discharge or bleeding gums.  Normal head shape, no evidence of injury or laceration.     EYES:  no scleral icterus, normal conjunctivae   Neck: no evidence of thyromegaly.  Supple   Chest/Lungs:   No audible wheezing equal chest wall expansion. Non labored breathing.  No cough.   Cardiovascular:   No evidence of elevated jugular venous pressure.  No evidence of pitting edema bilaterally    Abdomen:  no evidence of abdominal distention. No observe juandice.     Extremities: no cyanosis or clubbing noted.    Skin: no xanthelasma, normal skin color. No evidence of facial lacerations.      Neurologic: Normal arm motion bilateral, no tremors.  No evidence of focal defect.       Psychiatric: alert and oriented x3, calm        Please refer above for cardiac ROS details.        Medical History  Surgical History Family History Social History   Past Medical History:   Diagnosis Date     Aortic valve disorder     echo 1/09  4.2 cm TAA-MRA 4/2016       Bicuspid aortic valve      Coronary artery disease due to calcified coronary lesion 5/5/2016    Echo 6/2016   Good function  CT Ca++ 172 LAD  Cardiology consult 4/016-nuclear " stress     Family history of psychiatric condition      Family history of tremor      Family history of vascular disease      H/O LEEP 1/1/2007 2002-2006 LSIL paps with colposcopy, had LEEP in 2007. No records 6/2/10 ASCUS, +HR HPV 53 6/20/12 ASCUS, +HR HPV 53. Colposcopy outside HE/ system? 6/23/14 NIL 6/24/15 NIL pap, neg HPV 6/27/16 ASCUS, neg HPV 6/29/17 NIL pap, neg HPV 7/9/18 ASCUS, +HR HPV 16. Unclear if colposcopy was completed 7/10/19 NIL pap, neg HPV 7/13/20 NIL pap, neg HPV 2/1/21 NIL pap, neg HPV. Plan: await provider     History of aortic stenosis      History of vitamin D deficiency      Hypercholesteremia      Hyperlipidemia      Laboratory test 8/18/2016    Reading Physician Reading Date Result Priority    Patricia Orta MD 8/16/2016       Narrative       Examination: Nuclear medicine DATscan for Dopamine Receptor Localization.    Examination: NM BRAIN IMAGING TOMOGRAPHIC (SPECT) DATSCAN  Date: 8/16/2016 3:38 PM  Indication: Right sided postural tremor.   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       Osteopenia 2/15/2015     Other and unspecified hyperlipidemia     Created by Conversion      Papanicolaou smear of cervix with low grade squamous intraepithelial lesion (LGSIL)      LGSIL 2002  colpoLGSIL 2003  colpoLGSIL 4/2006 colpoLGSIL 11/06 colpoAbnormal 5/07 colpo  CHCWLEEPendometrial biopsy 5/07      Plantar fasciitis of right foot      Primary insomnia      Snores      Tremor      Vertigo      Vitamin D deficiency      Wears glasses      Past Surgical History:   Procedure Laterality Date     BLEPHAROPLASTY       CARDIAC CATHETERIZATION  05/30/2017    No intervention     CARDIAC CATHETERIZATION N/A 5/30/2017    Procedure:  Coronary Angiogram;  Surgeon: Torito Metzger MD;  Location: [facility];  Service:      CHOLECYSTECTOMY       CONIZATION CERVIX,LOOP ELECTRD      Description: Cervical Conization Loop Electrode Excision;  Recorded: 05/29/2008;     CV CORONARY ANGIOGRAM N/A 6/9/2022    Procedure: Coronary Angiogram;  Surgeon: Anabela Hernandez MD;  Location: [facility]     CV CORONARY ANGIOGRAM  6/9/2022    Procedure: ;  Surgeon: Anabela Hernandez MD;  Location: [facility]     CV TRANSCATHETER AORTIC VALVE REPLACEMENT-FEMORAL APPROACH N/A 8/23/2022    Procedure: Transcatheter Aortic Valve Replacement-Femoral Approach;  Surgeon: Anabela Hernandez MD;  Location: [facility]     FACIAL RECONSTRUCTION SURGERY      forehead as well     HC CLOSED TX MANDIBLE FX W/O MANIP      Description: Closed Treatment Of Mandibular Fracture;  Recorded: 05/29/2008;     HC DILATION/CURETTAGE DIAG/THER NON OB      Description: Dilation And Curettage;  Recorded: 05/29/2008;  Comments: X 2  metrorhhagia     LAPAROSCOPIC CHOLECYSTECTOMY N/A 2/23/2015    Procedure: CHOLECYSTECTOMY LAPAROSCOPIC;  Surgeon: Doug Webber MD;  Location: [facility];  Service:      OR TRANSCATHETER AORTIC VALVE REPLACEMENT, FEMORAL PERCUTANEOUS APPROACH (STANDBY) N/A 8/23/2022    Procedure: OR TRANSCATHETER AORTIC VALVE REPLACEMENT, FEMORAL PERCUTANEOUS APPROACH (STANDBY);  Surgeon: Katy Rojas MD;  Location: [facility]     ZZC ORAL SURGERY PROCEDURE      jaw fracture     Family History   Problem Relation Age of Onset     Heart Disease Father      Heart Disease Sister      Heart Disease Sister      Mental Illness Mother 30.00        schizophrenia, bipolar     Mental Illness Sister         bipolar     Colon Cancer Father      Hyperlipidemia Brother      Hyperlipidemia Brother      Hyperlipidemia Sister      Cervical Cancer Daughter      Diabetes Type 2  Brother      Diabetes Type 2  Brother      Tremor Father       Tremor Paternal Aunt      Tremor Paternal Grandfather      Tremor Son      Tremor Cousin         Paternal 1st cousin     Attention Deficit Disorder Son      Neurologic Disorder Son         Tourette Syndrome      Schizophrenia Mother      Schizophrenia Sister      Mental Illness Sister         depression     Diabetes Brother         type 2     Mental Illness Brother         depression        Social History     Socioeconomic History     Marital status:      Spouse name: Not on file     Number of children: Not on file     Years of education: Not on file     Highest education level: Not on file   Occupational History     Not on file   Tobacco Use     Smoking status: Never Smoker     Smokeless tobacco: Never Used   Substance and Sexual Activity     Alcohol use: Yes     Alcohol/week: 2.0 standard drinks     Comment: occasion     Drug use: No     Sexual activity: Not Currently     Partners: Male     Birth control/protection: Abstinence   Other Topics Concern     Not on file   Social History Narrative    Living in [place] with her  and has been  for 11 yrs     This is her 3rd marriage    1st marriage had 2 kids: son and daughter - 33 y r old son and 31 yr old daughter    2nd marriage no kids        Not working presently. Retired    Had been  in the past.      Social Determinants of Health     Financial Resource Strain: Not on file   Food Insecurity: Not on file   Transportation Needs: Not on file   Physical Activity: Not on file   Stress: Not on file   Social Connections: Not on file   Intimate Partner Violence: Not on file   Housing Stability: Not on file           Medications  Allergies   Current Outpatient Medications   Medication Sig Dispense Refill     aspirin 81 MG EC tablet Take 81 mg by mouth daily       atorvastatin (LIPITOR) 80 MG tablet Take 1 tablet (80 mg) by mouth At Bedtime 90 tablet 3     cyanocobalamin (VITAMIN B-12) 1000 MCG tablet Take 1,000 mcg by mouth daily         ezetimibe (ZETIA) 10 MG tablet Take 1 tablet (10 mg) by mouth daily 90 tablet 3     ibuprofen (ADVIL/MOTRIN) 800 MG tablet TAKE ONE TABLET BY MOUTH THREE TIMES DAILY AS NEEDED TAKE WITH FOOD (Patient taking differently: Take 800 mg by mouth as needed TAKE ONE TABLET BY MOUTH THREE TIMES DAILY AS NEEDED TAKE WITH FOOD) 60 tablet 0     Omega-3 Fatty Acids (OMEGA-3 FISH OIL) 1200 MG CAPS Take 1 capsule by mouth daily        polyethylene glycol-propylene glycol (SYSTANE) 0.4-0.3 % SOLN ophthalmic solution Place 1 drop into both eyes as needed for dry eyes       traZODone (DESYREL) 50 MG tablet Take 1 tablet (50 mg) by mouth At Bedtime 90 tablet 3     vitamin C (ASCORBIC ACID) 1000 MG TABS Take 1,000 mg by mouth daily        VITAMIN D3 50 MCG (2000 UT) tablet TAKE 1 TABLET BY MOUTH EVERY DAY 90 tablet 3     vitamin E 400 UNIT capsule Take 400 Units by mouth daily        zinc gluconate 50 MG tablet Take 50 mg by mouth daily        No Known Allergies       Lab Results    Chemistry/lipid CBC Cardiac Enzymes/BNP/TSH/INR   Recent Labs   Lab Test 03/09/22  1415   CHOL 127   HDL 57   LDL 53   TRIG 87     Recent Labs   Lab Test 03/09/22  1415 02/01/21  1515 07/13/20  0751   LDL 53 78 77     Recent Labs   Lab Test 08/24/22  0502      POTASSIUM 4.1   CHLORIDE 107   CO2 26   GLC 93   BUN 15   CR 0.77   GFRESTIMATED 84   FÉLIX 8.6     Recent Labs   Lab Test 08/24/22  0502 08/23/22  1402 08/19/22  0828   CR 0.77 0.81 0.78     No results for input(s): A1C in the last 09577 hours.       Recent Labs   Lab Test 08/24/22  0502   WBC 8.1   HGB 12.1   HCT 36.1   MCV 94   *     Recent Labs   Lab Test 08/24/22  0502 08/23/22  1402 08/19/22  0828   HGB 12.1 12.3 13.7    No results for input(s): TROPONINI in the last 04594 hours.  Recent Labs   Lab Test 08/19/22  0828   BNP 56     Recent Labs   Lab Test 07/10/19  0757   TSH 1.53     Recent Labs   Lab Test 08/19/22  0828   INR 1.07        Anabela Hernandez MD    Thank you for allowing  me to participate in the care of your patient.      Sincerely,     Anabela Hernandez MD     [facility]  cc: No referring provider defined for this encounter.

## 2022-09-30 ENCOUNTER — HOSPITAL ENCOUNTER (OUTPATIENT)
Dept: CARDIAC REHAB | Facility: CLINIC | Age: 67
Discharge: HOME | End: 2022-09-30
Attending: INTERNAL MEDICINE
Payer: COMMERCIAL

## 2022-09-30 PROCEDURE — 93798 PHYS/QHP OP CAR RHAB W/ECG: CPT

## 2022-10-03 ENCOUNTER — HOSPITAL ENCOUNTER (OUTPATIENT)
Dept: CARDIAC REHAB | Facility: CLINIC | Age: 67
Discharge: HOME | End: 2022-10-03
Attending: INTERNAL MEDICINE
Payer: COMMERCIAL

## 2022-10-03 PROCEDURE — 93798 PHYS/QHP OP CAR RHAB W/ECG: CPT

## 2022-10-07 ENCOUNTER — HOSPITAL ENCOUNTER (OUTPATIENT)
Dept: CARDIAC REHAB | Facility: CLINIC | Age: 67
Discharge: HOME | End: 2022-10-07
Attending: INTERNAL MEDICINE
Payer: COMMERCIAL

## 2022-10-07 PROCEDURE — 93798 PHYS/QHP OP CAR RHAB W/ECG: CPT

## 2022-10-10 ENCOUNTER — HOSPITAL ENCOUNTER (OUTPATIENT)
Dept: CARDIAC REHAB | Facility: CLINIC | Age: 67
Discharge: HOME | End: 2022-10-10
Attending: INTERNAL MEDICINE
Payer: COMMERCIAL

## 2022-10-10 PROCEDURE — 93798 PHYS/QHP OP CAR RHAB W/ECG: CPT

## 2022-10-12 ENCOUNTER — HOSPITAL ENCOUNTER (OUTPATIENT)
Dept: CARDIAC REHAB | Facility: CLINIC | Age: 67
Discharge: HOME | End: 2022-10-12
Attending: INTERNAL MEDICINE
Payer: COMMERCIAL

## 2022-10-12 PROCEDURE — 93798 PHYS/QHP OP CAR RHAB W/ECG: CPT

## 2022-10-18 ENCOUNTER — HOSPITAL ENCOUNTER (OUTPATIENT)
Dept: CARDIAC REHAB | Facility: CLINIC | Age: 67
Discharge: HOME | End: 2022-10-18
Attending: INTERNAL MEDICINE
Payer: COMMERCIAL

## 2022-10-19 ENCOUNTER — HOSPITAL ENCOUNTER (OUTPATIENT)
Dept: CARDIAC REHAB | Facility: CLINIC | Age: 67
Discharge: HOME | End: 2022-10-19
Attending: INTERNAL MEDICINE
Payer: COMMERCIAL

## 2022-10-19 PROCEDURE — 93798 PHYS/QHP OP CAR RHAB W/ECG: CPT

## 2022-10-21 ENCOUNTER — HOSPITAL ENCOUNTER (OUTPATIENT)
Dept: CARDIAC REHAB | Facility: CLINIC | Age: 67
Discharge: HOME | End: 2022-10-21
Attending: INTERNAL MEDICINE
Payer: COMMERCIAL

## 2022-10-21 PROCEDURE — 93798 PHYS/QHP OP CAR RHAB W/ECG: CPT

## 2022-10-24 ENCOUNTER — HOSPITAL ENCOUNTER (OUTPATIENT)
Dept: CARDIAC REHAB | Facility: CLINIC | Age: 67
Discharge: HOME | End: 2022-10-24
Attending: INTERNAL MEDICINE
Payer: COMMERCIAL

## 2022-10-24 PROCEDURE — 93798 PHYS/QHP OP CAR RHAB W/ECG: CPT

## 2022-10-26 ENCOUNTER — TRANSFERRED RECORDS (OUTPATIENT)
Dept: HEALTH INFORMATION MANAGEMENT | Facility: CLINIC | Age: 67
End: 2022-10-26

## 2022-10-26 ENCOUNTER — HOSPITAL ENCOUNTER (OUTPATIENT)
Dept: CARDIAC REHAB | Facility: CLINIC | Age: 67
Discharge: HOME | End: 2022-10-26
Attending: INTERNAL MEDICINE
Payer: COMMERCIAL

## 2022-10-26 PROCEDURE — 93798 PHYS/QHP OP CAR RHAB W/ECG: CPT

## 2022-11-02 ENCOUNTER — HOSPITAL ENCOUNTER (OUTPATIENT)
Dept: CARDIAC REHAB | Facility: CLINIC | Age: 67
Discharge: HOME | End: 2022-11-02
Attending: INTERNAL MEDICINE
Payer: COMMERCIAL

## 2022-11-02 PROCEDURE — 93798 PHYS/QHP OP CAR RHAB W/ECG: CPT

## 2022-11-04 ENCOUNTER — HOSPITAL ENCOUNTER (OUTPATIENT)
Dept: CARDIAC REHAB | Facility: CLINIC | Age: 67
Discharge: HOME | End: 2022-11-04
Attending: INTERNAL MEDICINE
Payer: COMMERCIAL

## 2022-11-04 PROCEDURE — 93798 PHYS/QHP OP CAR RHAB W/ECG: CPT

## 2022-11-09 ENCOUNTER — HOSPITAL ENCOUNTER (OUTPATIENT)
Dept: CARDIAC REHAB | Facility: CLINIC | Age: 67
Discharge: HOME | End: 2022-11-09
Attending: INTERNAL MEDICINE
Payer: COMMERCIAL

## 2022-11-09 PROCEDURE — 93798 PHYS/QHP OP CAR RHAB W/ECG: CPT

## 2022-11-11 ENCOUNTER — HOSPITAL ENCOUNTER (OUTPATIENT)
Dept: CARDIAC REHAB | Facility: CLINIC | Age: 67
Discharge: HOME | End: 2022-11-11
Attending: INTERNAL MEDICINE
Payer: COMMERCIAL

## 2022-11-11 PROCEDURE — 93798 PHYS/QHP OP CAR RHAB W/ECG: CPT

## 2022-11-16 ENCOUNTER — HOSPITAL ENCOUNTER (OUTPATIENT)
Dept: CARDIAC REHAB | Facility: CLINIC | Age: 67
Discharge: HOME | End: 2022-11-16
Attending: INTERNAL MEDICINE
Payer: COMMERCIAL

## 2022-11-16 PROCEDURE — 93798 PHYS/QHP OP CAR RHAB W/ECG: CPT

## 2022-11-18 ENCOUNTER — HOSPITAL ENCOUNTER (OUTPATIENT)
Dept: CARDIAC REHAB | Facility: CLINIC | Age: 67
Discharge: HOME | End: 2022-11-18
Attending: INTERNAL MEDICINE
Payer: COMMERCIAL

## 2022-11-18 PROCEDURE — 93798 PHYS/QHP OP CAR RHAB W/ECG: CPT

## 2022-11-21 ENCOUNTER — HOSPITAL ENCOUNTER (OUTPATIENT)
Dept: CARDIAC REHAB | Facility: CLINIC | Age: 67
Discharge: HOME | End: 2022-11-21
Attending: INTERNAL MEDICINE
Payer: COMMERCIAL

## 2022-11-21 PROCEDURE — 93798 PHYS/QHP OP CAR RHAB W/ECG: CPT

## 2022-11-23 ENCOUNTER — HOSPITAL ENCOUNTER (OUTPATIENT)
Dept: CARDIAC REHAB | Facility: CLINIC | Age: 67
Discharge: HOME | End: 2022-11-23
Attending: INTERNAL MEDICINE
Payer: COMMERCIAL

## 2022-11-23 PROCEDURE — 93798 PHYS/QHP OP CAR RHAB W/ECG: CPT

## 2022-12-02 DIAGNOSIS — E55.9 VITAMIN D DEFICIENCY, UNSPECIFIED: ICD-10-CM

## 2022-12-04 RX ORDER — CHOLECALCIFEROL (VITAMIN D3) 50 MCG
TABLET ORAL
Qty: 90 TABLET | Refills: 3 | Status: SHIPPED | OUTPATIENT
Start: 2022-12-04

## 2022-12-04 NOTE — TELEPHONE ENCOUNTER
"Last Written Prescription Date:  11/8/21  Last Fill Quantity: 90,  # refills: 3   Last office visit provider:   9/22/22    Requested Prescriptions   Pending Prescriptions Disp Refills     VITAMIN D3 50 MCG (2000 UT) tablet [Pharmacy Med Name: VITAMIN D3 50 MCG TABLET] 90 tablet 3     Sig: TAKE 1 TABLET BY MOUTH EVERY DAY       Vitamin Supplements (Adult) Protocol Passed - 12/2/2022 12:37 AM        Passed - High dose Vitamin D not ordered        Passed - Recent (12 mo) or future (30 days) visit within the authorizing provider's specialty     Patient has had an office visit with the authorizing provider or a provider within the authorizing providers department within the previous 12 mos or has a future within next 30 days. See \"Patient Info\" tab in inbasket, or \"Choose Columns\" in Meds & Orders section of the refill encounter.              Passed - Medication is active on med list             Keyanna Braswell RN 12/04/22 12:40 AM  "

## 2022-12-07 ENCOUNTER — HOSPITAL ENCOUNTER (OUTPATIENT)
Dept: CARDIAC REHAB | Facility: CLINIC | Age: 67
Discharge: HOME | End: 2022-12-07
Attending: INTERNAL MEDICINE
Payer: COMMERCIAL

## 2022-12-07 PROCEDURE — 93798 PHYS/QHP OP CAR RHAB W/ECG: CPT

## 2022-12-09 ENCOUNTER — HOSPITAL ENCOUNTER (OUTPATIENT)
Dept: CARDIAC REHAB | Facility: CLINIC | Age: 67
Discharge: HOME | End: 2022-12-09
Attending: INTERNAL MEDICINE
Payer: COMMERCIAL

## 2022-12-09 PROCEDURE — 93798 PHYS/QHP OP CAR RHAB W/ECG: CPT

## 2022-12-09 PROCEDURE — 93797 PHYS/QHP OP CAR RHAB WO ECG: CPT

## 2023-01-14 DIAGNOSIS — E78.00 HYPERCHOLESTEROLEMIA: ICD-10-CM

## 2023-01-16 RX ORDER — EZETIMIBE 10 MG/1
TABLET ORAL
Qty: 100 TABLET | Refills: 0 | Status: SHIPPED | OUTPATIENT
Start: 2023-01-16 | End: 2023-03-08

## 2023-01-21 DIAGNOSIS — E78.5 HYPERLIPIDEMIA, UNSPECIFIED HYPERLIPIDEMIA TYPE: ICD-10-CM

## 2023-01-22 RX ORDER — ATORVASTATIN CALCIUM 80 MG/1
TABLET, FILM COATED ORAL
Qty: 100 TABLET | Refills: 0 | Status: SHIPPED | OUTPATIENT
Start: 2023-01-22 | End: 2023-03-08

## 2023-01-23 NOTE — TELEPHONE ENCOUNTER
"Last Written Prescription Date:  3/9/22  Last Fill Quantity: 90,  # refills: 3   Last office visit provider:  9/22/22     Requested Prescriptions   Pending Prescriptions Disp Refills     atorvastatin (LIPITOR) 80 MG tablet [Pharmacy Med Name: Atorvastatin Calcium 80 MG Oral Tablet] 100 tablet 2     Sig: TAKE 1 TABLET BY MOUTH AT  BEDTIME       Statins Protocol Passed - 1/21/2023  9:33 PM        Passed - LDL on file in past 12 months     Recent Labs   Lab Test 03/09/22  1415   LDL 53             Passed - No abnormal creatine kinase in past 12 months     No lab results found.             Passed - Recent (12 mo) or future (30 days) visit within the authorizing provider's specialty     Patient has had an office visit with the authorizing provider or a provider within the authorizing providers department within the previous 12 mos or has a future within next 30 days. See \"Patient Info\" tab in inbasket, or \"Choose Columns\" in Meds & Orders section of the refill encounter.              Passed - Medication is active on med list        Passed - Patient is age 18 or older        Passed - No active pregnancy on record        Passed - No positive pregnancy test in past 12 months             Sarah Bates, RN 01/22/23 9:33 PM  "

## 2023-02-22 NOTE — ADDENDUM NOTE
Encounter addended by: Doug Garcia, PT on: 2/22/2023 10:49 AM   Actions taken: Clinical Note Signed, Episode resolved

## 2023-03-04 ASSESSMENT — ENCOUNTER SYMPTOMS
CHILLS: 0
PALPITATIONS: 0
FEVER: 0
BREAST MASS: 0
EYE PAIN: 0
SORE THROAT: 0
FREQUENCY: 0
NAUSEA: 0
COUGH: 0
MYALGIAS: 0
DIZZINESS: 0
HEMATURIA: 0
ARTHRALGIAS: 0
NERVOUS/ANXIOUS: 0
JOINT SWELLING: 0
PARESTHESIAS: 0
WEAKNESS: 0
ABDOMINAL PAIN: 0
DIARRHEA: 0
SHORTNESS OF BREATH: 0
HEARTBURN: 0
DYSURIA: 0
CONSTIPATION: 0
HEADACHES: 0
HEMATOCHEZIA: 0

## 2023-03-04 ASSESSMENT — ACTIVITIES OF DAILY LIVING (ADL): CURRENT_FUNCTION: NO ASSISTANCE NEEDED

## 2023-03-07 DIAGNOSIS — Z95.2 S/P TAVR (TRANSCATHETER AORTIC VALVE REPLACEMENT): Primary | ICD-10-CM

## 2023-03-07 RX ORDER — AMOXICILLIN 500 MG/1
2000 TABLET, FILM COATED ORAL ONCE
Qty: 4 TABLET | Refills: 0 | Status: SHIPPED | OUTPATIENT
Start: 2023-03-07 | End: 2023-03-07

## 2023-03-08 ENCOUNTER — APPOINTMENT (OUTPATIENT)
Dept: LAB | Facility: CLINIC | Age: 68
End: 2023-03-08
Attending: NURSE PRACTITIONER
Payer: COMMERCIAL

## 2023-03-08 ENCOUNTER — HOSPITAL ENCOUNTER (OUTPATIENT)
Dept: MAMMOGRAPHY | Facility: CLINIC | Age: 68
Discharge: HOME | End: 2023-03-08
Attending: NURSE PRACTITIONER
Payer: COMMERCIAL

## 2023-03-08 ENCOUNTER — OFFICE VISIT (OUTPATIENT)
Dept: FAMILY MEDICINE | Facility: CLINIC | Age: 68
End: 2023-03-08
Payer: COMMERCIAL

## 2023-03-08 VITALS
SYSTOLIC BLOOD PRESSURE: 110 MMHG | DIASTOLIC BLOOD PRESSURE: 70 MMHG | HEIGHT: 63 IN | WEIGHT: 154 LBS | OXYGEN SATURATION: 98 % | TEMPERATURE: 97.4 F | BODY MASS INDEX: 27.29 KG/M2 | HEART RATE: 74 BPM

## 2023-03-08 DIAGNOSIS — I71.20 THORACIC AORTIC ANEURYSM WITHOUT RUPTURE, UNSPECIFIED PART (H): ICD-10-CM

## 2023-03-08 DIAGNOSIS — Z12.31 VISIT FOR SCREENING MAMMOGRAM: ICD-10-CM

## 2023-03-08 DIAGNOSIS — E78.00 PURE HYPERCHOLESTEROLEMIA: ICD-10-CM

## 2023-03-08 DIAGNOSIS — G47.00 INSOMNIA, UNSPECIFIED TYPE: ICD-10-CM

## 2023-03-08 DIAGNOSIS — I50.32 CHRONIC DIASTOLIC HEART FAILURE (H): ICD-10-CM

## 2023-03-08 DIAGNOSIS — M32.9 SYSTEMIC LUPUS ERYTHEMATOSUS, UNSPECIFIED SLE TYPE, UNSPECIFIED ORGAN INVOLVEMENT STATUS (H): ICD-10-CM

## 2023-03-08 DIAGNOSIS — Z00.00 ENCOUNTER FOR MEDICARE ANNUAL WELLNESS EXAM: Primary | ICD-10-CM

## 2023-03-08 DIAGNOSIS — K64.4 HEMORRHOIDAL SKIN TAGS: ICD-10-CM

## 2023-03-08 PROBLEM — I20.89 ANGINA OF EFFORT (H): Status: RESOLVED | Noted: 2022-09-22 | Resolved: 2023-03-08

## 2023-03-08 LAB
ALBUMIN SERPL-MCNC: 4.5 G/DL (ref 3.5–5)
ALP SERPL-CCNC: 66 U/L (ref 45–120)
ALT SERPL W P-5'-P-CCNC: 27 U/L (ref 0–45)
ANION GAP SERPL CALCULATED.3IONS-SCNC: 8 MMOL/L (ref 5–18)
AST SERPL W P-5'-P-CCNC: 25 U/L (ref 0–40)
BILIRUB SERPL-MCNC: 1.6 MG/DL (ref 0–1)
BUN SERPL-MCNC: 13 MG/DL (ref 8–22)
CALCIUM SERPL-MCNC: 10 MG/DL (ref 8.5–10.5)
CHLORIDE BLD-SCNC: 112 MMOL/L (ref 98–107)
CHOLEST SERPL-MCNC: 126 MG/DL
CO2 SERPL-SCNC: 21 MMOL/L (ref 22–31)
CREAT SERPL-MCNC: 0.77 MG/DL (ref 0.6–1.1)
FASTING STATUS PATIENT QL REPORTED: YES
GFR SERPL CREATININE-BSD FRML MDRD: 84 ML/MIN/1.73M2
GLUCOSE BLD-MCNC: 97 MG/DL (ref 70–125)
HDLC SERPL-MCNC: 56 MG/DL
LDLC SERPL CALC-MCNC: 53 MG/DL
POTASSIUM BLD-SCNC: 4.2 MMOL/L (ref 3.5–5)
PROT SERPL-MCNC: 7.2 G/DL (ref 6–8)
SODIUM SERPL-SCNC: 141 MMOL/L (ref 136–145)
TRIGL SERPL-MCNC: 87 MG/DL

## 2023-03-08 PROCEDURE — 80053 COMPREHEN METABOLIC PANEL: CPT | Performed by: NURSE PRACTITIONER

## 2023-03-08 PROCEDURE — 77067 SCR MAMMO BI INCL CAD: CPT

## 2023-03-08 PROCEDURE — 36415 COLL VENOUS BLD VENIPUNCTURE: CPT | Performed by: NURSE PRACTITIONER

## 2023-03-08 PROCEDURE — 80061 LIPID PANEL: CPT | Performed by: NURSE PRACTITIONER

## 2023-03-08 PROCEDURE — G0439 PPPS, SUBSEQ VISIT: HCPCS | Performed by: NURSE PRACTITIONER

## 2023-03-08 PROCEDURE — 99213 OFFICE O/P EST LOW 20 MIN: CPT | Mod: 25 | Performed by: NURSE PRACTITIONER

## 2023-03-08 RX ORDER — EZETIMIBE 10 MG/1
10 TABLET ORAL DAILY
Qty: 90 TABLET | Refills: 3 | Status: SHIPPED | OUTPATIENT
Start: 2023-03-08 | End: 2023-03-08

## 2023-03-08 RX ORDER — ATORVASTATIN CALCIUM 80 MG/1
80 TABLET, FILM COATED ORAL AT BEDTIME
Qty: 90 TABLET | Refills: 3 | Status: SHIPPED | OUTPATIENT
Start: 2023-03-08 | End: 2024-03-15

## 2023-03-08 RX ORDER — TRAZODONE HYDROCHLORIDE 50 MG/1
50-100 TABLET, FILM COATED ORAL AT BEDTIME
Qty: 90 TABLET | Refills: 3 | Status: SHIPPED | OUTPATIENT
Start: 2023-03-08 | End: 2023-08-01

## 2023-03-08 RX ORDER — EZETIMIBE 10 MG/1
10 TABLET ORAL DAILY
Qty: 90 TABLET | Refills: 3 | Status: SHIPPED | OUTPATIENT
Start: 2023-03-08 | End: 2024-03-15

## 2023-03-08 RX ORDER — TRAZODONE HYDROCHLORIDE 50 MG/1
50-100 TABLET, FILM COATED ORAL AT BEDTIME
Qty: 90 TABLET | Refills: 3 | Status: SHIPPED | OUTPATIENT
Start: 2023-03-08 | End: 2023-03-08

## 2023-03-08 RX ORDER — ATORVASTATIN CALCIUM 80 MG/1
80 TABLET, FILM COATED ORAL AT BEDTIME
Qty: 90 TABLET | Refills: 3 | Status: SHIPPED | OUTPATIENT
Start: 2023-03-08 | End: 2023-03-08

## 2023-03-08 ASSESSMENT — ACTIVITIES OF DAILY LIVING (ADL): CURRENT_FUNCTION: NO ASSISTANCE NEEDED

## 2023-03-08 NOTE — PATIENT INSTRUCTIONS
Patient Education   Personalized Prevention Plan  You are due for the preventive services outlined below.  Your care team is available to assist you in scheduling these services.  If you have already completed any of these items, please share that information with your care team to update in your medical record.  Health Maintenance Due   Topic Date Due     Heart Failure Action Plan  Never done     ANNUAL REVIEW OF HM ORDERS  Never done     Thyroid Function Lab  07/10/2020     Cholesterol Lab  03/09/2023     Basic Metabolic Panel  03/28/2023

## 2023-03-08 NOTE — PROGRESS NOTES
"SUBJECTIVE:   Wendy is a 67 year old who presents for Preventive Visit.    Patient continues to do well after her TAVR procedure last year.  She is no longer having back pain or shortness of breath with activity.  She has completed cardiac rehab.  Scheduled to see cardiology again later this month.  They are also monitoring a thoracic aortic aneurysm.  On Atorvastatin and Zetia; tolerating well.    Using Trazodone at night for sleep.  Works well some nights.  Other nights she is up until 3 am.  This has been chronic.  Discussed increasing Trazodone to 100 mg.    Patient has external hemorrhoids.  She has had these since having children.  Not painful or itchy, but has a hard time keeping this area adequately clean, which is bothersome.         Patient has been advised of split billing requirements and indicates understanding: Yes  Are you in the first 12 months of your Medicare coverage?  No    Healthy Habits:     In general, how would you rate your overall health?  Good    Frequency of exercise:  4-5 days/week    Duration of exercise:  30-45 minutes    Do you usually eat at least 4 servings of fruit and vegetables a day, include whole grains    & fiber and avoid regularly eating high fat or \"junk\" foods?  Yes    Taking medications regularly:  Yes    Medication side effects:  None    Ability to successfully perform activities of daily living:  No assistance needed    Home Safety:  No safety concerns identified    Hearing Impairment:  No hearing concerns    In the past 6 months, have you been bothered by leaking of urine? Yes    In general, how would you rate your overall mental or emotional health?  Excellent      PHQ-2 Total Score: 0    Additional concerns today:  Yes      Have you ever done Advance Care Planning? (For example, a Health Directive, POLST, or a discussion with a medical provider or your loved ones about your wishes): No, advance care planning information given to patient to review.  Patient declined " advance care planning discussion at this time.    Fall risk  Fallen 2 or more times in the past year?: No  Any fall with injury in the past year?: No    Cognitive Screening   1) Repeat 3 items (Leader, Season, Table)    2) Clock draw: NORMAL  3) 3 item recall: Recalls 3 objects  Results: 3 items recalled: COGNITIVE IMPAIRMENT LESS LIKELY    Mini-CogTM Copyright LISSA Soto. Licensed by the author for use in [facility]; reprinted with permission (anne@[facility]). All rights reserved.      Do you have sleep apnea, excessive snoring or daytime drowsiness?: no    Reviewed and updated as needed this visit by clinical staff   Tobacco  Allergies  Meds  Problems  Med Hx  Surg Hx  Fam Hx          Reviewed and updated as needed this visit by Provider   Tobacco  Allergies  Meds  Problems  Med Hx  Surg Hx  Fam Hx         Social History     Tobacco Use     Smoking status: Never     Smokeless tobacco: Never   Substance Use Topics     Alcohol use: Yes     Alcohol/week: 2.0 standard drinks     Comment: occasion       Alcohol Use 3/8/2023   Prescreen: >3 drinks/day or >7 drinks/week? No   No flowsheet data found.    Current providers sharing in care for this patient include:   Patient Care Team:  Shahana Parker NP as PCP - General (Family Medicine)  Shahana Parker NP as Nurse Practitioner (Family Medicine)  Anabela Hernandez MD as Assigned Heart and Vascular Provider  Shahana Parker NP as Assigned PCP    The following health maintenance items are reviewed in Epic and correct as of today:  Health Maintenance   Topic Date Due     HF ACTION PLAN  Never done     ANNUAL REVIEW OF HM ORDERS  Never done     TSH W/FREE T4 REFLEX  07/10/2020     LIPID  03/09/2023     BMP  03/28/2023     ALT  08/23/2023     CBC  09/28/2023     COLORECTAL CANCER SCREENING  09/29/2023     PAP FOLLOW-UP  02/01/2024     HPV FOLLOW-UP  02/01/2024     MEDICARE ANNUAL WELLNESS VISIT  03/08/2024     FALL RISK ASSESSMENT  03/08/2024     MAMMO  "SCREENING  03/09/2024     ADVANCE CARE PLANNING  03/08/2028     DTAP/TDAP/TD IMMUNIZATION (3 - Td or Tdap) 07/10/2029     DEXA  02/23/2036     HEPATITIS C SCREENING  Completed     PHQ-2 (once per calendar year)  Completed     INFLUENZA VACCINE  Completed     Pneumococcal Vaccine: 65+ Years  Completed     ZOSTER IMMUNIZATION  Completed     COVID-19 Vaccine  Completed     IPV IMMUNIZATION  Aged Out     MENINGITIS IMMUNIZATION  Aged Out     PAP  Discontinued       FHS-7:   Breast CA Risk Assessment (FHS-7) 3/7/2022 3/9/2022 3/4/2023 3/8/2023   Did any of your first-degree relatives have breast or ovarian cancer? No No No No   Did any of your relatives have bilateral breast cancer? No No No No   Did any man in your family have breast cancer? No No No No   Did any woman in your family have breast and ovarian cancer? No No No No   Did any woman in your family have breast cancer before age 50 y? No No No No   Do you have 2 or more relatives with breast and/or ovarian cancer? No No No No   Do you have 2 or more relatives with breast and/or bowel cancer? No No No No     Pertinent mammograms are reviewed under the imaging tab.    Review of Systems  Pertinent items in HPI      OBJECTIVE:   /70 (BP Location: Left arm, Patient Position: Sitting, Cuff Size: Adult Regular)   Pulse 74   Temp 97.4  F (36.3  C) (Temporal)   Ht 1.6 m (5' 3\")   Wt 69.9 kg (154 lb)   SpO2 98%   BMI 27.28 kg/m   Estimated body mass index is 27.28 kg/m  as calculated from the following:    Height as of this encounter: 1.6 m (5' 3\").    Weight as of this encounter: 69.9 kg (154 lb).  Physical Exam  GENERAL: healthy, alert and no distress  EYES: Eyes grossly normal to inspection, PERRL and conjunctivae and sclerae normal  HENT: ear canals and TM's normal, nose and mouth without ulcers or lesions  NECK: no adenopathy, no asymmetry, masses, or scars and thyroid normal to palpation  RESP: lungs clear to auscultation - no rales, rhonchi or " "wheezes  CV: regular rate and rhythm, normal S1 S2, no S3 or S4, no murmur, click or rub, no peripheral edema and peripheral pulses strong  ABDOMEN: soft, nontender, no hepatosplenomegaly, no masses and bowel sounds normal  RECTAL: normal sphincter tone. External hemorrhoidal skin tags.   MS: no gross musculoskeletal defects noted, no edema  SKIN: no suspicious lesions or rashes  NEURO: Normal strength and tone, mentation intact and speech normal  PSYCH: mentation appears normal, affect normal/bright      ASSESSMENT / PLAN:   Wendy was seen today for annual visit.    Diagnoses and all orders for this visit:    Encounter for Medicare annual wellness exam    Hemorrhoidal skin tag  Referral placed to discuss removal.  -     Adult Colorectal Surgery  Referral; Future    Pure hypercholesterolemia  -     Lipid panel reflex to direct LDL Fasting  -     Comprehensive metabolic panel (BMP + Alb, Alk Phos, ALT, AST, Total. Bili, TP)  -     atorvastatin (LIPITOR) 80 MG tablet; Take 1 tablet (80 mg) by mouth At Bedtime  -     ezetimibe (ZETIA) 10 MG tablet; Take 1 tablet (10 mg) by mouth daily    Systemic lupus erythematosus, unspecified SLE type, unspecified organ involvement status (H)  In remission.  SLE rash triggered by sunlight.  Previously on Plaquenil.     Chronic diastolic heart failure (H)  Normal EF per last echocardiogram.    Thoracic aortic aneurysm without rupture, unspecified part  Monitoring per cardiology.    Insomnia, unspecified type  -     traZODone (DESYREL) 50 MG tablet; Take 1-2 tablets ( mg) by mouth At Bedtime        Patient has been advised of split billing requirements and indicates understanding: Yes      COUNSELING:  Reviewed preventive health counseling, as reflected in patient instructions      BMI:   Estimated body mass index is 27.28 kg/m  as calculated from the following:    Height as of this encounter: 1.6 m (5' 3\").    Weight as of this encounter: 69.9 kg (154 lb).   Weight " management plan: Discussed healthy diet and exercise guidelines      She reports that she has never smoked. She has never used smokeless tobacco.      Appropriate preventive services were discussed with this patient, including applicable screening as appropriate for cardiovascular disease, diabetes, osteopenia/osteoporosis, and glaucoma.  As appropriate for age/gender, discussed screening for colorectal cancer, prostate cancer, breast cancer, and cervical cancer. Checklist reviewing preventive services available has been given to the patient.    Reviewed patients plan of care and provided an AVS. The Basic Care Plan (routine screening as documented in Health Maintenance) for Wendy meets the Care Plan requirement. This Care Plan has been established and reviewed with the Patient.        Shahana Parker NP  [facility]    Identified Health Risks:

## 2023-03-13 NOTE — TELEPHONE ENCOUNTER
Diagnosis, Referred by & from: Hemorrhoids, looking to get them removed; referred by Shahana Parker   Appt date: 5/25/2023   NOTES STATUS DETAILS   OFFICE NOTE from referring provider [facility]:  3/8/23, 9/22/22 - [facility] OV with Shahana Parker NP   OFFICE NOTE from other specialist N/A    DISCHARGE SUMMARY from hospital N/A    DISCHARGE REPORT from the ER N/A    OPERATIVE REPORT [facility]:  2/23/15 - OP Note for LAPAROSCOPIC CHOLECYSTECTOMY with Dr. Webber   MEDICATION LIST Internal    LABS     BIOPSIES/PATHOLOGY RELATED TO DIAGNOSIS [facility]:  2/23/15 - Gallbladder Biopsy (Case: [id number])   DIAGNOSTIC PROCEDURES     COLONOSCOPY Received [facility]:  9/29/20 - Colonoscopy  9/13/17 -  Colonoscopy   UPPER ENDOSCOPY (EGD) Received [facility]:  9/13/17 - EGD   IMAGING (DISC & REPORT)      ULTRASOUND  (ENDOANAL/ENDORECTAL) [facility]:  2/9/15 - US Abdomen

## 2023-03-28 ENCOUNTER — OFFICE VISIT (OUTPATIENT)
Dept: CARDIOLOGY | Facility: CLINIC | Age: 68
End: 2023-03-28
Payer: COMMERCIAL

## 2023-03-28 VITALS
SYSTOLIC BLOOD PRESSURE: 108 MMHG | BODY MASS INDEX: 27.75 KG/M2 | WEIGHT: 156.6 LBS | OXYGEN SATURATION: 99 % | HEART RATE: 68 BPM | DIASTOLIC BLOOD PRESSURE: 62 MMHG | HEIGHT: 63 IN | RESPIRATION RATE: 16 BRPM

## 2023-03-28 DIAGNOSIS — I25.10 CORONARY ARTERY DISEASE DUE TO CALCIFIED CORONARY LESION: ICD-10-CM

## 2023-03-28 DIAGNOSIS — Z95.2 S/P TAVR (TRANSCATHETER AORTIC VALVE REPLACEMENT): Primary | ICD-10-CM

## 2023-03-28 DIAGNOSIS — I71.21 ANEURYSM OF ASCENDING AORTA WITHOUT RUPTURE (H): ICD-10-CM

## 2023-03-28 DIAGNOSIS — I25.84 CORONARY ARTERY DISEASE DUE TO CALCIFIED CORONARY LESION: ICD-10-CM

## 2023-03-28 PROCEDURE — 99214 OFFICE O/P EST MOD 30 MIN: CPT | Performed by: INTERNAL MEDICINE

## 2023-03-28 NOTE — LETTER
3/28/2023    Shahana Parker NP  [address] [place] [address]    RE: Wendy Handy       Dear Colleague,     I had the pleasure of seeing Wendy Handy in the [facility].      Thank you, Shahana Parker NP, for asking the [facility] team to see Ms. Wendy Handy to follow-up on status post TAVR for aortic valve stenosis, ascending aortic aneurysm.    Assessment/Recommendations   Assessment:    1.  Aortic valve stenosis, status post TAVR August 2022.  She is doing well following her procedure with resolution of the intrascapular pain she was having with activity.  Her pre-TAVR angiography demonstrated no significant coronary artery disease.  At this point continue with current treatment.  We will plan echocardiogram in late July early August to follow-up on her valve.  2.  Minimal coronary artery disease.  At this point would continue with risk factor modification to reduce progression.  3.  Mild ascending aortic aneurysm.  This had remained stable on her pre-TAVR CT angiogram.  We will plan to repeat chest CTA in a year to follow-up and make sure no further dilatation noted.    Plan:  1.  Continue current medication  2.  Plan echocardiogram in about 4 months to follow-up on TAVR valve  3.  Chest CTA in 11 months to follow-up on ascending aortic aneurysm  4.  Follow-up with me in 1 year     History of Present Illness    Ms. Wendy Handy is a 67 year old female with history of essential hypertension, aortic valve stenosis with mild ascending aortic aneurysm who presents today for a follow-up visit.  In August 2022, she underwent transcatheter aortic valve replacement with excellent result.  Her pre-TAVR coronary angiogram showed only minimal plaquing of her coronary arteries and thus no additional intervention needed.  Her echocardiogram following the procedure demonstrated mean gradient 15 mmHg across the aortic valve which was essentially unchanged on subsequent study a  "month later.  She has noted significant improvement in her symptoms of exertional dyspnea and has had complete resolution of the intrascapular back pain she was having with activity.    ECG (personally reviewed): No ECG today    Cardiac Imaging Studies (personally reviewed): No new imaging     Physical Examination Review of Systems   /62 (BP Location: Left arm, Patient Position: Sitting, Cuff Size: Adult Regular)   Pulse 68   Resp 16   Ht 1.6 m (5' 3\")   Wt 71 kg (156 lb 9.6 oz)   SpO2 99%   BMI 27.74 kg/m    Body mass index is 27.74 kg/m .  Wt Readings from Last 3 Encounters:   03/28/23 71 kg (156 lb 9.6 oz)   03/08/23 69.9 kg (154 lb)   09/28/22 70.3 kg (155 lb)     General Appearance:   Awake, Alert, No acute distress.   HEENT:  No scleral icterus; the mucous membranes were pink and moist.   Neck: No cervical bruits or jugular venous distention    Chest: The spine was straight. The chest was symmetric.   Lungs:   Respirations unlabored; the lungs are clear to auscultation. No wheezing   Cardiovascular:    Regular rate and rhythm.  S1, S2 normal.  1/6 systolic flow murmur heard at the left upper sternal border   Abdomen:  No organomegaly, masses, bruits, or tenderness. Bowels sounds are present   Extremities:  No peripheral edema bilaterally   Skin: No xanthelasma. Warm, Dry.   Musculoskeletal: No tenderness.   Neurologic: Mood and affect are appropriate.    Enc Vitals  BP: 108/62  Pulse: 68  Resp: 16  SpO2: 99 %  Weight: 71 kg (156 lb 9.6 oz)  Height: 160 cm (5' 3\")                                         Medical History  Surgical History Family History Social History   Past Medical History:   Diagnosis Date     Aortic valve disorder     echo 1/09  4.2 cm TAA-MRA 4/2016       Bicuspid aortic valve      Coronary artery disease due to calcified coronary lesion 5/5/2016    Echo 6/2016   Good function  CT Ca++ 172 LAD  Cardiology consult 4/016-nuclear stress     Family history of psychiatric condition  "     Family history of tremor      Family history of vascular disease      H/O LEEP 1/1/2007 2002-2006 LSIL paps with colposcopy, had LEEP in 2007. No records 6/2/10 ASCUS, +HR HPV 53 6/20/12 ASCUS, +HR HPV 53. Colposcopy outside HE/ system? 6/23/14 NIL 6/24/15 NIL pap, neg HPV 6/27/16 ASCUS, neg HPV 6/29/17 NIL pap, neg HPV 7/9/18 ASCUS, +HR HPV 16. Unclear if colposcopy was completed 7/10/19 NIL pap, neg HPV 7/13/20 NIL pap, neg HPV 2/1/21 NIL pap, neg HPV. Plan: await provider     History of aortic stenosis      History of vitamin D deficiency      Hypercholesteremia      Hyperlipidemia      Laboratory test 8/18/2016    Reading Physician Reading Date Result Priority    Patricia Orta MD 8/16/2016       Narrative       Examination: Nuclear medicine DATscan for Dopamine Receptor Localization.    Examination: NM BRAIN IMAGING TOMOGRAPHIC (SPECT) DATSCAN  Date: 8/16/2016 3:38 PM  Indication: Right sided postural tremor.   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       Osteopenia 2/15/2015     Other and unspecified hyperlipidemia     Created by Conversion      Papanicolaou smear of cervix with low grade squamous intraepithelial lesion (LGSIL)      LGSIL 2002  colpoLGSIL 2003  colpoLGSIL 4/2006 colpoLGSIL 11/06 colpoAbnormal 5/07 colpo  CHCWLEEPendometrial biopsy 5/07      Plantar fasciitis of right foot      Primary insomnia      Snores      Tremor      Vertigo      Vitamin D deficiency      Wears glasses     Past Surgical History:   Procedure Laterality Date     BLEPHAROPLASTY       CARDIAC CATHETERIZATION  05/30/2017    No intervention     CARDIAC CATHETERIZATION N/A 5/30/2017    Procedure: Coronary Angiogram;  Surgeon: Torito Metzger MD;   Location: [facility];  Service:      CHOLECYSTECTOMY       CONIZATION CERVIX,LOOP ELECTRD      Description: Cervical Conization Loop Electrode Excision;  Recorded: 05/29/2008;     CV CORONARY ANGIOGRAM N/A 6/9/2022    Procedure: Coronary Angiogram;  Surgeon: Anabela Hernandez MD;  Location: [facility] CV     CV CORONARY ANGIOGRAM  6/9/2022    Procedure: ;  Surgeon: Anabela Hernandez MD;  Location: [facility]     CV TRANSCATHETER AORTIC VALVE REPLACEMENT-FEMORAL APPROACH N/A 8/23/2022    Procedure: Transcatheter Aortic Valve Replacement-Femoral Approach;  Surgeon: Anabela Hernandez MD;  Location: [facility]     FACIAL RECONSTRUCTION SURGERY      forehead as well     HC CLOSED TX MANDIBLE FX W/O MANIP      Description: Closed Treatment Of Mandibular Fracture;  Recorded: 05/29/2008;     HC DILATION/CURETTAGE DIAG/THER NON OB      Description: Dilation And Curettage;  Recorded: 05/29/2008;  Comments: X 2  metrorhhagia     LAPAROSCOPIC CHOLECYSTECTOMY N/A 2/23/2015    Procedure: CHOLECYSTECTOMY LAPAROSCOPIC;  Surgeon: Doug Webber MD;  Location: [facility];  Service:      OR TRANSCATHETER AORTIC VALVE REPLACEMENT, FEMORAL PERCUTANEOUS APPROACH (STANDBY) N/A 8/23/2022    Procedure: OR TRANSCATHETER AORTIC VALVE REPLACEMENT, FEMORAL PERCUTANEOUS APPROACH (STANDBY);  Surgeon: Katy Rojas MD;  Location: [facility]     ZZC ORAL SURGERY PROCEDURE      jaw fracture    Family History   Problem Relation Age of Onset     Heart Disease Father      Heart Disease Sister      Heart Disease Sister      Mental Illness Mother 30.00        schizophrenia, bipolar     Mental Illness Sister         bipolar     Colon Cancer Father      Hyperlipidemia Brother      Hyperlipidemia Brother      Hyperlipidemia Sister      Cervical Cancer Daughter      Diabetes Type 2  Brother      Diabetes Type 2  Brother      Tremor Father      Tremor Paternal Aunt      Tremor Paternal Grandfather       Tremor Son      Tremor Cousin         Paternal 1st cousin     Attention Deficit Disorder Son      Neurologic Disorder Son         Tourette Syndrome      Schizophrenia Mother      Schizophrenia Sister      Mental Illness Sister         depression     Diabetes Brother         type 2     Mental Illness Brother         depression    Social History     Socioeconomic History     Marital status:      Spouse name: Not on file     Number of children: Not on file     Years of education: Not on file     Highest education level: Not on file   Occupational History     Not on file   Tobacco Use     Smoking status: Never     Smokeless tobacco: Never   Substance and Sexual Activity     Alcohol use: Yes     Alcohol/week: 2.0 standard drinks     Comment: occasion     Drug use: No     Sexual activity: Not Currently     Partners: Male     Birth control/protection: Abstinence   Other Topics Concern     Not on file   Social History Narrative    Living in [place] with her  and has been  for 11 yrs     This is her 3rd marriage    1st marriage had 2 kids: son and daughter - 33 y r old son and 31 yr old daughter    2nd marriage no kids        Not working presently. Retired    Had been  in the past.      Social Determinants of Health     Financial Resource Strain: Not on file   Food Insecurity: Not on file   Transportation Needs: Not on file   Physical Activity: Not on file   Stress: Not on file   Social Connections: Not on file   Intimate Partner Violence: Not on file   Housing Stability: Not on file          Medications  Allergies   Current Outpatient Medications   Medication Sig Dispense Refill     aspirin 81 MG EC tablet Take 81 mg by mouth daily       atorvastatin (LIPITOR) 80 MG tablet Take 1 tablet (80 mg) by mouth At Bedtime 90 tablet 3     cyanocobalamin (VITAMIN B-12) 1000 MCG tablet Take 1,000 mcg by mouth daily        ezetimibe (ZETIA) 10 MG tablet Take 1 tablet (10 mg) by mouth daily 90 tablet  3     ibuprofen (ADVIL/MOTRIN) 800 MG tablet TAKE 1 TABLET BY MOUTH 3  TIMES DAILY AS NEEDED TAKE  WITH FOOD 60 tablet 8     Omega-3 Fatty Acids (OMEGA-3 FISH OIL) 1200 MG CAPS Take 1 capsule by mouth daily        polyethylene glycol-propylene glycol (SYSTANE) 0.4-0.3 % SOLN ophthalmic solution Place 1 drop into both eyes as needed for dry eyes       traZODone (DESYREL) 50 MG tablet Take 1-2 tablets ( mg) by mouth At Bedtime 90 tablet 3     vitamin C (ASCORBIC ACID) 1000 MG TABS Take 1,000 mg by mouth daily        VITAMIN D3 50 MCG (2000 UT) tablet TAKE 1 TABLET BY MOUTH EVERY DAY 90 tablet 3     vitamin E 400 UNIT capsule Take 400 Units by mouth daily        zinc gluconate 50 MG tablet Take 50 mg by mouth daily         No Known Allergies      Lab Results    Chemistry/lipid CBC Cardiac Enzymes/BNP/TSH/INR   Recent Labs   Lab Test 03/08/23  0930   TRIG 87   LDL 53   BUN 13      CO2 21*    Recent Labs   Lab Test 09/28/22  1345   WBC 8.3   HGB 13.0   HCT 39.0   MCV 94       Recent Labs   Lab Test 08/19/22  0828 07/10/19  0757   BNP 56  --    TSH  --  1.53   INR 1.07  --         A total of 33 minutes was spent reviewing patient's medical records, obtaining history and performing examination, as well as discussing diagnoses/ recommendations with patient and answering all questions.                          Thank you for allowing me to participate in the care of your patient.      Sincerely,     Teodoar Platt MD     [facility]  cc:   Teodora Platt MD  [address], [address]  [place] [address]

## 2023-03-28 NOTE — PATIENT INSTRUCTIONS
Continue current medications  Plan echo in 4 months to follow up on TAVR  Chest CT in 1 year to follow up on ascending aorta

## 2023-03-28 NOTE — PROGRESS NOTES
Thank you, Shahana Parker NP, for asking the [facility] team to see Ms. Wendy Handy to follow-up on status post TAVR for aortic valve stenosis, ascending aortic aneurysm.    Assessment/Recommendations   Assessment:    1.  Aortic valve stenosis, status post TAVR August 2022.  She is doing well following her procedure with resolution of the intrascapular pain she was having with activity.  Her pre-TAVR angiography demonstrated no significant coronary artery disease.  At this point continue with current treatment.  We will plan echocardiogram in late July early August to follow-up on her valve.  2.  Minimal coronary artery disease.  At this point would continue with risk factor modification to reduce progression.  3.  Mild ascending aortic aneurysm.  This had remained stable on her pre-TAVR CT angiogram.  We will plan to repeat chest CTA in a year to follow-up and make sure no further dilatation noted.    Plan:  1.  Continue current medication  2.  Plan echocardiogram in about 4 months to follow-up on TAVR valve  3.  Chest CTA in 11 months to follow-up on ascending aortic aneurysm  4.  Follow-up with me in 1 year     History of Present Illness    Ms. Wendy Handy is a 67 year old female with history of essential hypertension, aortic valve stenosis with mild ascending aortic aneurysm who presents today for a follow-up visit.  In August 2022, she underwent transcatheter aortic valve replacement with excellent result.  Her pre-TAVR coronary angiogram showed only minimal plaquing of her coronary arteries and thus no additional intervention needed.  Her echocardiogram following the procedure demonstrated mean gradient 15 mmHg across the aortic valve which was essentially unchanged on subsequent study a month later.  She has noted significant improvement in her symptoms of exertional dyspnea and has had complete resolution of the intrascapular back pain she was having with activity.    ECG  "(personally reviewed): No ECG today    Cardiac Imaging Studies (personally reviewed): No new imaging     Physical Examination Review of Systems   /62 (BP Location: Left arm, Patient Position: Sitting, Cuff Size: Adult Regular)   Pulse 68   Resp 16   Ht 1.6 m (5' 3\")   Wt 71 kg (156 lb 9.6 oz)   SpO2 99%   BMI 27.74 kg/m    Body mass index is 27.74 kg/m .  Wt Readings from Last 3 Encounters:   03/28/23 71 kg (156 lb 9.6 oz)   03/08/23 69.9 kg (154 lb)   09/28/22 70.3 kg (155 lb)     General Appearance:   Awake, Alert, No acute distress.   HEENT:  No scleral icterus; the mucous membranes were pink and moist.   Neck: No cervical bruits or jugular venous distention    Chest: The spine was straight. The chest was symmetric.   Lungs:   Respirations unlabored; the lungs are clear to auscultation. No wheezing   Cardiovascular:    Regular rate and rhythm.  S1, S2 normal.  1/6 systolic flow murmur heard at the left upper sternal border   Abdomen:  No organomegaly, masses, bruits, or tenderness. Bowels sounds are present   Extremities:  No peripheral edema bilaterally   Skin: No xanthelasma. Warm, Dry.   Musculoskeletal: No tenderness.   Neurologic: Mood and affect are appropriate.    Enc Vitals  BP: 108/62  Pulse: 68  Resp: 16  SpO2: 99 %  Weight: 71 kg (156 lb 9.6 oz)  Height: 160 cm (5' 3\")                                         Medical History  Surgical History Family History Social History   Past Medical History:   Diagnosis Date     Aortic valve disorder     echo 1/09  4.2 cm TAA-MRA 4/2016       Bicuspid aortic valve      Coronary artery disease due to calcified coronary lesion 5/5/2016    Echo 6/2016   Good function  CT Ca++ 172 LAD  Cardiology consult 4/016-nuclear stress     Family history of psychiatric condition      Family history of tremor      Family history of vascular disease      H/O LEEP 1/1/2007 2002-2006 LSIL paps with colposcopy, had LEEP in 2007. No records 6/2/10 ASCUS, +HR HPV 53 " 6/20/12 ASCUS, +HR HPV 53. Colposcopy outside HE/ system? 6/23/14 NIL 6/24/15 NIL pap, neg HPV 6/27/16 ASCUS, neg HPV 6/29/17 NIL pap, neg HPV 7/9/18 ASCUS, +HR HPV 16. Unclear if colposcopy was completed 7/10/19 NIL pap, neg HPV 7/13/20 NIL pap, neg HPV 2/1/21 NIL pap, neg HPV. Plan: await provider     History of aortic stenosis      History of vitamin D deficiency      Hypercholesteremia      Hyperlipidemia      Laboratory test 8/18/2016    Reading Physician Reading Date Result Priority    Patricia Orta MD 8/16/2016       Narrative       Examination: Nuclear medicine DATscan for Dopamine Receptor Localization.    Examination: NM BRAIN IMAGING TOMOGRAPHIC (SPECT) DATSCAN  Date: 8/16/2016 3:38 PM  Indication: Right sided postural tremor.   Comparison: None  Additional Information: none  Interfering Medications: None  Technique:  The pa     Low back pain      Lupus erythematosus     Created by 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       Osteopenia 2/15/2015     Other and unspecified hyperlipidemia     Created by Conversion      Papanicolaou smear of cervix with low grade squamous intraepithelial lesion (LGSIL)      LGSIL 2002  colpoLGSIL 2003  colpoLGSIL 4/2006 colpoLGSIL 11/06 colpoAbnormal 5/07 colpo  CHCWLEEPendometrial biopsy 5/07      Plantar fasciitis of right foot      Primary insomnia      Snores      Tremor      Vertigo      Vitamin D deficiency      Wears glasses     Past Surgical History:   Procedure Laterality Date     BLEPHAROPLASTY       CARDIAC CATHETERIZATION  05/30/2017    No intervention     CARDIAC CATHETERIZATION N/A 5/30/2017    Procedure: Coronary Angiogram;  Surgeon: Torito Metzger MD;  Location: [facility] Lab;  Service:      CHOLECYSTECTOMY       CONIZATION CERVIX,LOOP ELECTRD      Description: Cervical Conization Loop Electrode Excision;  Recorded: 05/29/2008;      CV CORONARY ANGIOGRAM N/A 6/9/2022    Procedure: Coronary Angiogram;  Surgeon: Anabela Hernandez MD;  Location: [facility] CV     CV CORONARY ANGIOGRAM  6/9/2022    Procedure: ;  Surgeon: Anabela Hernandez MD;  Location: [facility] CV     CV TRANSCATHETER AORTIC VALVE REPLACEMENT-FEMORAL APPROACH N/A 8/23/2022    Procedure: Transcatheter Aortic Valve Replacement-Femoral Approach;  Surgeon: Anabela Hernandez MD;  Location: [facility] CATH [facility] CV     FACIAL RECONSTRUCTION SURGERY      forehead as well     HC CLOSED TX MANDIBLE FX W/O MANIP      Description: Closed Treatment Of Mandibular Fracture;  Recorded: 05/29/2008;     HC DILATION/CURETTAGE DIAG/THER NON OB      Description: Dilation And Curettage;  Recorded: 05/29/2008;  Comments: X 2  metrorhhagia     LAPAROSCOPIC CHOLECYSTECTOMY N/A 2/23/2015    Procedure: CHOLECYSTECTOMY LAPAROSCOPIC;  Surgeon: Doug Webber MD;  Location: [facility];  Service:      OR TRANSCATHETER AORTIC VALVE REPLACEMENT, FEMORAL PERCUTANEOUS APPROACH (STANDBY) N/A 8/23/2022    Procedure: OR TRANSCATHETER AORTIC VALVE REPLACEMENT, FEMORAL PERCUTANEOUS APPROACH (STANDBY);  Surgeon: Katy Rojas MD;  Location[facility]     ZZC ORAL SURGERY PROCEDURE      jaw fracture    Family History   Problem Relation Age of Onset     Heart Disease Father      Heart Disease Sister      Heart Disease Sister      Mental Illness Mother 30.00        schizophrenia, bipolar     Mental Illness Sister         bipolar     Colon Cancer Father      Hyperlipidemia Brother      Hyperlipidemia Brother      Hyperlipidemia Sister      Cervical Cancer Daughter      Diabetes Type 2  Brother      Diabetes Type 2  Brother      Tremor Father      Tremor Paternal Aunt      Tremor Paternal Grandfather      Tremor Son      Tremor Cousin         Paternal 1st cousin     Attention Deficit Disorder Son      Neurologic Disorder Son         Tourette Syndrome      Schizophrenia Mother       Schizophrenia Sister      Mental Illness Sister         depression     Diabetes Brother         type 2     Mental Illness Brother         depression    Social History     Socioeconomic History     Marital status:      Spouse name: Not on file     Number of children: Not on file     Years of education: Not on file     Highest education level: Not on file   Occupational History     Not on file   Tobacco Use     Smoking status: Never     Smokeless tobacco: Never   Substance and Sexual Activity     Alcohol use: Yes     Alcohol/week: 2.0 standard drinks     Comment: occasion     Drug use: No     Sexual activity: Not Currently     Partners: Male     Birth control/protection: Abstinence   Other Topics Concern     Not on file   Social History Narrative    Living in [place] with her  and has been  for 11 yrs     This is her 3rd marriage    1st marriage had 2 kids: son and daughter - 33 y r old son and 31 yr old daughter    2nd marriage no kids        Not working presently. Retired    Had been  in the past.      Social Determinants of Health     Financial Resource Strain: Not on file   Food Insecurity: Not on file   Transportation Needs: Not on file   Physical Activity: Not on file   Stress: Not on file   Social Connections: Not on file   Intimate Partner Violence: Not on file   Housing Stability: Not on file          Medications  Allergies   Current Outpatient Medications   Medication Sig Dispense Refill     aspirin 81 MG EC tablet Take 81 mg by mouth daily       atorvastatin (LIPITOR) 80 MG tablet Take 1 tablet (80 mg) by mouth At Bedtime 90 tablet 3     cyanocobalamin (VITAMIN B-12) 1000 MCG tablet Take 1,000 mcg by mouth daily        ezetimibe (ZETIA) 10 MG tablet Take 1 tablet (10 mg) by mouth daily 90 tablet 3     ibuprofen (ADVIL/MOTRIN) 800 MG tablet TAKE 1 TABLET BY MOUTH 3  TIMES DAILY AS NEEDED TAKE  WITH FOOD 60 tablet 8     Omega-3 Fatty Acids (OMEGA-3 FISH OIL) 1200 MG CAPS Take 1  capsule by mouth daily        polyethylene glycol-propylene glycol (SYSTANE) 0.4-0.3 % SOLN ophthalmic solution Place 1 drop into both eyes as needed for dry eyes       traZODone (DESYREL) 50 MG tablet Take 1-2 tablets ( mg) by mouth At Bedtime 90 tablet 3     vitamin C (ASCORBIC ACID) 1000 MG TABS Take 1,000 mg by mouth daily        VITAMIN D3 50 MCG (2000 UT) tablet TAKE 1 TABLET BY MOUTH EVERY DAY 90 tablet 3     vitamin E 400 UNIT capsule Take 400 Units by mouth daily        zinc gluconate 50 MG tablet Take 50 mg by mouth daily         No Known Allergies      Lab Results    Chemistry/lipid CBC Cardiac Enzymes/BNP/TSH/INR   Recent Labs   Lab Test 03/08/23  0930   TRIG 87   LDL 53   BUN 13      CO2 21*    Recent Labs   Lab Test 09/28/22  1345   WBC 8.3   HGB 13.0   HCT 39.0   MCV 94       Recent Labs   Lab Test 08/19/22  0828 07/10/19  0757   BNP 56  --    TSH  --  1.53   INR 1.07  --         A total of 33 minutes was spent reviewing patient's medical records, obtaining history and performing examination, as well as discussing diagnoses/ recommendations with patient and answering all questions.

## 2023-04-27 DIAGNOSIS — G47.00 INSOMNIA, UNSPECIFIED TYPE: ICD-10-CM

## 2023-04-27 RX ORDER — TRAZODONE HYDROCHLORIDE 50 MG/1
TABLET, FILM COATED ORAL
Qty: 90 TABLET | Refills: 3 | OUTPATIENT
Start: 2023-04-27

## 2023-05-01 DIAGNOSIS — M77.11 LATERAL EPICONDYLITIS OF RIGHT ELBOW: ICD-10-CM

## 2023-05-02 RX ORDER — IBUPROFEN 800 MG/1
TABLET, FILM COATED ORAL
Qty: 300 TABLET | Refills: 2 | OUTPATIENT
Start: 2023-05-02

## 2023-05-02 NOTE — TELEPHONE ENCOUNTER
"Routing refill request to provider for review/approval because:  Failed NSAIDS due to age    Last Written Prescription Date:  1/30/2023  Last Fill Quantity: 60,  # refills: 8   Last office visit provider:  3/8/2023     Requested Prescriptions   Pending Prescriptions Disp Refills     ibuprofen (ADVIL/MOTRIN) 800 MG tablet [Pharmacy Med Name: Ibuprofen 800 MG Oral Tablet] 300 tablet 2     Sig: TAKE 1 TABLET BY MOUTH 3 TIMES  DAILY AS NEEDED . TAKE WITH FOOD       NSAID Medications Failed - 5/2/2023  4:21 PM        Failed - Patient is age 6-64 years        Passed - Blood pressure under 140/90 in past 12 months     BP Readings from Last 3 Encounters:   03/28/23 108/62   03/08/23 110/70   09/28/22 108/62                 Passed - Normal ALT on file in past 12 months     Recent Labs   Lab Test 03/08/23  0930   ALT 27             Passed - Normal AST on file in past 12 months     Recent Labs   Lab Test 03/08/23  0930   AST 25             Passed - Recent (12 mo) or future (30 days) visit within the authorizing provider's specialty     Patient has had an office visit with the authorizing provider or a provider within the authorizing providers department within the previous 12 mos or has a future within next 30 days. See \"Patient Info\" tab in inbasket, or \"Choose Columns\" in Meds & Orders section of the refill encounter.              Passed - Normal CBC on file in past 12 months     Recent Labs   Lab Test 09/28/22  1345   WBC 8.3   RBC 4.14   HGB 13.0   HCT 39.0                    Passed - Medication is active on med list        Passed - No active pregnancy on record        Passed - Normal serum creatinine on file in past 12 months     Recent Labs   Lab Test 03/08/23  0930   CR 0.77       Ok to refill medication if creatinine is low          Passed - No positive pregnancy test in past 12 months             Sarah Cadet RN 05/02/23 4:22 PM  "

## 2023-05-11 ENCOUNTER — TRANSFERRED RECORDS (OUTPATIENT)
Dept: HEALTH INFORMATION MANAGEMENT | Facility: CLINIC | Age: 68
End: 2023-05-11
Payer: COMMERCIAL

## 2023-05-25 ENCOUNTER — OFFICE VISIT (OUTPATIENT)
Dept: SURGERY | Facility: CLINIC | Age: 68
End: 2023-05-25
Attending: NURSE PRACTITIONER
Payer: COMMERCIAL

## 2023-05-25 ENCOUNTER — PRE VISIT (OUTPATIENT)
Dept: SURGERY | Facility: CLINIC | Age: 68
End: 2023-05-25

## 2023-05-25 VITALS
OXYGEN SATURATION: 97 % | HEART RATE: 72 BPM | WEIGHT: 155 LBS | SYSTOLIC BLOOD PRESSURE: 111 MMHG | DIASTOLIC BLOOD PRESSURE: 76 MMHG | HEIGHT: 63 IN | BODY MASS INDEX: 27.46 KG/M2

## 2023-05-25 DIAGNOSIS — D36.9 ADENOMATOUS POLYP: ICD-10-CM

## 2023-05-25 DIAGNOSIS — K64.4 HEMORRHOIDAL SKIN TAGS: Primary | ICD-10-CM

## 2023-05-25 PROCEDURE — 99203 OFFICE O/P NEW LOW 30 MIN: CPT | Performed by: NURSE PRACTITIONER

## 2023-05-25 ASSESSMENT — PAIN SCALES - GENERAL: PAINLEVEL: NO PAIN (0)

## 2023-05-25 NOTE — LETTER
"2023       RE: Wendy Handy  [address] [address]       Dear Colleague,    Thank you for referring your patient, Wendy Handy, to the [facility] [place] at [facility]. Please see a copy of my visit note below.    Colon and Rectal Surgery Consult Clinic Note    Date: 2023     Referring provider:  Shahana Parker NP  [address] [place] [address]     RE: Wendy Handy  : 1955  DEMAR: 2023    Wendy Handy is a very pleasant 68 year old female with SLE, thoracic aortic aneurysm, and heart failure here for hemorrhoids.    HPI:  Has hemorrhoids that are external. These are not painful and do not bleed but are hard to get clean. Bowel movements can be hard depending on water and food intake but are generally soft. No leakage of stool. No anorectal surgeries in the past.  Colonoscopy 2020 with advanced adenoma due to size (10mm) with 3 year recall recommended.  Has had two vaginal births. Biggest baby was 6 lb 8 oz. Had an episiotomy with her first.    Physical Examination:  /76 (BP Location: Left arm, Patient Position: Sitting, Cuff Size: Adult Regular)   Pulse 72   Ht 5' 3\"   Wt 155 lb   SpO2 97%   BMI 27.46 kg/m    General: alert, oriented, in no acute distress, sitting comfortably  HEENT: mucous membranes moist    Perianal external examination: Exam was chaperoned by Umberto Castaneda, EMT-P   Perianal skin: Intact with no excoriation or lichenification.  Lesions: No evidence of an external lesion, nodularity, or induration in the perianal region.  Eversion of buttocks: There was not evidence of an anal fissure. Details: N/A.  Skin tags or external hemorrhoids: Yes: small wide based skin tags in the posterior and right anterior positions.    Digital rectal examination: Was performed.   Sphincter tone: Good.  Palpable lesions: No.  Other: None.  Bimanual examination: was not " performed    Anoscopy: Was performed.   Hemorrhoids: No significant internal hemorrhoids  Lesions: No    Assessment/Plan: 68 year old female with anal skin tags.  These are fairly wide-based but not very large.  No significant internal hemorrhoids.  These are relatively asymptomatic except they are difficult to get clean.  Recommended a fiber supplement to bulk up her stools, baby wipes instead of toilet paper, could try using a bidet as well.  If they are still bothersome with conservative management, could consider surgical intervention but discussed that we cannot promise a good cosmetic result and she can develop skin tags in the future or from surgery itself.  She would like to hold off on surgical intervention for now and will try conservative management.  She is due for colonoscopy in September given history of advanced adenoma in 2020 and family history of father with colon cancer in his 80s so I placed a referral for this.    Medical history:  Past Medical History:   Diagnosis Date    Aortic valve disorder     echo 1/09  4.2 cm TAA-MRA 4/2016      Bicuspid aortic valve     Coronary artery disease due to calcified coronary lesion 5/5/2016    Echo 6/2016   Good function  CT Ca++ 172 LAD  Cardiology consult 4/016-nuclear stress    Family history of psychiatric condition     Family history of tremor     Family history of vascular disease     H/O LEEP 1/1/2007 2002-2006 LSIL paps with colposcopy, had LEEP in 2007. No records 6/2/10 ASCUS, +HR HPV 53 6/20/12 ASCUS, +HR HPV 53. Colposcopy outside HE/ system? 6/23/14 NIL 6/24/15 NIL pap, neg HPV 6/27/16 ASCUS, neg HPV 6/29/17 NIL pap, neg HPV 7/9/18 ASCUS, +HR HPV 16. Unclear if colposcopy was completed 7/10/19 NIL pap, neg HPV 7/13/20 NIL pap, neg HPV 2/1/21 NIL pap, neg HPV. Plan: await provider    History of aortic stenosis     History of vitamin D deficiency     Hypercholesteremia     Hyperlipidemia     Laboratory test 8/18/2016    Reading  Physician Reading Date Result Priority    Patricia Orta MD 8/16/2016       Narrative       Examination: Nuclear medicine DATscan for Dopamine Receptor Localization.    Examination: NM BRAIN IMAGING TOMOGRAPHIC (SPECT) DATSCAN  Date: 8/16/2016 3:38 PM  Indication: Right sided postural tremor.   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      Osteopenia 2/15/2015    Other and unspecified hyperlipidemia     Created by Conversion     Papanicolaou smear of cervix with low grade squamous intraepithelial lesion (LGSIL)      LGSIL 2002  colpoLGSIL 2003  colpoLGSIL 4/2006 colpoLGSIL 11/06 colpoAbnormal 5/07 colpo  CHCWLEEPendometrial biopsy 5/07     Plantar fasciitis of right foot     Primary insomnia     Snores     Tremor     Vertigo     Vitamin D deficiency     Wears glasses        Surgical history:  Past Surgical History:   Procedure Laterality Date    BLEPHAROPLASTY      CARDIAC CATHETERIZATION  05/30/2017    No intervention    CARDIAC CATHETERIZATION N/A 5/30/2017    Procedure: Coronary Angiogram;  Surgeon: Torito Metzger MD;  Location: [facility] Lab;  Service:     CHOLECYSTECTOMY      CONIZATION CERVIX,LOOP ELECTRD      Description: Cervical Conization Loop Electrode Excision;  Recorded: 05/29/2008;    CV CORONARY ANGIOGRAM N/A 6/9/2022    Procedure: Coronary Angiogram;  Surgeon: Anabela Hernandez MD;  Location: [facility] CV    CV CORONARY ANGIOGRAM  6/9/2022    Procedure: ;  Surgeon: Anabela Hernandez MD;  Location: [facility] CV    CV TRANSCATHETER AORTIC VALVE REPLACEMENT-FEMORAL APPROACH N/A 8/23/2022    Procedure: Transcatheter Aortic Valve Replacement-Femoral Approach;  Surgeon: Anabela Hernandez MD;  Location: [facility]  LAB CV    FACIAL RECONSTRUCTION SURGERY      forehead as well    HC CLOSED TX MANDIBLE FX W/O MANIP      Description: Closed Treatment Of Mandibular Fracture;  Recorded: 05/29/2008;    HC DILATION/CURETTAGE DIAG/THER NON OB      Description: Dilation And Curettage;  Recorded: 05/29/2008;  Comments: X 2  metrorhhagia    LAPAROSCOPIC CHOLECYSTECTOMY N/A 2/23/2015    Procedure: CHOLECYSTECTOMY LAPAROSCOPIC;  Surgeon: Doug Webber MD;  Location: [facility];  Service:     OR TRANSCATHETER AORTIC VALVE REPLACEMENT, FEMORAL PERCUTANEOUS APPROACH (STANDBY) N/A 8/23/2022    Procedure: OR TRANSCATHETER AORTIC VALVE REPLACEMENT, FEMORAL PERCUTANEOUS APPROACH (STANDBY);  Surgeon: Katy Rojas MD;  Location: [facility] CV    ZZC ORAL SURGERY PROCEDURE      jaw fracture       Problem list:  Patient Active Problem List    Diagnosis Date Noted    S/P TAVR (transcatheter aortic valve replacement) 08/23/2022     Priority: Medium     August 23, 2022 - right TF approach, #23 BRADY valve, no sentinel      Aortic stenosis, severe 08/23/2022     Priority: Medium    Severe aortic stenosis 08/19/2022     Priority: Medium    Chronic diastolic heart failure (H) 08/19/2022     Priority: Medium    Discoid lupus erythematosus 03/09/2022     Priority: Medium     Formatting of this note might be different from the original.  Created by [facility] Annotation: May 29 2008  9:49AM - Yolanda Gonzalez: Rheumatologist      History of colonic polyps 10/01/2020     Priority: Medium    Hemorrhoids 09/29/2020     Priority: Medium    History of abnormal cervical Pap smear 07/09/2018     Priority: Medium     Formatting of this note might be different from the original.  LGSIL, neg colposcopy      Benign neoplasm of sigmoid colon 09/15/2017     Priority: Medium    Family history of colon cancer 09/13/2017     Priority: Medium    Polyp of colon 09/13/2017     Priority: Medium     Formatting of this note might be different  from the original.  Next colonoscopy due 2020      Laboratory test 08/18/2016     Priority: Medium     Reading Physician Reading Date Result Priority      Ifrah Gonzalez MD 8/16/2016             Narrative           Examination: Nuclear medicine DATscan for Dopamine Receptor  Localization.      Examination: NM BRAIN IMAGING TOMOGRAPHIC (SPECT) DATSCAN    Date: 8/16/2016 3:38 PM    Indication: Right sided postural tremor.     Comparison: None    Additional Information: none    Interfering Medications: None    Technique:    The patient initially received 1 ml of Lugol's solution orally prior  to the injection of 5 mCi of I-123 DATscan intravenously.    After 4 hours of uptake time the patient was imaged on a dual headed  SPECT scanner using JUSTIN collimators.. The patients head was  immobilized  prior to scanning to reduce motion. The head was scanned with a FOV of  15 cm at 3 degrees per stop over 360 degrees, 128 x 128 x 16 bit  resolution, at 30 seconds per stop to yield greater than 1.5 million  counts.    Images were reconstructed using a iterative reconstruction technique.  Semi-quantitative analysis was performed as a ratio of Putamen to  Caudate  Nucleus of the right and left brain when the Caudate uptake appeared  normal.    2. Technical Quality: Excellent    Subjective findings:    Semi-quantitative analysis:  (Ratios of activity in both strata normalized to background region  (occipital cortex)).  Right Caudate to Putamen ratio: 1.08  Left Caudate to Putamen ratio: 1.32    Normal ratios: <1.5.               Impression           Impression:    Presynaptic dopaminergic deficit is not found.         IFRAH GONZALEZ MD                 Plantar fasciitis of right foot      Priority: Medium    Low back pain      Priority: Medium    Coronary artery disease due to calcified coronary lesion 05/05/2016     Priority: Medium     Formatting of this note might be different from the original.  Echo 6/2016   Good function    CT  Ca++ 172 LAD    Cardiology consult 4/016-nuclear stress      Tremor 04/29/2016     Priority: Medium    Family history of vascular disease 04/29/2016     Priority: Medium    Family history of psychiatric condition 04/29/2016     Priority: Medium    Family history of tremor 04/29/2016     Priority: Medium    History of aortic stenosis 04/29/2016     Priority: Medium    Hyperlipidemia 04/29/2016     Priority: Medium     Overview:   Created by Conversion    Formatting of this note might be different from the original.   CT calcium score 172      Vertigo 04/29/2016     Priority: Medium    History of vitamin D deficiency 04/29/2016     Priority: Medium    Vitamin D deficiency 04/29/2016     Priority: Medium     Overview:   Created by Conversion    Formatting of this note might be different from the original.  Created by Conversion    Replacement Utility updated for latest IMO load      Wears glasses 04/29/2016     Priority: Medium    Primary insomnia 04/29/2016     Priority: Medium    Snores 04/29/2016     Priority: Medium    Aortic aneurysm (H) 04/29/2016     Priority: Medium     4.2 ascending aortic aneurysm    Interface, Rad Results In - 04/27/2016 1:47 PM CDT   OVER READ: DETAILED [facility] EXTRAC[facility] OVER READ OF CARDIAC CT   4/27/2016 12:50 PM    INDICATION: Coronary artery disease risk factors. Aortic stenosis.  TECHNIQUE: Dose reduction techniques were used.  COMPARISON: None.    FINDINGS:   LIMITED CHEST: Calcified granuloma within the lingula. Otherwise negative.  LIMITED MEDIASTINUM: Proximal ascending aortic aneurysm measures 4.2 cm in diameter. Aortic valve calcifications. No adenopathy. No pleural nor pericardial effusion.  LIMITED UPPER ABDOMEN: Negative.    IMPRESSION:   CONCLUSION:   1. 4.2 cm ascending aortic aneurysm. Associated aortic valve calcifications.  2. Please refer to cardiologist's dictation for the cardiac CT report.    Formatting of this note might be different from the  original.  Overview:   4.2 ascending aortic aneurysm    Interface, Rad Results In - 04/27/2016 1:47 PM CDT   OVER READ: DETAILED [facility] EXTRAC[facility] OVER READ OF CARDIAC CT   4/27/2016 12:50 PM    INDICATION: Coronary artery disease risk factors. Aortic stenosis.  TECHNIQUE: Dose reduction techniques were used.  COMPARISON: None.    FINDINGS:   LIMITED CHEST: Calcified granuloma within the lingula. Otherwise negative.  LIMITED MEDIASTINUM: Proximal ascending aortic aneurysm measures 4.2 cm in diameter. Aortic valve calcifications. No adenopathy. No pleural nor pericardial effusion.  LIMITED UPPER ABDOMEN: Negative.    IMPRESSION:   CONCLUSION:   1. 4.2 cm ascending aortic aneurysm. Associated aortic valve calcifications.  2. Please refer to cardiologist's dictation for the cardiac CT report.      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  1297-5183 LSIL paps with colposcopy, had LEEP in 2007. No records  6/2/10 ASCUS, +HR HPV 53  6/20/12 ASCUS, +HR HPV 53. Colposcopy outside / system?  6/23/14 NIL  6/24/15 NIL pap, neg HPV  6/27/16 ASCUS, neg HPV  6/29/17 NIL pap, neg HPV  7/9/18 ASCUS, +HR HPV 16. Unclear if colposcopy was completed  7/10/19 NIL pap, neg HPV  7/13/20 NIL pap, neg HPV  2/1/21 NIL pap, neg HPV. Plan: cotest in 3 years per provider      Papanicolaou smear of cervix with low grade squamous intraepithelial lesion (LGSIL) 02/25/2005     Priority: Medium     Overview:   Created by [employer] Annotation: May 29 2008  9:Yolanda Headley: LGSIL 2002   colpoLGSIL  2003  colpoLGSIL 4/2006 colpoLGSIL 11/06 colpoAbnormal 5/07 colpo   CHCWLEEPendometrial biopsy 5/07    Formatting of this note might be different from the original.  6/03 LSIL, 11/03 LSIL path reactive changes, 7/04 LSIL  ; Other abnormal Papanicolaou smear of cervix and cervical HPV(795.09)    Formatting of this note might be different from the original.  Created by [facility] Annotation: May 29 2008  9:49KAROLINE - Yolanda Gonzalez: LGSIL 2002   colpoLGSIL 2003  colpoLGSIL 4/2006 colpoLGSIL 11/06 colpoAbnormal 5/07 colpo   CHCWLEEPendometrial biopsy 5/07         Medications:  Current Outpatient Medications   Medication Sig Dispense Refill    aspirin 81 MG EC tablet Take 81 mg by mouth daily      atorvastatin (LIPITOR) 80 MG tablet Take 1 tablet (80 mg) by mouth At Bedtime 90 tablet 3    cyanocobalamin (VITAMIN B-12) 1000 MCG tablet Take 1,000 mcg by mouth daily       ezetimibe (ZETIA) 10 MG tablet Take 1 tablet (10 mg) by mouth daily 90 tablet 3    ibuprofen (ADVIL/MOTRIN) 800 MG tablet TAKE 1 TABLET BY MOUTH 3  TIMES DAILY AS NEEDED TAKE  WITH FOOD 60 tablet 8    Omega-3 Fatty Acids (OMEGA-3 FISH OIL) 1200 MG CAPS Take 1 capsule by mouth daily       polyethylene glycol-propylene glycol (SYSTANE) 0.4-0.3 % SOLN ophthalmic solution Place 1 drop into both eyes as needed for dry eyes      traZODone (DESYREL) 50 MG tablet Take 1-2 tablets ( mg) by mouth At Bedtime 90 tablet 3    vitamin C (ASCORBIC ACID) 1000 MG TABS Take 1,000 mg by mouth daily       VITAMIN D3 50 MCG (2000 UT) tablet TAKE 1 TABLET BY MOUTH EVERY DAY 90 tablet 3    vitamin E 400 UNIT capsule Take 400 Units by mouth daily       zinc gluconate 50 MG tablet Take 50 mg by mouth daily          Allergies:  No Known Allergies    Family history:  Family History   Problem Relation Age of Onset    Heart Disease Father     Heart Disease Sister     Heart Disease Sister     Mental Illness Mother 30.00        schizophrenia, bipolar    Mental Illness  "Sister         bipolar    Colon Cancer Father     Hyperlipidemia Brother     Hyperlipidemia Brother     Hyperlipidemia Sister     Cervical Cancer Daughter     Diabetes Type 2  Brother     Diabetes Type 2  Brother     Tremor Father     Tremor Paternal Aunt     Tremor Paternal Grandfather     Tremor Son     Tremor Cousin         Paternal 1st cousin    Attention Deficit Disorder Son     Neurologic Disorder Son         Tourette Syndrome     Schizophrenia Mother     Schizophrenia Sister     Mental Illness Sister         depression    Diabetes Brother         type 2    Mental Illness Brother         depression       Social history:  Social History     Tobacco Use    Smoking status: Never    Smokeless tobacco: Never   Vaping Use    Vaping status: Not on file   Substance Use Topics    Alcohol use: Yes     Alcohol/week: 2.0 standard drinks of alcohol     Comment: occasion    Marital status: .  Occupation: retired    Nursing Notes:   Umberto Castaneda, EMT  5/25/2023  1:10 PM  Signed  Chief Complaint   Patient presents with    New Patient    Hemorrhoids       Vitals:    05/25/23 1247   BP: 111/76   BP Location: Left arm   Patient Position: Sitting   Cuff Size: Adult Regular   Pulse: 72   SpO2: 97%   Weight: 155 lb   Height: 5' 3\"     Body mass index is 27.46 kg/m .     Umberto Castaneda, LAYLA- P    20 minutes spent on the date of encounter performing chart review, history and exam, documentation and further activities as noted above with an additional 2 minutes for anoscopy.     This note was created using speech recognition software and may contain unintended word substitutions.      Again, thank you for allowing me to participate in the care of your patient.      Sincerely,    FER Henley CNP    "

## 2023-05-25 NOTE — NURSING NOTE
"Chief Complaint   Patient presents with     New Patient     Hemorrhoids       Vitals:    05/25/23 1247   BP: 111/76   BP Location: Left arm   Patient Position: Sitting   Cuff Size: Adult Regular   Pulse: 72   SpO2: 97%   Weight: 155 lb   Height: 5' 3\"       Body mass index is 27.46 kg/m .     Umberto Castaneda, EMT- P    "

## 2023-05-25 NOTE — PROGRESS NOTES
"Colon and Rectal Surgery Consult Clinic Note    Date: 2023     Referring provider:  Shahana Parker, YOCASTA  [address] [place] [address]     RE: Wendy Handy  : 1955  DEMAR: 2023    Wendy Handy is a very pleasant 68 year old female with SLE, thoracic aortic aneurysm, and heart failure here for hemorrhoids.    HPI:  Has hemorrhoids that are external. These are not painful and do not bleed but are hard to get clean. Bowel movements can be hard depending on water and food intake but are generally soft. No leakage of stool. No anorectal surgeries in the past.  Colonoscopy 2020 with advanced adenoma due to size (10mm) with 3 year recall recommended.  Has had two vaginal births. Biggest baby was 6 lb 8 oz. Had an episiotomy with her first.    Physical Examination:  /76 (BP Location: Left arm, Patient Position: Sitting, Cuff Size: Adult Regular)   Pulse 72   Ht 5' 3\"   Wt 155 lb   SpO2 97%   BMI 27.46 kg/m    General: alert, oriented, in no acute distress, sitting comfortably  HEENT: mucous membranes moist    Perianal external examination: Exam was chaperoned by Umberto Castaneda, EMT-P   Perianal skin: Intact with no excoriation or lichenification.  Lesions: No evidence of an external lesion, nodularity, or induration in the perianal region.  Eversion of buttocks: There was not evidence of an anal fissure. Details: N/A.  Skin tags or external hemorrhoids: Yes: small wide based skin tags in the posterior and right anterior positions.    Digital rectal examination: Was performed.   Sphincter tone: Good.  Palpable lesions: No.  Other: None.  Bimanual examination: was not performed    Anoscopy: Was performed.   Hemorrhoids: No significant internal hemorrhoids  Lesions: No    Assessment/Plan: 68 year old female with anal skin tags.  These are fairly wide-based but not very large.  No significant internal hemorrhoids.  These are relatively asymptomatic except they are difficult to get clean.  " Recommended a fiber supplement to bulk up her stools, baby wipes instead of toilet paper, could try using a bidet as well.  If they are still bothersome with conservative management, could consider surgical intervention but discussed that we cannot promise a good cosmetic result and she can develop skin tags in the future or from surgery itself.  She would like to hold off on surgical intervention for now and will try conservative management.  She is due for colonoscopy in September given history of advanced adenoma in 2020 and family history of father with colon cancer in his 80s so I placed a referral for this.    Medical history:  Past Medical History:   Diagnosis Date     Aortic valve disorder     echo 1/09  4.2 cm TAA-MRA 4/2016       Bicuspid aortic valve      Coronary artery disease due to calcified coronary lesion 5/5/2016    Echo 6/2016   Good function  CT Ca++ 172 LAD  Cardiology consult 4/016-nuclear stress     Family history of psychiatric condition      Family history of tremor      Family history of vascular disease      H/O LEEP 1/1/2007 2002-2006 LSIL paps with colposcopy, had LEEP in 2007. No records 6/2/10 ASCUS, +HR HPV 53 6/20/12 ASCUS, +HR HPV 53. Colposcopy outside HE/ system? 6/23/14 NIL 6/24/15 NIL pap, neg HPV 6/27/16 ASCUS, neg HPV 6/29/17 NIL pap, neg HPV 7/9/18 ASCUS, +HR HPV 16. Unclear if colposcopy was completed 7/10/19 NIL pap, neg HPV 7/13/20 NIL pap, neg HPV 2/1/21 NIL pap, neg HPV. Plan: await provider     History of aortic stenosis      History of vitamin D deficiency      Hypercholesteremia      Hyperlipidemia      Laboratory test 8/18/2016    Reading Physician Reading Date Result Priority    Patricia Orta MD 8/16/2016       Narrative       Examination: Nuclear medicine DATscan for Dopamine Receptor Localization.    Examination: NM BRAIN IMAGING TOMOGRAPHIC (SPECT) DATSCAN  Date: 8/16/2016 3:38 PM  Indication: Right sided postural tremor.   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       Osteopenia 2/15/2015     Other and unspecified hyperlipidemia     Created by Conversion      Papanicolaou smear of cervix with low grade squamous intraepithelial lesion (LGSIL)      LGSIL 2002  colpoLGSIL 2003  colpoLGSIL 4/2006 colpoLGSIL 11/06 colpoAbnormal 5/07 colpo  CHCWLEEPendometrial biopsy 5/07      Plantar fasciitis of right foot      Primary insomnia      Snores      Tremor      Vertigo      Vitamin D deficiency      Wears glasses        Surgical history:  Past Surgical History:   Procedure Laterality Date     BLEPHAROPLASTY       CARDIAC CATHETERIZATION  05/30/2017    No intervention     CARDIAC CATHETERIZATION N/A 5/30/2017    Procedure: Coronary Angiogram;  Surgeon: Torito Metzger MD;  Location: [facility] Cath Lab;  Service:      CHOLECYSTECTOMY       CONIZATION CERVIX,LOOP ELECTRD      Description: Cervical Conization Loop Electrode Excision;  Recorded: 05/29/2008;     CV CORONARY ANGIOGRAM N/A 6/9/2022    Procedure: Coronary Angiogram;  Surgeon: Anabela Hernandez MD;  Location: [facility] CV     CV CORONARY ANGIOGRAM  6/9/2022    Procedure: ;  Surgeon: Anabela Hernandez MD;  Location: [facility] CV     CV TRANSCATHETER AORTIC VALVE REPLACEMENT-FEMORAL APPROACH N/A 8/23/2022    Procedure: Transcatheter Aortic Valve Replacement-Femoral Approach;  Surgeon: Anabela Hernandez MD;  Location: [facility] CATH [facility] CV     FACIAL RECONSTRUCTION SURGERY      forehead as well     HC CLOSED TX MANDIBLE FX W/O MANIP      Description: Closed Treatment Of Mandibular Fracture;  Recorded: 05/29/2008;     HC DILATION/CURETTAGE DIAG/THER NON OB      Description: Dilation And Curettage;  Recorded: 05/29/2008;  Comments: X  2  metrorhhagia     LAPAROSCOPIC CHOLECYSTECTOMY N/A 2/23/2015    Procedure: CHOLECYSTECTOMY LAPAROSCOPIC;  Surgeon: Doug Webber MD;  Location: [facility];  Service:      OR TRANSCATHETER AORTIC VALVE REPLACEMENT, FEMORAL PERCUTANEOUS APPROACH (STANDBY) N/A 8/23/2022    Procedure: OR TRANSCATHETER AORTIC VALVE REPLACEMENT, FEMORAL PERCUTANEOUS APPROACH (STANDBY);  Surgeon: Katy Rojas MD;  Location: [facility] LAB [facility] ORAL SURGERY PROCEDURE      jaw fracture       Problem list:  Patient Active Problem List    Diagnosis Date Noted     S/P TAVR (transcatheter aortic valve replacement) 08/23/2022     Priority: Medium     August 23, 2022 - right TF approach, #23 BRADY valve, no sentinel       Aortic stenosis, severe 08/23/2022     Priority: Medium     Severe aortic stenosis 08/19/2022     Priority: Medium     Chronic diastolic heart failure (H) 08/19/2022     Priority: Medium     Discoid lupus erythematosus 03/09/2022     Priority: Medium     Formatting of this note might be different from the original.  Created by [facility] Annotation: May 29 2008  9:49AM - Yolanda Gonzalez: Rheumatologist       History of colonic polyps 10/01/2020     Priority: Medium     Hemorrhoids 09/29/2020     Priority: Medium     History of abnormal cervical Pap smear 07/09/2018     Priority: Medium     Formatting of this note might be different from the original.  LGSIL, neg colposcopy       Benign neoplasm of sigmoid colon 09/15/2017     Priority: Medium     Family history of colon cancer 09/13/2017     Priority: Medium     Polyp of colon 09/13/2017     Priority: Medium     Formatting of this note might be different from the original.  Next colonoscopy due 2020       Laboratory test 08/18/2016     Priority: Medium     Reading Physician Reading Date Result Priority      Patricia Orta MD 8/16/2016             Narrative           Examination: Nuclear medicine DATscan for Dopamine Receptor  Localization.       Examination: NM BRAIN IMAGING TOMOGRAPHIC (SPECT) DATSCAN    Date: 8/16/2016 3:38 PM    Indication: Right sided postural tremor.     Comparison: None    Additional Information: none    Interfering Medications: None    Technique:    The patient initially received 1 ml of Lugol's solution orally prior  to the injection of 5 mCi of I-123 DATscan intravenously.    After 4 hours of uptake time the patient was imaged on a dual headed  SPECT scanner using JUSTIN collimators.. The patients head was  immobilized  prior to scanning to reduce motion. The head was scanned with a FOV of  15 cm at 3 degrees per stop over 360 degrees, 128 x 128 x 16 bit  resolution, at 30 seconds per stop to yield greater than 1.5 million  counts.    Images were reconstructed using a iterative reconstruction technique.  Semi-quantitative analysis was performed as a ratio of Putamen to  Caudate  Nucleus of the right and left brain when the Caudate uptake appeared  normal.    2. Technical Quality: Excellent    Subjective findings:    Semi-quantitative analysis:  (Ratios of activity in both strata normalized to background region  (occipital cortex)).  Right Caudate to Putamen ratio: 1.08  Left Caudate to Putamen ratio: 1.32    Normal ratios: <1.5.               Impression           Impression:    Presynaptic dopaminergic deficit is not found.         IFRAH GONZALEZ MD                  Plantar fasciitis of right foot      Priority: Medium     Low back pain      Priority: Medium     Coronary artery disease due to calcified coronary lesion 05/05/2016     Priority: Medium     Formatting of this note might be different from the original.  Echo 6/2016   Good function    CT Ca++ 172 LAD    Cardiology consult 4/016-nuclear stress       Tremor 04/29/2016     Priority: Medium     Family history of vascular disease 04/29/2016     Priority: Medium     Family history of psychiatric condition 04/29/2016     Priority: Medium     Family history of tremor 04/29/2016      Priority: Medium     History of aortic stenosis 04/29/2016     Priority: Medium     Hyperlipidemia 04/29/2016     Priority: Medium     Overview:   Created by Conversion    Formatting of this note might be different from the original.   CT calcium score 172       Vertigo 04/29/2016     Priority: Medium     History of vitamin D deficiency 04/29/2016     Priority: Medium     Vitamin D deficiency 04/29/2016     Priority: Medium     Overview:   Created by Conversion    Formatting of this note might be different from the original.  Created by Conversion    Replacement Utility updated for latest IMO load       Wears glasses 04/29/2016     Priority: Medium     Primary insomnia 04/29/2016     Priority: Medium     Snores 04/29/2016     Priority: Medium     Aortic aneurysm (H) 04/29/2016     Priority: Medium     4.2 ascending aortic aneurysm    Interface, Rad Results In - 04/27/2016 1:47 PM CDT   OVER READ: DETAILED [facility] EXTRAC[facility] OVER READ OF CARDIAC CT   4/27/2016 12:50 PM    INDICATION: Coronary artery disease risk factors. Aortic stenosis.  TECHNIQUE: Dose reduction techniques were used.  COMPARISON: None.    FINDINGS:   LIMITED CHEST: Calcified granuloma within the lingula. Otherwise negative.  LIMITED MEDIASTINUM: Proximal ascending aortic aneurysm measures 4.2 cm in diameter. Aortic valve calcifications. No adenopathy. No pleural nor pericardial effusion.  LIMITED UPPER ABDOMEN: Negative.    IMPRESSION:   CONCLUSION:   1. 4.2 cm ascending aortic aneurysm. Associated aortic valve calcifications.  2. Please refer to cardiologist's dictation for the cardiac CT report.    Formatting of this note might be different from the original.  Overview:   4.2 ascending aortic aneurysm    Interface, Rad Results In - 04/27/2016 1:47 PM CDT   OVER READ: DETAILED [facility] EXTRAC[facility] OVER READ OF CARDIAC CT   4/27/2016 12:50 PM    INDICATION: Coronary artery disease risk factors. Aortic  stenosis.  TECHNIQUE: Dose reduction techniques were used.  COMPARISON: None.    FINDINGS:   LIMITED CHEST: Calcified granuloma within the lingula. Otherwise negative.  LIMITED MEDIASTINUM: Proximal ascending aortic aneurysm measures 4.2 cm in diameter. Aortic valve calcifications. No adenopathy. No pleural nor pericardial effusion.  LIMITED UPPER ABDOMEN: Negative.    IMPRESSION:   CONCLUSION:   1. 4.2 cm ascending aortic aneurysm. Associated aortic valve calcifications.  2. Please refer to cardiologist's dictation for the cardiac CT report.       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  0795-1266 LSIL paps with colposcopy, had LEEP in 2007. No records  6/2/10 ASCUS, +HR HPV 53  6/20/12 ASCUS, +HR HPV 53. Colposcopy outside / system?  6/23/14 NIL  6/24/15 NIL pap, neg HPV  6/27/16 ASCUS, neg HPV  6/29/17 NIL pap, neg HPV  7/9/18 ASCUS, +HR HPV 16. Unclear if colposcopy was completed  7/10/19 NIL pap, neg HPV  7/13/20 NIL pap, neg HPV  2/1/21 NIL pap, neg HPV. Plan: cotest in 3 years per provider       Papanicolaou smear of cervix with low grade squamous intraepithelial lesion (LGSIL) 02/25/2005     Priority: Medium     Overview:   Created by [facility]  [facility] Annotation: May 29 2008  9:49Yolanda Blanchard: LGSIL 2002   colpoLGSIL 2003  colpoLGSIL 4/2006 colpoLGSIL 11/06 colpoAbnormal 5/07 colpo   CHCWLEEPendometrial biopsy 5/07    Formatting of this note might be different from the original.  6/03 LSIL, 11/03 LSIL path reactive changes, 7/04 LSIL  ; Other abnormal Papanicolaou smear of  cervix and cervical HPV(795.09)    Formatting of this note might be different from the original.  Created by [facility] Annotation: May 29 2008  9:49AM - Yoalnda Gonzalez: LGSIL 2002   colpoLGSIL 2003  colpoLGSIL 4/2006 colpoLGSIL 11/06 colpoAbnormal 5/07 colpo   CHCWLEEPendometrial biopsy 5/07         Medications:  Current Outpatient Medications   Medication Sig Dispense Refill     aspirin 81 MG EC tablet Take 81 mg by mouth daily       atorvastatin (LIPITOR) 80 MG tablet Take 1 tablet (80 mg) by mouth At Bedtime 90 tablet 3     cyanocobalamin (VITAMIN B-12) 1000 MCG tablet Take 1,000 mcg by mouth daily        ezetimibe (ZETIA) 10 MG tablet Take 1 tablet (10 mg) by mouth daily 90 tablet 3     ibuprofen (ADVIL/MOTRIN) 800 MG tablet TAKE 1 TABLET BY MOUTH 3  TIMES DAILY AS NEEDED TAKE  WITH FOOD 60 tablet 8     Omega-3 Fatty Acids (OMEGA-3 FISH OIL) 1200 MG CAPS Take 1 capsule by mouth daily        polyethylene glycol-propylene glycol (SYSTANE) 0.4-0.3 % SOLN ophthalmic solution Place 1 drop into both eyes as needed for dry eyes       traZODone (DESYREL) 50 MG tablet Take 1-2 tablets ( mg) by mouth At Bedtime 90 tablet 3     vitamin C (ASCORBIC ACID) 1000 MG TABS Take 1,000 mg by mouth daily        VITAMIN D3 50 MCG (2000 UT) tablet TAKE 1 TABLET BY MOUTH EVERY DAY 90 tablet 3     vitamin E 400 UNIT capsule Take 400 Units by mouth daily        zinc gluconate 50 MG tablet Take 50 mg by mouth daily          Allergies:  No Known Allergies    Family history:  Family History   Problem Relation Age of Onset     Heart Disease Father      Heart Disease Sister      Heart Disease Sister      Mental Illness Mother 30.00        schizophrenia, bipolar     Mental Illness Sister         bipolar     Colon Cancer Father      Hyperlipidemia Brother      Hyperlipidemia Brother      Hyperlipidemia Sister      Cervical Cancer Daughter      Diabetes Type 2  Brother      Diabetes Type 2  Brother      Tremor Father       "Tremor Paternal Aunt      Tremor Paternal Grandfather      Tremor Son      Tremor Cousin         Paternal 1st cousin     Attention Deficit Disorder Son      Neurologic Disorder Son         Tourette Syndrome      Schizophrenia Mother      Schizophrenia Sister      Mental Illness Sister         depression     Diabetes Brother         type 2     Mental Illness Brother         depression       Social history:  Social History     Tobacco Use     Smoking status: Never     Smokeless tobacco: Never   Vaping Use     Vaping status: Not on file   Substance Use Topics     Alcohol use: Yes     Alcohol/week: 2.0 standard drinks of alcohol     Comment: occasion    Marital status: .  Occupation: retired    Nursing Notes:   Umberto Castaneda, EMT  5/25/2023  1:10 PM  Signed  Chief Complaint   Patient presents with     New Patient     Hemorrhoids       Vitals:    05/25/23 1247   BP: 111/76   BP Location: Left arm   Patient Position: Sitting   Cuff Size: Adult Regular   Pulse: 72   SpO2: 97%   Weight: 155 lb   Height: 5' 3\"       Body mass index is 27.46 kg/m .     Umberto Castaneda, EMT- P         20 minutes spent on the date of encounter performing chart review, history and exam, documentation and further activities as noted above with an additional 2 minutes for anoscopy.     FER Duke, NP-C  Colon and Rectal Surgery   [facility]    This note was created using speech recognition software and may contain unintended word substitutions.    "

## 2023-06-22 ENCOUNTER — TELEPHONE (OUTPATIENT)
Dept: GASTROENTEROLOGY | Facility: CLINIC | Age: 68
End: 2023-06-22
Payer: COMMERCIAL

## 2023-06-22 NOTE — TELEPHONE ENCOUNTER
"Endoscopy Scheduling Screen    Have you had a positive Covid test in the last 14 days?  No    Are you active on MyChart?   Yes    What insurance is in the chart?  Other:  [facility]     Ordering/Referring Provider: SUZIE ERVIN   (If ordering provider performs procedure, schedule with ordering provider unless otherwise instructed. )    BMI: Estimated body mass index is 27.46 kg/m  as calculated from the following:    Height as of 5/25/23: 1.6 m (5' 3\").    Weight as of 5/25/23: 70.3 kg (155 lb).     Sedation Ordered  moderate sedation.   If patient BMI > 50 do not schedule in ASC.    Are you taking any prescription medications for pain?   No    Are you taking methadone or Suboxone?  No    Do you have a history of malignant hyperthermia or adverse reaction to anesthesia?  No    (Females) Are you currently pregnant?   No     Have you been diagnosed or told you have pulmonary hypertension?   No    Do you have an LVAD?  No    Have you been told you have moderate to severe sleep apnea?  No    Have you been told you have COPD, asthma, or any other lung disease?  No    Do you have any heart conditions?  Yes     In the past 6 months, have you had any hospitalizations for heart related issues including cardiomyopathy, heart attack, or stent placement?  No    Do you have any implantable devices in your body (pacemaker, ICD)?  Other REPLACED THE AUROTIC VALUE / BOVINE TRANS CATHETER HEART    Do you take nitroglycerine?  No    Have you ever had or are you awaiting a heart or lung transplant?   No    Have you had a stroke or transient ischemic attack (TIA aka \"mini stroke\" in the last 6 months?   No    Have you been diagnosed with or been told you have cirrhosis of the liver?   No    Are you currently on dialysis?   No    Do you need assistance transferring?   No    BMI: Estimated body mass index is 27.46 kg/m  as calculated from the following:    Height as of 5/25/23: 1.6 m (5' 3\").    Weight as of " 5/25/23: 70.3 kg (155 lb).     Is patients BMI > 40 and scheduling location [facility]?  No    Do you take the medication Phentermine, Ozempic or Wegovy?  No    Do you take the medication Naltrexone?  No    Do you take blood thinners?  No      Prep   Are you currently on dialysis or do you have chronic kidney disease?  No    Do you have a diagnosis of diabetes?  No    Do you have a diagnosis of cystic fibrosis (CF)?  No    On a regular basis do you go 3 -5 days between bowel movements?  No    BMI > 40?  No    Preferred Pharmacy:    [facility] [place] [address] [address] [place] [address] [address] [facility] [address]  Phone: [phone] Fax: [phone]        Final Scheduling Details   Colonoscopy prep sent?  MiraLAX (No Mag Citrate)    Procedure scheduled  Colonoscopy    Surgeon:  LEVENTHAL     Date of procedure:  10/4/23     Schedule PAC:   No    Location  [facility] - [facility]    Sedation   Moderate Sedation    Patient Reminders:    You will receive a call from a Nurse to review instructions and health history.  This assessment must be completed prior to your procedure.  Failure to complete the Nurse assessment may result in the procedure being cancelled.       On the day of your procedure, please designate an adult(s) who can drive you home stay with you for the next 24 hours. The medicines used in the exam will make you sleepy. You will not be able to drive.       You cannot take public transportation, ride share services, or non-medical taxi service without a responsible caregiver.  Medical transport services are allowed with the requirement that a responsible caregiver will receive you at your destination.  We require that drivers and caregivers are confirmed prior to your procedure.

## 2023-07-28 ENCOUNTER — HOSPITAL ENCOUNTER (OUTPATIENT)
Dept: CARDIOLOGY | Facility: CLINIC | Age: 68
Discharge: HOME | End: 2023-07-28
Attending: INTERNAL MEDICINE | Admitting: INTERNAL MEDICINE
Payer: COMMERCIAL

## 2023-07-28 DIAGNOSIS — Z95.2 S/P TAVR (TRANSCATHETER AORTIC VALVE REPLACEMENT): Primary | ICD-10-CM

## 2023-07-28 DIAGNOSIS — I25.10 CORONARY ARTERY DISEASE DUE TO CALCIFIED CORONARY LESION: ICD-10-CM

## 2023-07-28 DIAGNOSIS — I25.84 CORONARY ARTERY DISEASE DUE TO CALCIFIED CORONARY LESION: ICD-10-CM

## 2023-07-28 DIAGNOSIS — Z95.2 S/P TAVR (TRANSCATHETER AORTIC VALVE REPLACEMENT): ICD-10-CM

## 2023-07-28 DIAGNOSIS — I71.21 ANEURYSM OF ASCENDING AORTA WITHOUT RUPTURE (H): ICD-10-CM

## 2023-07-28 LAB — LVEF ECHO: NORMAL

## 2023-07-28 PROCEDURE — 93306 TTE W/DOPPLER COMPLETE: CPT | Mod: 26 | Performed by: INTERNAL MEDICINE

## 2023-07-28 PROCEDURE — 93306 TTE W/DOPPLER COMPLETE: CPT

## 2023-07-31 DIAGNOSIS — G47.00 INSOMNIA, UNSPECIFIED TYPE: ICD-10-CM

## 2023-08-01 RX ORDER — TRAZODONE HYDROCHLORIDE 50 MG/1
TABLET, FILM COATED ORAL
Qty: 90 TABLET | Refills: 3 | Status: SHIPPED | OUTPATIENT
Start: 2023-08-01 | End: 2024-03-12

## 2023-08-01 NOTE — TELEPHONE ENCOUNTER
"Last Written Prescription Date:  03/08/2023  Last Fill Quantity: 90,  # refills: 3   Last office visit provider:  03/08/2023     Requested Prescriptions   Pending Prescriptions Disp Refills    traZODone (DESYREL) 50 MG tablet [Pharmacy Med Name: traZODone HCl 50 MG Oral Tablet] 90 tablet 7     Sig: TAKE 1 TO 2 TABLETS BY MOUTH AT  BEDTIME       Serotonin Modulators Passed - 8/1/2023  2:24 PM        Passed - Recent (12 mo) or future (30 days) visit within the authorizing provider's specialty     Patient has had an office visit with the authorizing provider or a provider within the authorizing providers department within the previous 12 mos or has a future within next 30 days. See \"Patient Info\" tab in inbasket, or \"Choose Columns\" in Meds & Orders section of the refill encounter.              Passed - Medication is active on med list        Passed - Patient is age 18 or older        Passed - No active pregnancy on record        Passed - No positive pregnancy test in past 12 months             Rabia Batista RN 08/01/23 2:24 PM  "

## 2023-09-15 DIAGNOSIS — Z95.2 S/P TAVR (TRANSCATHETER AORTIC VALVE REPLACEMENT): Primary | ICD-10-CM

## 2023-09-20 ENCOUNTER — TELEPHONE (OUTPATIENT)
Dept: GASTROENTEROLOGY | Facility: CLINIC | Age: 68
End: 2023-09-20
Payer: COMMERCIAL

## 2023-09-20 NOTE — TELEPHONE ENCOUNTER
Pre assessment completed for upcoming procedure.      Procedure details:    Patient scheduled for Colonoscopy  on 10/4/23.     Arrival time: 0645. Procedure time 0745    Pre op exam needed? N/A    Facility location: [facility]; [address] [place] [address], [address] Floor, [place] [address]    Sedation type: Conscious sedation     Indication for procedure:   Adenomatous polyp          COVID policy reviewed.    Designated  policy reviewed. Instructed to have someone stay 6 hours post procedure.       Chart review:     Electronic implanted devices? No    Diabetic? No    Diabetic medication HOLDING recommendations: (if applicable)  Oral diabetic medications: No  Diabetic injectables: No  Insulin: No    Medication review:    Anticoagulants? No    NSAIDS? Yes.  Ibuprofen (Advil, Motrin).  Holding interval of 1 day before procedure.    Other medication HOLDING recommendations:  N/A      Prep for procedure:     Bowel prep recommendation: Miralax without magnesium citrate  Due to: standard prep due to recall.    Prep instructions sent via [employer]     Reviewed procedure prep instructions.     Patient verbalized understanding and had no questions or concerns at this time.        Daniela Salazar RN  Endoscopy Procedure Pre Assessment RN  [phone] option 4

## 2023-09-21 ENCOUNTER — OFFICE VISIT (OUTPATIENT)
Dept: CARDIOLOGY | Facility: CLINIC | Age: 68
End: 2023-09-21
Payer: COMMERCIAL

## 2023-09-21 ENCOUNTER — LAB (OUTPATIENT)
Dept: CARDIOLOGY | Facility: CLINIC | Age: 68
End: 2023-09-21
Payer: COMMERCIAL

## 2023-09-21 VITALS
DIASTOLIC BLOOD PRESSURE: 70 MMHG | RESPIRATION RATE: 14 BRPM | HEART RATE: 69 BPM | BODY MASS INDEX: 28.17 KG/M2 | WEIGHT: 159 LBS | SYSTOLIC BLOOD PRESSURE: 98 MMHG

## 2023-09-21 DIAGNOSIS — Z95.2 S/P TAVR (TRANSCATHETER AORTIC VALVE REPLACEMENT): Primary | ICD-10-CM

## 2023-09-21 DIAGNOSIS — Z95.2 S/P TAVR (TRANSCATHETER AORTIC VALVE REPLACEMENT): ICD-10-CM

## 2023-09-21 LAB
ANION GAP SERPL CALCULATED.3IONS-SCNC: 11 MMOL/L (ref 7–15)
ATRIAL RATE - MUSE: 69 BPM
BUN SERPL-MCNC: 16.9 MG/DL (ref 8–23)
CALCIUM SERPL-MCNC: 10.3 MG/DL (ref 8.8–10.2)
CHLORIDE SERPL-SCNC: 102 MMOL/L (ref 98–107)
CREAT SERPL-MCNC: 0.83 MG/DL (ref 0.51–0.95)
DEPRECATED HCO3 PLAS-SCNC: 24 MMOL/L (ref 22–29)
DIASTOLIC BLOOD PRESSURE - MUSE: NORMAL MMHG
EGFRCR SERPLBLD CKD-EPI 2021: 76 ML/MIN/1.73M2
ERYTHROCYTE [DISTWIDTH] IN BLOOD BY AUTOMATED COUNT: 11.9 % (ref 10–15)
GLUCOSE SERPL-MCNC: 96 MG/DL (ref 70–99)
HCT VFR BLD AUTO: 39.5 % (ref 35–47)
HGB BLD-MCNC: 13.6 G/DL (ref 11.7–15.7)
INTERPRETATION ECG - MUSE: NORMAL
MCH RBC QN AUTO: 31.8 PG (ref 26.5–33)
MCHC RBC AUTO-ENTMCNC: 34.4 G/DL (ref 31.5–36.5)
MCV RBC AUTO: 92 FL (ref 78–100)
P AXIS - MUSE: 10 DEGREES
PLATELET # BLD AUTO: 165 10E3/UL (ref 150–450)
POTASSIUM SERPL-SCNC: 4.2 MMOL/L (ref 3.4–5.3)
PR INTERVAL - MUSE: 144 MS
QRS DURATION - MUSE: 76 MS
QT - MUSE: 382 MS
QTC - MUSE: 409 MS
R AXIS - MUSE: 3 DEGREES
RBC # BLD AUTO: 4.28 10E6/UL (ref 3.8–5.2)
SODIUM SERPL-SCNC: 137 MMOL/L (ref 136–145)
SYSTOLIC BLOOD PRESSURE - MUSE: NORMAL MMHG
T AXIS - MUSE: 61 DEGREES
VENTRICULAR RATE- MUSE: 69 BPM
WBC # BLD AUTO: 9 10E3/UL (ref 4–11)

## 2023-09-21 PROCEDURE — 99214 OFFICE O/P EST MOD 30 MIN: CPT | Performed by: INTERNAL MEDICINE

## 2023-09-21 PROCEDURE — 85027 COMPLETE CBC AUTOMATED: CPT

## 2023-09-21 PROCEDURE — 36415 COLL VENOUS BLD VENIPUNCTURE: CPT

## 2023-09-21 PROCEDURE — 93000 ELECTROCARDIOGRAM COMPLETE: CPT | Performed by: GENERAL ACUTE CARE HOSPITAL

## 2023-09-21 PROCEDURE — 80048 BASIC METABOLIC PNL TOTAL CA: CPT

## 2023-09-21 RX ORDER — AMOXICILLIN 500 MG/1
2000 CAPSULE ORAL
Qty: 4 CAPSULE | Refills: 1 | Status: SHIPPED | OUTPATIENT
Start: 2023-09-21 | End: 2024-03-15

## 2023-09-21 RX ORDER — CALCIUM CARBONATE/VITAMIN D3 600 MG-10
1 TABLET ORAL DAILY
COMMUNITY
Start: 2023-05-01

## 2023-09-21 NOTE — PROGRESS NOTES
"HEART CARE ENCOUNTER NOTE       [facility]  [phone]    Assessment/Recommendations   Assessment:  Hx of severe aortic stenosis  - s/p TAVR on 8/23/22, using a #23 Killian ELLIOT valve  2.   Chronic diastolic heart failure, NYHA class I - compensated  3.   Subcutaneous lupus  4.   Hyperlipidemia, with lipid panel in March showing total cholesterol 126 and LDL 53  5.   Mild non-obstructive coronary artery disease - she denies angina  6.   Ascending aortic aneurysm, stable by CTA TAVR in June 2022  7.   Floaters/waves in vision along with vertigo - patient had these prior to TAVR, but now they are occurring more frequently again    Plan:  Activity as tolerated  Continue aspirin 81 mg daily, indefinitely  Await results of CBC and BMP  Recommended follow-up with PCP in regards to #7 above  She should see Dr. Platt in March with repeat CTA or cardiac MR    Thank you for the opportunity to participate in the care of Wendy Handy. It's been a pleasure working with her.  Please do not hesitate to call with any questions or concerns.       History of Present Illness/Subjective    I had the opportunity to see Wendy Handy at the [facility] for her 1 year follow-up visit after transcatheter aortic valve replacement.    Wendy is a very pleasant 67-year-old female who has been followed by serial echocardiograms for her bicuspid aortic valve disease and ascending aortic aneurysm.  She also has history of subcutaneous lupus, hyperlipidemia and coronary artery disease.     In August of last year, she underwent a transfemoral TAVR, using a 23 mm Elliot valve.  Since then, she has been doing quite well.  She is active and not having symptoms of shortness of breath or chest pain.  What she does note at today's visit is \"waves\" in her vision.  She says she was having some of this prior to the valve replacement, but only in her left eye.  She says the episodes are now happening more frequently " and are in both eyes.  She also says she has been having what she thinks is vertigo again.  She had this also prior to valve replacement, but it had gone away until recently.  She denies any visual field cuts or headaches.    Otherwise, Wendy denies chest discomfort, palpitations, shortness of breath at rest, PND, orthopnea, pre-syncope or syncope, weight loss, changes in appetite, nausea or vomiting.   ____________________________________________________________  Echo 7/28/2023:  Interpretation Summary     1.Left ventricular size, wall motion and function are normal. The ejection  fraction is 60-65%.  2.Normal right ventricle size and systolic function.  3.Aortic valve replaced by a BRADY 3 23 mm valve, functioning well at stroke-  volume index of 36 cc/m2 dimensionless index 0.36 with mean gradient of 16  mmHg m/s and peak velocity of 2.6 m/sec.  Compared to the prior study dated 9/21/2022, AI is no longer seen, and prior  peak velocitiy was 2.4 m/sec.      EKG (personally reviewed and interpreted):  Normal sinus rhythm     Physical Examination Review of Systems   Vitals: BP 98/70 (BP Location: Right arm, Patient Position: Sitting, Cuff Size: Adult Regular)   Pulse 69   Resp 14   Wt 72.1 kg (159 lb)   BMI 28.17 kg/m    BMI= Body mass index is 28.17 kg/m .  Wt Readings from Last 3 Encounters:   09/21/23 72.1 kg (159 lb)   05/25/23 70.3 kg (155 lb)   03/28/23 71 kg (156 lb 9.6 oz)       General Appearance:   Alert, cooperative and in no acute distress   ENT/Mouth: membranes moist, no oral lesions or bleeding gums.      EYES:  no scleral icterus, normal conjunctivae   Neck: Supple without lymphadenopathy.  Thyroid not visualized   Chest/Lungs:   lungs are clear to auscultation, no rales or wheezing   Cardiovascular:   Regular. Normal first and second heart sounds with soft systolic murmur.  No rubs, or gallops; the carotid, radial and posterior tibial pulses are intact, no edema bilaterally    Abdomen:  Soft and  nontender. Bowel sounds are present in all quadrants   Extremities: no cyanosis or clubbing.     Skin: no xanthelasma, warm.    Neurologic: normal gait, normal  bilateral, no tremors   Psychiatric: Normal mood and affect       Please refer above for cardiac ROS details.      Medical History  Surgical History Family History Social History   Past Medical History:   Diagnosis Date    Aortic valve disorder     echo 1/09  4.2 cm TAA-MRA 4/2016      Bicuspid aortic valve     Coronary artery disease due to calcified coronary lesion 5/5/2016    Echo 6/2016   Good function  CT Ca++ 172 LAD  Cardiology consult 4/016-nuclear stress    Family history of psychiatric condition     Family history of tremor     Family history of vascular disease     H/O LEEP 1/1/2007 2002-2006 LSIL paps with colposcopy, had LEEP in 2007. No records 6/2/10 ASCUS, +HR HPV 53 6/20/12 ASCUS, +HR HPV 53. Colposcopy outside / system? 6/23/14 NIL 6/24/15 NIL pap, neg HPV 6/27/16 ASCUS, neg HPV 6/29/17 NIL pap, neg HPV 7/9/18 ASCUS, +HR HPV 16. Unclear if colposcopy was completed 7/10/19 NIL pap, neg HPV 7/13/20 NIL pap, neg HPV 2/1/21 NIL pap, neg HPV. Plan: await provider    History of aortic stenosis     History of vitamin D deficiency     Hypercholesteremia     Hyperlipidemia     Laboratory test 8/18/2016    Reading Physician Reading Date Result Priority    Patricia Orta MD 8/16/2016       Narrative       Examination: Nuclear medicine DATscan for Dopamine Receptor Localization.    Examination: NM BRAIN IMAGING TOMOGRAPHIC (SPECT) DATSCAN  Date: 8/16/2016 3:38 PM  Indication: Right sided postural tremor.   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      Osteopenia 2/15/2015     Other and unspecified hyperlipidemia     Created by Conversion     Papanicolaou smear of cervix with low grade squamous intraepithelial lesion (LGSIL)      LGSIL 2002  colpoLGSIL 2003  colpoLGSIL 4/2006 colpoLGSIL 11/06 colpoAbnormal 5/07 colpo  CHCWLEEPendometrial biopsy 5/07     Plantar fasciitis of right foot     Primary insomnia     Snores     Tremor     Vertigo     Vitamin D deficiency     Wears glasses      Past Surgical History:   Procedure Laterality Date    BLEPHAROPLASTY      CARDIAC CATHETERIZATION  05/30/2017    No intervention    CARDIAC CATHETERIZATION N/A 5/30/2017    Procedure: Coronary Angiogram;  Surgeon: Torito Metzger MD;  Location: [facility];  Service:     CHOLECYSTECTOMY      CONIZATION CERVIX,LOOP ELECTRD      Description: Cervical Conization Loop Electrode Excision;  Recorded: 05/29/2008;    CV CORONARY ANGIOGRAM N/A 6/9/2022    Procedure: Coronary Angiogram;  Surgeon: Anabela Hernandez MD;  Location: [facility] CV    CV CORONARY ANGIOGRAM  6/9/2022    Procedure: ;  Surgeon: Anabela Hernandez MD;  Location: [facility] CV    CV TRANSCATHETER AORTIC VALVE REPLACEMENT-FEMORAL APPROACH N/A 8/23/2022    Procedure: Transcatheter Aortic Valve Replacement-Femoral Approach;  Surgeon: Anabela Hernandez MD;  Location: [facility] CV    FACIAL RECONSTRUCTION SURGERY      forehead as well    HC CLOSED TX MANDIBLE FX W/O MANIP      Description: Closed Treatment Of Mandibular Fracture;  Recorded: 05/29/2008;    HC DILATION/CURETTAGE DIAG/THER NON OB      Description: Dilation And Curettage;  Recorded: 05/29/2008;  Comments: X 2  metrorhhagia    LAPAROSCOPIC CHOLECYSTECTOMY N/A 2/23/2015    Procedure: CHOLECYSTECTOMY LAPAROSCOPIC;  Surgeon: Doug Webber MD;  Location: [facility];  Service:     OR TRANSCATHETER AORTIC VALVE REPLACEMENT, FEMORAL PERCUTANEOUS APPROACH (STANDBY) N/A 8/23/2022    Procedure: OR TRANSCATHETER AORTIC VALVE REPLACEMENT, FEMORAL PERCUTANEOUS  APPROACH (STANDBY);  Surgeon: Katy Rojas MD;  Location: [facility] ORAL SURGERY PROCEDURE      jaw fracture     Family History   Problem Relation Age of Onset    Heart Disease Father     Heart Disease Sister     Heart Disease Sister     Mental Illness Mother 30.00        schizophrenia, bipolar    Mental Illness Sister         bipolar    Colon Cancer Father     Hyperlipidemia Brother     Hyperlipidemia Brother     Hyperlipidemia Sister     Cervical Cancer Daughter     Diabetes Type 2  Brother     Diabetes Type 2  Brother     Tremor Father     Tremor Paternal Aunt     Tremor Paternal Grandfather     Tremor Son     Tremor Cousin         Paternal 1st cousin    Attention Deficit Disorder Son     Neurologic Disorder Son         Tourette Syndrome     Schizophrenia Mother     Schizophrenia Sister     Mental Illness Sister         depression    Diabetes Brother         type 2    Mental Illness Brother         depression    Social History     Socioeconomic History    Marital status:      Spouse name: Not on file    Number of children: Not on file    Years of education: Not on file    Highest education level: Not on file   Occupational History    Not on file   Tobacco Use    Smoking status: Never    Smokeless tobacco: Never   Substance and Sexual Activity    Alcohol use: Yes     Alcohol/week: 2.0 standard drinks of alcohol     Comment: occasion    Drug use: No    Sexual activity: Not Currently     Partners: Male     Birth control/protection: Abstinence   Other Topics Concern    Not on file   Social History Narrative    Living in [place] with her  and has been  for 11 yrs     This is her 3rd marriage    1st marriage had 2 kids: son and daughter - 33 y r old son and 31 yr old daughter    2nd marriage no kids        Not working presently. Retired    Had been  in the past.      Social Determinants of Health     Financial Resource Strain: Not on file   Food Insecurity: Not  on file   Transportation Needs: Not on file   Physical Activity: Not on file   Stress: Not on file   Social Connections: Not on file   Interpersonal Safety: Not on file   Housing Stability: Not on file          Medications  Allergies   Current Outpatient Medications   Medication Sig Dispense Refill    amoxicillin (AMOXIL) 500 MG capsule Take 4 capsules (2,000 mg) by mouth once as needed (take 30-63 minutes prior to dental visit) 4 capsule 1    aspirin 81 MG EC tablet Take 81 mg by mouth daily      atorvastatin (LIPITOR) 80 MG tablet Take 1 tablet (80 mg) by mouth At Bedtime 90 tablet 3    calcium carbonate-vitamin D (CALTRATE) 600-10 MG-MCG per tablet Take 1 tablet by mouth daily      cyanocobalamin (VITAMIN B-12) 1000 MCG tablet Take 1,000 mcg by mouth daily       ezetimibe (ZETIA) 10 MG tablet Take 1 tablet (10 mg) by mouth daily 90 tablet 3    ibuprofen (ADVIL/MOTRIN) 800 MG tablet TAKE 1 TABLET BY MOUTH 3 TIMES  DAILY AS NEEDED . TAKE WITH FOOD 120 tablet 3    Omega-3 Fatty Acids (OMEGA-3 FISH OIL) 1200 MG CAPS Take 1 capsule by mouth daily       polyethylene glycol-propylene glycol (SYSTANE) 0.4-0.3 % SOLN ophthalmic solution Place 1 drop into both eyes as needed for dry eyes      traZODone (DESYREL) 50 MG tablet TAKE 1 TO 2 TABLETS BY MOUTH AT  BEDTIME 90 tablet 3    vitamin C (ASCORBIC ACID) 1000 MG TABS Take 1,000 mg by mouth daily       VITAMIN D3 50 MCG (2000 UT) tablet TAKE 1 TABLET BY MOUTH EVERY DAY 90 tablet 3    vitamin E 400 UNIT capsule Take 400 Units by mouth daily       zinc gluconate 50 MG tablet Take 50 mg by mouth daily       No Known Allergies      Lab Results    Chemistry/lipid CBC Cardiac Enzymes/BNP/TSH/INR   Recent Labs   Lab Test 03/09/22  1415   CHOL 127   HDL 57   LDL 53   TRIG 87     Recent Labs   Lab Test 03/09/22  1415 02/01/21  1515 07/13/20  0751   LDL 53 78 77     Recent Labs   Lab Test 08/24/22  0502      POTASSIUM 4.1   CHLORIDE 107   CO2 26   GLC 93   BUN 15   CR 0.77    GFRESTIMATED 84   FÉLIX 8.6     Recent Labs   Lab Test 08/24/22  0502 08/23/22  1402 08/19/22  0828   CR 0.77 0.81 0.78     No results for input(s): A1C in the last 68828 hours. Recent Labs   Lab Test 08/24/22  0502   WBC 8.1   HGB 12.1   HCT 36.1   MCV 94   *     Recent Labs   Lab Test 08/24/22  0502 08/23/22  1402 08/19/22  0828   HGB 12.1 12.3 13.7    No results for input(s): TROPONINI in the last 48586 hours.  Recent Labs   Lab Test 08/19/22  0828   BNP 56     Recent Labs   Lab Test 07/10/19  0757   TSH 1.53     Recent Labs   Lab Test 08/19/22  0828   INR 1.07            This note has been dictated using voice recognition software. Any grammatical or context distortions are unintentional and inherent to the software.

## 2023-09-21 NOTE — LETTER
9/21/2023    Shahana Parker, NP  [address] [place] [address]    RE: Wendy Mejiaohn       Dear Colleague,     I had the pleasure of seeing Wendy Handy in the [facility].  HEART CARE ENCOUNTER NOTE       [facility]  [phone]    Assessment/Recommendations   Assessment:  Hx of severe aortic stenosis  - s/p TAVR on 8/23/22, using a #23 Killian ELLIOT valve  2.   Chronic diastolic heart failure, NYHA class I - compensated  3.   Subcutaneous lupus  4.   Hyperlipidemia, with lipid panel in March showing total cholesterol 126 and LDL 53  5.   Mild non-obstructive coronary artery disease - she denies angina  6.   Ascending aortic aneurysm, stable by CTA TAVR in June 2022  7.   Floaters/waves in vision along with vertigo - patient had these prior to TAVR, but now they are occurring more frequently again    Plan:  Activity as tolerated  Continue aspirin 81 mg daily, indefinitely  Await results of CBC and BMP  Recommended follow-up with PCP in regards to #7 above  She should see Dr. Platt in March with repeat CTA or cardiac MR    Thank you for the opportunity to participate in the care of Wendy Handy. It's been a pleasure working with her.  Please do not hesitate to call with any questions or concerns.       History of Present Illness/Subjective    I had the opportunity to see Wendy Handy at the [facility] for her 1 year follow-up visit after transcatheter aortic valve replacement.    Wendy is a very pleasant 67-year-old female who has been followed by serial echocardiograms for her bicuspid aortic valve disease and ascending aortic aneurysm.  She also has history of subcutaneous lupus, hyperlipidemia and coronary artery disease.     In August of last year, she underwent a transfemoral TAVR, using a 23 mm Elliot valve.  Since then, she has been doing quite well.  She is active and not having symptoms of shortness of breath or chest pain.  What she does  "note at today's visit is \"waves\" in her vision.  She says she was having some of this prior to the valve replacement, but only in her left eye.  She says the episodes are now happening more frequently and are in both eyes.  She also says she has been having what she thinks is vertigo again.  She had this also prior to valve replacement, but it had gone away until recently.  She denies any visual field cuts or headaches.    Otherwise, Wendy denies chest discomfort, palpitations, shortness of breath at rest, PND, orthopnea, pre-syncope or syncope, weight loss, changes in appetite, nausea or vomiting.   ____________________________________________________________  Echo 7/28/2023:  Interpretation Summary     1.Left ventricular size, wall motion and function are normal. The ejection  fraction is 60-65%.  2.Normal right ventricle size and systolic function.  3.Aortic valve replaced by a BRADY 3 23 mm valve, functioning well at stroke-  volume index of 36 cc/m2 dimensionless index 0.36 with mean gradient of 16  mmHg m/s and peak velocity of 2.6 m/sec.  Compared to the prior study dated 9/21/2022, AI is no longer seen, and prior  peak velocitiy was 2.4 m/sec.      EKG (personally reviewed and interpreted):  Normal sinus rhythm     Physical Examination Review of Systems   Vitals: BP 98/70 (BP Location: Right arm, Patient Position: Sitting, Cuff Size: Adult Regular)   Pulse 69   Resp 14   Wt 72.1 kg (159 lb)   BMI 28.17 kg/m    BMI= Body mass index is 28.17 kg/m .  Wt Readings from Last 3 Encounters:   09/21/23 72.1 kg (159 lb)   05/25/23 70.3 kg (155 lb)   03/28/23 71 kg (156 lb 9.6 oz)       General Appearance:   Alert, cooperative and in no acute distress   ENT/Mouth: membranes moist, no oral lesions or bleeding gums.      EYES:  no scleral icterus, normal conjunctivae   Neck: Supple without lymphadenopathy.  Thyroid not visualized   Chest/Lungs:   lungs are clear to auscultation, no rales or wheezing "   Cardiovascular:   Regular. Normal first and second heart sounds with soft systolic murmur.  No rubs, or gallops; the carotid, radial and posterior tibial pulses are intact, no edema bilaterally    Abdomen:  Soft and nontender. Bowel sounds are present in all quadrants   Extremities: no cyanosis or clubbing.     Skin: no xanthelasma, warm.    Neurologic: normal gait, normal  bilateral, no tremors   Psychiatric: Normal mood and affect       Please refer above for cardiac ROS details.      Medical History  Surgical History Family History Social History   Past Medical History:   Diagnosis Date    Aortic valve disorder     echo 1/09  4.2 cm TAA-MRA 4/2016      Bicuspid aortic valve     Coronary artery disease due to calcified coronary lesion 5/5/2016    Echo 6/2016   Good function  CT Ca++ 172 LAD  Cardiology consult 4/016-nuclear stress    Family history of psychiatric condition     Family history of tremor     Family history of vascular disease     H/O LEEP 1/1/2007 2002-2006 LSIL paps with colposcopy, had LEEP in 2007. No records 6/2/10 ASCUS, +HR HPV 53 6/20/12 ASCUS, +HR HPV 53. Colposcopy outside / system? 6/23/14 NIL 6/24/15 NIL pap, neg HPV 6/27/16 ASCUS, neg HPV 6/29/17 NIL pap, neg HPV 7/9/18 ASCUS, +HR HPV 16. Unclear if colposcopy was completed 7/10/19 NIL pap, neg HPV 7/13/20 NIL pap, neg HPV 2/1/21 NIL pap, neg HPV. Plan: await provider    History of aortic stenosis     History of vitamin D deficiency     Hypercholesteremia     Hyperlipidemia     Laboratory test 8/18/2016    Reading Physician Reading Date Result Priority    Patricia Orta MD 8/16/2016       Narrative       Examination: Nuclear medicine DATscan for Dopamine Receptor Localization.    Examination: NM BRAIN IMAGING TOMOGRAPHIC (SPECT) DATSCAN  Date: 8/16/2016 3:38 PM  Indication: Right sided postural tremor.   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      Osteopenia 2/15/2015    Other and unspecified hyperlipidemia     Created by Conversion     Papanicolaou smear of cervix with low grade squamous intraepithelial lesion (LGSIL)      LGSIL 2002  colpoLGSIL 2003  colpoLGSIL 4/2006 colpoLGSIL 11/06 colpoAbnormal 5/07 colpo  CHCWLEEPendometrial biopsy 5/07     Plantar fasciitis of right foot     Primary insomnia     Snores     Tremor     Vertigo     Vitamin D deficiency     Wears glasses      Past Surgical History:   Procedure Laterality Date    BLEPHAROPLASTY      CARDIAC CATHETERIZATION  05/30/2017    No intervention    CARDIAC CATHETERIZATION N/A 5/30/2017    Procedure: Coronary Angiogram;  Surgeon: Torito Metzger MD;  Location: [facility] Cath Lab;  Service:     CHOLECYSTECTOMY      CONIZATION CERVIX,LOOP ELECTRD      Description: Cervical Conization Loop Electrode Excision;  Recorded: 05/29/2008;    CV CORONARY ANGIOGRAM N/A 6/9/2022    Procedure: Coronary Angiogram;  Surgeon: Anabela Hernandez MD;  Location: [facility] CV    CV CORONARY ANGIOGRAM  6/9/2022    Procedure: ;  Surgeon: Anabela Hernandez MD;  Location: [facility] CV    CV TRANSCATHETER AORTIC VALVE REPLACEMENT-FEMORAL APPROACH N/A 8/23/2022    Procedure: Transcatheter Aortic Valve Replacement-Femoral Approach;  Surgeon: Anabela Hernandez MD;  Location: [facility] CV    FACIAL RECONSTRUCTION SURGERY      forehead as well    HC CLOSED TX MANDIBLE FX W/O MANIP      Description: Closed Treatment Of Mandibular Fracture;  Recorded: 05/29/2008;    HC DILATION/CURETTAGE DIAG/THER NON OB      Description: Dilation And Curettage;  Recorded: 05/29/2008;  Comments: X 2  metrorhhagia    LAPAROSCOPIC CHOLECYSTECTOMY N/A 2/23/2015    Procedure: CHOLECYSTECTOMY LAPAROSCOPIC;  Surgeon: Doug Webber MD;   Location: [facility] Main OR;  Service:     OR TRANSCATHETER AORTIC VALVE REPLACEMENT, FEMORAL PERCUTANEOUS APPROACH (STANDBY) N/A 8/23/2022    Procedure: OR TRANSCATHETER AORTIC VALVE REPLACEMENT, FEMORAL PERCUTANEOUS APPROACH (LOISBY);  Surgeon: Katy Rojas MD;  Location: [facility] ORAL SURGERY PROCEDURE      jaw fracture     Family History   Problem Relation Age of Onset    Heart Disease Father     Heart Disease Sister     Heart Disease Sister     Mental Illness Mother 30.00        schizophrenia, bipolar    Mental Illness Sister         bipolar    Colon Cancer Father     Hyperlipidemia Brother     Hyperlipidemia Brother     Hyperlipidemia Sister     Cervical Cancer Daughter     Diabetes Type 2  Brother     Diabetes Type 2  Brother     Tremor Father     Tremor Paternal Aunt     Tremor Paternal Grandfather     Tremor Son     Tremor Cousin         Paternal 1st cousin    Attention Deficit Disorder Son     Neurologic Disorder Son         Tourette Syndrome     Schizophrenia Mother     Schizophrenia Sister     Mental Illness Sister         depression    Diabetes Brother         type 2    Mental Illness Brother         depression    Social History     Socioeconomic History    Marital status:      Spouse name: Not on file    Number of children: Not on file    Years of education: Not on file    Highest education level: Not on file   Occupational History    Not on file   Tobacco Use    Smoking status: Never    Smokeless tobacco: Never   Substance and Sexual Activity    Alcohol use: Yes     Alcohol/week: 2.0 standard drinks of alcohol     Comment: occasion    Drug use: No    Sexual activity: Not Currently     Partners: Male     Birth control/protection: Abstinence   Other Topics Concern    Not on file   Social History Narrative    Living in [place] with her  and has been  for 11 yrs     This is her 3rd marriage    1st marriage had 2 kids: son and daughter - 33 y r old son and  31 yr old daughter    2nd marriage no kids        Not working presently. Retired    Had been  in the past.      Social Determinants of Health     Financial Resource Strain: Not on file   Food Insecurity: Not on file   Transportation Needs: Not on file   Physical Activity: Not on file   Stress: Not on file   Social Connections: Not on file   Interpersonal Safety: Not on file   Housing Stability: Not on file          Medications  Allergies   Current Outpatient Medications   Medication Sig Dispense Refill    amoxicillin (AMOXIL) 500 MG capsule Take 4 capsules (2,000 mg) by mouth once as needed (take 30-63 minutes prior to dental visit) 4 capsule 1    aspirin 81 MG EC tablet Take 81 mg by mouth daily      atorvastatin (LIPITOR) 80 MG tablet Take 1 tablet (80 mg) by mouth At Bedtime 90 tablet 3    calcium carbonate-vitamin D (CALTRATE) 600-10 MG-MCG per tablet Take 1 tablet by mouth daily      cyanocobalamin (VITAMIN B-12) 1000 MCG tablet Take 1,000 mcg by mouth daily       ezetimibe (ZETIA) 10 MG tablet Take 1 tablet (10 mg) by mouth daily 90 tablet 3    ibuprofen (ADVIL/MOTRIN) 800 MG tablet TAKE 1 TABLET BY MOUTH 3 TIMES  DAILY AS NEEDED . TAKE WITH FOOD 120 tablet 3    Omega-3 Fatty Acids (OMEGA-3 FISH OIL) 1200 MG CAPS Take 1 capsule by mouth daily       polyethylene glycol-propylene glycol (SYSTANE) 0.4-0.3 % SOLN ophthalmic solution Place 1 drop into both eyes as needed for dry eyes      traZODone (DESYREL) 50 MG tablet TAKE 1 TO 2 TABLETS BY MOUTH AT  BEDTIME 90 tablet 3    vitamin C (ASCORBIC ACID) 1000 MG TABS Take 1,000 mg by mouth daily       VITAMIN D3 50 MCG (2000 UT) tablet TAKE 1 TABLET BY MOUTH EVERY DAY 90 tablet 3    vitamin E 400 UNIT capsule Take 400 Units by mouth daily       zinc gluconate 50 MG tablet Take 50 mg by mouth daily       No Known Allergies      Lab Results    Chemistry/lipid CBC Cardiac Enzymes/BNP/TSH/INR   Recent Labs   Lab Test 03/09/22  1415   CHOL 127   HDL 57   LDL 53    TRIG 87     Recent Labs   Lab Test 03/09/22  1415 02/01/21  1515 07/13/20  0751   LDL 53 78 77     Recent Labs   Lab Test 08/24/22  0502      POTASSIUM 4.1   CHLORIDE 107   CO2 26   GLC 93   BUN 15   CR 0.77   GFRESTIMATED 84   FÉLIX 8.6     Recent Labs   Lab Test 08/24/22  0502 08/23/22  1402 08/19/22  0828   CR 0.77 0.81 0.78     No results for input(s): A1C in the last 31258 hours. Recent Labs   Lab Test 08/24/22  0502   WBC 8.1   HGB 12.1   HCT 36.1   MCV 94   *     Recent Labs   Lab Test 08/24/22  0502 08/23/22  1402 08/19/22  0828   HGB 12.1 12.3 13.7    No results for input(s): TROPONINI in the last 76273 hours.  Recent Labs   Lab Test 08/19/22  0828   BNP 56     Recent Labs   Lab Test 07/10/19  0757   TSH 1.53     Recent Labs   Lab Test 08/19/22  0828   INR 1.07            This note has been dictated using voice recognition software. Any grammatical or context distortions are unintentional and inherent to the software.          Thank you for allowing me to participate in the care of your patient.    Sincerely,   RODRIGO SORENSEN [facility]

## 2023-09-21 NOTE — PATIENT INSTRUCTIONS
Wendy Handy,    It was a pleasure to see you today in the clinic regarding your 1 year TAVR visit.     My recommendations after this visit include:     - no changes at this time  - Rx for antibiotics sent    You should followup with Dr. Platt in March    **Remember you will need prophylactic antibiotics for all dental visits in the future.  You can get the Rx from your dentist, your PCP or from us.  If possible, still refrain from going to the dentist until 6 months or more after your valve replacement      If you have questions or concerns, please call using the numbers below:    Valve Clinic Phone   [phone]    After Hours/Scheduling  [phone]    Otherwise you can dial the nurse directly at:    Mariela Banks RN  [phone]    Pasha Castano RN  [phone]

## 2023-10-04 ENCOUNTER — HOSPITAL ENCOUNTER (OUTPATIENT)
Facility: AMBULATORY SURGERY CENTER | Age: 68
Discharge: HOME | End: 2023-10-04
Attending: INTERNAL MEDICINE | Admitting: INTERNAL MEDICINE
Payer: COMMERCIAL

## 2023-10-04 VITALS
WEIGHT: 158 LBS | RESPIRATION RATE: 14 BRPM | TEMPERATURE: 96.9 F | SYSTOLIC BLOOD PRESSURE: 101 MMHG | HEIGHT: 63 IN | DIASTOLIC BLOOD PRESSURE: 66 MMHG | OXYGEN SATURATION: 98 % | BODY MASS INDEX: 28 KG/M2 | HEART RATE: 72 BPM

## 2023-10-04 DIAGNOSIS — Z86.0100 HISTORY OF COLONIC POLYPS: Primary | ICD-10-CM

## 2023-10-04 LAB — COLONOSCOPY: NORMAL

## 2023-10-04 PROCEDURE — 88305 TISSUE EXAM BY PATHOLOGIST: CPT | Mod: 26 | Performed by: PATHOLOGY

## 2023-10-04 PROCEDURE — 45380 COLONOSCOPY AND BIOPSY: CPT | Mod: PT | Performed by: INTERNAL MEDICINE

## 2023-10-04 PROCEDURE — 88305 TISSUE EXAM BY PATHOLOGIST: CPT | Mod: TC | Performed by: INTERNAL MEDICINE

## 2023-10-04 RX ORDER — ONDANSETRON 2 MG/ML
4 INJECTION INTRAMUSCULAR; INTRAVENOUS EVERY 6 HOURS PRN
Status: DISCONTINUED | OUTPATIENT
Start: 2023-10-04 | End: 2023-10-05 | Stop reason: HOSPADM

## 2023-10-04 RX ORDER — NALOXONE HYDROCHLORIDE 0.4 MG/ML
0.4 INJECTION, SOLUTION INTRAMUSCULAR; INTRAVENOUS; SUBCUTANEOUS
Status: DISCONTINUED | OUTPATIENT
Start: 2023-10-04 | End: 2023-10-05 | Stop reason: HOSPADM

## 2023-10-04 RX ORDER — LIDOCAINE 40 MG/G
CREAM TOPICAL
Status: DISCONTINUED | OUTPATIENT
Start: 2023-10-04 | End: 2023-10-04 | Stop reason: HOSPADM

## 2023-10-04 RX ORDER — FENTANYL CITRATE 50 UG/ML
INJECTION, SOLUTION INTRAMUSCULAR; INTRAVENOUS PRN
Status: DISCONTINUED | OUTPATIENT
Start: 2023-10-04 | End: 2023-10-04 | Stop reason: HOSPADM

## 2023-10-04 RX ORDER — PROCHLORPERAZINE MALEATE 5 MG
5 TABLET ORAL EVERY 6 HOURS PRN
Status: DISCONTINUED | OUTPATIENT
Start: 2023-10-04 | End: 2023-10-05 | Stop reason: HOSPADM

## 2023-10-04 RX ORDER — ONDANSETRON 4 MG/1
4 TABLET, ORALLY DISINTEGRATING ORAL EVERY 6 HOURS PRN
Status: DISCONTINUED | OUTPATIENT
Start: 2023-10-04 | End: 2023-10-05 | Stop reason: HOSPADM

## 2023-10-04 RX ORDER — NALOXONE HYDROCHLORIDE 0.4 MG/ML
0.2 INJECTION, SOLUTION INTRAMUSCULAR; INTRAVENOUS; SUBCUTANEOUS
Status: DISCONTINUED | OUTPATIENT
Start: 2023-10-04 | End: 2023-10-05 | Stop reason: HOSPADM

## 2023-10-04 RX ORDER — FLUMAZENIL 0.1 MG/ML
0.2 INJECTION, SOLUTION INTRAVENOUS
Status: ACTIVE | OUTPATIENT
Start: 2023-10-04 | End: 2023-10-04

## 2023-10-04 RX ORDER — ONDANSETRON 2 MG/ML
4 INJECTION INTRAMUSCULAR; INTRAVENOUS
Status: DISCONTINUED | OUTPATIENT
Start: 2023-10-04 | End: 2023-10-04 | Stop reason: HOSPADM

## 2023-10-04 NOTE — H&P
Wendy TORRES Ole  [id number]  female  68 year old      Reason for procedure/surgery: colonoscopy    Patient Active Problem List   Diagnosis    Tremor    Family history of vascular disease    Family history of psychiatric condition    Family history of tremor    History of aortic stenosis    Hyperlipidemia    Vertigo    History of vitamin D deficiency    Vitamin D deficiency    Papanicolaou smear of cervix with low grade squamous intraepithelial lesion (LGSIL)    Osteopenia    Systemic lupus erythematosus (H)    Wears glasses    Primary insomnia    Snores    Aortic aneurysm (H24)    Nocardia infection    Plantar fasciitis of right foot    Low back pain    Laboratory test    Benign neoplasm of sigmoid colon    Coronary artery disease due to calcified coronary lesion    Discoid lupus erythematosus    Family history of colon cancer    H/O LEEP    Hemorrhoids    History of abnormal cervical Pap smear    History of colonic polyps    Polyp of colon    Severe aortic stenosis    Chronic diastolic heart failure (H)    S/P TAVR (transcatheter aortic valve replacement)    Aortic stenosis, severe       Past Surgical History:    Past Surgical History:   Procedure Laterality Date    BLEPHAROPLASTY      CARDIAC CATHETERIZATION  05/30/2017    No intervention    CARDIAC CATHETERIZATION N/A 5/30/2017    Procedure: Coronary Angiogram;  Surgeon: Torito Metzger MD;  Location: [facility] Cath Lab;  Service:     CHOLECYSTECTOMY      CONIZATION CERVIX,LOOP ELECTRD      Description: Cervical Conization Loop Electrode Excision;  Recorded: 05/29/2008;    CV CORONARY ANGIOGRAM N/A 6/9/2022    Procedure: Coronary Angiogram;  Surgeon: Anabela Hernandez MD;  Location: [facility] CATH [facility] CV    CV CORONARY ANGIOGRAM  6/9/2022    Procedure: ;  Surgeon: Anabela Hernandez MD;  Location: [facility] CV    CV TRANSCATHETER AORTIC VALVE REPLACEMENT-FEMORAL APPROACH N/A 8/23/2022    Procedure: Transcatheter Aortic Valve Replacement-Femoral Approach;   Surgeon: Anabela Hernandez MD;  Location: [facility]    FACIAL RECONSTRUCTION SURGERY      forehead as well    HC CLOSED TX MANDIBLE FX W/O MANIP      Description: Closed Treatment Of Mandibular Fracture;  Recorded: 05/29/2008;    HC DILATION/CURETTAGE DIAG/THER NON OB      Description: Dilation And Curettage;  Recorded: 05/29/2008;  Comments: X 2  metrorhhagia    LAPAROSCOPIC CHOLECYSTECTOMY N/A 2/23/2015    Procedure: CHOLECYSTECTOMY LAPAROSCOPIC;  Surgeon: Doug Webber MD;  Location: [facility];  Service:     OR TRANSCATHETER AORTIC VALVE REPLACEMENT, FEMORAL PERCUTANEOUS APPROACH (STANDBY) N/A 8/23/2022    Procedure: OR TRANSCATHETER AORTIC VALVE REPLACEMENT, FEMORAL PERCUTANEOUS APPROACH (STANDBY);  Surgeon: Katy Rojas MD;  Location: [facility] ORAL SURGERY PROCEDURE      jaw fracture       Past Medical History:   Past Medical History:   Diagnosis Date    Aortic valve disorder     echo 1/09  4.2 cm TAA-MRA 4/2016      Bicuspid aortic valve     Coronary artery disease due to calcified coronary lesion 5/5/2016    Echo 6/2016   Good function  CT Ca++ 172 LAD  Cardiology consult 4/016-nuclear stress    Family history of psychiatric condition     Family history of tremor     Family history of vascular disease     H/O LEEP 1/1/2007 2002-2006 LSIL paps with colposcopy, had LEEP in 2007. No records 6/2/10 ASCUS, +HR HPV 53 6/20/12 ASCUS, +HR HPV 53. Colposcopy outside HE/ system? 6/23/14 NIL 6/24/15 NIL pap, neg HPV 6/27/16 ASCUS, neg HPV 6/29/17 NIL pap, neg HPV 7/9/18 ASCUS, +HR HPV 16. Unclear if colposcopy was completed 7/10/19 NIL pap, neg HPV 7/13/20 NIL pap, neg HPV 2/1/21 NIL pap, neg HPV. Plan: await provider    History of aortic stenosis     History of vitamin D deficiency     Hypercholesteremia     Hyperlipidemia     Laboratory test 8/18/2016    Reading Physician Reading Date Result Priority    Patricia Orta MD 8/16/2016       Narrative       Examination:  Nuclear medicine DATscan for Dopamine Receptor Localization.    Examination: NM BRAIN IMAGING TOMOGRAPHIC (SPECT) DATSCAN  Date: 8/16/2016 3:38 PM  Indication: Right sided postural tremor.   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      Osteopenia 2/15/2015    Other and unspecified hyperlipidemia     Created by Conversion     Papanicolaou smear of cervix with low grade squamous intraepithelial lesion (LGSIL)      LGSIL 2002  colpoLGSIL 2003  colpoLGSIL 4/2006 colpoLGSIL 11/06 colpoAbnormal 5/07 colpo  CHCWLEEPendometrial biopsy 5/07     Plantar fasciitis of right foot     Primary insomnia     Snores     Tremor     Vertigo     Vitamin D deficiency     Wears glasses        Social History:   Social History     Tobacco Use    Smoking status: Never    Smokeless tobacco: Never   Substance Use Topics    Alcohol use: Yes     Alcohol/week: 2.0 standard drinks of alcohol     Comment: occasion       Family History:   Family History   Problem Relation Age of Onset    Heart Disease Father     Heart Disease Sister     Heart Disease Sister     Mental Illness Mother 30.00        schizophrenia, bipolar    Mental Illness Sister         bipolar    Colon Cancer Father     Hyperlipidemia Brother     Hyperlipidemia Brother     Hyperlipidemia Sister     Cervical Cancer Daughter     Diabetes Type 2  Brother     Diabetes Type 2  Brother     Tremor Father     Tremor Paternal Aunt     Tremor Paternal Grandfather     Tremor Son     Tremor Cousin         Paternal 1st cousin    Attention Deficit Disorder Son     Neurologic Disorder Son         Tourette Syndrome     Schizophrenia Mother     Schizophrenia Sister     Mental Illness Sister         depression    Diabetes Brother       "   type 2    Mental Illness Brother         depression       Allergies: No Known Allergies    Active Medications:   Current Outpatient Medications   Medication Sig Dispense Refill    amoxicillin (AMOXIL) 500 MG capsule Take 4 capsules (2,000 mg) by mouth once as needed (take 30-63 minutes prior to dental visit) 4 capsule 1    aspirin 81 MG EC tablet Take 81 mg by mouth daily      atorvastatin (LIPITOR) 80 MG tablet Take 1 tablet (80 mg) by mouth At Bedtime 90 tablet 3    calcium carbonate-vitamin D (CALTRATE) 600-10 MG-MCG per tablet Take 1 tablet by mouth daily      cyanocobalamin (VITAMIN B-12) 1000 MCG tablet Take 1,000 mcg by mouth daily       ezetimibe (ZETIA) 10 MG tablet Take 1 tablet (10 mg) by mouth daily 90 tablet 3    ibuprofen (ADVIL/MOTRIN) 800 MG tablet TAKE 1 TABLET BY MOUTH 3 TIMES  DAILY AS NEEDED . TAKE WITH FOOD 120 tablet 3    Omega-3 Fatty Acids (OMEGA-3 FISH OIL) 1200 MG CAPS Take 1 capsule by mouth daily       polyethylene glycol-propylene glycol (SYSTANE) 0.4-0.3 % SOLN ophthalmic solution Place 1 drop into both eyes as needed for dry eyes      traZODone (DESYREL) 50 MG tablet TAKE 1 TO 2 TABLETS BY MOUTH AT  BEDTIME 90 tablet 3    vitamin C (ASCORBIC ACID) 1000 MG TABS Take 1,000 mg by mouth daily       VITAMIN D3 50 MCG (2000 UT) tablet TAKE 1 TABLET BY MOUTH EVERY DAY 90 tablet 3    vitamin E 400 UNIT capsule Take 400 Units by mouth daily       zinc gluconate 50 MG tablet Take 50 mg by mouth daily          Systemic Review:   CONSTITUTIONAL: NEGATIVE for fever, chills, change in weight  ENT/MOUTH: NEGATIVE for ear, mouth and throat problems  RESP: NEGATIVE for significant cough or SOB  CV: NEGATIVE for chest pain, palpitations or peripheral edema    Physical Examination:   Vital Signs: /74 (BP Location: Right arm)   Pulse 75   Temp 97  F (36.1  C) (Temporal)   Resp 18   Ht 1.6 m (5' 3\")   Wt 71.7 kg (158 lb)   SpO2 98%   BMI 27.99 kg/m    GENERAL: healthy, alert and no " distress  NECK: no adenopathy, no asymmetry, masses, or scars  RESP: Normal respiratory excursion   CV: regular rates and rhythm and no peripheral edema  ABDOMEN: soft, nontender and bowel sounds normal  MS: no gross musculoskeletal defects noted, no edema    Plan: Appropriate to proceed as scheduled.      Thomas M. Leventhal, MD  10/4/2023    PCP:  Shahana Parker

## 2023-10-05 LAB
PATH REPORT.COMMENTS IMP SPEC: NORMAL
PATH REPORT.COMMENTS IMP SPEC: NORMAL
PATH REPORT.FINAL DX SPEC: NORMAL
PATH REPORT.GROSS SPEC: NORMAL
PATH REPORT.MICROSCOPIC SPEC OTHER STN: NORMAL
PATH REPORT.RELEVANT HX SPEC: NORMAL
PHOTO IMAGE: NORMAL

## 2023-10-11 ENCOUNTER — PATIENT OUTREACH (OUTPATIENT)
Dept: GASTROENTEROLOGY | Facility: CLINIC | Age: 68
End: 2023-10-11
Payer: COMMERCIAL

## 2023-10-17 ENCOUNTER — ANCILLARY PROCEDURE (OUTPATIENT)
Dept: GENERAL RADIOLOGY | Facility: CLINIC | Age: 68
End: 2023-10-17
Attending: INTERNAL MEDICINE
Payer: COMMERCIAL

## 2023-10-17 ENCOUNTER — OFFICE VISIT (OUTPATIENT)
Dept: INTERNAL MEDICINE | Facility: CLINIC | Age: 68
End: 2023-10-17
Payer: COMMERCIAL

## 2023-10-17 VITALS
DIASTOLIC BLOOD PRESSURE: 70 MMHG | OXYGEN SATURATION: 97 % | HEIGHT: 63 IN | BODY MASS INDEX: 28.23 KG/M2 | RESPIRATION RATE: 16 BRPM | SYSTOLIC BLOOD PRESSURE: 114 MMHG | WEIGHT: 159.3 LBS | HEART RATE: 80 BPM

## 2023-10-17 DIAGNOSIS — H53.9 VISION CHANGES: ICD-10-CM

## 2023-10-17 DIAGNOSIS — M19.011 ARTHRITIS OF RIGHT SHOULDER REGION: ICD-10-CM

## 2023-10-17 DIAGNOSIS — Z95.2 S/P TAVR (TRANSCATHETER AORTIC VALVE REPLACEMENT): ICD-10-CM

## 2023-10-17 DIAGNOSIS — M19.011 ARTHRITIS OF RIGHT SHOULDER REGION: Primary | ICD-10-CM

## 2023-10-17 DIAGNOSIS — G44.89 OTHER HEADACHE SYNDROME: ICD-10-CM

## 2023-10-17 DIAGNOSIS — M32.9 SYSTEMIC LUPUS ERYTHEMATOSUS, UNSPECIFIED SLE TYPE, UNSPECIFIED ORGAN INVOLVEMENT STATUS (H): ICD-10-CM

## 2023-10-17 DIAGNOSIS — R42 DIZZINESS: ICD-10-CM

## 2023-10-17 PROBLEM — I35.0 SEVERE AORTIC STENOSIS: Status: RESOLVED | Noted: 2022-08-19 | Resolved: 2023-10-17

## 2023-10-17 PROBLEM — Z87.42 HISTORY OF ABNORMAL CERVICAL PAP SMEAR: Status: RESOLVED | Noted: 2018-07-09 | Resolved: 2023-10-17

## 2023-10-17 PROCEDURE — 99204 OFFICE O/P NEW MOD 45 MIN: CPT | Performed by: INTERNAL MEDICINE

## 2023-10-17 PROCEDURE — 73030 X-RAY EXAM OF SHOULDER: CPT | Mod: TC | Performed by: RADIOLOGY

## 2023-10-17 NOTE — PROGRESS NOTES
"  Assessment & Plan     Arthritis of right shoulder region  Patient has pain and restriction in the movement of the right shoulder, with no history of trauma, for 4-6 weeks. Pain is worse and provoked with the internal and external rotation.   On the exam, positive cross arm test and Neer's. Negative drop arm test. Range of motion decreased in internal and external rotation.  No swelling.  Xray will be done today.   OTC medications for pain management discussed. She will start PT.  - Physical Therapy Referral; Future  - XR Shoulder Right 2 Views; Future    Vision changes    - Adult Neurology  Referral; Future    Other headache syndrome    - Adult Neurology  Referral; Future    Dizziness    - Adult Neurology  Referral; Future    She had bilat cataract surgery in April 2021. She has the vision correction lenses. Since then that her both eyes are cloudy on and off and sometimes wavy. She has had several follow-up appointments with her ophthalmologist, and the results indicate that the surgery was successful, but her eyes are not reacting to the corrective lenses She has been experiencing more waves in both eyes, and some dizziness. Especially when she stand up quickly.     Chronic medical problems are hyperlipidemia, on statin and Zetia.   She has history of vertigo, but this is different.  She is s/p TAVR, but she had these symptoms even before the TAVR was done.  She also has diagnosis of SLE, In remission.  SLE rash triggered by sunlight.  Previously on Plaquenil.     Exam did not show any neurological deficits and CN II-XII are normal.  We discussed possible ocular migraine and the treatment, but she refused the treatment and would like to see the Neurologist.  Referral is provided.             BMI:   Estimated body mass index is 28.22 kg/m  as calculated from the following:    Height as of this encounter: 1.6 m (5' 3\").    Weight as of this encounter: 72.3 kg (159 lb 4.8 oz). " "      Return in about 4 weeks (around 11/14/2023) for Follow up.     Valerie Roper MD  [facility]    Ludy Nguyen is a 68 year old, presenting for the following health issues:  Cloudy Eye's After Cataract Surgery 2021 (Cloudy Eye's After Cataract Surgery 2021)      10/17/2023     9:33 AM   Additional Questions   Roomed by Trish SOTO               Review of Systems         Objective    /70   Pulse 80   Resp 16   Ht 1.6 m (5' 3\")   Wt 72.3 kg (159 lb 4.8 oz)   LMP  (LMP Unknown)   SpO2 97%   BMI 28.22 kg/m    Body mass index is 28.22 kg/m .  Physical Exam                         "

## 2023-10-19 DIAGNOSIS — M77.11 LATERAL EPICONDYLITIS OF RIGHT ELBOW: ICD-10-CM

## 2023-10-20 RX ORDER — IBUPROFEN 800 MG/1
TABLET, FILM COATED ORAL
Qty: 300 TABLET | Refills: 2 | OUTPATIENT
Start: 2023-10-20

## 2023-10-29 NOTE — PROGRESS NOTES
NEUROLOGY CONSULTATION NOTE       [facility] [place]  [address] [place] [address]  Tel: [phone]  Fax: [phone]  [email or web address]     Wendy Handy,  1955, MRN [id number]  PCP: Shahana Parker  Date: 10/30/2023     ASSESSMENT & PLAN     Visit Diagnosis  Benign familial tremor  Visual symptoms     Ocular migraine  68-year-old female with SLE, CAD, s/p TAVR, benign familial tremor who was referred for evaluation of intermittent visual symptoms along with dizziness and headaches.  Her description suggests a diagnosis of ocular migraine.  I have recommended MRI of the brain and if negative I would suggest starting a prophylactic agent.  However patient is more interested in knowing if there is any structural abnormality to account for her symptoms and if negative she is reluctant to try any medication.  Follow-up will be after above test    Thank you again for this referral, please feel free to contact me if you have any questions.    Sourav Jim MD  [facility]  (Formerly, [facility]     REASON FOR CONSULTATION vision issues        HISTORY OF PRESENT ILLNESS     We have been requested by Shahana Parker to evaluate Wendy Handy who is a 68 year old  female for visual symptoms    Patient is a 68-year-old female with history of SLE, CAD, s/p TAVR, benign familial tremor who was referred for evaluation of dizziness and visual symptoms.  Patient had cataract surgery in 2021 and since then she experiences these symptoms that consist of blurry lines along with scotomas and occur multiple times in a week lasting for few hours.  Occasionally she gets light sensitivity and gets headaches.  She denies past history of migraine headache.  There is no history of any focal weakness numbness.     PROBLEM LIST   Patient Active Problem List   Diagnosis Code    Benign familial tremor G25.0    Family history of  vascular disease Z82.49    Family history of psychiatric condition Z81.8    Family history of tremor Z82.0    Hyperlipidemia E78.5    Vertigo R42    History of vitamin D deficiency Z86.39    Vitamin D deficiency E55.9    Papanicolaou smear of cervix with low grade squamous intraepithelial lesion (LGSIL) R87.612    Osteopenia M85.80    Systemic lupus erythematosus (H) M32.9    Primary insomnia F51.01    Aortic aneurysm (H24) I71.9    Benign neoplasm of sigmoid colon D12.5    Coronary artery disease due to calcified coronary lesion I25.10, I25.84    Discoid lupus erythematosus L93.0    Family history of colon cancer Z80.0    H/O LEEP Z98.890    Hemorrhoids K64.9    History of colonic polyps Z86.010    Polyp of colon K63.5    Chronic diastolic heart failure (H) I50.32    S/P TAVR (transcatheter aortic valve replacement) Z95.2    Aortic stenosis, severe I35.0         PAST MEDICAL & SURGICAL HISTORY     Past Medical History:   Patient  has a past medical history of Aortic valve disorder, Bicuspid aortic valve, Coronary artery disease due to calcified coronary lesion (5/5/2016), Family history of psychiatric condition, Family history of tremor, Family history of vascular disease, H/O LEEP (1/1/2007), History of aortic stenosis, History of vitamin D deficiency, Hypercholesteremia, Hyperlipidemia, Laboratory test (8/18/2016), Low back pain, Lupus erythematosus, Nocardia infection, Osteopenia (2/15/2015), Other and unspecified hyperlipidemia, Papanicolaou smear of cervix with low grade squamous intraepithelial lesion (LGSIL), Plantar fasciitis of right foot, Primary insomnia, Snores, Tremor, Vertigo, Vitamin D deficiency, and Wears glasses.    Surgical History:  She  has a past surgical history that includes ORAL SURGERY PROCEDURE; Cholecystectomy; Blepharoplasty; Facial reconstruction surgery; conization cervix,loop electrd; DILATION/CURETTAGE DIAG/THER NON OB; CLOSED RX MANDIBLE FX; Laparoscopic cholecystectomy (N/A,  2/23/2015); Cardiac catheterization (05/30/2017); Cardiac catheterization (N/A, 5/30/2017); Coronary Angiogram (N/A, 6/9/2022); Coronary Angiogram (6/9/2022); Transcatheter Aortic Valve Replacement-Femoral Approach (N/A, 8/23/2022); Or Transcatheter Aortic Valve Replacement, Femoral Percutaneous Approach ([place]) (N/A, 8/23/2022); and Colonoscopy (N/A, 10/4/2023).     SOCIAL HISTORY     Reviewed, and she  reports that she has never smoked. She has never used smokeless tobacco. She reports current alcohol use of about 2.0 standard drinks of alcohol per week. She reports that she does not use drugs.     FAMILY HISTORY     Reviewed, and family history includes Attention Deficit Disorder in her son; Cervical Cancer in her daughter; Colon Cancer in her father; Diabetes in her brother; Diabetes Type 2  in her brother and brother; Heart Disease in her father, sister, and sister; Hyperlipidemia in her brother, brother, and sister; Mental Illness in her brother, sister, and sister; Mental Illness (age of onset: 30.00) in her mother; Neurologic Disorder in her son; Schizophrenia in her mother and sister; Tremor in her cousin, father, paternal aunt, paternal grandfather, and son.     ALLERGIES     No Known Allergies      REVIEW OF SYSTEMS     A 12 point review of system was performed and was negative except as outlined in the history of present illness.     HOME MEDICATIONS     Current Outpatient Rx   Medication Sig Dispense Refill    amoxicillin (AMOXIL) 500 MG capsule Take 4 capsules (2,000 mg) by mouth once as needed (take 30-63 minutes prior to dental visit) 4 capsule 1    aspirin 81 MG EC tablet Take 81 mg by mouth daily      atorvastatin (LIPITOR) 80 MG tablet Take 1 tablet (80 mg) by mouth At Bedtime 90 tablet 3    calcium carbonate-vitamin D (CALTRATE) 600-10 MG-MCG per tablet Take 1 tablet by mouth daily      cyanocobalamin (VITAMIN B-12) 1000 MCG tablet Take 1,000 mcg by mouth daily       ezetimibe (ZETIA) 10 MG  "tablet Take 1 tablet (10 mg) by mouth daily 90 tablet 3    ibuprofen (ADVIL/MOTRIN) 800 MG tablet TAKE 1 TABLET BY MOUTH 3 TIMES  DAILY AS NEEDED . TAKE WITH FOOD 120 tablet 3    Omega-3 Fatty Acids (OMEGA-3 FISH OIL) 1200 MG CAPS Take 1 capsule by mouth daily       polyethylene glycol-propylene glycol (SYSTANE) 0.4-0.3 % SOLN ophthalmic solution Place 1 drop into both eyes as needed for dry eyes      traZODone (DESYREL) 50 MG tablet TAKE 1 TO 2 TABLETS BY MOUTH AT  BEDTIME 90 tablet 3    vitamin C (ASCORBIC ACID) 1000 MG TABS Take 1,000 mg by mouth daily       VITAMIN D3 50 MCG (2000 UT) tablet TAKE 1 TABLET BY MOUTH EVERY DAY 90 tablet 3    vitamin E 400 UNIT capsule Take 400 Units by mouth daily       zinc gluconate 50 MG tablet Take 50 mg by mouth daily            PHYSICAL EXAM     Vital signs  /78   Pulse 69   Ht 1.6 m (5' 3\")   Wt 72.1 kg (159 lb)   LMP  (LMP Unknown)   BMI 28.17 kg/m      Weight:   159 lbs 0 oz    Elderly female who is alert and oriented vital signs are reviewed and documented in electronic medical record neck supple.  Neurologically speech was normal.  Cranial nerves II through XII are intact.  Motor strength 5/5.  Reflexes 1+ toes downgoing.  No dysmetria noted on finger-nose testing gait normal Romberg negative.     PERTINENT DIAGNOSTIC STUDIES     Following studies were reviewed:     No recent imaging studies to review     PERTINENT LABS  Following labs were reviewed:  Hospital Outpatient Visit on 10/04/2023   Component Date Value Ref Range Status    Case Report 10/04/2023    Final                    Value:Surgical Pathology Report                         Case: [id number]                                  Authorizing Provider:  Leventhal, Thomas Michael, Collected:           10/04/2023 08:05 AM                                 MD                                                                           Ordering Location:     [facility] OR  Received:            " 10/04/2023 08:42 AM                                 [place]                                                                  Pathologist:           Gino Quigley MD                                                           Specimen:    Other, Rectum polyp                                                                        Final Diagnosis 10/04/2023    Final                    Value:This result contains rich text formatting which cannot be displayed here.    Clinical Information 10/04/2023    Final                    Value:This result contains rich text formatting which cannot be displayed here.    Gross Description 10/04/2023    Final                    Value:This result contains rich text formatting which cannot be displayed here.    Microscopic Description 10/04/2023    Final                    Value:This result contains rich text formatting which cannot be displayed here.    Performing Labs 10/04/2023    Final                    Value:This result contains rich text formatting which cannot be displayed here.    COLONOSCOPY 10/04/2023    Final                    Value:[facility]  [address] [place], [place] [address]., [place] [address] [phone]     Endoscopy Department  _______________________________________________________________________________  Patient Name: Wendy Handy             Procedure Date: 10/4/2023 6:41 AM  MRN: [id number]                       Account Number: [id number]  YOB: 1955              Admit Type: Outpatient  Age: 68                               Room: [facility] PROCED[facility] 05  Gender: Female                        Note Status: Finalized  Attending MD: THOMAS MICHAEL LEVENTHAL , MD,   Total Sedation Time:   _______________________________________________________________________________     Procedure:             Colonoscopy  Indications:           Surveillance: Personal history of adenomatous polyps                          on last colonoscopy 3 years ago  Providers:              THOMAS MICHAEL LEVENTHAL, MD, Tori Thompson RN  Referring MD:          Kate Delatorre  Medicines:             Midazolam                           4 mg IV, Fentanyl 150 micrograms IV  Complications:         No immediate complications.  _______________________________________________________________________________  Procedure:             Pre-Anesthesia Assessment:                         - Prior to the procedure, a History and Physical was                          performed, and patient medications and allergies were                          reviewed. The patient is competent. The risks and                          benefits of the procedure and the sedation options and                          risks were discussed with the patient. All questions                          were answered and informed consent was obtained.                          Patient identification and proposed procedure were                          verified by the physician, the nurse and the                          technician in the pre-procedure area in the endoscopy                          suite. Mental Status Examination: alert and oriented.                                                    Airway Examination: normal oropharyngeal airway and                          neck mobility. Respiratory Examination: clear to                          auscultation. CV Examination: normal. Prophylactic                          Antibiotics: The patient does not require prophylactic                          antibiotics. Prior Anticoagulants: The patient has                          taken no anticoagulant or antiplatelet agents. ASA                          Grade Assessment: III - A patient with severe systemic                          disease. After reviewing the risks and benefits, the                          patient was deemed in satisfactory condition to                          undergo the procedure. The anesthesia plan was to  use                          moderate sedation / analgesia (conscious sedation).                          Immediately prior to administration of medications,                          the patient was re-assessed for adequacy to receive                                                    sedatives. The heart rate, respiratory rate, oxygen                          saturations, blood pressure, adequacy of pulmonary                          ventilation, and response to care were monitored                          throughout the procedure. The physical status of the                          patient was re-assessed after the procedure.                         After obtaining informed consent, the colonoscope was                          passed under direct vision. Throughout the procedure,                          the patient's blood pressure, pulse, and oxygen                          saturations were monitored continuously. The                          Colonoscope was introduced through the anus and                          advanced to the terminal ileum, with identification of                          the appendiceal orifice and IC valve. The colonoscopy                          was performed without difficulty. The patient                          tolerated                           the procedure well. The quality of the bowel                          preparation was excellent. The terminal ileum,                          ileocecal valve, appendiceal orifice, and rectum were                          photographed.                                                                                   Findings:       Skin tags were found on perianal exam.       A 2 mm polyp was found in the recto-sigmoid colon. The polyp was        sessile. The polyp was removed with a jumbo cold forceps. Resection and        retrieval were complete. Verification of patient identification for the        specimen was done by the physician, nurse  and technician using the        patient's name, birth date and medical record number. Estimated blood        loss was minimal.       No other significant abnormalities were identified in a careful        examination of the remainder of the colon.       The retroflexed view of the distal rectum and anal verge was normal and        showed no anal or                           rectal abnormalities.       The terminal ileum appeared normal.                                                                                   Moderate Sedation:       Moderate (conscious) sedation was administered by the nurse and        supervised by the endoscopist. The following parameters were monitored:        oxygen saturation, heart rate, blood pressure, and response to care.        Total physician intraservice time was 26 minutes.  Impression:            - Perianal skin tags found on perianal exam.                         - One 2 mm polyp at the recto-sigmoid colon, removed                          with a jumbo cold forceps. Resected and retrieved.                         - The examined portion of the ileum was normal.  Recommendation:        - Discharge patient to home.                         - Resume previous diet.                         - Continue present medications.                         - Await pathology results.                         - Repeat colonoscopy in 5 y                          ears for surveillance.                         - Return to primary care physician as previously                          scheduled.                                                                                     Electronically Signed by Dr. Leventhal  ____________________________  THOMAS MICHAEL LEVENTHAL, MD  10/4/2023 8:11:54 AM  I was physically present for the entire viewing portion of the exam.  __________________________  Signature of teaching physician  THOMAS LEVENTHAL , MD  Number of Addenda: 0    Note Initiated On: 10/4/2023  6:41 AM  Scope In: 7:44:09 AM  Scope Out: 8:08:15 AM     Lab on 09/21/2023   Component Date Value Ref Range Status    Ventricular Rate 09/21/2023 69  BPM Final    Atrial Rate 09/21/2023 69  BPM Final    KS Interval 09/21/2023 144  ms Final    QRS Duration 09/21/2023 76  ms Final    QT 09/21/2023 382  ms Final    QTc 09/21/2023 409  ms Final    P Axis 09/21/2023 10  degrees Final    R AXIS 09/21/2023 3  degrees Final    T Axis 09/21/2023 61  degrees Final    Interpretation ECG 09/21/2023    Final                    Value:Sinus rhythm  Normal ECG  When compared with ECG of 28-SEP-2022 12:59,  No significant change was found  Confirmed by TANIA PAULSON MD LOC:WW ([id number]) on 9/21/2023 5:10:02 PM      WBC Count 09/21/2023 9.0  4.0 - 11.0 10e3/uL Final    RBC Count 09/21/2023 4.28  3.80 - 5.20 10e6/uL Final    Hemoglobin 09/21/2023 13.6  11.7 - 15.7 g/dL Final    Hematocrit 09/21/2023 39.5  35.0 - 47.0 % Final    MCV 09/21/2023 92  78 - 100 fL Final    MCH 09/21/2023 31.8  26.5 - 33.0 pg Final    MCHC 09/21/2023 34.4  31.5 - 36.5 g/dL Final    RDW 09/21/2023 11.9  10.0 - 15.0 % Final    Platelet Count 09/21/2023 165  150 - 450 10e3/uL Final    Sodium 09/21/2023 137  136 - 145 mmol/L Final    Potassium 09/21/2023 4.2  3.4 - 5.3 mmol/L Final    Chloride 09/21/2023 102  98 - 107 mmol/L Final    Carbon Dioxide (CO2) 09/21/2023 24  22 - 29 mmol/L Final    Anion Gap 09/21/2023 11  7 - 15 mmol/L Final    Urea Nitrogen 09/21/2023 16.9  8.0 - 23.0 mg/dL Final    Creatinine 09/21/2023 0.83  0.51 - 0.95 mg/dL Final    Calcium 09/21/2023 10.3 (H)  8.8 - 10.2 mg/dL Final    Glucose 09/21/2023 96  70 - 99 mg/dL Final    GFR Estimate 09/21/2023 76  >60 mL/min/1.73m2 Final        Total time spent for face to face visit, reviewing labs/imaging studies, counseling and coordination of care was: 1 Hour spent on the date of the encounter doing chart review, review of outside records, review of test results, interpretation of tests, patient  visit, and documentation     This note was dictated using voice recognition software.  Any grammatical or context distortions are unintentional and inherent to the software.    No orders of the defined types were placed in this encounter.     New Prescriptions    No medications on file      Modified Medications    No medications on file

## 2023-10-30 ENCOUNTER — OFFICE VISIT (OUTPATIENT)
Dept: NEUROLOGY | Facility: CLINIC | Age: 68
End: 2023-10-30
Attending: INTERNAL MEDICINE
Payer: COMMERCIAL

## 2023-10-30 VITALS
HEIGHT: 63 IN | HEART RATE: 69 BPM | SYSTOLIC BLOOD PRESSURE: 111 MMHG | DIASTOLIC BLOOD PRESSURE: 78 MMHG | BODY MASS INDEX: 28.17 KG/M2 | WEIGHT: 159 LBS

## 2023-10-30 DIAGNOSIS — R42 DIZZINESS: ICD-10-CM

## 2023-10-30 DIAGNOSIS — H57.9 VISUAL SYMPTOMS: ICD-10-CM

## 2023-10-30 DIAGNOSIS — G44.89 OTHER HEADACHE SYNDROME: ICD-10-CM

## 2023-10-30 DIAGNOSIS — G25.0 BENIGN FAMILIAL TREMOR: Primary | ICD-10-CM

## 2023-10-30 DIAGNOSIS — H53.9 VISION CHANGES: ICD-10-CM

## 2023-10-30 PROCEDURE — 99205 OFFICE O/P NEW HI 60 MIN: CPT | Performed by: PSYCHIATRY & NEUROLOGY

## 2023-10-30 ASSESSMENT — ACTIVITIES OF DAILY LIVING (ADL)
WHEN_LYING_ON_THE_INVOLVED_SIDE: 3
REMOVING_SOMETHING_FROM_YOUR_BACK_POCKET: 9
PUSHING_WITH_THE_INVOLVED_ARM: 7
PLACING_AN_OBJECT_ON_A_HIGH_SHELF: 7
WASHING_YOUR_BACK: 7
PUTTING_ON_YOUR_PANTS: 5
REACHING_FOR_SOMETHING_ON_A_HIGH_SHELF: 8
PLEASE_INDICATE_YOR_PRIMARY_REASON_FOR_REFERRAL_TO_THERAPY:: SHOULDER
CARRYING_A_HEAVY_OBJECT_OF_10_POUNDS: 6
PUTTING_ON_A_SHIRT_THAT_BUTTONS_DOWN_THE_FRONT: 3
PUTTING_ON_AN_UNDERSHIRT_OR_A_PULLOVER_SWEATER: 6
AT_ITS_WORST?: 9
WASHING_YOUR_HAIR?: 2
TOUCHING_THE_BACK_OF_YOUR_NECK: 7

## 2023-10-30 NOTE — NURSING NOTE
Chief Complaint   Patient presents with    vision issues     New patient     Myrtle Lazaro on 10/30/2023 at 1:32 PM

## 2023-10-30 NOTE — LETTER
10/30/2023         RE: Wendy Handy  [address] [facility] [address]        Dear Colleague,    Thank you for referring your patient, Wendy Handy, to the [facility] [place]. Please see a copy of my visit note below.    NEUROLOGY CONSULTATION NOTE       [facility] [place]  [address] [place] [address]  Tel: [phone]  Fax: [phone]  [email or web address]     Wendy Handy,  1955, MRN [id number]  PCP: Shahana Parker  Date: 10/30/2023     ASSESSMENT & PLAN     Visit Diagnosis  Benign familial tremor  Visual symptoms     Ocular migraine  68-year-old female with SLE, CAD, s/p TAVR, benign familial tremor who was referred for evaluation of intermittent visual symptoms along with dizziness and headaches.  Her description suggests a diagnosis of ocular migraine.  I have recommended MRI of the brain and if negative I would suggest starting a prophylactic agent.  However patient is more interested in knowing if there is any structural abnormality to account for her symptoms and if negative she is reluctant to try any medication.  Follow-up will be after above test    Thank you again for this referral, please feel free to contact me if you have any questions.    Sourav Jim MD  [facility]  (Former[facility], [facility]     REASON FOR CONSULTATION vision issues        HISTORY OF PRESENT ILLNESS     We have been requested by Shahana Parker to evaluate Wendy Handy who is a 68 year old  female for visual symptoms    Patient is a 68-year-old female with history of SLE, CAD, s/p TAVR, benign familial tremor who was referred for evaluation of dizziness and visual symptoms.  Patient had cataract surgery in 2021 and since then she experiences these symptoms that consist of blurry lines along with scotomas and occur multiple times in a week lasting for few hours.  Occasionally she gets  light sensitivity and gets headaches.  She denies past history of migraine headache.  There is no history of any focal weakness numbness.     PROBLEM LIST   Patient Active Problem List   Diagnosis Code     Benign familial tremor G25.0     Family history of vascular disease Z82.49     Family history of psychiatric condition Z81.8     Family history of tremor Z82.0     Hyperlipidemia E78.5     Vertigo R42     History of vitamin D deficiency Z86.39     Vitamin D deficiency E55.9     Papanicolaou smear of cervix with low grade squamous intraepithelial lesion (LGSIL) R87.612     Osteopenia M85.80     Systemic lupus erythematosus (H) M32.9     Primary insomnia F51.01     Aortic aneurysm (H24) I71.9     Benign neoplasm of sigmoid colon D12.5     Coronary artery disease due to calcified coronary lesion I25.10, I25.84     Discoid lupus erythematosus L93.0     Family history of colon cancer Z80.0     H/O LEEP Z98.890     Hemorrhoids K64.9     History of colonic polyps Z86.010     Polyp of colon K63.5     Chronic diastolic heart failure (H) I50.32     S/P TAVR (transcatheter aortic valve replacement) Z95.2     Aortic stenosis, severe I35.0         PAST MEDICAL & SURGICAL HISTORY     Past Medical History:   Patient  has a past medical history of Aortic valve disorder, Bicuspid aortic valve, Coronary artery disease due to calcified coronary lesion (5/5/2016), Family history of psychiatric condition, Family history of tremor, Family history of vascular disease, H/O LEEP (1/1/2007), History of aortic stenosis, History of vitamin D deficiency, Hypercholesteremia, Hyperlipidemia, Laboratory test (8/18/2016), Low back pain, Lupus erythematosus, Nocardia infection, Osteopenia (2/15/2015), Other and unspecified hyperlipidemia, Papanicolaou smear of cervix with low grade squamous intraepithelial lesion (LGSIL), Plantar fasciitis of right foot, Primary insomnia, Snores, Tremor, Vertigo, Vitamin D deficiency, and Wears  glasses.    Surgical History:  She  has a past surgical history that includes ORAL SURGERY PROCEDURE; Cholecystectomy; Blepharoplasty; Facial reconstruction surgery; conization cervix,loop electrd; DILATION/CURETTAGE DIAG/THER NON OB; CLOSED RX MANDIBLE FX; Laparoscopic cholecystectomy (N/A, 2/23/2015); Cardiac catheterization (05/30/2017); Cardiac catheterization (N/A, 5/30/2017); Coronary Angiogram (N/A, 6/9/2022); Coronary Angiogram (6/9/2022); Transcatheter Aortic Valve Replacement-Femoral Approach (N/A, 8/23/2022); Or Transcatheter Aortic Valve Replacement, Femoral Percutaneous Approach ([place]) (N/A, 8/23/2022); and Colonoscopy (N/A, 10/4/2023).     SOCIAL HISTORY     Reviewed, and she  reports that she has never smoked. She has never used smokeless tobacco. She reports current alcohol use of about 2.0 standard drinks of alcohol per week. She reports that she does not use drugs.     FAMILY HISTORY     Reviewed, and family history includes Attention Deficit Disorder in her son; Cervical Cancer in her daughter; Colon Cancer in her father; Diabetes in her brother; Diabetes Type 2  in her brother and brother; Heart Disease in her father, sister, and sister; Hyperlipidemia in her brother, brother, and sister; Mental Illness in her brother, sister, and sister; Mental Illness (age of onset: 30.00) in her mother; Neurologic Disorder in her son; Schizophrenia in her mother and sister; Tremor in her cousin, father, paternal aunt, paternal grandfather, and son.     ALLERGIES     No Known Allergies      REVIEW OF SYSTEMS     A 12 point review of system was performed and was negative except as outlined in the history of present illness.     HOME MEDICATIONS     Current Outpatient Rx   Medication Sig Dispense Refill     amoxicillin (AMOXIL) 500 MG capsule Take 4 capsules (2,000 mg) by mouth once as needed (take 30-63 minutes prior to dental visit) 4 capsule 1     aspirin 81 MG EC tablet Take 81 mg by mouth daily        "atorvastatin (LIPITOR) 80 MG tablet Take 1 tablet (80 mg) by mouth At Bedtime 90 tablet 3     calcium carbonate-vitamin D (CALTRATE) 600-10 MG-MCG per tablet Take 1 tablet by mouth daily       cyanocobalamin (VITAMIN B-12) 1000 MCG tablet Take 1,000 mcg by mouth daily        ezetimibe (ZETIA) 10 MG tablet Take 1 tablet (10 mg) by mouth daily 90 tablet 3     ibuprofen (ADVIL/MOTRIN) 800 MG tablet TAKE 1 TABLET BY MOUTH 3 TIMES  DAILY AS NEEDED . TAKE WITH FOOD 120 tablet 3     Omega-3 Fatty Acids (OMEGA-3 FISH OIL) 1200 MG CAPS Take 1 capsule by mouth daily        polyethylene glycol-propylene glycol (SYSTANE) 0.4-0.3 % SOLN ophthalmic solution Place 1 drop into both eyes as needed for dry eyes       traZODone (DESYREL) 50 MG tablet TAKE 1 TO 2 TABLETS BY MOUTH AT  BEDTIME 90 tablet 3     vitamin C (ASCORBIC ACID) 1000 MG TABS Take 1,000 mg by mouth daily        VITAMIN D3 50 MCG (2000 UT) tablet TAKE 1 TABLET BY MOUTH EVERY DAY 90 tablet 3     vitamin E 400 UNIT capsule Take 400 Units by mouth daily        zinc gluconate 50 MG tablet Take 50 mg by mouth daily            PHYSICAL EXAM     Vital signs  /78   Pulse 69   Ht 1.6 m (5' 3\")   Wt 72.1 kg (159 lb)   LMP  (LMP Unknown)   BMI 28.17 kg/m      Weight:   159 lbs 0 oz    Elderly female who is alert and oriented vital signs are reviewed and documented in electronic medical record neck supple.  Neurologically speech was normal.  Cranial nerves II through XII are intact.  Motor strength 5/5.  Reflexes 1+ toes downgoing.  No dysmetria noted on finger-nose testing gait normal Romberg negative.     PERTINENT DIAGNOSTIC STUDIES     Following studies were reviewed:     No recent imaging studies to review     PERTINENT LABS  Following labs were reviewed:  Hospital Outpatient Visit on 10/04/2023   Component Date Value Ref Range Status     Case Report 10/04/2023    Final                    Value:Surgical Pathology Report                         Case: [id number]    "                               Authorizing Provider:  Leventhal, Thomas Michael, Collected:           10/04/2023 08:05 AM                                 MD                                                                           Ordering Location:     [facility] Main OR  Received:            10/04/2023 08:42 AM                                 [place]                                                                  Pathologist:           Gino Quigley MD                                                           Specimen:    Other, Rectum polyp                                                                         Final Diagnosis 10/04/2023    Final                    Value:This result contains rich text formatting which cannot be displayed here.     Clinical Information 10/04/2023    Final                    Value:This result contains rich text formatting which cannot be displayed here.     Gross Description 10/04/2023    Final                    Value:This result contains rich text formatting which cannot be displayed here.     Microscopic Description 10/04/2023    Final                    Value:This result contains rich text formatting which cannot be displayed here.     Performing Labs 10/04/2023    Final                    Value:This result contains rich text formatting which cannot be displayed here.     COLONOSCOPY 10/04/2023    Final                    Value:[facility]  [address] [place], [place] [address]., [place] [address] [phone]     Endoscopy Department  _______________________________________________________________________________  Patient Name: Wendy Handy             Procedure Date: 10/4/2023 6:41 AM  MRN: [id number]                       Account Number: [id number]  YOB: 1955              Admit Type: Outpatient  Age: 68                               Room: [facility] PROCEDURE ROOM 05  Gender: Female                        Note Status: Finalized  Attending MD: MICHAEL SY  LEVENTHAL , MD,   Total Sedation Time:   _______________________________________________________________________________     Procedure:             Colonoscopy  Indications:           Surveillance: Personal history of adenomatous polyps                          on last colonoscopy 3 years ago  Providers:             THOMAS MICHAEL LEVENTHAL, MD, Tori Thompson RN  Referring MD:          Kate Delatorre  Medicines:             Midazolam                           4 mg IV, Fentanyl 150 micrograms IV  Complications:         No immediate complications.  _______________________________________________________________________________  Procedure:             Pre-Anesthesia Assessment:                         - Prior to the procedure, a History and Physical was                          performed, and patient medications and allergies were                          reviewed. The patient is competent. The risks and                          benefits of the procedure and the sedation options and                          risks were discussed with the patient. All questions                          were answered and informed consent was obtained.                          Patient identification and proposed procedure were                          verified by the physician, the nurse and the                          technician in the pre-procedure area in the endoscopy                          suite. Mental Status Examination: alert and oriented.                                                    Airway Examination: normal oropharyngeal airway and                          neck mobility. Respiratory Examination: clear to                          auscultation. CV Examination: normal. Prophylactic                          Antibiotics: The patient does not require prophylactic                          antibiotics. Prior Anticoagulants: The patient has                          taken no anticoagulant or antiplatelet  agents. ASA                          Grade Assessment: III - A patient with severe systemic                          disease. After reviewing the risks and benefits, the                          patient was deemed in satisfactory condition to                          undergo the procedure. The anesthesia plan was to use                          moderate sedation / analgesia (conscious sedation).                          Immediately prior to administration of medications,                          the patient was re-assessed for adequacy to receive                                                    sedatives. The heart rate, respiratory rate, oxygen                          saturations, blood pressure, adequacy of pulmonary                          ventilation, and response to care were monitored                          throughout the procedure. The physical status of the                          patient was re-assessed after the procedure.                         After obtaining informed consent, the colonoscope was                          passed under direct vision. Throughout the procedure,                          the patient's blood pressure, pulse, and oxygen                          saturations were monitored continuously. The                          Colonoscope was introduced through the anus and                          advanced to the terminal ileum, with identification of                          the appendiceal orifice and IC valve. The colonoscopy                          was performed without difficulty. The patient                          tolerated                           the procedure well. The quality of the bowel                          preparation was excellent. The terminal ileum,                          ileocecal valve, appendiceal orifice, and rectum were                          photographed.                                                                                   Findings:        Skin tags were found on perianal exam.       A 2 mm polyp was found in the recto-sigmoid colon. The polyp was        sessile. The polyp was removed with a jumbo cold forceps. Resection and        retrieval were complete. Verification of patient identification for the        specimen was done by the physician, nurse and technician using the        patient's name, birth date and medical record number. Estimated blood        loss was minimal.       No other significant abnormalities were identified in a careful        examination of the remainder of the colon.       The retroflexed view of the distal rectum and anal verge was normal and        showed no anal or                           rectal abnormalities.       The terminal ileum appeared normal.                                                                                   Moderate Sedation:       Moderate (conscious) sedation was administered by the nurse and        supervised by the endoscopist. The following parameters were monitored:        oxygen saturation, heart rate, blood pressure, and response to care.        Total physician intraservice time was 26 minutes.  Impression:            - Perianal skin tags found on perianal exam.                         - One 2 mm polyp at the recto-sigmoid colon, removed                          with a jumbo cold forceps. Resected and retrieved.                         - The examined portion of the ileum was normal.  Recommendation:        - Discharge patient to home.                         - Resume previous diet.                         - Continue present medications.                         - Await pathology results.                         - Repeat colonoscopy in 5 y                          ears for surveillance.                         - Return to primary care physician as previously                          scheduled.                                                                                      Electronically Signed by Dr. Leventhal  ____________________________  THOMAS MICHAEL LEVENTHAL, MD  10/4/2023 8:11:54 AM  I was physically present for the entire viewing portion of the exam.  __________________________  Signature of teaching physician  THOMAS LEVENTHAL , MD  Number of Addenda: 0    Note Initiated On: 10/4/2023 6:41 AM  Scope In: 7:44:09 AM  Scope Out: 8:08:15 AM     Lab on 09/21/2023   Component Date Value Ref Range Status     Ventricular Rate 09/21/2023 69  BPM Final     Atrial Rate 09/21/2023 69  BPM Final     MA Interval 09/21/2023 144  ms Final     QRS Duration 09/21/2023 76  ms Final     QT 09/21/2023 382  ms Final     QTc 09/21/2023 409  ms Final     P Axis 09/21/2023 10  degrees Final     R AXIS 09/21/2023 3  degrees Final     T Axis 09/21/2023 61  degrees Final     Interpretation ECG 09/21/2023    Final                    Value:Sinus rhythm  Normal ECG  When compared with ECG of 28-SEP-2022 12:59,  No significant change was found  Confirmed by TANIA PAULSON MD LOC:WW ([id number]) on 9/21/2023 5:10:02 PM       WBC Count 09/21/2023 9.0  4.0 - 11.0 10e3/uL Final     RBC Count 09/21/2023 4.28  3.80 - 5.20 10e6/uL Final     Hemoglobin 09/21/2023 13.6  11.7 - 15.7 g/dL Final     Hematocrit 09/21/2023 39.5  35.0 - 47.0 % Final     MCV 09/21/2023 92  78 - 100 fL Final     MCH 09/21/2023 31.8  26.5 - 33.0 pg Final     MCHC 09/21/2023 34.4  31.5 - 36.5 g/dL Final     RDW 09/21/2023 11.9  10.0 - 15.0 % Final     Platelet Count 09/21/2023 165  150 - 450 10e3/uL Final     Sodium 09/21/2023 137  136 - 145 mmol/L Final     Potassium 09/21/2023 4.2  3.4 - 5.3 mmol/L Final     Chloride 09/21/2023 102  98 - 107 mmol/L Final     Carbon Dioxide (CO2) 09/21/2023 24  22 - 29 mmol/L Final     Anion Gap 09/21/2023 11  7 - 15 mmol/L Final     Urea Nitrogen 09/21/2023 16.9  8.0 - 23.0 mg/dL Final     Creatinine 09/21/2023 0.83  0.51 - 0.95 mg/dL Final     Calcium 09/21/2023 10.3 (H)  8.8 - 10.2 mg/dL Final     Glucose  09/21/2023 96  70 - 99 mg/dL Final     GFR Estimate 09/21/2023 76  >60 mL/min/1.73m2 Final        Total time spent for face to face visit, reviewing labs/imaging studies, counseling and coordination of care was: 1 Hour spent on the date of the encounter doing chart review, review of outside records, review of test results, interpretation of tests, patient visit, and documentation     This note was dictated using voice recognition software.  Any grammatical or context distortions are unintentional and inherent to the software.    No orders of the defined types were placed in this encounter.     New Prescriptions    No medications on file      Modified Medications    No medications on file                Again, thank you for allowing me to participate in the care of your patient.        Sincerely,        Sourav Jim MD

## 2023-10-31 ENCOUNTER — THERAPY VISIT (OUTPATIENT)
Dept: PHYSICAL THERAPY | Facility: REHABILITATION | Age: 68
End: 2023-10-31
Payer: COMMERCIAL

## 2023-10-31 DIAGNOSIS — M19.011 ARTHRITIS OF RIGHT SHOULDER REGION: Primary | ICD-10-CM

## 2023-10-31 PROCEDURE — 97110 THERAPEUTIC EXERCISES: CPT | Mod: GP | Performed by: PHYSICAL THERAPIST

## 2023-10-31 PROCEDURE — 97162 PT EVAL MOD COMPLEX 30 MIN: CPT | Mod: GP | Performed by: PHYSICAL THERAPIST

## 2023-10-31 NOTE — PROGRESS NOTES
PHYSICAL THERAPY EVALUATION  Type of Visit: Evaluation    See electronic medical record for Abuse and Falls Screening details.    Subjective       Presenting condition or subjective complaint: Sore right shoulder. Significant right shoulder pain without history of trauma 6-8 weeks ago. Pain is worse with IR and ER. Her pain is worse with reaching behind her back, behind her head, or reaching overhead.  Pain is better when not in those positions. Pain is primarily in anterior shoulder, but can sometimes radiate down to elbow.    Date of onset: 09/05/23    Relevant medical history: Heart problems; Vision problems   Dates & types of surgery: Broken jaw-1980, gall bladder-2013, aortic valve replacement-2022    Prior diagnostic imaging/testing results: X-ray     Prior therapy history for the same diagnosis, illness or injury: No      Living Environment  Social support: With a significant other or spouse   Type of home: House; Multi-level   Stairs to enter the home: Yes 3 Is there a railing: No   Ramp: No   Stairs inside the home: Yes 15 Is there a railing: Yes   Help at home: None  Equipment owned:       Employment: No    Hobbies/Interests: Sewing, gardening    Patient goals for therapy: Reach my arm back, reach my arm up my back, roll over without my shoulder hurting       Objective   SHOULDER EVALUATION  POSTURE: Standing Posture: Rounded shoulders, Forward head, Thoracic kyphosis increased  Sitting Posture: Rounded shoulders, Forward head, Thoracic kyphosis increased  ROM:   (Degrees) Left AROM Right AROM    Shoulder Flexion 160 150   Shoulder Abduction 169 150   Shoulder Internal Rotation T4 T11   Shoulder External Rotation WNL WNL   Elbow Extension WNL WNL   Elbow Flexion WNL WNL   Pain:   End feel: WNL    STRENGTH:   Pain: - none + mild ++ moderate +++ severe  Strength Scale: 0-5/5 Left Right   Shoulder Flexion 3+ 3+   Shoulder Abduction 4 4   Shoulder Internal Rotation 4+ 4+   Shoulder External Rotation 4 4    Elbow Flexion 5 5   Elbow Extension 5 5     SPECIAL TESTS:    Left Right   Impingement     Neer's Negative  Positive   Hawkin's-Abdirahman Negative  Positive   Coracoid Impingement Negative  Positive   Internal impingement Negative  Negative    Labral     Anterior Slide Negative  Negative    Ward's Negative  Negative    Crank Negative  Negative    Instability     Anterior Load & Shift Negative  Negative    Posterior Load & Shift Negative  Negative    Posterior instability (with 90 degrees flex) Negative  Negative    Biceps      Biceps load Negative Negative   Speed's Negative  Negative    Rotator Cuff Tear     Drop Arm Negative  Negative    Empty can Negative Negative   Scarf Negative Negative   Belly Press Negative  Negative    Lift off  Negative  Positive     PALPATION:  Significant tenderness and taught bands located in lateral border of right pec major, subscap, infraspinatus, teres major and minor, and supraspinatus.   JOINT MOBILITY:  Mild hypomobility in bilateral GH joints and hypomobile with pain in right AC joint.    Assessment & Plan   CLINICAL IMPRESSIONS  Medical Diagnosis: Arthritis of right shoulder region    Treatment Diagnosis: Right shoulder pain   Impression/Assessment:     Clinical Decision Making (Complexity):  Clinical Presentation: Evolving/Changing  Clinical Presentation Rationale: based on medical and personal factors listed in PT evaluation  Clinical Decision Making (Complexity): Moderate complexity    PLAN OF CARE  Treatment Interventions:  Modalities: Dry Needling, E-stim  Interventions: Manual Therapy, Neuromuscular Re-education, Therapeutic Activity, Therapeutic Exercise, Self-Care/Home Management    Long Term Goals     PT Goal 1  Goal Identifier: HEP  Goal Description: Wendy will be independent in their HEP in order to facilitate return to prior level of function.  Goal Progress: In progress  Target Date: 01/29/24  PT Goal 2  Goal Identifier: Right shoulder IR ROM  Goal Description:  Wendy will increase right shoulder IR ROM to >/= T7 without pain in order to improve the quality of their ADLs.  Goal Progress: T11  Target Date: 01/29/24  PT Goal 3  Goal Identifier: SPADI  Goal Description: Wendy will demonstrate a decrease in pain and increase in function as measured by scoring </= 40% on the SPADI.  Goal Progress: 60.77  Target Date: 01/29/24  PT Goal 4  Goal Identifier: Bilateral shoulder flexion strength  Goal Description: Wendy will demonstrate bilateral shoulder flexion strength of >/=4/5 without pain in order to improve the ease of their ADLs.  Goal Progress: 3+ B  Target Date: 01/29/24      Frequency of Treatment: 1 x/week  Duration of Treatment: 90 days    Education Assessment:        Risks and benefits of evaluation/treatment have been explained.   Patient/Family/caregiver agrees with Plan of Care.     Evaluation Time:     PT Eval, Moderate Complexity Minutes (31206): 20     Signing Clinician: CATIE CHRISTINE [facility]                                                                                   OUTPATIENT PHYSICAL THERAPY      PLAN OF TREATMENT FOR OUTPATIENT REHABILITATION   Patient's Last Name, First Name, BRIANJOSE  OleWendy  BRIAN YOB: 1955   Provider's Name   [facility]   Medical Record No.  [id number]     Onset Date: 09/05/23  Start of Care Date: 10/31/23     Medical Diagnosis:  Arthritis of right shoulder region      PT Treatment Diagnosis:  Right shoulder pain Plan of Treatment  Frequency/Duration: 1 x/week/ 90 days    Certification date from 10/31/23 to 01/29/24         See note for plan of treatment details and functional goals     ELISEO DARNELL PT                         I CERTIFY THE NEED FOR THESE SERVICES FURNISHED UNDER        THIS PLAN OF TREATMENT AND WHILE UNDER MY CARE     (Physician attestation of this document indicates review and certification of the therapy plan).                 Referring Provider:  Valerie Roper MD      Initial Assessment  See Epic Evaluation- Start of Care Date: 10/31/23

## 2023-11-16 ENCOUNTER — THERAPY VISIT (OUTPATIENT)
Dept: PHYSICAL THERAPY | Facility: REHABILITATION | Age: 68
End: 2023-11-16
Attending: INTERNAL MEDICINE
Payer: COMMERCIAL

## 2023-11-16 ENCOUNTER — MYC MEDICAL ADVICE (OUTPATIENT)
Dept: FAMILY MEDICINE | Facility: CLINIC | Age: 68
End: 2023-11-16

## 2023-11-16 DIAGNOSIS — M19.011 ARTHRITIS OF RIGHT SHOULDER REGION: ICD-10-CM

## 2023-11-16 PROCEDURE — 97110 THERAPEUTIC EXERCISES: CPT | Mod: GP | Performed by: PHYSICAL THERAPIST

## 2023-11-16 PROCEDURE — 97140 MANUAL THERAPY 1/> REGIONS: CPT | Mod: GP | Performed by: PHYSICAL THERAPIST

## 2023-11-16 PROCEDURE — 97112 NEUROMUSCULAR REEDUCATION: CPT | Mod: GP | Performed by: PHYSICAL THERAPIST

## 2023-11-18 ENCOUNTER — HOSPITAL ENCOUNTER (OUTPATIENT)
Dept: MRI IMAGING | Facility: HOSPITAL | Age: 68
Discharge: HOME | End: 2023-11-18
Attending: PSYCHIATRY & NEUROLOGY | Admitting: PSYCHIATRY & NEUROLOGY
Payer: COMMERCIAL

## 2023-11-18 DIAGNOSIS — H53.9 VISION CHANGES: ICD-10-CM

## 2023-11-18 DIAGNOSIS — R42 DIZZINESS: ICD-10-CM

## 2023-11-18 DIAGNOSIS — G44.89 OTHER HEADACHE SYNDROME: ICD-10-CM

## 2023-11-18 DIAGNOSIS — H57.9 VISUAL SYMPTOMS: ICD-10-CM

## 2023-11-18 PROCEDURE — 70551 MRI BRAIN STEM W/O DYE: CPT

## 2023-11-24 ENCOUNTER — THERAPY VISIT (OUTPATIENT)
Dept: PHYSICAL THERAPY | Facility: REHABILITATION | Age: 68
End: 2023-11-24
Attending: INTERNAL MEDICINE
Payer: COMMERCIAL

## 2023-11-24 DIAGNOSIS — M19.011 ARTHRITIS OF RIGHT SHOULDER REGION: Primary | ICD-10-CM

## 2023-11-24 PROCEDURE — 97110 THERAPEUTIC EXERCISES: CPT | Mod: GP | Performed by: PHYSICAL THERAPIST

## 2023-11-24 PROCEDURE — 97140 MANUAL THERAPY 1/> REGIONS: CPT | Mod: GP | Performed by: PHYSICAL THERAPIST

## 2023-11-24 PROCEDURE — 97112 NEUROMUSCULAR REEDUCATION: CPT | Mod: GP | Performed by: PHYSICAL THERAPIST

## 2023-11-30 ENCOUNTER — THERAPY VISIT (OUTPATIENT)
Dept: PHYSICAL THERAPY | Facility: REHABILITATION | Age: 68
End: 2023-11-30
Attending: INTERNAL MEDICINE
Payer: COMMERCIAL

## 2023-11-30 DIAGNOSIS — M19.011 ARTHRITIS OF RIGHT SHOULDER REGION: Primary | ICD-10-CM

## 2023-11-30 PROCEDURE — 97140 MANUAL THERAPY 1/> REGIONS: CPT | Mod: GP | Performed by: PHYSICAL THERAPIST

## 2023-11-30 PROCEDURE — 97112 NEUROMUSCULAR REEDUCATION: CPT | Mod: GP | Performed by: PHYSICAL THERAPIST

## 2023-11-30 PROCEDURE — 97110 THERAPEUTIC EXERCISES: CPT | Mod: GP | Performed by: PHYSICAL THERAPIST

## 2023-12-03 DIAGNOSIS — G47.00 INSOMNIA, UNSPECIFIED TYPE: ICD-10-CM

## 2023-12-04 RX ORDER — TRAZODONE HYDROCHLORIDE 50 MG/1
TABLET, FILM COATED ORAL
Qty: 90 TABLET | Refills: 7 | OUTPATIENT
Start: 2023-12-04

## 2023-12-07 ENCOUNTER — THERAPY VISIT (OUTPATIENT)
Dept: PHYSICAL THERAPY | Facility: REHABILITATION | Age: 68
End: 2023-12-07
Payer: COMMERCIAL

## 2023-12-07 DIAGNOSIS — M19.011 ARTHRITIS OF RIGHT SHOULDER REGION: Primary | ICD-10-CM

## 2023-12-07 PROCEDURE — 97110 THERAPEUTIC EXERCISES: CPT | Mod: GP | Performed by: PHYSICAL THERAPIST

## 2023-12-07 PROCEDURE — 97112 NEUROMUSCULAR REEDUCATION: CPT | Mod: GP | Performed by: PHYSICAL THERAPIST

## 2023-12-07 NOTE — PROGRESS NOTES
DISCHARGE  Reason for Discharge: Patient chooses to discontinue therapy.    Equipment Issued: Yellow theraband    Discharge Plan: Patient to continue home program.    Referring Provider:  Valerie Roper MD         12/07/23 0500   Appointment Info   Signing clinician's name / credentials Brendan Bajwa, PT, DPT, CMTPT/DN   Visits Used 5   Medical Diagnosis Arthritis of right shoulder region   PT Tx Diagnosis Right shoulder pain   Quick Adds Certification   Progress Note/Certification   Start of Care Date 10/31/23   Onset of illness/injury or Date of Surgery 09/05/23   Therapy Frequency 1 x/week   Predicted Duration 90 days   Certification date from 10/31/23   Certification date to 01/29/24   Progress Note Due Date 01/29/24   GOALS   PT Goals 2;3;4   PT Goal 1   Goal Identifier HEP   Goal Description Wendy will be independent in their HEP in order to facilitate return to prior level of function.   Goal Progress Met   Target Date 01/29/24   Date Met 12/07/23   PT Goal 2   Goal Identifier Right shoulder IR ROM   Goal Description Wendy will increase right shoulder IR ROM to >/= T7 without pain in order to improve the quality of their ADLs.   Goal Progress T8   Target Date 01/29/24   PT Goal 3   Goal Identifier SPADI   Goal Description Wendy will demonstrate a decrease in pain and increase in function as measured by scoring </= 40% on the SPADI.   Goal Progress 46.15   Target Date 01/29/24   PT Goal 4   Goal Identifier Bilateral shoulder flexion strength   Goal Description Wendy will demonstrate bilateral shoulder flexion strength of >/=4/5 without pain in order to improve the ease of their ADLs.   Goal Progress 4+/5 B   Target Date 01/29/24   Date Met 12/07/23   Subjective Report   Subjective Report She has been feeling good since last session. She hasn't been having very much pain, only minor discomfort in her right shoulder when reaching in certain positions, like trying to reach behind her back or across her body.  "  Treatment Interventions (PT)   Interventions Therapeutic Procedure/Exercise;Manual Therapy;Neuromuscular Re-education   Therapeutic Procedure/Exercise   Therapeutic Procedures: strength, endurance, ROM, flexibillity minutes (16377) 23   Therapeutic Procedures Ther Proc 2;Ther Proc 3;Ther Proc 4;Ther Proc 5;Ther Proc 6;Ther Proc 7;Ther Proc 8   Ther Proc 1 UBE   Ther Proc 1 - Details 1.5' forward and back   Ther Proc 2 Scap squeezes   Ther Proc 2 - Details x10   Ther Proc 3 IR/ER @ waist   Ther Proc 4 Supine pec minor stretch   Ther Proc 5 Pec major and minor doorway stretch   Ther Proc 5 - Details x45\" B each   Ther Proc 6 Four corners shoudler stretch   Ther Proc 6 - Details x45\" B   Ther Proc 7 Rows and GH extension   Ther Proc 8 ER @ 90/90   Ther Proc 8 - Details YTB x10   Skilled Intervention Education on importance of continuing HEP as well as HEP dosage.   Neuromuscular Re-education   Neuromuscular re-ed of mvmt, balance, coord, kinesthetic sense, posture, proprioception minutes (35492) 10   Neuromuscular Re-education Neuro Re-ed 2;Neuro Re-ed 3;Neuro Re-ed 4   Neuro Re-ed 1 Shoulder flexion with isometric ER   Neuro Re-ed 2 Body blade push pull, IR/ER, and depression   Neuro Re-ed 3 PNF D1/D2   Neuro Re-ed 3 - Details x10, with YTB x10 flexion and extension   Neuro Re-ed 4 Lawn mowers   Manual Therapy   Manual Therapy 1 STM/TPR   Manual Therapy 1 - Details Soft tissue mobilization and trigger point release to right pec major and minor to decrease pain and reduce taut bands. Positioned supine.   Plan   Home program PTRx   Comments   Comments Wendy is showing significant improvement in her symptoms and shoulder function today. Her strength and ROM have both increased significantly, though her right IR ROM is still decreased compared to her left. She had little to no pain with her exercises today, only noting fatigue. She needed minor cuing on her form, but she was otherwise doing them with good form. She is " leaving for the winter next week so today will be her last appointment. She feels comfortable with how her shoulder has been feeling and feels confident in managing her symptoms with her HEP.   Total Session Time   Timed Code Treatment Minutes 33   Total Treatment Time (sum of timed and untimed services) 33

## 2023-12-18 DIAGNOSIS — M77.11 LATERAL EPICONDYLITIS OF RIGHT ELBOW: ICD-10-CM

## 2023-12-19 RX ORDER — IBUPROFEN 800 MG/1
TABLET, FILM COATED ORAL
Qty: 180 TABLET | Refills: 5 | Status: SHIPPED | OUTPATIENT
Start: 2023-12-19 | End: 2024-03-15

## 2024-01-02 NOTE — PROGRESS NOTES
Patient in to see RN for Pre-TAVR visit on 8/19/22    All pre-procedure labs drawn: 8/19   EKG obtained: 8/19     STS score: 1.1%    Labs reviewed: pending  COVID test date: 8/19 [facility]    Patient instructed on medications:   No medications morning of procedure    Loading dose of ASA: yes    Sent home with Francesco Wipes. Instructions given in detail to patient along with written directions.    Patient has advanced directive: no    Education was given to patient regarding what to expect pre-procedure.     Patient was informed procedure will be done at [facility]: Main Entrance at [address] [place] [address]; [place] [address] and their arrival time is at 1030am    TAVR and blood consent signed at the time of the appt: yes    All questions were answered to family and patient by RN.    Pt and pts  Omar were present at the time of appointment.      Mariela Banks RN BSN  Structural Heart Coordinator  [facility]   [phone]     Spoke with mom, patient has been improving over the last 2 days.  Paient has been \"grazing\" on snacks, drinking ok.  Urine and stool output are normal.  Patient is alert and playful.  Lips are a little chapped, mom is breaking up the ABX doses and giving them in 2 administrations 30 minutes apart and patient has been able to keep it down.  Mom states no vomiting over the last two days. Will continue to monitor. If symptoms persist or worsen over the next 48 hours parent to call back for next steps.  Parent verbalized understanding and agreed with plan.      no

## 2024-01-08 ENCOUNTER — MYC MEDICAL ADVICE (OUTPATIENT)
Dept: FAMILY MEDICINE | Facility: CLINIC | Age: 69
End: 2024-01-08
Payer: COMMERCIAL

## 2024-01-09 ENCOUNTER — NURSE TRIAGE (OUTPATIENT)
Dept: NURSING | Facility: CLINIC | Age: 69
End: 2024-01-09
Payer: COMMERCIAL

## 2024-01-09 NOTE — TELEPHONE ENCOUNTER
Patient has a really bad cold.  Symptoms have been present for 5 days.  Patient is taking 1000 mg tylenol and dayquil and night quil.  In the past her colds turn into pneumonia.  Denies fever.  Cough is present and sore throat and congested chest.  Patient has taken a covid test and is negative.        Reason for Disposition   [1] Continuous (nonstop) coughing interferes with work or school AND [2] no improvement using cough treatment per Care Advice    Additional Information   Negative: SEVERE difficulty breathing (e.g., struggling for each breath, speaks in single words)   Negative: Bluish (or gray) lips or face now   Negative: [1] Difficulty breathing AND [2] exposure to flames, smoke, or fumes   Negative: [1] Stridor AND [2] difficulty breathing   Negative: Sounds like a life-threatening emergency to the triager   Negative: [1] MODERATE difficulty breathing (e.g., speaks in phrases, SOB even at rest, pulse 100-120) AND [2] still present when not coughing   Negative: Chest pain  (Exception: MILD central chest pain, present only when coughing.)   Negative: Patient sounds very sick or weak to the triager   Negative: [1] MILD difficulty breathing (e.g., minimal/no SOB at rest, SOB with walking, pulse <100) AND [2] still present when not coughing   Negative: [1] Coughed up blood AND [2] > 1 tablespoon (15 ml)   (Exception: Blood-tinged sputum.)   Negative: Fever > 103 F (39.4 C)   Negative: [1] Fever > 101 F (38.3 C) AND [2] age > 60 years   Negative: [1] Fever > 100.0 F (37.8 C) AND [2] bedridden (e.g., CVA, chronic illness, recovering from surgery)   Negative: [1] Fever > 100.0 F (37.8 C) AND [2] diabetes mellitus or weak immune system (e.g., HIV positive, cancer chemo, splenectomy, organ transplant, chronic steroids)   Negative: Wheezing is present   Negative: SEVERE coughing spells (e.g., whooping sound after coughing, vomiting after coughing)    Protocols used: Cough - Acute Tuzojkiosf-P-VM

## 2024-01-14 DIAGNOSIS — G47.00 INSOMNIA, UNSPECIFIED TYPE: ICD-10-CM

## 2024-01-15 ENCOUNTER — MYC REFILL (OUTPATIENT)
Dept: FAMILY MEDICINE | Facility: CLINIC | Age: 69
End: 2024-01-15
Payer: COMMERCIAL

## 2024-01-15 DIAGNOSIS — G47.00 INSOMNIA, UNSPECIFIED TYPE: ICD-10-CM

## 2024-01-15 RX ORDER — TRAZODONE HYDROCHLORIDE 50 MG/1
50-100 TABLET, FILM COATED ORAL AT BEDTIME
Qty: 90 TABLET | Refills: 3 | OUTPATIENT
Start: 2024-01-15

## 2024-01-15 RX ORDER — TRAZODONE HYDROCHLORIDE 50 MG/1
TABLET, FILM COATED ORAL
Qty: 90 TABLET | Refills: 7 | OUTPATIENT
Start: 2024-01-15

## 2024-03-06 PROBLEM — G43.109 OCULAR MIGRAINE: Status: ACTIVE | Noted: 2024-03-06

## 2024-03-07 NOTE — PROGRESS NOTES
NEUROLOGY FOLLOW UP VISIT  NOTE       [facility] NEUROLOGY [place]  [address] [place] [address]  Tel: [phone]  Fax: [phone]  [email or web address]     Wendy Handy,  1955, MRN [id number]  PCP: Shahana Parker  Date: 2024      ASSESSMENT & PLAN     Visit Diagnosis  Ocular migraine     Ocular migraine  68-year-old female with SLE, CAD s/p TAVR, benign familial tremor who returns for follow-up of ocular migraine.  Since her last visit she had an MRI of the brain that showed age-related changes.  She continues to have ocular migraine without associated headache and although previously was reluctant to use any medication is open to that idea.  Currently she takes trazodone to help her with insomnia and I have switched her to amitriptyline 25 mg at bedtime both for migraine prophylaxis and insomnia.  Depending on her response we can increase the dose further.  Follow-up will be in 1 year    Thank you again for this referral, please feel free to contact me if you have any questions.    Sourav Jim MD  [facility] NEUROLOGY, [place]     HISTORY OF PRESENT ILLNESS     Patient is a 68-year-old female with SLE, CAD s/p TAVR, benign familial tremor who was evaluated on 10/30/2023 for intermittent visual symptoms along with dizziness and headache.  She was diagnosed with ocular migraine and MRI brain was ordered that showed mild age-related changes but no acute abnormality.  Although I recommended starting a prophylactic agent she was more interested in knowing if there is any structural abnormality to account for her symptoms and would rather not take any medication.  She is having these episodes at least couple times a week when she gets colored squiggly lines in her field of vision that last for few minutes to 15 minutes and not associated with any headache.  Occasionally she gets a sense of dizziness and it resolves.     PROBLEM LIST   Patient Active Problem List    Diagnosis Code    Benign familial tremor G25.0    Family history of vascular disease Z82.49    Family history of psychiatric condition Z81.8    Family history of tremor Z82.0    Hyperlipidemia E78.5    Vertigo R42    History of vitamin D deficiency Z86.39    Vitamin D deficiency E55.9    Papanicolaou smear of cervix with low grade squamous intraepithelial lesion (LGSIL) R87.612    Osteopenia M85.80    Systemic lupus erythematosus (H) M32.9    Primary insomnia F51.01    Aortic aneurysm (H24) I71.9    Benign neoplasm of sigmoid colon D12.5    Coronary artery disease due to calcified coronary lesion I25.10, I25.84    Discoid lupus erythematosus L93.0    Family history of colon cancer Z80.0    H/O LEEP Z98.890    Hemorrhoids K64.9    History of colonic polyps Z86.010    Polyp of colon K63.5    Chronic diastolic heart failure (H) I50.32    S/P TAVR (transcatheter aortic valve replacement) Z95.2    Aortic stenosis, severe I35.0    Arthritis of right shoulder region M19.011    Ocular migraine G43.109         PAST MEDICAL & SURGICAL HISTORY     Past Medical History:   Patient  has a past medical history of Aortic valve disorder, Bicuspid aortic valve, Coronary artery disease due to calcified coronary lesion (5/5/2016), Family history of psychiatric condition, Family history of tremor, Family history of vascular disease, H/O LEEP (1/1/2007), History of aortic stenosis, History of vitamin D deficiency, Hypercholesteremia, Hyperlipidemia, Laboratory test (8/18/2016), Low back pain, Lupus erythematosus, Nocardia infection, Osteopenia (2/15/2015), Other and unspecified hyperlipidemia, Papanicolaou smear of cervix with low grade squamous intraepithelial lesion (LGSIL), Plantar fasciitis of right foot, Primary insomnia, Snores, Tremor, Vertigo, Vitamin D deficiency, and Wears glasses.    Surgical History:  She  has a past surgical history that includes ORAL SURGERY PROCEDURE; Cholecystectomy; Blepharoplasty; Facial  reconstruction surgery; conization cervix,loop electrd; DILATION/CURETTAGE DIAG/THER NON OB; CLOSED RX MANDIBLE FX; Laparoscopic cholecystectomy (N/A, 2/23/2015); Cardiac catheterization (05/30/2017); Cardiac catheterization (N/A, 5/30/2017); Coronary Angiogram (N/A, 6/9/2022); Coronary Angiogram (6/9/2022); Transcatheter Aortic Valve Replacement-Femoral Approach (N/A, 8/23/2022); Or Transcatheter Aortic Valve Replacement, Femoral Percutaneous Approach ([place]) (N/A, 8/23/2022); and Colonoscopy (N/A, 10/4/2023).     SOCIAL HISTORY     Reviewed, and she  reports that she has never smoked. She has never used smokeless tobacco. She reports current alcohol use of about 2.0 standard drinks of alcohol per week. She reports that she does not use drugs.     FAMILY HISTORY     Reviewed, and family history includes Attention Deficit Disorder in her son; Cervical Cancer in her daughter; Colon Cancer in her father; Diabetes in her brother; Diabetes Type 2  in her brother and brother; Heart Disease in her father, sister, and sister; Hyperlipidemia in her brother, brother, and sister; Mental Illness in her brother, sister, and sister; Mental Illness (age of onset: 30.00) in her mother; Neurologic Disorder in her son; Schizophrenia in her mother and sister; Tremor in her cousin, father, paternal aunt, paternal grandfather, and son.     ALLERGIES     No Known Allergies      REVIEW OF SYSTEMS     A 12 point review of system was performed and was negative except as outlined in the history of present illness.     HOME MEDICATIONS     Current Outpatient Rx   Medication Sig Dispense Refill    amitriptyline (ELAVIL) 25 MG tablet Take 1 tablet (25 mg) by mouth at bedtime for 30 days 30 tablet 0    [START ON 4/12/2024] amitriptyline (ELAVIL) 25 MG tablet Take 1 tablet (25 mg) by mouth at bedtime 90 tablet 3    amoxicillin (AMOXIL) 500 MG capsule Take 4 capsules (2,000 mg) by mouth once as needed (take 30-63 minutes prior to dental visit)  "4 capsule 1    aspirin 81 MG EC tablet Take 81 mg by mouth daily      atorvastatin (LIPITOR) 80 MG tablet Take 1 tablet (80 mg) by mouth At Bedtime 90 tablet 3    calcium carbonate-vitamin D (CALTRATE) 600-10 MG-MCG per tablet Take 1 tablet by mouth daily      cyanocobalamin (VITAMIN B-12) 1000 MCG tablet Take 1,000 mcg by mouth daily       ezetimibe (ZETIA) 10 MG tablet Take 1 tablet (10 mg) by mouth daily 90 tablet 3    ibuprofen (ADVIL/MOTRIN) 800 MG tablet TAKE 1 TABLET BY MOUTH 3 TIMES  DAILY AS NEEDED TAKE WITH FOOD 180 tablet 5    Omega-3 Fatty Acids (OMEGA-3 FISH OIL) 1200 MG CAPS Take 1 capsule by mouth daily       polyethylene glycol-propylene glycol (SYSTANE) 0.4-0.3 % SOLN ophthalmic solution Place 1 drop into both eyes as needed for dry eyes      vitamin C (ASCORBIC ACID) 1000 MG TABS Take 1,000 mg by mouth daily       VITAMIN D3 50 MCG (2000 UT) tablet TAKE 1 TABLET BY MOUTH EVERY DAY 90 tablet 3    vitamin E 400 UNIT capsule Take 400 Units by mouth daily       zinc gluconate 50 MG tablet Take 50 mg by mouth daily            PHYSICAL EXAM     Vital signs  /72   Pulse 74   Ht 1.6 m (5' 3\")   Wt 73.9 kg (163 lb)   LMP  (LMP Unknown)   BMI 28.87 kg/m      Weight:   163 lbs 0 oz    Elderly female who is alert and oriented vital signs are reviewed and documented in electronic medical record neck supple. Neurologically speech was normal. Cranial nerves II through XII are intact. Motor strength 5/5. Reflexes 1+ toes downgoing. No dysmetria noted on finger-nose testing gait normal Romberg negative.      PERTINENT DIAGNOSTIC STUDIES     Following studies were reviewed:     MRI BRAIN 11/19/2023  1.  No evidence of acute intracranial hemorrhage, mass effect, or infarction.  2.  Mild nonspecific white matter changes.  3.  Mild brain parenchymal volume loss.     PERTINENT LABS  Following labs were reviewed:  No visits with results within 3 Month(s) from this visit.   Latest known visit with results is: "   Hospital Outpatient Visit on 10/04/2023   Component Date Value Ref Range Status    Case Report 10/04/2023    Final                    Value:Surgical Pathology Report                         Case: [id number]                                  Authorizing Provider:  Leventhal, Thomas Michael, Collected:           10/04/2023 08:05 AM                                 MD                                                                           Ordering Location:     [facility] Main OR  Received:            10/04/2023 08:42 AM                                 [place]                                                                  Pathologist:           Gino Quigley MD                                                           Specimen:    Other, Rectum polyp                                                                        Final Diagnosis 10/04/2023    Final                    Value:This result contains rich text formatting which cannot be displayed here.    Clinical Information 10/04/2023    Final                    Value:This result contains rich text formatting which cannot be displayed here.    Gross Description 10/04/2023    Final                    Value:This result contains rich text formatting which cannot be displayed here.    Microscopic Description 10/04/2023    Final                    Value:This result contains rich text formatting which cannot be displayed here.    Performing Labs 10/04/2023    Final                    Value:This result contains rich text formatting which cannot be displayed here.    COLONOSCOPY 10/04/2023    Final                    Value:[facility]  [address] [place], [place] [address]., [place] [address] [phone]     Endoscopy Department  _______________________________________________________________________________  Patient Name: Wendy Handy             Procedure Date: 10/4/2023 6:41 AM  MRN: [id number]                       Account Number: [id number]  Date of Birth:  1955              Admit Type: Outpatient  Age: 68                               Room: [facility] PROCEDURE ROOM 05  Gender: Female                        Note Status: Finalized  Attending MD: THOMAS MICHAEL LEVENTHAL , MD,   Total Sedation Time:   _______________________________________________________________________________     Procedure:             Colonoscopy  Indications:           Surveillance: Personal history of adenomatous polyps                          on last colonoscopy 3 years ago  Providers:             THOMAS MICHAEL LEVENTHAL, MD, Tori Thompson, ARIC  Referring MD:          Kate Delatorre  Medicines:             Midazolam                           4 mg IV, Fentanyl 150 micrograms IV  Complications:         No immediate complications.  _______________________________________________________________________________  Procedure:             Pre-Anesthesia Assessment:                         - Prior to the procedure, a History and Physical was                          performed, and patient medications and allergies were                          reviewed. The patient is competent. The risks and                          benefits of the procedure and the sedation options and                          risks were discussed with the patient. All questions                          were answered and informed consent was obtained.                          Patient identification and proposed procedure were                          verified by the physician, the nurse and the                          technician in the pre-procedure area in the endoscopy                          suite. Mental Status Examination: alert and oriented.                                                    Airway Examination: normal oropharyngeal airway and                          neck mobility. Respiratory Examination: clear to                          auscultation. CV Examination: normal. Prophylactic                           Antibiotics: The patient does not require prophylactic                          antibiotics. Prior Anticoagulants: The patient has                          taken no anticoagulant or antiplatelet agents. ASA                          Grade Assessment: III - A patient with severe systemic                          disease. After reviewing the risks and benefits, the                          patient was deemed in satisfactory condition to                          undergo the procedure. The anesthesia plan was to use                          moderate sedation / analgesia (conscious sedation).                          Immediately prior to administration of medications,                          the patient was re-assessed for adequacy to receive                                                    sedatives. The heart rate, respiratory rate, oxygen                          saturations, blood pressure, adequacy of pulmonary                          ventilation, and response to care were monitored                          throughout the procedure. The physical status of the                          patient was re-assessed after the procedure.                         After obtaining informed consent, the colonoscope was                          passed under direct vision. Throughout the procedure,                          the patient's blood pressure, pulse, and oxygen                          saturations were monitored continuously. The                          Colonoscope was introduced through the anus and                          advanced to the terminal ileum, with identification of                          the appendiceal orifice and IC valve. The colonoscopy                          was performed without difficulty. The patient                          tolerated                           the procedure well. The quality of the bowel                          preparation was excellent. The terminal ileum,                           ileocecal valve, appendiceal orifice, and rectum were                          photographed.                                                                                   Findings:       Skin tags were found on perianal exam.       A 2 mm polyp was found in the recto-sigmoid colon. The polyp was        sessile. The polyp was removed with a jumbo cold forceps. Resection and        retrieval were complete. Verification of patient identification for the        specimen was done by the physician, nurse and technician using the        patient's name, birth date and medical record number. Estimated blood        loss was minimal.       No other significant abnormalities were identified in a careful        examination of the remainder of the colon.       The retroflexed view of the distal rectum and anal verge was normal and        showed no anal or                           rectal abnormalities.       The terminal ileum appeared normal.                                                                                   Moderate Sedation:       Moderate (conscious) sedation was administered by the nurse and        supervised by the endoscopist. The following parameters were monitored:        oxygen saturation, heart rate, blood pressure, and response to care.        Total physician intraservice time was 26 minutes.  Impression:            - Perianal skin tags found on perianal exam.                         - One 2 mm polyp at the recto-sigmoid colon, removed                          with a jumbo cold forceps. Resected and retrieved.                         - The examined portion of the ileum was normal.  Recommendation:        - Discharge patient to home.                         - Resume previous diet.                         - Continue present medications.                         - Await pathology results.                         - Repeat colonoscopy in 5 y                          ears for surveillance.                          - Return to primary care physician as previously                          scheduled.                                                                                     Electronically Signed by Dr. Leventhal  ____________________________  THOMAS MICHAEL LEVENTHAL, MD  10/4/2023 8:11:54 AM  I was physically present for the entire viewing portion of the exam.  __________________________  Signature of teaching physician  THOMAS LEVENTHAL , MD  Number of Addenda: 0    Note Initiated On: 10/4/2023 6:41 AM  Scope In: 7:44:09 AM  Scope Out: 8:08:15 AM           Total time spent for face to face visit, reviewing labs/imaging studies, counseling and coordination of care was: 30 Minutes spent on the date of the encounter doing chart review, review of outside records, review of test results, interpretation of tests, patient visit, and documentation     The longitudinal plan of care for the diagnosis(es)/condition(s) as documented were addressed during this visit. Due to the added complexity in care, I will continue to support Wendy in the subsequent management and with ongoing continuity of care.    This note was dictated using voice recognition software.  Any grammatical or context distortions are unintentional and inherent to the software.    No orders of the defined types were placed in this encounter.     New Prescriptions    AMITRIPTYLINE (ELAVIL) 25 MG TABLET    Take 1 tablet (25 mg) by mouth at bedtime for 30 days    AMITRIPTYLINE (ELAVIL) 25 MG TABLET    Take 1 tablet (25 mg) by mouth at bedtime     Modified Medications    No medications on file

## 2024-03-12 ENCOUNTER — TRANSFERRED RECORDS (OUTPATIENT)
Dept: HEALTH INFORMATION MANAGEMENT | Facility: CLINIC | Age: 69
End: 2024-03-12

## 2024-03-12 ENCOUNTER — OFFICE VISIT (OUTPATIENT)
Dept: NEUROLOGY | Facility: CLINIC | Age: 69
End: 2024-03-12
Payer: COMMERCIAL

## 2024-03-12 VITALS
BODY MASS INDEX: 28.88 KG/M2 | DIASTOLIC BLOOD PRESSURE: 72 MMHG | SYSTOLIC BLOOD PRESSURE: 115 MMHG | HEIGHT: 63 IN | WEIGHT: 163 LBS | HEART RATE: 74 BPM

## 2024-03-12 DIAGNOSIS — G43.109 OCULAR MIGRAINE: Primary | ICD-10-CM

## 2024-03-12 PROCEDURE — 99214 OFFICE O/P EST MOD 30 MIN: CPT | Performed by: PSYCHIATRY & NEUROLOGY

## 2024-03-12 PROCEDURE — G2211 COMPLEX E/M VISIT ADD ON: HCPCS | Performed by: PSYCHIATRY & NEUROLOGY

## 2024-03-12 NOTE — NURSING NOTE
Chief Complaint   Patient presents with    Migraine     Patient reports that her migraines have been manageable. No other concerns.     Myrtle Lazaro on 3/12/2024 at 9:47 AM

## 2024-03-12 NOTE — LETTER
3/12/2024         RE: Wendy Handy  [address] [facility] [address]        Dear Colleague,    Thank you for referring your patient, Wendy Handy, to the [facility] [place]. Please see a copy of my visit note below.    NEUROLOGY FOLLOW UP VISIT  NOTE       [facility] [place]  [address] [place] [address]  Tel: [phone]  Fax: [phone]  [email or web address]     Wendy Handy,  1955, MRN [id number]  PCP: Shahana Parker  Date: 2024      ASSESSMENT & PLAN     Visit Diagnosis  Ocular migraine     Ocular migraine  68-year-old female with SLE, CAD s/p TAVR, benign familial tremor who returns for follow-up of ocular migraine.  Since her last visit she had an MRI of the brain that showed age-related changes.  She continues to have ocular migraine without associated headache and although previously was reluctant to use any medication is open to that idea.  Currently she takes trazodone to help her with insomnia and I have switched her to amitriptyline 25 mg at bedtime both for migraine prophylaxis and insomnia.  Depending on her response we can increase the dose further.  Follow-up will be in 1 year    Thank you again for this referral, please feel free to contact me if you have any questions.    Sourav Jmi MD  [facility], [place]     HISTORY OF PRESENT ILLNESS     Patient is a 68-year-old female with SLE, CAD s/p TAVR, benign familial tremor who was evaluated on 10/30/2023 for intermittent visual symptoms along with dizziness and headache.  She was diagnosed with ocular migraine and MRI brain was ordered that showed mild age-related changes but no acute abnormality.  Although I recommended starting a prophylactic agent she was more interested in knowing if there is any structural abnormality to account for her symptoms and would rather not take any medication.  She is having these episodes at least couple  times a week when she gets colored squiggly lines in her field of vision that last for few minutes to 15 minutes and not associated with any headache.  Occasionally she gets a sense of dizziness and it resolves.     PROBLEM LIST   Patient Active Problem List   Diagnosis Code     Benign familial tremor G25.0     Family history of vascular disease Z82.49     Family history of psychiatric condition Z81.8     Family history of tremor Z82.0     Hyperlipidemia E78.5     Vertigo R42     History of vitamin D deficiency Z86.39     Vitamin D deficiency E55.9     Papanicolaou smear of cervix with low grade squamous intraepithelial lesion (LGSIL) R87.612     Osteopenia M85.80     Systemic lupus erythematosus (H) M32.9     Primary insomnia F51.01     Aortic aneurysm (H24) I71.9     Benign neoplasm of sigmoid colon D12.5     Coronary artery disease due to calcified coronary lesion I25.10, I25.84     Discoid lupus erythematosus L93.0     Family history of colon cancer Z80.0     H/O LEEP Z98.890     Hemorrhoids K64.9     History of colonic polyps Z86.010     Polyp of colon K63.5     Chronic diastolic heart failure (H) I50.32     S/P TAVR (transcatheter aortic valve replacement) Z95.2     Aortic stenosis, severe I35.0     Arthritis of right shoulder region M19.011     Ocular migraine G43.109         PAST MEDICAL & SURGICAL HISTORY     Past Medical History:   Patient  has a past medical history of Aortic valve disorder, Bicuspid aortic valve, Coronary artery disease due to calcified coronary lesion (5/5/2016), Family history of psychiatric condition, Family history of tremor, Family history of vascular disease, H/O LEEP (1/1/2007), History of aortic stenosis, History of vitamin D deficiency, Hypercholesteremia, Hyperlipidemia, Laboratory test (8/18/2016), Low back pain, Lupus erythematosus, Nocardia infection, Osteopenia (2/15/2015), Other and unspecified hyperlipidemia, Papanicolaou smear of cervix with low grade squamous  intraepithelial lesion (LGSIL), Plantar fasciitis of right foot, Primary insomnia, Snores, Tremor, Vertigo, Vitamin D deficiency, and Wears glasses.    Surgical History:  She  has a past surgical history that includes ORAL SURGERY PROCEDURE; Cholecystectomy; Blepharoplasty; Facial reconstruction surgery; conization cervix,loop electrd; DILATION/CURETTAGE DIAG/THER NON OB; CLOSED RX MANDIBLE FX; Laparoscopic cholecystectomy (N/A, 2/23/2015); Cardiac catheterization (05/30/2017); Cardiac catheterization (N/A, 5/30/2017); Coronary Angiogram (N/A, 6/9/2022); Coronary Angiogram (6/9/2022); Transcatheter Aortic Valve Replacement-Femoral Approach (N/A, 8/23/2022); Or Transcatheter Aortic Valve Replacement, Femoral Percutaneous Approach ([place]) (N/A, 8/23/2022); and Colonoscopy (N/A, 10/4/2023).     SOCIAL HISTORY     Reviewed, and she  reports that she has never smoked. She has never used smokeless tobacco. She reports current alcohol use of about 2.0 standard drinks of alcohol per week. She reports that she does not use drugs.     FAMILY HISTORY     Reviewed, and family history includes Attention Deficit Disorder in her son; Cervical Cancer in her daughter; Colon Cancer in her father; Diabetes in her brother; Diabetes Type 2  in her brother and brother; Heart Disease in her father, sister, and sister; Hyperlipidemia in her brother, brother, and sister; Mental Illness in her brother, sister, and sister; Mental Illness (age of onset: 30.00) in her mother; Neurologic Disorder in her son; Schizophrenia in her mother and sister; Tremor in her cousin, father, paternal aunt, paternal grandfather, and son.     ALLERGIES     No Known Allergies      REVIEW OF SYSTEMS     A 12 point review of system was performed and was negative except as outlined in the history of present illness.     HOME MEDICATIONS     Current Outpatient Rx   Medication Sig Dispense Refill     amitriptyline (ELAVIL) 25 MG tablet Take 1 tablet (25 mg) by mouth  "at bedtime for 30 days 30 tablet 0     [START ON 4/12/2024] amitriptyline (ELAVIL) 25 MG tablet Take 1 tablet (25 mg) by mouth at bedtime 90 tablet 3     amoxicillin (AMOXIL) 500 MG capsule Take 4 capsules (2,000 mg) by mouth once as needed (take 30-63 minutes prior to dental visit) 4 capsule 1     aspirin 81 MG EC tablet Take 81 mg by mouth daily       atorvastatin (LIPITOR) 80 MG tablet Take 1 tablet (80 mg) by mouth At Bedtime 90 tablet 3     calcium carbonate-vitamin D (CALTRATE) 600-10 MG-MCG per tablet Take 1 tablet by mouth daily       cyanocobalamin (VITAMIN B-12) 1000 MCG tablet Take 1,000 mcg by mouth daily        ezetimibe (ZETIA) 10 MG tablet Take 1 tablet (10 mg) by mouth daily 90 tablet 3     ibuprofen (ADVIL/MOTRIN) 800 MG tablet TAKE 1 TABLET BY MOUTH 3 TIMES  DAILY AS NEEDED TAKE WITH FOOD 180 tablet 5     Omega-3 Fatty Acids (OMEGA-3 FISH OIL) 1200 MG CAPS Take 1 capsule by mouth daily        polyethylene glycol-propylene glycol (SYSTANE) 0.4-0.3 % SOLN ophthalmic solution Place 1 drop into both eyes as needed for dry eyes       vitamin C (ASCORBIC ACID) 1000 MG TABS Take 1,000 mg by mouth daily        VITAMIN D3 50 MCG (2000 UT) tablet TAKE 1 TABLET BY MOUTH EVERY DAY 90 tablet 3     vitamin E 400 UNIT capsule Take 400 Units by mouth daily        zinc gluconate 50 MG tablet Take 50 mg by mouth daily            PHYSICAL EXAM     Vital signs  /72   Pulse 74   Ht 1.6 m (5' 3\")   Wt 73.9 kg (163 lb)   LMP  (LMP Unknown)   BMI 28.87 kg/m      Weight:   163 lbs 0 oz    Elderly female who is alert and oriented vital signs are reviewed and documented in electronic medical record neck supple. Neurologically speech was normal. Cranial nerves II through XII are intact. Motor strength 5/5. Reflexes 1+ toes downgoing. No dysmetria noted on finger-nose testing gait normal Romberg negative.      PERTINENT DIAGNOSTIC STUDIES     Following studies were reviewed:     MRI BRAIN 11/19/2023  1.  No evidence " of acute intracranial hemorrhage, mass effect, or infarction.  2.  Mild nonspecific white matter changes.  3.  Mild brain parenchymal volume loss.     PERTINENT LABS  Following labs were reviewed:  No visits with results within 3 Month(s) from this visit.   Latest known visit with results is:   Hospital Outpatient Visit on 10/04/2023   Component Date Value Ref Range Status     Case Report 10/04/2023    Final                    Value:Surgical Pathology Report                         Case: [id number]                                  Authorizing Provider:  Leventhal, Thomas Michael, Collected:           10/04/2023 08:05 AM                                 MD                                                                           Ordering Location:     [facility] OR  Received:            10/04/2023 08:42 AM                                 [place]                                                                  Pathologist:           Gino Quigley MD                                                           Specimen:    Other, Rectum polyp                                                                         Final Diagnosis 10/04/2023    Final                    Value:This result contains rich text formatting which cannot be displayed here.     Clinical Information 10/04/2023    Final                    Value:This result contains rich text formatting which cannot be displayed here.     Gross Description 10/04/2023    Final                    Value:This result contains rich text formatting which cannot be displayed here.     Microscopic Description 10/04/2023    Final                    Value:This result contains rich text formatting which cannot be displayed here.     Performing Labs 10/04/2023    Final                    Value:This result contains rich text formatting which cannot be displayed here.     COLONOSCOPY 10/04/2023    Final                    Value:[facility]  [address] [place] [address]  [facility] [place] [address] [phone]     Endoscopy Department  _______________________________________________________________________________  Patient Name: Wendy Handy             Procedure Date: 10/4/2023 6:41 AM  MRN: [id number]                       Account Number: [id number]  YOB: 1955              Admit Type: Outpatient  Age: 68                               Room: [facility] [place] [place] PROCEDURE ROOM 05  Gender: Female                        Note Status: Finalized  Attending MD: THOMAS MICHAEL LEVENTHAL , MD,   Total Sedation Time:   _______________________________________________________________________________     Procedure:             Colonoscopy  Indications:           Surveillance: Personal history of adenomatous polyps                          on last colonoscopy 3 years ago  Providers:             THOMAS MICHAEL LEVENTHAL, MD, Tori Thompson RN  Referring MD:          Kate Delatorre  Medicines:             Midazolam                           4 mg IV, Fentanyl 150 micrograms IV  Complications:         No immediate complications.  _______________________________________________________________________________  Procedure:             Pre-Anesthesia Assessment:                         - Prior to the procedure, a History and Physical was                          performed, and patient medications and allergies were                          reviewed. The patient is competent. The risks and                          benefits of the procedure and the sedation options and                          risks were discussed with the patient. All questions                          were answered and informed consent was obtained.                          Patient identification and proposed procedure were                          verified by the physician, the nurse and the                          technician in the pre-procedure area in the endoscopy                          suite. Mental Status  Examination: alert and oriented.                                                    Airway Examination: normal oropharyngeal airway and                          neck mobility. Respiratory Examination: clear to                          auscultation. CV Examination: normal. Prophylactic                          Antibiotics: The patient does not require prophylactic                          antibiotics. Prior Anticoagulants: The patient has                          taken no anticoagulant or antiplatelet agents. ASA                          Grade Assessment: III - A patient with severe systemic                          disease. After reviewing the risks and benefits, the                          patient was deemed in satisfactory condition to                          undergo the procedure. The anesthesia plan was to use                          moderate sedation / analgesia (conscious sedation).                          Immediately prior to administration of medications,                          the patient was re-assessed for adequacy to receive                                                    sedatives. The heart rate, respiratory rate, oxygen                          saturations, blood pressure, adequacy of pulmonary                          ventilation, and response to care were monitored                          throughout the procedure. The physical status of the                          patient was re-assessed after the procedure.                         After obtaining informed consent, the colonoscope was                          passed under direct vision. Throughout the procedure,                          the patient's blood pressure, pulse, and oxygen                          saturations were monitored continuously. The                          Colonoscope was introduced through the anus and                          advanced to the terminal ileum, with identification of                          the  appendiceal orifice and IC valve. The colonoscopy                          was performed without difficulty. The patient                          tolerated                           the procedure well. The quality of the bowel                          preparation was excellent. The terminal ileum,                          ileocecal valve, appendiceal orifice, and rectum were                          photographed.                                                                                   Findings:       Skin tags were found on perianal exam.       A 2 mm polyp was found in the recto-sigmoid colon. The polyp was        sessile. The polyp was removed with a jumbo cold forceps. Resection and        retrieval were complete. Verification of patient identification for the        specimen was done by the physician, nurse and technician using the        patient's name, birth date and medical record number. Estimated blood        loss was minimal.       No other significant abnormalities were identified in a careful        examination of the remainder of the colon.       The retroflexed view of the distal rectum and anal verge was normal and        showed no anal or                           rectal abnormalities.       The terminal ileum appeared normal.                                                                                   Moderate Sedation:       Moderate (conscious) sedation was administered by the nurse and        supervised by the endoscopist. The following parameters were monitored:        oxygen saturation, heart rate, blood pressure, and response to care.        Total physician intraservice time was 26 minutes.  Impression:            - Perianal skin tags found on perianal exam.                         - One 2 mm polyp at the recto-sigmoid colon, removed                          with a jumbo cold forceps. Resected and retrieved.                         - The examined portion of the ileum was  normal.  Recommendation:        - Discharge patient to home.                         - Resume previous diet.                         - Continue present medications.                         - Await pathology results.                         - Repeat colonoscopy in 5 y                          ears for surveillance.                         - Return to primary care physician as previously                          scheduled.                                                                                     Electronically Signed by Dr. Leventhal  ____________________________  THOMAS MICHAEL LEVENTHAL, MD  10/4/2023 8:11:54 AM  I was physically present for the entire viewing portion of the exam.  __________________________  Signature of teaching physician  THOMAS LEVENTHAL , MD  Number of Addenda: 0    Note Initiated On: 10/4/2023 6:41 AM  Scope In: 7:44:09 AM  Scope Out: 8:08:15 AM           Total time spent for face to face visit, reviewing labs/imaging studies, counseling and coordination of care was: 30 Minutes spent on the date of the encounter doing chart review, review of outside records, review of test results, interpretation of tests, patient visit, and documentation     The longitudinal plan of care for the diagnosis(es)/condition(s) as documented were addressed during this visit. Due to the added complexity in care, I will continue to support Wendy in the subsequent management and with ongoing continuity of care.    This note was dictated using voice recognition software.  Any grammatical or context distortions are unintentional and inherent to the software.    No orders of the defined types were placed in this encounter.     New Prescriptions    AMITRIPTYLINE (ELAVIL) 25 MG TABLET    Take 1 tablet (25 mg) by mouth at bedtime for 30 days    AMITRIPTYLINE (ELAVIL) 25 MG TABLET    Take 1 tablet (25 mg) by mouth at bedtime     Modified Medications    No medications on file                 Again, thank you for  allowing me to participate in the care of your patient.        Sincerely,        Sourav Jim MD

## 2024-03-13 ENCOUNTER — HOSPITAL ENCOUNTER (OUTPATIENT)
Dept: MAMMOGRAPHY | Facility: CLINIC | Age: 69
Discharge: HOME | End: 2024-03-13
Attending: NURSE PRACTITIONER | Admitting: NURSE PRACTITIONER
Payer: COMMERCIAL

## 2024-03-13 DIAGNOSIS — Z12.31 ENCOUNTER FOR SCREENING MAMMOGRAM FOR BREAST CANCER: ICD-10-CM

## 2024-03-13 PROCEDURE — 77063 BREAST TOMOSYNTHESIS BI: CPT

## 2024-03-13 SDOH — HEALTH STABILITY: PHYSICAL HEALTH: ON AVERAGE, HOW MANY DAYS PER WEEK DO YOU ENGAGE IN MODERATE TO STRENUOUS EXERCISE (LIKE A BRISK WALK)?: 5 DAYS

## 2024-03-13 SDOH — HEALTH STABILITY: PHYSICAL HEALTH: ON AVERAGE, HOW MANY MINUTES DO YOU ENGAGE IN EXERCISE AT THIS LEVEL?: 50 MIN

## 2024-03-13 ASSESSMENT — SOCIAL DETERMINANTS OF HEALTH (SDOH): HOW OFTEN DO YOU GET TOGETHER WITH FRIENDS OR RELATIVES?: TWICE A WEEK

## 2024-03-14 ENCOUNTER — HOSPITAL ENCOUNTER (OUTPATIENT)
Dept: CT IMAGING | Facility: CLINIC | Age: 69
Discharge: HOME | End: 2024-03-14
Attending: INTERNAL MEDICINE | Admitting: INTERNAL MEDICINE
Payer: COMMERCIAL

## 2024-03-14 DIAGNOSIS — I71.21 ANEURYSM OF ASCENDING AORTA WITHOUT RUPTURE (H): ICD-10-CM

## 2024-03-14 PROCEDURE — 71250 CT THORAX DX C-: CPT

## 2024-03-15 ENCOUNTER — OFFICE VISIT (OUTPATIENT)
Dept: FAMILY MEDICINE | Facility: CLINIC | Age: 69
End: 2024-03-15
Payer: COMMERCIAL

## 2024-03-15 VITALS
TEMPERATURE: 97.3 F | WEIGHT: 163 LBS | HEIGHT: 63 IN | OXYGEN SATURATION: 100 % | BODY MASS INDEX: 28.88 KG/M2 | HEART RATE: 73 BPM | SYSTOLIC BLOOD PRESSURE: 120 MMHG | DIASTOLIC BLOOD PRESSURE: 84 MMHG | RESPIRATION RATE: 16 BRPM

## 2024-03-15 DIAGNOSIS — Z12.4 CERVICAL CANCER SCREENING: ICD-10-CM

## 2024-03-15 DIAGNOSIS — Z00.00 ENCOUNTER FOR MEDICARE ANNUAL WELLNESS EXAM: Primary | ICD-10-CM

## 2024-03-15 DIAGNOSIS — M32.9 SYSTEMIC LUPUS ERYTHEMATOSUS, UNSPECIFIED SLE TYPE, UNSPECIFIED ORGAN INVOLVEMENT STATUS (H): ICD-10-CM

## 2024-03-15 DIAGNOSIS — I25.84 CORONARY ARTERY DISEASE DUE TO CALCIFIED CORONARY LESION: ICD-10-CM

## 2024-03-15 DIAGNOSIS — E78.2 MIXED HYPERLIPIDEMIA: ICD-10-CM

## 2024-03-15 DIAGNOSIS — M77.11 LATERAL EPICONDYLITIS OF RIGHT ELBOW: ICD-10-CM

## 2024-03-15 DIAGNOSIS — I71.21 ANEURYSM OF ASCENDING AORTA WITHOUT RUPTURE (H): ICD-10-CM

## 2024-03-15 DIAGNOSIS — Z95.2 S/P TAVR (TRANSCATHETER AORTIC VALVE REPLACEMENT): ICD-10-CM

## 2024-03-15 DIAGNOSIS — I25.10 CORONARY ARTERY DISEASE DUE TO CALCIFIED CORONARY LESION: ICD-10-CM

## 2024-03-15 DIAGNOSIS — E78.00 PURE HYPERCHOLESTEROLEMIA: ICD-10-CM

## 2024-03-15 PROBLEM — I50.32 CHRONIC DIASTOLIC HEART FAILURE (H): Status: RESOLVED | Noted: 2022-08-19 | Resolved: 2024-03-15

## 2024-03-15 PROCEDURE — 80061 LIPID PANEL: CPT | Performed by: NURSE PRACTITIONER

## 2024-03-15 PROCEDURE — 36415 COLL VENOUS BLD VENIPUNCTURE: CPT | Performed by: NURSE PRACTITIONER

## 2024-03-15 PROCEDURE — 87624 HPV HI-RISK TYP POOLED RSLT: CPT | Performed by: NURSE PRACTITIONER

## 2024-03-15 PROCEDURE — 99213 OFFICE O/P EST LOW 20 MIN: CPT | Mod: 25 | Performed by: NURSE PRACTITIONER

## 2024-03-15 PROCEDURE — 80053 COMPREHEN METABOLIC PANEL: CPT | Performed by: NURSE PRACTITIONER

## 2024-03-15 PROCEDURE — G0439 PPPS, SUBSEQ VISIT: HCPCS | Performed by: NURSE PRACTITIONER

## 2024-03-15 PROCEDURE — G0145 SCR C/V CYTO,THINLAYER,RESCR: HCPCS | Performed by: NURSE PRACTITIONER

## 2024-03-15 RX ORDER — ATORVASTATIN CALCIUM 80 MG/1
80 TABLET, FILM COATED ORAL AT BEDTIME
Qty: 90 TABLET | Refills: 3 | Status: SHIPPED | OUTPATIENT
Start: 2024-03-15

## 2024-03-15 RX ORDER — ALBUTEROL SULFATE 90 UG/1
AEROSOL, METERED RESPIRATORY (INHALATION)
COMMUNITY
Start: 2024-01-09 | End: 2024-04-19

## 2024-03-15 RX ORDER — AMOXICILLIN 500 MG/1
2000 CAPSULE ORAL
Qty: 4 CAPSULE | Refills: 0 | Status: SHIPPED | OUTPATIENT
Start: 2024-03-15 | End: 2024-05-27

## 2024-03-15 RX ORDER — IBUPROFEN 800 MG/1
TABLET, FILM COATED ORAL
Qty: 30 TABLET | Refills: 5 | Status: CANCELLED | OUTPATIENT
Start: 2024-03-15

## 2024-03-15 RX ORDER — EZETIMIBE 10 MG/1
10 TABLET ORAL DAILY
Qty: 90 TABLET | Refills: 3 | Status: SHIPPED | OUTPATIENT
Start: 2024-03-15

## 2024-03-15 RX ORDER — IBUPROFEN 800 MG/1
TABLET, FILM COATED ORAL
Qty: 30 TABLET | Refills: 3 | Status: SHIPPED | OUTPATIENT
Start: 2024-03-15 | End: 2024-03-26

## 2024-03-15 NOTE — PROGRESS NOTES
"Preventive Care Visit  [facility]  Shahana Parker NP, Family Medicine  Mar 15, 2024      Assessment & Plan     Encounter for Medicare annual wellness exam    Cervical cancer screening  I think we can discontinue pap screenings if this is normal.   - Pap Screen with HPV - recommended age 30 - 65 years    Mixed hyperlipidemia  - Comprehensive metabolic panel (BMP + Alb, Alk Phos, ALT, AST, Total. Bili, TP)    Coronary artery disease due to calcified coronary lesion  - Lipid panel reflex to direct LDL Fasting    Pure hypercholesterolemia  - atorvastatin (LIPITOR) 80 MG tablet  Dispense: 90 tablet; Refill: 3  - ezetimibe (ZETIA) 10 MG tablet  Dispense: 90 tablet; Refill: 3    S/P TAVR (transcatheter aortic valve replacement)  - amoxicillin (AMOXIL) 500 MG capsule  Dispense: 4 capsule; Refill: 0    Lateral epicondylitis of right elbow  - ibuprofen (ADVIL/MOTRIN) 800 MG tablet  Dispense: 30 tablet; Refill: 3    Aneurysm of ascending aorta without rupture (H24)  Stable.  Monitoring by cardiology.    Systemic lupus erythematosus, unspecified SLE type, unspecified organ involvement status (H)  In remission.  Previously on Plaquenil.       Patient has been advised of split billing requirements and indicates understanding: Yes        BMI  Estimated body mass index is 28.87 kg/m  as calculated from the following:    Height as of this encounter: 1.6 m (5' 3\").    Weight as of this encounter: 73.9 kg (163 lb).   Weight management plan: Discussed healthy diet and exercise guidelines    Counseling  Appropriate preventive services were discussed with this patient, including applicable screening as appropriate for fall prevention, nutrition, physical activity, Tobacco-use cessation, weight loss and cognition.  Checklist reviewing preventive services available has been given to the patient.  Reviewed patient's diet, addressing concerns and/or questions.   Information on urinary incontinence and treatment " "options given to patient.           Ludy Nguyen is a 68 year old, presenting for the following:  Annual Visit (Fasting ; \"Can I eat grapefruit w/ atorvastatin?\" )      Health Care Directive  Patient does not have a Health Care Directive or Living Will: Discussed advance care planning with patient; however, patient declined at this time.          3/13/2024   General Health   How would you rate your overall physical health? Good   Feel stress (tense, anxious, or unable to sleep) Not at all         3/13/2024   Nutrition   Diet: Regular (no restrictions)         3/13/2024   Exercise   Days per week of moderate/strenous exercise 5 days   Average minutes spent exercising at this level 50 min         3/13/2024   Social Factors   Frequency of gathering with friends or relatives Twice a week   Worry food won't last until get money to buy more No   Food not last or not have enough money for food? No   Do you have housing?  Yes   Are you worried about losing your housing? No   Lack of transportation? No   Unable to get utilities (heat,electricity)? No         3/13/2024   Fall Risk   Fallen 2 or more times in the past year? No   Trouble with walking or balance? No          3/13/2024   Activities of Daily Living- Home Safety   Needs help with the following daily activites None of the above   Safety concerns in the home None of the above         3/13/2024   Dental   Dentist two times every year? Yes         3/13/2024   Hearing Screening   Hearing concerns? None of the above         3/13/2024   Driving Risk Screening   Patient/family members have concerns about driving No         3/13/2024   General Alertness/Fatigue Screening   Have you been more tired than usual lately? No         3/13/2024   Urinary Incontinence Screening   Bothered by leaking urine in past 6 months Yes         3/6/2024   TB Screening   Were you born outside of [place]?  No         Today's PHQ-2 Score:       3/15/2024    11:06 AM   PHQ-2 ( 1999 [employer])   Q1: " Little interest or pleasure in doing things 0   Q2: Feeling down, depressed or hopeless 0   PHQ-2 Score 0   Q1: Little interest or pleasure in doing things Not at all   Q2: Feeling down, depressed or hopeless Not at all   PHQ-2 Score 0           3/13/2024   Substance Use   Alcohol more than 3/day or more than 7/wk No   Do you have a current opioid prescription? No   How severe/bad is pain from 1 to 10? 2/10   Do you use any other substances recreationally? (!) BATH SALTS     Social History     Tobacco Use    Smoking status: Never    Smokeless tobacco: Never   Vaping Use    Vaping Use: Never used   Substance Use Topics    Alcohol use: Yes     Alcohol/week: 2.0 standard drinks of alcohol     Comment: occasion    Drug use: No           3/13/2024   LAST FHS-7 RESULTS   1st degree relative breast or ovarian cancer No   Any relative bilateral breast cancer No   Any male have breast cancer No   Any ONE woman have BOTH breast AND ovarian cancer No   Any woman with breast cancer before 50yrs No   2 or more relatives with breast AND/OR ovarian cancer No   2 or more relatives with breast AND/OR bowel cancer No            ASCVD Risk   The ASCVD Risk score (Liane BOB, et al., 2019) failed to calculate for the following reasons:    The valid total cholesterol range is 130 to 320 mg/dL            Reviewed and updated as needed this visit by Provider   Tobacco  Allergies  Meds  Problems  Med Hx  Surg Hx  Fam Hx              Current providers sharing in care for this patient include:  Patient Care Team:  Shahana Parker NP as PCP - General (Family Medicine)  Shahana Parker NP as Nurse Practitioner (Family Medicine)  Kate Kong APRN CNP as Nurse Practitioner (Colon & Rectal)  Shahana Parker NP as Assigned PCP  Kate Kong APRN CNP as Assigned Surgical Provider  Aleah Kay PA-C as Assigned Heart and Vascular Provider  Sourav Jim MD as MD (Neurology)  Sourav Jim,  "MD as Assigned Neuroscience Provider    The following health maintenance items are reviewed in Epic and correct as of today:  Health Maintenance   Topic Date Due    HF ACTION PLAN  Never done    PAP FOLLOW-UP  02/01/2024    HPV FOLLOW-UP  02/01/2024    ALT  03/08/2024    LIPID  03/08/2024    BMP  03/21/2024    CBC  09/21/2024    MEDICARE ANNUAL WELLNESS VISIT  03/15/2025    ANNUAL REVIEW OF HM ORDERS  03/15/2025    FALL RISK ASSESSMENT  03/15/2025    MAMMO SCREENING  03/13/2026    GLUCOSE  09/21/2026    ADVANCE CARE PLANNING  03/08/2028    COLORECTAL CANCER SCREENING  10/04/2028    DTAP/TDAP/TD IMMUNIZATION (3 - Td or Tdap) 07/10/2029    DEXA  02/23/2036    TSH W/FREE T4 REFLEX  Completed    HEPATITIS C SCREENING  Completed    PHQ-2 (once per calendar year)  Completed    INFLUENZA VACCINE  Completed    Pneumococcal Vaccine: 65+ Years  Completed    ZOSTER IMMUNIZATION  Completed    RSV VACCINE (Pregnancy & 60+)  Completed    COVID-19 Vaccine  Completed    IPV IMMUNIZATION  Aged Out    HPV IMMUNIZATION  Aged Out    MENINGITIS IMMUNIZATION  Aged Out    RSV MONOCLONAL ANTIBODY  Aged Out    PAP  Discontinued       Pertinent items in HPI       Objective    Exam  /84 (BP Location: Right arm, Patient Position: Sitting, Cuff Size: Adult Regular)   Pulse 73   Temp 97.3  F (36.3  C) (Temporal)   Resp 16   Ht 1.6 m (5' 3\")   Wt 73.9 kg (163 lb)   LMP  (LMP Unknown)   SpO2 100%   BMI 28.87 kg/m     Estimated body mass index is 28.87 kg/m  as calculated from the following:    Height as of this encounter: 1.6 m (5' 3\").    Weight as of this encounter: 73.9 kg (163 lb).    Physical Exam  GENERAL: alert and no distress  EYES: Eyes grossly normal to inspection, PERRL and conjunctivae and sclerae normal  HENT: ear canals and TM's normal, nose and mouth without ulcers or lesions  NECK: no adenopathy, no asymmetry, masses, or scars  RESP: lungs clear to auscultation - no rales, rhonchi or wheezes  CV: regular rate and " rhythm, normal S1 S2, no S3 or S4, no murmur, click or rub, no peripheral edema  ABDOMEN: soft, nontender, no hepatosplenomegaly, no masses and bowel sounds normal   (female) w/bimanual: normal female external genitalia, normal urethral meatus, normal vaginal mucosa, and normal cervix/adnexa/uterus without masses or discharge  MS: no gross musculoskeletal defects noted, no edema  SKIN: no suspicious lesions or rashes  NEURO: Normal strength and tone, mentation intact and speech normal  PSYCH: mentation appears normal, affect normal/bright         3/15/2024   Mini Cog   Clock Draw Score 2 Normal   3 Item Recall 3 objects recalled   Mini Cog Total Score 5              Signed Electronically by: Shahana Parker NP

## 2024-03-15 NOTE — PATIENT INSTRUCTIONS
Preventive Care Advice   This is general advice given by our system to help you stay healthy. However, your care team may have specific advice just for you. Please talk to your care team about your preventive care needs.  Nutrition  Eat 5 or more servings of fruits and vegetables each day.  Try wheat bread, brown rice and whole grain pasta (instead of white bread, rice, and pasta).  Get enough calcium and vitamin D. Check the label on foods and aim for 100% of the RDA (recommended daily allowance).  Lifestyle  Exercise at least 150 minutes each week   (30 minutes a day, 5 days a week).  Do muscle strengthening activities 2 days a week. These help control your weight and prevent disease.  No smoking.  Wear sunscreen to prevent skin cancer.  Have a dental exam and cleaning every 6 months.  Yearly exams  See your health care team every year to talk about:  Any changes in your health.  Any medicines your care team has prescribed.  Preventive care, family planning, and ways to prevent chronic diseases.  Shots (vaccines)   HPV shots (up to age 26), if you've never had them before.  Hepatitis B shots (up to age 59), if you've never had them before.  COVID-19 shot: Get this shot when it's due.  Flu shot: Get a flu shot every year.  Tetanus shot: Get a tetanus shot every 10 years.  Pneumococcal, hepatitis A, and RSV shots: Ask your care team if you need these based on your risk.  Shingles shot (for age 50 and up).  General health tests  Diabetes screening:  Starting at age 35, Get screened for diabetes at least every 3 years.  If you are younger than age 35, ask your care team if you should be screened for diabetes.  Cholesterol test: At age 39, start having a cholesterol test every 5 years, or more often if advised.  Bone density scan (DEXA): At age 50, ask your care team if you should have this scan for osteoporosis (brittle bones).  Hepatitis C: Get tested at least once in your life.  STIs (sexually transmitted  infections)  Before age 24: Ask your care team if you should be screened for STIs.  After age 24: Get screened for STIs if you're at risk. You are at risk for STIs (including HIV) if:  You are sexually active with more than one person.  You don't use condoms every time.  You or a partner was diagnosed with a sexually transmitted infection.  If you are at risk for HIV, ask about PrEP medicine to prevent HIV.  Get tested for HIV at least once in your life, whether you are at risk for HIV or not.  Cancer screening tests  Cervical cancer screening: If you have a cervix, begin getting regular cervical cancer screening tests at age 21. Most people who have regular screenings with normal results can stop after age 65. Talk about this with your provider.  Breast cancer scan (mammogram): If you've ever had breasts, begin having regular mammograms starting at age 40. This is a scan to check for breast cancer.  Colon cancer screening: It is important to start screening for colon cancer at age 45.  Have a colonoscopy test every 10 years (or more often if you're at risk) Or, ask your provider about stool tests like a FIT test every year or Cologuard test every 3 years.  To learn more about your testing options, visit: https://www.[employer]/314839.pdf.  For help making a decision, visit: https://bit.ly/gi80917.  Prostate cancer screening test: If you have a prostate and are age 55 to 69, ask your provider if you would benefit from a yearly prostate cancer screening test.  Lung cancer screening: If you are a current or former smoker age 50 to 80, ask your care team if ongoing lung cancer screenings are right for you.  For informational purposes only. Not to replace the advice of your health care provider. Copyright   2023 [employer]. All rights reserved. Clinically reviewed by the [facility]. [employer] 692068 - REV 01/24.    Bladder Training: Care Instructions  Your Care Instructions      Bladder training is used to treat urge incontinence and stress incontinence. Urge incontinence means that the need to urinate comes on so fast that you can't get to a toilet in time. Stress incontinence means that you leak urine because of pressure on your bladder. For example, it may happen when you laugh, cough, or lift something heavy.  Bladder training can increase how long you can wait before you have to urinate. It can also help your bladder hold more urine. And it can give you better control over the urge to urinate.  It is important to remember that bladder training takes a few weeks to a few months to make a difference. You may not see results right away, but don't give up.  Follow-up care is a key part of your treatment and safety. Be sure to make and go to all appointments, and call your doctor if you are having problems. It's also a good idea to know your test results and keep a list of the medicines you take.  How can you care for yourself at home?  Work with your doctor to come up with a bladder training program that is right for you. You may use one or more of the following methods.  Delayed urination  In the beginning, try to keep from urinating for 5 minutes after you first feel the need to go.  While you wait, take deep, slow breaths to relax. Kegel exercises can also help you delay the need to go to the bathroom.  After some practice, when you can easily wait 5 minutes to urinate, try to wait 10 minutes before you urinate.  Slowly increase the waiting period until you are able to control when you have to urinate.  Scheduled urination  Empty your bladder when you first wake up in the morning.  Schedule times throughout the day when you will urinate.  Start by going to the bathroom every hour, even if you don't need to go.  Slowly increase the time between trips to the bathroom.  When you have found a schedule that works well for you, keep doing it.  If you wake up during the night and have to  "urinate, do it. Apply your schedule to waking hours only.  Kegel exercises  These tighten and strengthen pelvic muscles, which can help you control the flow of urine. (If doing these exercises causes pain, stop doing them and talk with your doctor.) To do Kegel exercises:  Squeeze your muscles as if you were trying not to pass gas. Or squeeze your muscles as if you were stopping the flow of urine. Your belly, legs, and buttocks shouldn't move.  Hold the squeeze for 3 seconds, then relax for 5 to 10 seconds.  Start with 3 seconds, then add 1 second each week until you are able to squeeze for 10 seconds.  Repeat the exercise 10 times a session. Do 3 to 8 sessions a day.  When should you call for help?  Watch closely for changes in your health, and be sure to contact your doctor if:    Your incontinence is getting worse.     You do not get better as expected.   Where can you learn more?  Go to https://www.[employer].net/patiented  Enter V684 in the search box to learn more about \"Bladder Training: Care Instructions.\"  Current as of: November 15, 2023               Content Version: 14.0    4986-2644 [employer].   Care instructions adapted under license by your healthcare professional. If you have questions about a medical condition or this instruction, always ask your healthcare professional. [employer] disclaims any warranty or liability for your use of this information.      Substance Use Disorder: Care Instructions  Overview     You can improve your life and health by stopping your use of alcohol or drugs. When you don't drink or use drugs, you may feel and sleep better. You may get along better with your family, friends, and coworkers. There are medicines and programs that can help with substance use disorder.  How can you care for yourself at home?  Here are some ways to help you stay sober and prevent relapse.  If you have been given medicine to help keep you sober or reduce your " cravings, be sure to take it exactly as prescribed.  Talk to your doctor about programs that can help you stop using drugs or drinking alcohol.  Do not keep alcohol or drugs in your home.  Plan ahead. Think about what you'll say if other people ask you to drink or use drugs. Try not to spend time with people who drink or use drugs.  Use the time and money spent on drinking or drugs to do something that's important to you.  Preventing a relapse  Have a plan to deal with relapse. Learn to recognize changes in your thinking that lead you to drink or use drugs. Get help before you start to drink or use drugs again.  Try to stay away from situations, friends, or places that may lead you to drink or use drugs.  If you feel the need to drink alcohol or use drugs again, seek help right away. Call a trusted friend or family member. Some people get support from organizations such as Narcotics Anonymous or [place] or from treatment facilities.  If you relapse, get help as soon as you can. Some people make a plan with another person that outlines what they want that person to do for them if they relapse. The plan usually includes how to handle the relapse and who to notify in case of relapse.  Don't give up. Remember that a relapse doesn't mean that you have failed. Use the experience to learn the triggers that lead you to drink or use drugs. Then quit again. Recovery is a lifelong process. Many people have several relapses before they are able to quit for good.  Follow-up care is a key part of your treatment and safety. Be sure to make and go to all appointments, and call your doctor if you are having problems. It's also a good idea to know your test results and keep a list of the medicines you take.  When should you call for help?   Call 911  anytime you think you may need emergency care. For example, call if you or someone else:    Has overdosed or has withdrawal signs. Be sure to tell the emergency workers that you  "are or someone else is using or trying to quit using drugs. Overdose or withdrawal signs may include:  Losing consciousness.  Seizure.  Seeing or hearing things that aren't there (hallucinations).     Is thinking or talking about suicide or harming others.   Where to get help 24 hours a day, 7 days a week   If you or someone you know talks about suicide, self-harm, a mental health crisis, a substance use crisis, or any other kind of emotional distress, get help right away. You can:    Call the Suicide and Crisis Lifeline at 368.     Call 1-716-815-TALK (1-108.310.8731).     Text HOME to 099583 to access the Crisis Text Line.   Consider saving these numbers in your phone.  Go to [employer] for more information or to chat online.  Call your doctor now or seek immediate medical care if:    You are having withdrawal symptoms. These may include nausea or vomiting, sweating, shakiness, and anxiety.   Watch closely for changes in your health, and be sure to contact your doctor if:    You have a relapse.     You need more help or support to stop.   Where can you learn more?  Go to https://www.[employer].net/patiented  Enter H573 in the search box to learn more about \"Substance Use Disorder: Care Instructions.\"  Current as of: November 15, 2023               Content Version: 14.0    3529-7484 [employer].   Care instructions adapted under license by your healthcare professional. If you have questions about a medical condition or this instruction, always ask your healthcare professional. [employer] disclaims any warranty or liability for your use of this information.      "

## 2024-03-16 LAB
ALBUMIN SERPL BCG-MCNC: 4.8 G/DL (ref 3.5–5.2)
ALP SERPL-CCNC: 68 U/L (ref 40–150)
ALT SERPL W P-5'-P-CCNC: 24 U/L (ref 0–50)
ANION GAP SERPL CALCULATED.3IONS-SCNC: 9 MMOL/L (ref 7–15)
AST SERPL W P-5'-P-CCNC: 23 U/L (ref 0–45)
BILIRUB SERPL-MCNC: 1 MG/DL
BUN SERPL-MCNC: 13.2 MG/DL (ref 8–23)
CALCIUM SERPL-MCNC: 9.7 MG/DL (ref 8.8–10.2)
CHLORIDE SERPL-SCNC: 107 MMOL/L (ref 98–107)
CHOLEST SERPL-MCNC: 127 MG/DL
CREAT SERPL-MCNC: 0.8 MG/DL (ref 0.51–0.95)
DEPRECATED HCO3 PLAS-SCNC: 25 MMOL/L (ref 22–29)
EGFRCR SERPLBLD CKD-EPI 2021: 80 ML/MIN/1.73M2
FASTING STATUS PATIENT QL REPORTED: YES
GLUCOSE SERPL-MCNC: 89 MG/DL (ref 70–99)
HDLC SERPL-MCNC: 64 MG/DL
LDLC SERPL CALC-MCNC: 46 MG/DL
NONHDLC SERPL-MCNC: 63 MG/DL
POTASSIUM SERPL-SCNC: 4.2 MMOL/L (ref 3.4–5.3)
PROT SERPL-MCNC: 7 G/DL (ref 6.4–8.3)
SODIUM SERPL-SCNC: 141 MMOL/L (ref 135–145)
TRIGL SERPL-MCNC: 87 MG/DL

## 2024-03-20 LAB
BKR LAB AP GYN ADEQUACY: NORMAL
BKR LAB AP GYN INTERPRETATION: NORMAL
BKR LAB AP HPV REFLEX: NORMAL
BKR LAB AP PREVIOUS ABNORMAL: NORMAL
PATH REPORT.COMMENTS IMP SPEC: NORMAL
PATH REPORT.COMMENTS IMP SPEC: NORMAL
PATH REPORT.RELEVANT HX SPEC: NORMAL

## 2024-03-22 LAB
HUMAN PAPILLOMA VIRUS 16 DNA: NEGATIVE
HUMAN PAPILLOMA VIRUS 18 DNA: NEGATIVE
HUMAN PAPILLOMA VIRUS FINAL DIAGNOSIS: NORMAL
HUMAN PAPILLOMA VIRUS OTHER HR: NEGATIVE

## 2024-03-23 DIAGNOSIS — M77.11 LATERAL EPICONDYLITIS OF RIGHT ELBOW: ICD-10-CM

## 2024-03-26 RX ORDER — IBUPROFEN 800 MG/1
TABLET, FILM COATED ORAL
Qty: 120 TABLET | Refills: 8 | Status: SHIPPED | OUTPATIENT
Start: 2024-03-26

## 2024-04-19 ENCOUNTER — OFFICE VISIT (OUTPATIENT)
Dept: CARDIOLOGY | Facility: CLINIC | Age: 69
End: 2024-04-19
Attending: INTERNAL MEDICINE
Payer: COMMERCIAL

## 2024-04-19 VITALS
RESPIRATION RATE: 20 BRPM | HEART RATE: 88 BPM | SYSTOLIC BLOOD PRESSURE: 114 MMHG | DIASTOLIC BLOOD PRESSURE: 80 MMHG | BODY MASS INDEX: 28.41 KG/M2 | WEIGHT: 160.4 LBS

## 2024-04-19 DIAGNOSIS — I25.84 CORONARY ARTERY DISEASE DUE TO CALCIFIED CORONARY LESION: ICD-10-CM

## 2024-04-19 DIAGNOSIS — I25.10 CORONARY ARTERY DISEASE DUE TO CALCIFIED CORONARY LESION: ICD-10-CM

## 2024-04-19 DIAGNOSIS — Z95.2 S/P TAVR (TRANSCATHETER AORTIC VALVE REPLACEMENT): Primary | ICD-10-CM

## 2024-04-19 DIAGNOSIS — I71.21 ANEURYSM OF ASCENDING AORTA WITHOUT RUPTURE (H): ICD-10-CM

## 2024-04-19 DIAGNOSIS — E78.00 PURE HYPERCHOLESTEROLEMIA: ICD-10-CM

## 2024-04-19 PROCEDURE — 99214 OFFICE O/P EST MOD 30 MIN: CPT | Performed by: INTERNAL MEDICINE

## 2024-04-19 NOTE — LETTER
4/19/2024    Shahana Parker, NP  [address] [place] [address]    RE: Wendy Handy       Dear Colleague,     I had the pleasure of seeing Wendy Handy in the [facility].      Thank you, Shahana Parker, for asking the [facility] team to see Ms. Wendy Handy to follow-up on aortic valve disease status post TAVR, CAD, hypercholesterolemia, ascending aortic aneurysm    Assessment/Recommendations   Assessment:    1.  Aortic valve stenosis, status post TAVR August 2022.  She continues to do well with no complaints of exertional chest discomfort/intrascapular discomfort or exertional dyspnea.  Her last echocardiogram a year ago demonstrated normal prosthetic valve function.  Did recommend a follow-up echocardiogram this summer to verify no progression.  2.  Coronary artery disease, minimal by pre-TAVR angiography.  At this point continue with risk factor modification.    3.  Hypercholesterolemia, well-controlled with total cholesterol of 127 and LDL of 46 by recent lipid profile. No change in medication.  4.  Mild ascending aortic aneurysm, stable based on recent CT scan.    Plan:  1.  Continue current medications  2.  Plan echocardiogram this summer to reassess her aortic valve  3.  Repeat chest CTA in 2 to 3 years       History of Present Illness    Ms. Wendy Handy is a 68 year old female with aortic valve stenosis status post TAVR in August 2022 with finding of minimal coronary artery disease by pre-TAVR angiography, mild hypercholesterolemia and ascending aortic dilatation who presents to the office today for follow-up visit.  Has been doing well over the course of the year with no complaints of exertional chest or intrascapular discomfort.  Also denies any exertional dyspnea.  Spent 2 months in [place] over the winter where they hiked on a regular basis with no obvious decline in her exercise capacity.  Denies lightheadedness or palpitations.  Her most recent lipid levels  were excellent with a LDL of 46.    ECG (personally reviewed): No ECG today    Cardiac Imaging Studies (personally reviewed): No new cardiac imaging     Physical Examination Review of Systems   /80 (BP Location: Left arm, Patient Position: Sitting, Cuff Size: Adult Regular)   Pulse 88   Resp 20   Wt 72.8 kg (160 lb 6.4 oz)   LMP  (LMP Unknown)   BMI 28.41 kg/m    Body mass index is 28.41 kg/m .  Wt Readings from Last 3 Encounters:   04/19/24 72.8 kg (160 lb 6.4 oz)   03/15/24 73.9 kg (163 lb)   03/12/24 73.9 kg (163 lb)     General Appearance:   Awake, Alert, No acute distress.   HEENT:  No scleral icterus; the mucous membranes were pink and moist.   Neck: No cervical bruits or jugular venous distention    Chest: The spine was straight. The chest was symmetric.   Lungs:   Respirations unlabored; the lungs are clear to auscultation. No wheezing   Cardiovascular:   Regular rate and rhythm.  S1, S2 normal.  1/6 crescendo decrescendo systolic murmur heard at the left sternal border.   Abdomen:  No organomegaly, masses, bruits, or tenderness. Bowels sounds are present   Extremities: No peripheral edema bilaterally   Skin: No xanthelasma. Warm, Dry.   Musculoskeletal: No tenderness.   Neurologic: Mood and affect are appropriate.    Enc Vitals  BP: 114/80  Pulse: 88  Resp: 20  Weight: 72.8 kg (160 lb 6.4 oz)                                         Medical History  Surgical History Family History Social History   Past Medical History:   Diagnosis Date    Aortic valve disorder     echo 1/09  4.2 cm TAA-MRA 4/2016      Bicuspid aortic valve     Coronary artery disease due to calcified coronary lesion 5/5/2016    Echo 6/2016   Good function  CT Ca++ 172 LAD  Cardiology consult 4/016-nuclear stress    Family history of psychiatric condition     Family history of tremor     Family history of vascular disease     H/O LEEP 1/1/2007 2002-2006 LSIL paps with colposcopy, had LEEP in 2007. No records 6/2/10 ASCUS, +HR  HPV 53 6/20/12 ASCUS, +HR HPV 53. Colposcopy outside / system? 6/23/14 NIL 6/24/15 NIL pap, neg HPV 6/27/16 ASCUS, neg HPV 6/29/17 NIL pap, neg HPV 7/9/18 ASCUS, +HR HPV 16. Unclear if colposcopy was completed 7/10/19 NIL pap, neg HPV 7/13/20 NIL pap, neg HPV 2/1/21 NIL pap, neg HPV. Plan: await provider    History of aortic stenosis     History of vitamin D deficiency     Hypercholesteremia     Hyperlipidemia     Laboratory test 8/18/2016    Reading Physician Reading Date Result Priority    Patricia Orta MD 8/16/2016       Narrative       Examination: Nuclear medicine DATscan for Dopamine Receptor Localization.    Examination: NM BRAIN IMAGING TOMOGRAPHIC (SPECT) DATSCAN  Date: 8/16/2016 3:38 PM  Indication: Right sided postural tremor.   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      Osteopenia 2/15/2015    Other and unspecified hyperlipidemia     Created by Conversion     Papanicolaou smear of cervix with low grade squamous intraepithelial lesion (LGSIL)      LGSIL 2002  colpoLGSIL 2003  colpoLGSIL 4/2006 colpoLGSIL 11/06 colpoAbnormal 5/07 colpo  CHCWLEEPendometrial biopsy 5/07     Plantar fasciitis of right foot     Primary insomnia     Snores     Tremor     Vertigo     Vitamin D deficiency     Wears glasses     Past Surgical History:   Procedure Laterality Date    BLEPHAROPLASTY      CARDIAC CATHETERIZATION  05/30/2017    No intervention    CARDIAC CATHETERIZATION N/A 5/30/2017    Procedure: Coronary Angiogram;  Surgeon: Torito Metzger MD;  Location: [facility];  Service:     CHOLECYSTECTOMY      COLONOSCOPY N/A 10/4/2023    Procedure: COLONOSCOPY, WITH POLYPECTOMY;  Surgeon: Leventhal, Thomas Michael, MD;  [place] [facility]     CONIZATION CERVIX,LOOP ELECTRD      Description: Cervical Conization Loop Electrode Excision;  Recorded: 05/29/2008;    CV CORONARY ANGIOGRAM N/A 6/9/2022    Procedure: Coronary Angiogram;  Surgeon: Anabela Hernandez MD;  Location: [facility]    CV CORONARY ANGIOGRAM  6/9/2022    Procedure: ;  Surgeon: Anabela Hernandez MD;  Location: [facility]    CV TRANSCATHETER AORTIC VALVE REPLACEMENT-FEMORAL APPROACH N/A 8/23/2022    Procedure: Transcatheter Aortic Valve Replacement-Femoral Approach;  Surgeon: Anabela Hernandez MD;  Location: [facility]    FACIAL RECONSTRUCTION SURGERY      forehead as well    HC CLOSED TX MANDIBLE FX W/O MANIP      Description: Closed Treatment Of Mandibular Fracture;  Recorded: 05/29/2008;    HC DILATION/CURETTAGE DIAG/THER NON OB      Description: Dilation And Curettage;  Recorded: 05/29/2008;  Comments: X 2  metrorhhagia    LAPAROSCOPIC CHOLECYSTECTOMY N/A 2/23/2015    Procedure: CHOLECYSTECTOMY LAPAROSCOPIC;  Surgeon: Doug Webber MD;  Location: [facility];  Service:     OR TRANSCATHETER AORTIC VALVE REPLACEMENT, FEMORAL PERCUTANEOUS APPROACH (STANDBY) N/A 8/23/2022    Procedure: OR TRANSCATHETER AORTIC VALVE REPLACEMENT, FEMORAL PERCUTANEOUS APPROACH (STANDBY);  Surgeon: Katy Rojas MD;  Location: [facility]    ZZC ORAL SURGERY PROCEDURE      jaw fracture    Family History   Problem Relation Age of Onset    Heart Disease Father     Heart Disease Sister     Heart Disease Sister     Mental Illness Mother 30.00        schizophrenia, bipolar    Mental Illness Sister         bipolar    Colon Cancer Father     Hyperlipidemia Brother     Hyperlipidemia Brother     Hyperlipidemia Sister     Cervical Cancer Daughter     Diabetes Type 2  Brother     Diabetes Type 2  Brother     Tremor Father     Tremor Paternal Aunt     Tremor Paternal Grandfather     Tremor Son     Tremor Cousin         Paternal 1st cousin    Attention Deficit Disorder Son      Neurologic Disorder Son         Tourette Syndrome     Schizophrenia Mother     Schizophrenia Sister     Mental Illness Sister         depression    Diabetes Brother         type 2    Mental Illness Brother         depression    Social History     Socioeconomic History    Marital status:      Spouse name: Not on file    Number of children: Not on file    Years of education: Not on file    Highest education level: Not on file   Occupational History    Not on file   Tobacco Use    Smoking status: Never    Smokeless tobacco: Never   Vaping Use    Vaping status: Never Used   Substance and Sexual Activity    Alcohol use: Yes     Alcohol/week: 2.0 standard drinks of alcohol     Comment: occasion    Drug use: No    Sexual activity: Not Currently     Partners: Male     Birth control/protection: Abstinence   Other Topics Concern    Not on file   Social History Narrative    Living in [place] with her  and has been  for 11 yrs     This is her 3rd marriage    1st marriage had 2 kids: son and daughter - 33 y r old son and 31 yr old daughter    2nd marriage no kids        Not working presently. Retired    Had been  in the past.      Social Determinants of Health     Financial Resource Strain: Low Risk  (3/13/2024)    Financial Resource Strain     Within the past 12 months, have you or your family members you live with been unable to get utilities (heat, electricity) when it was really needed?: No   Food Insecurity: Low Risk  (3/13/2024)    Food Insecurity     Within the past 12 months, did you worry that your food would run out before you got money to buy more?: No     Within the past 12 months, did the food you bought just not last and you didn t have money to get more?: No   Transportation Needs: Low Risk  (3/13/2024)    Transportation Needs     Within the past 12 months, has lack of transportation kept you from medical appointments, getting your medicines, non-medical meetings or  appointments, work, or from getting things that you need?: No   Physical Activity: Sufficiently Active (3/13/2024)    Exercise Vital Sign     Days of Exercise per Week: 5 days     Minutes of Exercise per Session: 50 min   Stress: No Stress Concern Present (3/13/2024)    [employer] [place] [employer] - Occupational Stress Questionnaire     Feeling of Stress : Not at all   Social Connections: Unknown (3/13/2024)    Social Connection and Isolation Panel [NHANES]     Frequency of Communication with Friends and Family: Not on file     Frequency of Social Gatherings with Friends and Family: Twice a week     Attends Baptism Services: Not on file     Active Member of Clubs or Organizations: Not on file     Attends Club or Organization Meetings: Not on file     Marital Status: Not on file   Interpersonal Safety: Low Risk  (10/17/2023)    Interpersonal Safety     Do you feel physically and emotionally safe where you currently live?: Yes     Within the past 12 months, have you been hit, slapped, kicked or otherwise physically hurt by someone?: No     Within the past 12 months, have you been humiliated or emotionally abused in other ways by your partner or ex-partner?: No   Housing Stability: Low Risk  (3/13/2024)    Housing Stability     Do you have housing? : Yes     Are you worried about losing your housing?: No          Medications  Allergies   Current Outpatient Medications   Medication Sig Dispense Refill    amitriptyline (ELAVIL) 25 MG tablet Take 1 tablet (25 mg) by mouth at bedtime 90 tablet 3    amoxicillin (AMOXIL) 500 MG capsule Take 4 capsules (2,000 mg) by mouth once as needed (take 30-63 minutes prior to dental visit) 4 capsule 0    aspirin 81 MG EC tablet Take 81 mg by mouth daily      atorvastatin (LIPITOR) 80 MG tablet Take 1 tablet (80 mg) by mouth at bedtime 90 tablet 3    calcium carbonate-vitamin D (CALTRATE) 600-10 MG-MCG per tablet Take 1 tablet by mouth daily      cyanocobalamin (VITAMIN  B-12) 1000 MCG tablet Take 1,000 mcg by mouth daily       ezetimibe (ZETIA) 10 MG tablet Take 1 tablet (10 mg) by mouth daily 90 tablet 3    ibuprofen (ADVIL/MOTRIN) 800 MG tablet TAKE 1 TABLET BY MOUTH 3 TIMES  DAILY AS NEEDED TAKE WITH FOOD 120 tablet 8    Omega-3 Fatty Acids (OMEGA-3 FISH OIL) 1200 MG CAPS Take 1 capsule by mouth daily       polyethylene glycol-propylene glycol (SYSTANE) 0.4-0.3 % SOLN ophthalmic solution Place 1 drop into both eyes as needed for dry eyes      vitamin C (ASCORBIC ACID) 1000 MG TABS Take 1,000 mg by mouth daily       VITAMIN D3 50 MCG (2000 UT) tablet TAKE 1 TABLET BY MOUTH EVERY DAY 90 tablet 3    vitamin E 400 UNIT capsule Take 400 Units by mouth daily       zinc gluconate 50 MG tablet Take 50 mg by mouth daily         No Known Allergies      Lab Results    Chemistry/lipid CBC Cardiac Enzymes/BNP/TSH/INR   Recent Labs   Lab Test 03/15/24  1203   TRIG 87   LDL 46   BUN 13.2      CO2 25    Recent Labs   Lab Test 09/21/23  1525   WBC 9.0   HGB 13.6   HCT 39.5   MCV 92       Recent Labs   Lab Test 08/19/22  0828 07/10/19  0757   BNP 56  --    TSH  --  1.53   INR 1.07  --         A total of 31 minutes was spent reviewing patient's medical records, obtaining history and performing examination, as well as discussing diagnoses/ recommendations with patient and answering all questions.                        Thank you for allowing me to participate in the care of your patient.      Sincerely,     Teodora Platt MD     [facility]  cc:   Teodora Platt MD  [address], [address]  [place] [address]

## 2024-04-19 NOTE — PROGRESS NOTES
Thank you, Shahana Parker, for asking the [facility] team to see Ms. Wendy Handy to follow-up on aortic valve disease status post TAVR, CAD, hypercholesterolemia, ascending aortic aneurysm    Assessment/Recommendations   Assessment:    1.  Aortic valve stenosis, status post TAVR August 2022.  She continues to do well with no complaints of exertional chest discomfort/intrascapular discomfort or exertional dyspnea.  Her last echocardiogram a year ago demonstrated normal prosthetic valve function.  Did recommend a follow-up echocardiogram this summer to verify no progression.  2.  Coronary artery disease, minimal by pre-TAVR angiography.  At this point continue with risk factor modification.    3.  Hypercholesterolemia, well-controlled with total cholesterol of 127 and LDL of 46 by recent lipid profile. No change in medication.  4.  Mild ascending aortic aneurysm, stable based on recent CT scan.    Plan:  1.  Continue current medications  2.  Plan echocardiogram this summer to reassess her aortic valve  3.  Repeat chest CTA in 2 to 3 years       History of Present Illness    Ms. Wendy Handy is a 68 year old female with aortic valve stenosis status post TAVR in August 2022 with finding of minimal coronary artery disease by pre-TAVR angiography, mild hypercholesterolemia and ascending aortic dilatation who presents to the office today for follow-up visit.  Has been doing well over the course of the year with no complaints of exertional chest or intrascapular discomfort.  Also denies any exertional dyspnea.  Spent 2 months in [place] over the winter where they hiked on a regular basis with no obvious decline in her exercise capacity.  Denies lightheadedness or palpitations.  Her most recent lipid levels were excellent with a LDL of 46.    ECG (personally reviewed): No ECG today    Cardiac Imaging Studies (personally reviewed): No new cardiac imaging     Physical Examination Review of Systems   BP  114/80 (BP Location: Left arm, Patient Position: Sitting, Cuff Size: Adult Regular)   Pulse 88   Resp 20   Wt 72.8 kg (160 lb 6.4 oz)   LMP  (LMP Unknown)   BMI 28.41 kg/m    Body mass index is 28.41 kg/m .  Wt Readings from Last 3 Encounters:   04/19/24 72.8 kg (160 lb 6.4 oz)   03/15/24 73.9 kg (163 lb)   03/12/24 73.9 kg (163 lb)     General Appearance:   Awake, Alert, No acute distress.   HEENT:  No scleral icterus; the mucous membranes were pink and moist.   Neck: No cervical bruits or jugular venous distention    Chest: The spine was straight. The chest was symmetric.   Lungs:   Respirations unlabored; the lungs are clear to auscultation. No wheezing   Cardiovascular:   Regular rate and rhythm.  S1, S2 normal.  1/6 crescendo decrescendo systolic murmur heard at the left sternal border.   Abdomen:  No organomegaly, masses, bruits, or tenderness. Bowels sounds are present   Extremities: No peripheral edema bilaterally   Skin: No xanthelasma. Warm, Dry.   Musculoskeletal: No tenderness.   Neurologic: Mood and affect are appropriate.    Enc Vitals  BP: 114/80  Pulse: 88  Resp: 20  Weight: 72.8 kg (160 lb 6.4 oz)                                         Medical History  Surgical History Family History Social History   Past Medical History:   Diagnosis Date    Aortic valve disorder     echo 1/09  4.2 cm TAA-MRA 4/2016      Bicuspid aortic valve     Coronary artery disease due to calcified coronary lesion 5/5/2016    Echo 6/2016   Good function  CT Ca++ 172 LAD  Cardiology consult 4/016-nuclear stress    Family history of psychiatric condition     Family history of tremor     Family history of vascular disease     H/O LEEP 1/1/2007 2002-2006 LSIL paps with colposcopy, had LEEP in 2007. No records 6/2/10 ASCUS, +HR HPV 53 6/20/12 ASCUS, +HR HPV 53. Colposcopy outside HE/ system? 6/23/14 NIL 6/24/15 NIL pap, neg HPV 6/27/16 ASCUS, neg HPV 6/29/17 NIL pap, neg HPV 7/9/18 ASCUS, +HR HPV 16. Unclear if colposcopy  was completed 7/10/19 NIL pap, neg HPV 7/13/20 NIL pap, neg HPV 2/1/21 NIL pap, neg HPV. Plan: await provider    History of aortic stenosis     History of vitamin D deficiency     Hypercholesteremia     Hyperlipidemia     Laboratory test 8/18/2016    Reading Physician Reading Date Result Priority    Patricia Orta MD 8/16/2016       Narrative       Examination: Nuclear medicine DATscan for Dopamine Receptor Localization.    Examination: NM BRAIN IMAGING TOMOGRAPHIC (SPECT) DATSCAN  Date: 8/16/2016 3:38 PM  Indication: Right sided postural tremor.   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      Osteopenia 2/15/2015    Other and unspecified hyperlipidemia     Created by Conversion     Papanicolaou smear of cervix with low grade squamous intraepithelial lesion (LGSIL)      LGSIL 2002  colpoLGSIL 2003  colpoLGSIL 4/2006 colpoLGSIL 11/06 colpoAbnormal 5/07 colpo  CHCWLEEPendometrial biopsy 5/07     Plantar fasciitis of right foot     Primary insomnia     Snores     Tremor     Vertigo     Vitamin D deficiency     Wears glasses     Past Surgical History:   Procedure Laterality Date    BLEPHAROPLASTY      CARDIAC CATHETERIZATION  05/30/2017    No intervention    CARDIAC CATHETERIZATION N/A 5/30/2017    Procedure: Coronary Angiogram;  Surgeon: Torito Metzger MD;  Location: [facility];  Service:     CHOLECYSTECTOMY      COLONOSCOPY N/A 10/4/2023    Procedure: COLONOSCOPY, WITH POLYPECTOMY;  Surgeon: Leventhal, Thomas Michael, MD;  Location: [facility] OR    CONIZATION CERVIX,LOOP ELECTRD      Description: Cervical Conization Loop Electrode Excision;  Recorded: 05/29/2008;    CV CORONARY ANGIOGRAM N/A 6/9/2022    Procedure: Coronary Angiogram;  Surgeon:  Anabela Hernandez MD;  Location: [facility] CV    CV CORONARY ANGIOGRAM  6/9/2022    Procedure: ;  Surgeon: Anabela Hernandez MD;  Location: [facility] CATH [facility] CV    CV TRANSCATHETER AORTIC VALVE REPLACEMENT-FEMORAL APPROACH N/A 8/23/2022    Procedure: Transcatheter Aortic Valve Replacement-Femoral Approach;  Surgeon: Anabela Hernandez MD;  Location: [facility] CATH [facility] CV    FACIAL RECONSTRUCTION SURGERY      forehead as well    HC CLOSED TX MANDIBLE FX W/O MANIP      Description: Closed Treatment Of Mandibular Fracture;  Recorded: 05/29/2008;    HC DILATION/CURETTAGE DIAG/THER NON OB      Description: Dilation And Curettage;  Recorded: 05/29/2008;  Comments: X 2  metrorhhagia    LAPAROSCOPIC CHOLECYSTECTOMY N/A 2/23/2015    Procedure: CHOLECYSTECTOMY LAPAROSCOPIC;  Surgeon: Doug Webber MD;  Location: [facility];  Service:     OR TRANSCATHETER AORTIC VALVE REPLACEMENT, FEMORAL PERCUTANEOUS APPROACH (STANDBY) N/A 8/23/2022    Procedure: OR TRANSCATHETER AORTIC VALVE REPLACEMENT, FEMORAL PERCUTANEOUS APPROACH (STANDBY);  Surgeon: Katy Rojas MD;  Location: [facility] CV    ZZC ORAL SURGERY PROCEDURE      jaw fracture    Family History   Problem Relation Age of Onset    Heart Disease Father     Heart Disease Sister     Heart Disease Sister     Mental Illness Mother 30.00        schizophrenia, bipolar    Mental Illness Sister         bipolar    Colon Cancer Father     Hyperlipidemia Brother     Hyperlipidemia Brother     Hyperlipidemia Sister     Cervical Cancer Daughter     Diabetes Type 2  Brother     Diabetes Type 2  Brother     Tremor Father     Tremor Paternal Aunt     Tremor Paternal Grandfather     Tremor Son     Tremor Cousin         Paternal 1st cousin    Attention Deficit Disorder Son     Neurologic Disorder Son         Tourette Syndrome     Schizophrenia Mother     Schizophrenia Sister     Mental Illness Sister         depression    Diabetes Brother         type 2    Mental  Illness Brother         depression    Social History     Socioeconomic History    Marital status:      Spouse name: Not on file    Number of children: Not on file    Years of education: Not on file    Highest education level: Not on file   Occupational History    Not on file   Tobacco Use    Smoking status: Never    Smokeless tobacco: Never   Vaping Use    Vaping status: Never Used   Substance and Sexual Activity    Alcohol use: Yes     Alcohol/week: 2.0 standard drinks of alcohol     Comment: occasion    Drug use: No    Sexual activity: Not Currently     Partners: Male     Birth control/protection: Abstinence   Other Topics Concern    Not on file   Social History Narrative    Living in [place] with her  and has been  for 11 yrs     This is her 3rd marriage    1st marriage had 2 kids: son and daughter - 33 y r old son and 31 yr old daughter    2nd marriage no kids        Not working presently. Retired    Had been  in the past.      Social Determinants of Health     Financial Resource Strain: Low Risk  (3/13/2024)    Financial Resource Strain     Within the past 12 months, have you or your family members you live with been unable to get utilities (heat, electricity) when it was really needed?: No   Food Insecurity: Low Risk  (3/13/2024)    Food Insecurity     Within the past 12 months, did you worry that your food would run out before you got money to buy more?: No     Within the past 12 months, did the food you bought just not last and you didn t have money to get more?: No   Transportation Needs: Low Risk  (3/13/2024)    Transportation Needs     Within the past 12 months, has lack of transportation kept you from medical appointments, getting your medicines, non-medical meetings or appointments, work, or from getting things that you need?: No   Physical Activity: Sufficiently Active (3/13/2024)    Exercise Vital Sign     Days of Exercise per Week: 5 days     Minutes of Exercise per  Session: 50 min   Stress: No Stress Concern Present (3/13/2024)    [employer] - Occupational Stress Questionnaire     Feeling of Stress : Not at all   Social Connections: Unknown (3/13/2024)    Social Connection and Isolation Panel [NHANES]     Frequency of Communication with Friends and Family: Not on file     Frequency of Social Gatherings with Friends and Family: Twice a week     Attends Latter-day Services: Not on file     Active Member of Clubs or Organizations: Not on file     Attends Club or Organization Meetings: Not on file     Marital Status: Not on file   Interpersonal Safety: Low Risk  (10/17/2023)    Interpersonal Safety     Do you feel physically and emotionally safe where you currently live?: Yes     Within the past 12 months, have you been hit, slapped, kicked or otherwise physically hurt by someone?: No     Within the past 12 months, have you been humiliated or emotionally abused in other ways by your partner or ex-partner?: No   Housing Stability: Low Risk  (3/13/2024)    Housing Stability     Do you have housing? : Yes     Are you worried about losing your housing?: No          Medications  Allergies   Current Outpatient Medications   Medication Sig Dispense Refill    amitriptyline (ELAVIL) 25 MG tablet Take 1 tablet (25 mg) by mouth at bedtime 90 tablet 3    amoxicillin (AMOXIL) 500 MG capsule Take 4 capsules (2,000 mg) by mouth once as needed (take 30-63 minutes prior to dental visit) 4 capsule 0    aspirin 81 MG EC tablet Take 81 mg by mouth daily      atorvastatin (LIPITOR) 80 MG tablet Take 1 tablet (80 mg) by mouth at bedtime 90 tablet 3    calcium carbonate-vitamin D (CALTRATE) 600-10 MG-MCG per tablet Take 1 tablet by mouth daily      cyanocobalamin (VITAMIN B-12) 1000 MCG tablet Take 1,000 mcg by mouth daily       ezetimibe (ZETIA) 10 MG tablet Take 1 tablet (10 mg) by mouth daily 90 tablet 3    ibuprofen (ADVIL/MOTRIN) 800 MG tablet TAKE 1 TABLET BY MOUTH 3  TIMES  DAILY AS NEEDED TAKE WITH FOOD 120 tablet 8    Omega-3 Fatty Acids (OMEGA-3 FISH OIL) 1200 MG CAPS Take 1 capsule by mouth daily       polyethylene glycol-propylene glycol (SYSTANE) 0.4-0.3 % SOLN ophthalmic solution Place 1 drop into both eyes as needed for dry eyes      vitamin C (ASCORBIC ACID) 1000 MG TABS Take 1,000 mg by mouth daily       VITAMIN D3 50 MCG (2000 UT) tablet TAKE 1 TABLET BY MOUTH EVERY DAY 90 tablet 3    vitamin E 400 UNIT capsule Take 400 Units by mouth daily       zinc gluconate 50 MG tablet Take 50 mg by mouth daily         No Known Allergies      Lab Results    Chemistry/lipid CBC Cardiac Enzymes/BNP/TSH/INR   Recent Labs   Lab Test 03/15/24  1203   TRIG 87   LDL 46   BUN 13.2      CO2 25    Recent Labs   Lab Test 09/21/23  1525   WBC 9.0   HGB 13.6   HCT 39.5   MCV 92       Recent Labs   Lab Test 08/19/22  0828 07/10/19  0757   BNP 56  --    TSH  --  1.53   INR 1.07  --         A total of 31 minutes was spent reviewing patient's medical records, obtaining history and performing examination, as well as discussing diagnoses/ recommendations with patient and answering all questions.

## 2024-04-19 NOTE — PATIENT INSTRUCTIONS
Continue current medications  Plan echocardiogram this summer to follow up on aortic valve. I will contact you with results.

## 2024-05-27 ENCOUNTER — MYC REFILL (OUTPATIENT)
Dept: FAMILY MEDICINE | Facility: CLINIC | Age: 69
End: 2024-05-27
Payer: COMMERCIAL

## 2024-05-27 DIAGNOSIS — Z95.2 S/P TAVR (TRANSCATHETER AORTIC VALVE REPLACEMENT): ICD-10-CM

## 2024-05-28 ENCOUNTER — MYC REFILL (OUTPATIENT)
Dept: FAMILY MEDICINE | Facility: CLINIC | Age: 69
End: 2024-05-28
Payer: COMMERCIAL

## 2024-05-28 DIAGNOSIS — Z95.2 S/P TAVR (TRANSCATHETER AORTIC VALVE REPLACEMENT): ICD-10-CM

## 2024-05-28 RX ORDER — AMOXICILLIN 500 MG/1
2000 CAPSULE ORAL
Qty: 4 CAPSULE | Refills: 0 | Status: SHIPPED | OUTPATIENT
Start: 2024-05-28 | End: 2024-05-28

## 2024-05-29 RX ORDER — AMOXICILLIN 500 MG/1
2000 CAPSULE ORAL
Qty: 4 CAPSULE | Refills: 0 | Status: SHIPPED | OUTPATIENT
Start: 2024-05-29 | End: 2024-10-07

## 2024-07-25 ENCOUNTER — HOSPITAL ENCOUNTER (OUTPATIENT)
Dept: CARDIOLOGY | Facility: CLINIC | Age: 69
Discharge: HOME | End: 2024-07-25
Attending: INTERNAL MEDICINE | Admitting: INTERNAL MEDICINE
Payer: COMMERCIAL

## 2024-07-25 DIAGNOSIS — Z95.2 S/P TAVR (TRANSCATHETER AORTIC VALVE REPLACEMENT): ICD-10-CM

## 2024-07-25 LAB — LVEF ECHO: NORMAL

## 2024-07-25 PROCEDURE — 93306 TTE W/DOPPLER COMPLETE: CPT | Mod: 26 | Performed by: INTERNAL MEDICINE

## 2024-07-25 PROCEDURE — 93306 TTE W/DOPPLER COMPLETE: CPT

## 2024-07-26 DIAGNOSIS — I25.10 CORONARY ARTERY DISEASE DUE TO CALCIFIED CORONARY LESION: ICD-10-CM

## 2024-07-26 DIAGNOSIS — Z95.2 S/P TAVR (TRANSCATHETER AORTIC VALVE REPLACEMENT): Primary | ICD-10-CM

## 2024-07-26 DIAGNOSIS — E78.00 PURE HYPERCHOLESTEROLEMIA: ICD-10-CM

## 2024-07-26 DIAGNOSIS — I25.84 CORONARY ARTERY DISEASE DUE TO CALCIFIED CORONARY LESION: ICD-10-CM

## 2024-09-03 ENCOUNTER — OFFICE VISIT (OUTPATIENT)
Dept: FAMILY MEDICINE | Facility: CLINIC | Age: 69
End: 2024-09-03
Payer: COMMERCIAL

## 2024-09-03 ENCOUNTER — APPOINTMENT (OUTPATIENT)
Dept: LAB | Facility: CLINIC | Age: 69
End: 2024-09-03
Payer: COMMERCIAL

## 2024-09-03 VITALS
TEMPERATURE: 97.4 F | OXYGEN SATURATION: 99 % | SYSTOLIC BLOOD PRESSURE: 110 MMHG | HEART RATE: 86 BPM | HEIGHT: 63 IN | DIASTOLIC BLOOD PRESSURE: 80 MMHG | WEIGHT: 161 LBS | RESPIRATION RATE: 16 BRPM | BODY MASS INDEX: 28.53 KG/M2

## 2024-09-03 DIAGNOSIS — R19.7 DIARRHEA, UNSPECIFIED TYPE: Primary | ICD-10-CM

## 2024-09-03 LAB
ADV 40+41 DNA STL QL NAA+NON-PROBE: NEGATIVE
ALBUMIN SERPL BCG-MCNC: 4.4 G/DL (ref 3.5–5.2)
ALP SERPL-CCNC: 70 U/L (ref 40–150)
ALT SERPL W P-5'-P-CCNC: 19 U/L (ref 0–50)
ANION GAP SERPL CALCULATED.3IONS-SCNC: 11 MMOL/L (ref 7–15)
AST SERPL W P-5'-P-CCNC: 25 U/L (ref 0–45)
ASTRO TYP 1-8 RNA STL QL NAA+NON-PROBE: NEGATIVE
BILIRUB SERPL-MCNC: 1.4 MG/DL
BUN SERPL-MCNC: 10.3 MG/DL (ref 8–23)
C CAYETANENSIS DNA STL QL NAA+NON-PROBE: NEGATIVE
CALCIUM SERPL-MCNC: 9.5 MG/DL (ref 8.8–10.4)
CAMPYLOBACTER DNA SPEC NAA+PROBE: NEGATIVE
CHLORIDE SERPL-SCNC: 105 MMOL/L (ref 98–107)
CREAT SERPL-MCNC: 0.85 MG/DL (ref 0.51–0.95)
CRYPTOSP DNA STL QL NAA+NON-PROBE: NEGATIVE
E COLI O157 DNA STL QL NAA+NON-PROBE: NORMAL
E HISTOLYT DNA STL QL NAA+NON-PROBE: NEGATIVE
EAEC ASTA GENE ISLT QL NAA+PROBE: NEGATIVE
EC STX1+STX2 GENES STL QL NAA+NON-PROBE: NEGATIVE
EGFRCR SERPLBLD CKD-EPI 2021: 74 ML/MIN/1.73M2
EPEC EAE GENE STL QL NAA+NON-PROBE: NEGATIVE
ERYTHROCYTE [DISTWIDTH] IN BLOOD BY AUTOMATED COUNT: 11.6 % (ref 10–15)
ETEC LTA+ST1A+ST1B TOX ST NAA+NON-PROBE: NEGATIVE
G LAMBLIA DNA STL QL NAA+NON-PROBE: NEGATIVE
GLUCOSE SERPL-MCNC: 88 MG/DL (ref 70–99)
HCO3 SERPL-SCNC: 25 MMOL/L (ref 22–29)
HCT VFR BLD AUTO: 40.6 % (ref 35–47)
HGB BLD-MCNC: 13.6 G/DL (ref 11.7–15.7)
LIPASE SERPL-CCNC: 23 U/L (ref 13–60)
MCH RBC QN AUTO: 31.2 PG (ref 26.5–33)
MCHC RBC AUTO-ENTMCNC: 33.5 G/DL (ref 31.5–36.5)
MCV RBC AUTO: 93 FL (ref 78–100)
NOROVIRUS GI+II RNA STL QL NAA+NON-PROBE: NEGATIVE
P SHIGELLOIDES DNA STL QL NAA+NON-PROBE: NEGATIVE
PLATELET # BLD AUTO: 164 10E3/UL (ref 150–450)
POTASSIUM SERPL-SCNC: 4 MMOL/L (ref 3.4–5.3)
PROT SERPL-MCNC: 7 G/DL (ref 6.4–8.3)
RBC # BLD AUTO: 4.36 10E6/UL (ref 3.8–5.2)
RVA RNA STL QL NAA+NON-PROBE: NEGATIVE
SALMONELLA SP RPOD STL QL NAA+PROBE: NEGATIVE
SAPO I+II+IV+V RNA STL QL NAA+NON-PROBE: NEGATIVE
SHIGELLA SP+EIEC IPAH ST NAA+NON-PROBE: NEGATIVE
SODIUM SERPL-SCNC: 141 MMOL/L (ref 135–145)
TSH SERPL DL<=0.005 MIU/L-ACNC: 2.36 UIU/ML (ref 0.3–4.2)
V CHOLERAE DNA SPEC QL NAA+PROBE: NEGATIVE
VIBRIO DNA SPEC NAA+PROBE: NEGATIVE
WBC # BLD AUTO: 8.8 10E3/UL (ref 4–11)
Y ENTEROCOL DNA STL QL NAA+PROBE: NEGATIVE

## 2024-09-03 PROCEDURE — 80053 COMPREHEN METABOLIC PANEL: CPT | Performed by: NURSE PRACTITIONER

## 2024-09-03 PROCEDURE — 85027 COMPLETE CBC AUTOMATED: CPT | Performed by: NURSE PRACTITIONER

## 2024-09-03 PROCEDURE — 83690 ASSAY OF LIPASE: CPT | Performed by: NURSE PRACTITIONER

## 2024-09-03 PROCEDURE — G2211 COMPLEX E/M VISIT ADD ON: HCPCS | Performed by: NURSE PRACTITIONER

## 2024-09-03 PROCEDURE — 84443 ASSAY THYROID STIM HORMONE: CPT | Performed by: NURSE PRACTITIONER

## 2024-09-03 PROCEDURE — 87507 IADNA-DNA/RNA PROBE TQ 12-25: CPT | Mod: GZ | Performed by: NURSE PRACTITIONER

## 2024-09-03 PROCEDURE — 99214 OFFICE O/P EST MOD 30 MIN: CPT | Performed by: NURSE PRACTITIONER

## 2024-09-03 PROCEDURE — 36415 COLL VENOUS BLD VENIPUNCTURE: CPT | Performed by: NURSE PRACTITIONER

## 2024-09-03 RX ORDER — PHENOL 1.4 %
AEROSOL, SPRAY (ML) MUCOUS MEMBRANE
COMMUNITY
Start: 2024-08-01

## 2024-09-03 ASSESSMENT — ENCOUNTER SYMPTOMS: DIARRHEA: 1

## 2024-09-03 NOTE — PROGRESS NOTES
"  Assessment & Plan     Diarrhea, unspecified type  Patient with diarrhea x 2 weeks.  Recommend stool studies and labs for further review.  Encouraged adequate hydration and simple carbohydrates.  May continue Imodium with continued stools, but I did discuss possible constipation with this medication.  - Enteric Bacteria and Virus Panel by JONATHAN Stool  - CBC with platelets  - Comprehensive metabolic panel (BMP + Alb, Alk Phos, ALT, AST, Total. Bili, TP)  - TSH with free T4 reflex  - Lipase      The longitudinal plan of care for the diagnosis(es)/condition(s) as documented were addressed during this visit. Due to the added complexity in care, I will continue to support Wendy in the subsequent management and with ongoing continuity of care.        BMI  Estimated body mass index is 28.52 kg/m  as calculated from the following:    Height as of this encounter: 1.6 m (5' 3\").    Weight as of this encounter: 73 kg (161 lb).       Ludy Nguyen is a 69 year old who presents with complaints of diarrhea for 2 weeks.  She is having up to 4 episodes a day of loose, urgent stools.  Associated symptoms include increased gas and lower abdominal discomfort.  She started having some bilateral low back pain this week.  Denies any fever, nausea/vomiting, or blood in her stools.  She is urinating normally.  Having a bowel movement improves her lower abdominal discomfort.  She has been feeling a little bit more tired, but otherwise well.  No dizziness or lightheadedness.  No recent travel or recent antibiotic use.  There is a positive family history of Crohn's disease.  Patient had a colonoscopy last year which showed 1 polyp.  She is due for repeat colonoscopy in 4 years.  Patient has been eating a bland diet and taking Imodium.  She has not yet had a loose stool today.    Diarrhea (Ongoing for 2 weeks ; now causing abdominal and lower back pain ; passing about 4 stools a day )      Diarrhea    History of Present Illness " "      Reason for visit:  Diarrhea for past two weeks.  Symptom onset:  1-2 weeks ago  Symptoms include:  Runny stools, gas, abdominal pain.  Symptom intensity:  Moderate  Symptom progression:  Staying the same  Had these symptoms before:  No  What makes it worse:  Eating  What makes it better:  Laying down   She is taking medications regularly.             Objective    /80 (BP Location: Right arm, Patient Position: Sitting, Cuff Size: Adult Regular)   Pulse 86   Temp 97.4  F (36.3  C) (Temporal)   Resp 16   Ht 1.6 m (5' 3\")   Wt 73 kg (161 lb)   LMP  (LMP Unknown)   SpO2 99%   BMI 28.52 kg/m    Body mass index is 28.52 kg/m .  Physical Exam   GENERAL: alert and no distress  RESP: lungs clear to auscultation - no rales, rhonchi or wheezes  CV: regular rate and rhythm, normal S1 S2, no S3 or S4, no murmur, click or rub, no peripheral edema   ABDOMEN: soft, nontender, no hepatosplenomegaly, no masses and bowel sounds normal  BACK: no CVA tenderness, no paralumbar tenderness            Signed Electronically by: Shahana Parker NP    "

## 2024-09-25 ENCOUNTER — OFFICE VISIT (OUTPATIENT)
Dept: FAMILY MEDICINE | Facility: CLINIC | Age: 69
End: 2024-09-25
Payer: COMMERCIAL

## 2024-09-25 ENCOUNTER — TELEPHONE (OUTPATIENT)
Dept: GASTROENTEROLOGY | Facility: CLINIC | Age: 69
End: 2024-09-25

## 2024-09-25 VITALS
DIASTOLIC BLOOD PRESSURE: 78 MMHG | OXYGEN SATURATION: 99 % | TEMPERATURE: 97.4 F | WEIGHT: 156 LBS | SYSTOLIC BLOOD PRESSURE: 120 MMHG | HEART RATE: 81 BPM | RESPIRATION RATE: 16 BRPM | BODY MASS INDEX: 27.64 KG/M2 | HEIGHT: 63 IN

## 2024-09-25 DIAGNOSIS — K52.9 CHRONIC DIARRHEA: Primary | ICD-10-CM

## 2024-09-25 PROCEDURE — 99213 OFFICE O/P EST LOW 20 MIN: CPT | Performed by: NURSE PRACTITIONER

## 2024-09-25 PROCEDURE — G2211 COMPLEX E/M VISIT ADD ON: HCPCS | Performed by: NURSE PRACTITIONER

## 2024-09-25 RX ORDER — BACLOFEN 20 MG
TABLET ORAL
COMMUNITY
Start: 2024-08-01

## 2024-09-25 NOTE — TELEPHONE ENCOUNTER
"Endoscopy Scheduling Screen    Have you had any respiratory illness or flu-like symptoms in the last 10 days?  No    What is your communication preference for Instructions and/or Bowel Prep?   MyChart    What insurance is in the chart?  Other:  [facility]    Ordering/Referring Provider: Shahana Parker NP in [facility]   (If ordering provider performs procedure, schedule with ordering provider unless otherwise instructed. )    BMI: Estimated body mass index is 27.63 kg/m  as calculated from the following:    Height as of an earlier encounter on 9/25/24: 1.6 m (5' 3\").    Weight as of an earlier encounter on 9/25/24: 70.8 kg (156 lb).     Sedation Ordered  moderate sedation.   If patient BMI > 50 do not schedule in ASC.    If patient BMI > 45 do not schedule at [facility].    Are you taking methadone or Suboxone?  NO, No RN review required.    Have you been diagnosed and are being treated for severe PTSD or severe anxiety?  NO, No RN review required.    Are you taking any prescription medications for pain 3 or more times per week?   NO, No RN review required.    Do you have a history of malignant hyperthermia?  No    (Females) Are you currently pregnant?   No     Have you been diagnosed or told you have pulmonary hypertension?   No    Do you have an LVAD?  No    Have you been told you have moderate to severe sleep apnea?  No.    Have you been told you have COPD, asthma, or any other lung disease?  No    Do you have any heart conditions?  Yes     In the past year, have you had any hospitalizations for heart related issues including cardiomyopathy, heart attack, or stent placement?  No    Do you have any implantable devices in your body (pacemaker, ICD)?  Other Heart Valve (TAVR)    Do you take nitroglycerine?  No    Have you ever had or are you waiting for an organ transplant?  No. Continue scheduling, no site restrictions.    Have you had a stroke or transient ischemic attack (TIA aka \"mini stroke\" in the last 6 " "months?   No    Have you been diagnosed with or been told you have cirrhosis of the liver?   No.    Are you currently on dialysis?   No    Do you need assistance transferring?   No    BMI: Estimated body mass index is 27.63 kg/m  as calculated from the following:    Height as of an earlier encounter on 9/25/24: 1.6 m (5' 3\").    Weight as of an earlier encounter on 9/25/24: 70.8 kg (156 lb).     Is patients BMI > 40 and scheduling location UP?  No    Do you take an injectable or oral medication for weight loss or diabetes (excluding insulin)?  No    Do you take the medication Naltrexone?  No    Do you take blood thinners?  No       Prep   Are you currently on dialysis or do you have chronic kidney disease?  No    Do you have a diagnosis of diabetes?  No    Do you have a diagnosis of cystic fibrosis (CF)?  No    On a regular basis do you go 3 -5 days between bowel movements?  No    BMI > 40?  No    Preferred Pharmacy:    [id number] [place] [address] [address]  Phone: [phone] Fax: [phone]    Final Scheduling Details     Procedure scheduled  Colonoscopy    Surgeon:  Matt    Date of procedure:  10.18.24      Pre-OP / PAC:   No - Not required for this site.    Location  [facility] - Patient preference.    Sedation   Moderate Sedation - Per order.      Patient Reminders:   You will receive a call from a Nurse to review instructions and health history.  This assessment must be completed prior to your procedure.  Failure to complete the Nurse assessment may result in the procedure being cancelled.      On the day of your procedure, please designate an adult(s) who can drive you home stay with you for the next 24 hours. The medicines used in the exam will make you sleepy. You will not be able to drive.      You cannot take public transportation, ride share services, or non-medical taxi service without a responsible caregiver.  Medical transport services are allowed " with the requirement that a responsible caregiver will receive you at your destination.  We require that drivers and caregivers are confirmed prior to your procedure.

## 2024-09-25 NOTE — PROGRESS NOTES
"  Assessment & Plan     Chronic diarrhea  Patient with diarrhea now lasting greater than 6 weeks.  Continues to have 3-5 loose/watery stools per day.  No longer having bowel movements overnight, which has been the only improvement.  Labs and stool studies were negative.  Recommend colonoscopy for further investigation.  Referral placed.  - Adult GI  Referral - Procedure Only    The longitudinal plan of care for the diagnosis(es)/condition(s) as documented were addressed during this visit. Due to the added complexity in care, I will continue to support Wendy in the subsequent management and with ongoing continuity of care.          BMI  Estimated body mass index is 27.63 kg/m  as calculated from the following:    Height as of this encounter: 1.6 m (5' 3\").    Weight as of this encounter: 70.8 kg (156 lb).       Ludy Nguyen is a 69 year old who presents for follow-up.  Patient initially seen about 3 weeks ago with complaints of diarrhea.  At that time, she had already been experiencing diarrhea for a couple of weeks.  Labs and stool studies were negative at that visit.  Patient continues to have 3-5 episodes of watery diarrhea per day.  Symptoms are worse first thing in the morning.  She gets some cramping right before having a bowel movement and sometimes at night, but otherwise no significant abdominal pain.  No nausea/vomiting.  The only improvement since our previous visit is that she is no longer needing to wake up overnight to have diarrhea.  Appetite remains low.  She continues following a BRAT diet.  She did utilize some Imodium in the first couple weeks of diarrhea, but did not notice much improvement.    Follow Up (Passing about 3-5 loose stools a day )    History of Present Illness       Reason for visit:  Diarrhea    She eats 0-1 servings of fruits and vegetables daily.She consumes 0 sweetened beverage(s) daily.She exercises with enough effort to increase her heart rate 10 to 19 minutes per " "day.  She exercises with enough effort to increase her heart rate 5 days per week.   She is taking medications regularly.               Objective    /78 (BP Location: Right arm, Patient Position: Sitting, Cuff Size: Adult Regular)   Pulse 81   Temp 97.4  F (36.3  C) (Temporal)   Resp 16   Ht 1.6 m (5' 3\")   Wt 70.8 kg (156 lb)   LMP  (LMP Unknown)   SpO2 99%   BMI 27.63 kg/m    Body mass index is 27.63 kg/m .  Physical Exam   GENERAL: alert and no distress            Signed Electronically by: Shahana Parker NP    "

## 2024-09-27 NOTE — TELEPHONE ENCOUNTER
Standard Miralax Bowel Prep recommended due to standard bowel prep. Instructions were sent via [employer].

## 2024-10-07 ENCOUNTER — MYC REFILL (OUTPATIENT)
Dept: FAMILY MEDICINE | Facility: CLINIC | Age: 69
End: 2024-10-07
Payer: COMMERCIAL

## 2024-10-07 ENCOUNTER — MYC MEDICAL ADVICE (OUTPATIENT)
Dept: FAMILY MEDICINE | Facility: CLINIC | Age: 69
End: 2024-10-07
Payer: COMMERCIAL

## 2024-10-07 DIAGNOSIS — Z95.2 S/P TAVR (TRANSCATHETER AORTIC VALVE REPLACEMENT): ICD-10-CM

## 2024-10-07 DIAGNOSIS — R05.1 ACUTE COUGH: Primary | ICD-10-CM

## 2024-10-07 RX ORDER — ALBUTEROL SULFATE 90 UG/1
2 INHALANT RESPIRATORY (INHALATION) EVERY 6 HOURS PRN
Qty: 18 G | Refills: 0 | Status: SHIPPED | OUTPATIENT
Start: 2024-10-07

## 2024-10-07 RX ORDER — AMOXICILLIN 500 MG/1
2000 CAPSULE ORAL
Qty: 4 CAPSULE | Refills: 0 | Status: SHIPPED | OUTPATIENT
Start: 2024-10-07

## 2024-10-07 RX ORDER — AMOXICILLIN 500 MG/1
2000 CAPSULE ORAL
Qty: 4 CAPSULE | Refills: 0 | OUTPATIENT
Start: 2024-10-07

## 2024-10-18 ENCOUNTER — HOSPITAL ENCOUNTER (OUTPATIENT)
Facility: CLINIC | Age: 69
Discharge: HOME | End: 2024-10-18
Attending: INTERNAL MEDICINE | Admitting: INTERNAL MEDICINE
Payer: COMMERCIAL

## 2024-10-18 VITALS
DIASTOLIC BLOOD PRESSURE: 74 MMHG | HEART RATE: 74 BPM | OXYGEN SATURATION: 97 % | RESPIRATION RATE: 12 BRPM | SYSTOLIC BLOOD PRESSURE: 107 MMHG

## 2024-10-18 LAB — COLONOSCOPY: NORMAL

## 2024-10-18 PROCEDURE — 250N000011 HC RX IP 250 OP 636: Performed by: INTERNAL MEDICINE

## 2024-10-18 PROCEDURE — G0500 MOD SEDAT ENDO SERVICE >5YRS: HCPCS | Performed by: INTERNAL MEDICINE

## 2024-10-18 PROCEDURE — 45380 COLONOSCOPY AND BIOPSY: CPT | Performed by: INTERNAL MEDICINE

## 2024-10-18 PROCEDURE — 45385 COLONOSCOPY W/LESION REMOVAL: CPT | Performed by: INTERNAL MEDICINE

## 2024-10-18 PROCEDURE — 88305 TISSUE EXAM BY PATHOLOGIST: CPT | Mod: TC | Performed by: INTERNAL MEDICINE

## 2024-10-18 RX ORDER — FLUMAZENIL 0.1 MG/ML
0.2 INJECTION, SOLUTION INTRAVENOUS
Status: DISCONTINUED | OUTPATIENT
Start: 2024-10-18 | End: 2024-10-18 | Stop reason: HOSPADM

## 2024-10-18 RX ORDER — ONDANSETRON 2 MG/ML
4 INJECTION INTRAMUSCULAR; INTRAVENOUS EVERY 6 HOURS PRN
Status: DISCONTINUED | OUTPATIENT
Start: 2024-10-18 | End: 2024-10-18 | Stop reason: HOSPADM

## 2024-10-18 RX ORDER — PROCHLORPERAZINE MALEATE 5 MG/1
5 TABLET ORAL EVERY 6 HOURS PRN
Status: DISCONTINUED | OUTPATIENT
Start: 2024-10-18 | End: 2024-10-18 | Stop reason: HOSPADM

## 2024-10-18 RX ORDER — NALOXONE HYDROCHLORIDE 0.4 MG/ML
0.2 INJECTION, SOLUTION INTRAMUSCULAR; INTRAVENOUS; SUBCUTANEOUS
Status: DISCONTINUED | OUTPATIENT
Start: 2024-10-18 | End: 2024-10-18 | Stop reason: HOSPADM

## 2024-10-18 RX ORDER — FENTANYL CITRATE 50 UG/ML
50-100 INJECTION, SOLUTION INTRAMUSCULAR; INTRAVENOUS EVERY 5 MIN PRN
Status: DISCONTINUED | OUTPATIENT
Start: 2024-10-18 | End: 2024-10-18 | Stop reason: HOSPADM

## 2024-10-18 RX ORDER — NALOXONE HYDROCHLORIDE 0.4 MG/ML
0.4 INJECTION, SOLUTION INTRAMUSCULAR; INTRAVENOUS; SUBCUTANEOUS
Status: DISCONTINUED | OUTPATIENT
Start: 2024-10-18 | End: 2024-10-18 | Stop reason: HOSPADM

## 2024-10-18 RX ORDER — LIDOCAINE 40 MG/G
CREAM TOPICAL
Status: DISCONTINUED | OUTPATIENT
Start: 2024-10-18 | End: 2024-10-18 | Stop reason: HOSPADM

## 2024-10-18 RX ORDER — ATROPINE SULFATE 0.1 MG/ML
1 INJECTION INTRAVENOUS
Status: DISCONTINUED | OUTPATIENT
Start: 2024-10-18 | End: 2024-10-18 | Stop reason: HOSPADM

## 2024-10-18 RX ORDER — DIPHENHYDRAMINE HYDROCHLORIDE 50 MG/ML
25-50 INJECTION INTRAMUSCULAR; INTRAVENOUS
Status: DISCONTINUED | OUTPATIENT
Start: 2024-10-18 | End: 2024-10-18 | Stop reason: HOSPADM

## 2024-10-18 RX ORDER — SIMETHICONE 40MG/0.6ML
133 SUSPENSION, DROPS(FINAL DOSAGE FORM)(ML) ORAL
Status: DISCONTINUED | OUTPATIENT
Start: 2024-10-18 | End: 2024-10-18 | Stop reason: HOSPADM

## 2024-10-18 RX ORDER — ONDANSETRON 2 MG/ML
4 INJECTION INTRAMUSCULAR; INTRAVENOUS
Status: DISCONTINUED | OUTPATIENT
Start: 2024-10-18 | End: 2024-10-18 | Stop reason: HOSPADM

## 2024-10-18 RX ORDER — ONDANSETRON 4 MG/1
4 TABLET, ORALLY DISINTEGRATING ORAL EVERY 6 HOURS PRN
Status: DISCONTINUED | OUTPATIENT
Start: 2024-10-18 | End: 2024-10-18 | Stop reason: HOSPADM

## 2024-10-18 RX ORDER — EPINEPHRINE 1 MG/ML
0.1 INJECTION, SOLUTION, CONCENTRATE INTRAVENOUS
Status: DISCONTINUED | OUTPATIENT
Start: 2024-10-18 | End: 2024-10-18 | Stop reason: HOSPADM

## 2024-10-18 RX ADMIN — FENTANYL CITRATE 50 MCG: 50 INJECTION, SOLUTION INTRAMUSCULAR; INTRAVENOUS at 11:34

## 2024-10-18 RX ADMIN — FENTANYL CITRATE 50 MCG: 50 INJECTION, SOLUTION INTRAMUSCULAR; INTRAVENOUS at 11:29

## 2024-10-18 RX ADMIN — MIDAZOLAM 2 MG: 1 INJECTION INTRAMUSCULAR; INTRAVENOUS at 11:29

## 2024-10-18 RX ADMIN — MIDAZOLAM 2 MG: 1 INJECTION INTRAMUSCULAR; INTRAVENOUS at 11:34

## 2024-10-18 ASSESSMENT — ACTIVITIES OF DAILY LIVING (ADL)
ADLS_ACUITY_SCORE: 36
ADLS_ACUITY_SCORE: 36

## 2024-10-18 NOTE — H&P
[facility]  Pre-Endoscopy History and Physical     Wendy Handy MRN# [id number]   YOB: 1955 Age: 69 year old     Date of Procedure: 10/18/2024  Primary care provider: Shahana Parker  Type of Endoscopy: colonoscopy  Reason for Procedure: diarrhea  Type of Anesthesia Anticipated: Moderate Sedation    HPI:    Wendy is a 69 year old female who will be undergoing the above procedure.      A history and physical has been performed. The patient's medications and allergies have been reviewed. The risks and benefits of the procedure and the sedation options and risks were discussed with the patient.  All questions were answered and informed consent was obtained.      She denies a personal or family history of anesthesia complications or bleeding disorders.     No Known Allergies     Prior to Admission Medications   Prescriptions Last Dose Informant Patient Reported? Taking?   Magnesium Oxide -Mg Supplement 500 MG TABS Past Week  Yes Yes   Melatonin 10 MG TABS tablet Past Week  Yes Yes   Omega-3 Fatty Acids (OMEGA-3 FISH OIL) 1200 MG CAPS Past Week  Yes Yes   Sig: Take 1 capsule by mouth daily    VITAMIN D3 50 MCG (2000 UT) tablet Past Week  No Yes   Sig: TAKE 1 TABLET BY MOUTH EVERY DAY   albuterol (PROAIR HFA/PROVENTIL HFA/VENTOLIN HFA) 108 (90 Base) MCG/ACT inhaler Past Month  No Yes   Sig: Inhale 2 puffs into the lungs every 6 hours as needed for shortness of breath, wheezing or cough.   amitriptyline (ELAVIL) 25 MG tablet 10/17/2024  No Yes   Sig: Take 1 tablet (25 mg) by mouth at bedtime   amoxicillin (AMOXIL) 500 MG capsule 10/17/2024  No Yes   Sig: Take 4 capsules (2,000 mg) by mouth once as needed (take 30-63 minutes prior to dental visit).   aspirin 81 MG EC tablet 10/17/2024  Yes Yes   Sig: Take 81 mg by mouth daily   atorvastatin (LIPITOR) 80 MG tablet 10/17/2024  No Yes   Sig: Take 1 tablet (80 mg) by mouth at bedtime   calcium carbonate-vitamin D (CALTRATE) 600-10 MG-MCG  per tablet Past Week  Yes Yes   Sig: Take 1 tablet by mouth daily   cyanocobalamin (VITAMIN B-12) 1000 MCG tablet Past Week  Yes Yes   Sig: Take 1,000 mcg by mouth daily    ezetimibe (ZETIA) 10 MG tablet   No Yes   Sig: Take 1 tablet (10 mg) by mouth daily   ibuprofen (ADVIL/MOTRIN) 800 MG tablet Past Month  No Yes   Sig: TAKE 1 TABLET BY MOUTH 3 TIMES  DAILY AS NEEDED TAKE WITH FOOD   polyethylene glycol-propylene glycol (SYSTANE) 0.4-0.3 % SOLN ophthalmic solution Past Week  Yes Yes   Sig: Place 1 drop into both eyes as needed for dry eyes   vitamin C (ASCORBIC ACID) 1000 MG TABS Past Week  Yes Yes   Sig: Take 1,000 mg by mouth daily    vitamin E 400 UNIT capsule Past Week  Yes Yes   Sig: Take 400 Units by mouth daily    zinc gluconate 50 MG tablet Past Week  Yes Yes   Sig: Take 50 mg by mouth daily       Facility-Administered Medications: None       Patient Active Problem List   Diagnosis    Benign familial tremor    Family history of vascular disease    Family history of psychiatric condition    Family history of tremor    Hyperlipidemia    Vertigo    History of vitamin D deficiency    Vitamin D deficiency    Papanicolaou smear of cervix with low grade squamous intraepithelial lesion (LGSIL)    Osteopenia    Systemic lupus erythematosus (H)    Primary insomnia    Aortic aneurysm (H)    Benign neoplasm of sigmoid colon    Coronary artery disease due to calcified coronary lesion    Discoid lupus erythematosus    Family history of colon cancer    H/O LEEP    Hemorrhoids    History of colonic polyps    Polyp of colon    S/P TAVR (transcatheter aortic valve replacement)    Aortic stenosis, severe    Arthritis of right shoulder region    Ocular migraine        Past Medical History:   Diagnosis Date    Aortic valve disorder     echo 1/09  4.2 cm TAA-MRA 4/2016      Bicuspid aortic valve     Coronary artery disease due to calcified coronary lesion 5/5/2016    Echo 6/2016   Good function  CT Ca++ 172 LAD  Cardiology  consult 4/016-nuclear stress    Family history of psychiatric condition     Family history of tremor     Family history of vascular disease     H/O LEEP 1/1/2007 2002-2006 LSIL paps with colposcopy, had LEEP in 2007. No records 6/2/10 ASCUS, +HR HPV 53 6/20/12 ASCUS, +HR HPV 53. Colposcopy outside HE/ system? 6/23/14 NIL 6/24/15 NIL pap, neg HPV 6/27/16 ASCUS, neg HPV 6/29/17 NIL pap, neg HPV 7/9/18 ASCUS, +HR HPV 16. Unclear if colposcopy was completed 7/10/19 NIL pap, neg HPV 7/13/20 NIL pap, neg HPV 2/1/21 NIL pap, neg HPV. Plan: await provider    History of aortic stenosis     History of vitamin D deficiency     Hypercholesteremia     Hyperlipidemia     Laboratory test 8/18/2016    Reading Physician Reading Date Result Priority    Patricia Orta MD 8/16/2016       Narrative       Examination: Nuclear medicine DATscan for Dopamine Receptor Localization.    Examination: NM BRAIN IMAGING TOMOGRAPHIC (SPECT) DATSCAN  Date: 8/16/2016 3:38 PM  Indication: Right sided postural tremor.   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      Osteopenia 2/15/2015    Other and unspecified hyperlipidemia     Created by Conversion     Papanicolaou smear of cervix with low grade squamous intraepithelial lesion (LGSIL)      LGSIL 2002  colpoLGSIL 2003  colpoLGSIL 4/2006 colpoLGSIL 11/06 colpoAbnormal 5/07 colpo  CHCWLEEPendometrial biopsy 5/07     Plantar fasciitis of right foot     Primary insomnia     Snores     Tremor     Vertigo     Vitamin D deficiency     Wears glasses         Past Surgical History:   Procedure Laterality Date    BLEPHAROPLASTY      CARDIAC CATHETERIZATION  05/30/2017    No intervention    CARDIAC CATHETERIZATION N/A 5/30/2017    Procedure:  Coronary Angiogram;  Surgeon: Torito Metzger MD;  Location: [facility];  Service:     CHOLECYSTECTOMY      COLONOSCOPY N/A 10/4/2023    Procedure: COLONOSCOPY, WITH POLYPECTOMY;  Surgeon: Leventhal, Thomas Michael, MD;  Location: [facility] [place]    CONIZATION CERVIX,LOOP ELECTRD      Description: Cervical Conization Loop Electrode Excision;  Recorded: 05/29/2008;    CV CORONARY ANGIOGRAM N/A 6/9/2022    Procedure: Coronary Angiogram;  Surgeon: Anabela Hernandez MD;  Location: [facility]    CV CORONARY ANGIOGRAM  6/9/2022    Procedure: ;  Surgeon: Anabela Hernandez MD;  Location: [facility]    CV TRANSCATHETER AORTIC VALVE REPLACEMENT-FEMORAL APPROACH N/A 8/23/2022    Procedure: Transcatheter Aortic Valve Replacement-Femoral Approach;  Surgeon: Anabela Hernandez MD;  Location: [facility]    FACIAL RECONSTRUCTION SURGERY      forehead as well    HC CLOSED TX MANDIBLE FX W/O MANIP      Description: Closed Treatment Of Mandibular Fracture;  Recorded: 05/29/2008;    HC DILATION/CURETTAGE DIAG/THER NON OB      Description: Dilation And Curettage;  Recorded: 05/29/2008;  Comments: X 2  metrorhhagia    LAPAROSCOPIC CHOLECYSTECTOMY N/A 2/23/2015    Procedure: CHOLECYSTECTOMY LAPAROSCOPIC;  Surgeon: Doug Webber MD;  Location: [facility];  Service:     OR TRANSCATHETER AORTIC VALVE REPLACEMENT, FEMORAL PERCUTANEOUS APPROACH (STANDBY) N/A 8/23/2022    Procedure: OR TRANSCATHETER AORTIC VALVE REPLACEMENT, FEMORAL PERCUTANEOUS APPROACH (STANDBY);  Surgeon: Katy Rojas MD;  Location: [facility]    ZZC ORAL SURGERY PROCEDURE      jaw fracture       Social History     Tobacco Use    Smoking status: Never    Smokeless tobacco: Never   Substance Use Topics    Alcohol use: Yes     Alcohol/week: 2.0 standard drinks of alcohol     Comment: occasion       Family History   Problem Relation Age of Onset    Heart Disease Father     Heart Disease Sister     Heart Disease Sister   "   Mental Illness Mother 30.00        schizophrenia, bipolar    Mental Illness Sister         bipolar    Colon Cancer Father     Hyperlipidemia Brother     Hyperlipidemia Brother     Hyperlipidemia Sister     Cervical Cancer Daughter     Diabetes Type 2  Brother     Diabetes Type 2  Brother     Tremor Father     Tremor Paternal Aunt     Tremor Paternal Grandfather     Tremor Son     Tremor Cousin         Paternal 1st cousin    Attention Deficit Disorder Son     Neurologic Disorder Son         Tourette Syndrome     Schizophrenia Mother     Schizophrenia Sister     Mental Illness Sister         depression    Diabetes Brother         type 2    Mental Illness Brother         depression       REVIEW OF SYSTEMS:     5 point ROS negative except as noted above in HPI, including Gen., Resp., CV, GI &  system review.      PHYSICAL EXAM:   /79   Pulse 78   Resp 18   LMP  (LMP Unknown)   SpO2 96%  Estimated body mass index is 27.63 kg/m  as calculated from the following:    Height as of 9/25/24: 1.6 m (5' 3\").    Weight as of 9/25/24: 70.8 kg (156 lb).   GENERAL APPEARANCE: healthy, alert, and no distress  MENTAL STATUS: alert  AIRWAY EXAM: Mallampatti Class II (visualization of the soft palate, fauces, and uvula)  RESP: lungs clear to auscultation - no rales, rhonchi or wheezes  CV: regular rates and rhythm      DIAGNOSTICS:    Not indicated      IMPRESSION   ASA Class 2 - Mild systemic disease        PLAN:       Plan for colonoscopy. We discussed the risks, benefits and alternatives and the patient wished to proceed.    The above has been forwarded to the consulting provider.      Signed Electronically by: Renato Gutierrez MD,MD  October 18, 2024      [facility]  Ph: [phone] Fax: [phone]      "

## 2024-10-22 LAB
PATH REPORT.COMMENTS IMP SPEC: NORMAL
PATH REPORT.FINAL DX SPEC: NORMAL
PATH REPORT.GROSS SPEC: NORMAL
PATH REPORT.MICROSCOPIC SPEC OTHER STN: NORMAL
PATH REPORT.MICROSCOPIC SPEC OTHER STN: NORMAL
PATH REPORT.RELEVANT HX SPEC: NORMAL
PHOTO IMAGE: NORMAL

## 2024-10-22 PROCEDURE — 88305 TISSUE EXAM BY PATHOLOGIST: CPT | Mod: 26 | Performed by: PATHOLOGY

## 2024-10-22 PROCEDURE — 88342 IMHCHEM/IMCYTCHM 1ST ANTB: CPT | Mod: 26 | Performed by: PATHOLOGY

## 2024-12-25 DIAGNOSIS — M77.11 LATERAL EPICONDYLITIS OF RIGHT ELBOW: ICD-10-CM

## 2024-12-25 DIAGNOSIS — E78.00 PURE HYPERCHOLESTEROLEMIA: ICD-10-CM

## 2024-12-26 RX ORDER — EZETIMIBE 10 MG/1
10 TABLET ORAL DAILY
Qty: 100 TABLET | Refills: 1 | Status: SHIPPED | OUTPATIENT
Start: 2024-12-26

## 2024-12-26 RX ORDER — IBUPROFEN 800 MG/1
TABLET, FILM COATED ORAL
Qty: 300 TABLET | Refills: 2 | Status: SHIPPED | OUTPATIENT
Start: 2024-12-26

## 2024-12-26 RX ORDER — ATORVASTATIN CALCIUM 80 MG/1
80 TABLET, FILM COATED ORAL AT BEDTIME
Qty: 100 TABLET | Refills: 1 | Status: SHIPPED | OUTPATIENT
Start: 2024-12-26

## 2025-01-22 DIAGNOSIS — G43.109 OCULAR MIGRAINE: ICD-10-CM

## 2025-01-22 NOTE — TELEPHONE ENCOUNTER
Refill request for: amitriptyline (ELAVIL) 25 MG tablet    Directions: Take 1 tablet (25 mg) by mouth at bedtime     LOV: 03/12/24  NOV: 03/13/25    100 day supply with 0 refills Medication T'd for review and signature    Candida Hayes LPN on 1/22/2025 at 3:17 PM

## 2025-03-08 ASSESSMENT — HEADACHE IMPACT TEST (HIT 6)
HOW OFTEN HAVE YOU FELT FED UP OR IRRITATED BECAUSE OF YOUR HEADACHES: NEVER
HOW OFTEN DID HEADACHS LIMIT CONCENTRATION ON WORK OR DAILY ACTIVITY: RARELY
WHEN YOU HAVE A HEADACHE HOW OFTEN DO YOU WISH YOU COULD LIE DOWN: NEVER
HOW OFTEN DO HEADACHES LIMIT YOUR DAILY ACTIVITIES: RARELY
HIT6 TOTAL SCORE: 40
HOW OFTEN HAVE YOU FELT TOO TIRED TO WORK BECAUSE OF YOUR HEADACHES: NEVER
WHEN YOU HAVE HEADACHES HOW OFTEN IS THE PAIN SEVERE: NEVER

## 2025-03-09 ENCOUNTER — HEALTH MAINTENANCE LETTER (OUTPATIENT)
Age: 70
End: 2025-03-09

## 2025-03-10 NOTE — PROGRESS NOTES
NEUROLOGY FOLLOW UP VISIT  NOTE       [facility]UR[facility] [place]  [address] [place] [address]  Tel: [phone]  Fax: [phone]  [email or web address]     Wendy Handy,  1955, MRN [id number]  PCP: Shahana Parker  Date: 2025      ASSESSMENT & PLAN     Visit Diagnosis  Ocular migraine     Ocular migraine  69-year-old female with SLE, CAD s/p TAVR, benign familial tremor who returns for follow-up for ocular migraine.  She was started on amitriptyline in the past that did not seem to help.  Prior to that she has tried trazodone.  She is reluctant to use any medication as she feels visual symptoms couple of times a week is more of a nuisance.  She is taking multivitamins and I have encouraged her to take magnesium and riboflavin that might help her migraine.  She will taper herself off of amitriptyline.  Follow-up will be in as needed basis.    Thank you again for this referral, please feel free to contact me if you have any questions.    Sourav Jim MD  [facility]     HISTORY OF PRESENT ILLNESS     Patient is a 69-year-old female with SLE, CAD s/p TAVR, benign familial tremor last seen on 3/12/2024 for ocular migraine who returns for follow-up.  Her symptoms are not associated with headache and previously was reluctant to try any prophylactic agent.  She was on trazodone to help her with sleep and was switched to amitriptyline and plan was to consider titrating the dose depending on her response.  Workup in the past included MRI brain that was normal and she reports at least couple of times a week she gets colored squiggly lines in her visual field that lasts for a few minutes to 15 minutes and then resolved.  It is not associated with any headache but occasionally gets dizziness.  Patient reports that in spite of taking amitriptyline she did not notice any change in her symptoms and was having episodes at least couple of times a week.  She  cannot identify any triggers and the symptoms are not associated with any headaches.     PROBLEM LIST   Patient Active Problem List   Diagnosis    Benign familial tremor    Family history of vascular disease    Family history of psychiatric condition    Family history of tremor    Hyperlipidemia    Vertigo    History of vitamin D deficiency    Vitamin D deficiency    Papanicolaou smear of cervix with low grade squamous intraepithelial lesion (LGSIL)    Osteopenia    Systemic lupus erythematosus (H)    Primary insomnia    Aortic aneurysm    Benign neoplasm of sigmoid colon    Coronary artery disease due to calcified coronary lesion    Discoid lupus erythematosus    Family history of colon cancer    H/O LEEP    Hemorrhoids    History of colonic polyps    Polyp of colon    S/P TAVR (transcatheter aortic valve replacement)    Aortic stenosis, severe    Arthritis of right shoulder region    Ocular migraine         PAST MEDICAL & SURGICAL HISTORY     Past Medical History:   Patient  has a past medical history of Aortic valve disorder, Bicuspid aortic valve, Coronary artery disease due to calcified coronary lesion (5/5/2016), Family history of psychiatric condition, Family history of tremor, Family history of vascular disease, H/O LEEP (1/1/2007), History of aortic stenosis, History of vitamin D deficiency, Hypercholesteremia, Hyperlipidemia, Laboratory test (8/18/2016), Low back pain, Lupus erythematosus, Nocardia infection, Osteopenia (2/15/2015), Other and unspecified hyperlipidemia, Papanicolaou smear of cervix with low grade squamous intraepithelial lesion (LGSIL), Plantar fasciitis of right foot, Primary insomnia, Snores, Tremor, Vertigo, Vitamin D deficiency, and Wears glasses.    Surgical History:  She  has a past surgical history that includes ORAL SURGERY PROCEDURE; Cholecystectomy; Blepharoplasty; Facial reconstruction surgery; conization cervix,loop electrd; DILATION/CURETTAGE DIAG/THER NON OB; CLOSED RX  MANDIBLE FX; Laparoscopic cholecystectomy (N/A, 2/23/2015); Cardiac catheterization (05/30/2017); Cardiac catheterization (N/A, 5/30/2017); Coronary Angiogram (N/A, 6/9/2022); Coronary Angiogram (6/9/2022); Transcatheter Aortic Valve Replacement-Femoral Approach (N/A, 8/23/2022); Or Transcatheter Aortic Valve Replacement, Femoral Percutaneous Approach ([place]) (N/A, 8/23/2022); Colonoscopy (N/A, 10/4/2023); Colonoscopy (N/A, 10/18/2024); and Colonoscopy (N/A, 10/18/2024).     SOCIAL HISTORY     Reviewed, and she  reports that she has never smoked. She has never used smokeless tobacco. She reports current alcohol use of about 2.0 standard drinks of alcohol per week. She reports that she does not use drugs.     FAMILY HISTORY     Reviewed, and family history includes Attention Deficit Disorder in her son; Cervical Cancer in her daughter; Colon Cancer in her father; Diabetes in her brother; Diabetes Type 2  in her brother and brother; Heart Disease in her father, sister, and sister; Hyperlipidemia in her brother, brother, and sister; Mental Illness in her brother, sister, and sister; Mental Illness (age of onset: 30.00) in her mother; Neurologic Disorder in her son; Schizophrenia in her mother and sister; Tremor in her cousin, father, paternal aunt, paternal grandfather, and son.     ALLERGIES     No Known Allergies      REVIEW OF SYSTEMS     A 12 point review of system was performed and was negative except as outlined in the history of present illness.     HOME MEDICATIONS     Current Outpatient Rx   Medication Sig Dispense Refill    aspirin 81 MG EC tablet Take 81 mg by mouth daily      atorvastatin (LIPITOR) 80 MG tablet TAKE 1 TABLET BY MOUTH AT  BEDTIME 100 tablet 1    calcium carbonate-vitamin D (CALTRATE) 600-10 MG-MCG per tablet Take 1 tablet by mouth daily      cyanocobalamin (VITAMIN B-12) 1000 MCG tablet Take 1,000 mcg by mouth daily       ezetimibe (ZETIA) 10 MG tablet TAKE 1 TABLET BY MOUTH DAILY 100  "tablet 1    ibuprofen (ADVIL/MOTRIN) 800 MG tablet TAKE 1 TABLET BY MOUTH 3 TIMES  DAILY AS NEEDED TAKE WITH FOOD 300 tablet 2    Magnesium Oxide -Mg Supplement 500 MG TABS       Melatonin 10 MG TABS tablet       Omega-3 Fatty Acids (OMEGA-3 FISH OIL) 1200 MG CAPS Take 1 capsule by mouth daily       polyethylene glycol-propylene glycol (SYSTANE) 0.4-0.3 % SOLN ophthalmic solution Place 1 drop into both eyes as needed for dry eyes      vitamin C (ASCORBIC ACID) 1000 MG TABS Take 1,000 mg by mouth daily       VITAMIN D3 50 MCG (2000 UT) tablet TAKE 1 TABLET BY MOUTH EVERY DAY 90 tablet 3    vitamin E 400 UNIT capsule Take 400 Units by mouth daily       zinc gluconate 50 MG tablet Take 50 mg by mouth daily            PHYSICAL EXAM     Vital signs  /70   Pulse 80   Ht 1.6 m (5' 3\")   Wt 70.3 kg (155 lb)   LMP  (LMP Unknown)   BMI 27.46 kg/m      Weight:   155 lbs 0 oz    Elderly female who is alert and oriented vital signs are reviewed and documented in electronic medical record neck supple. Neurologically speech was normal. Cranial nerves II through XII are intact. Motor strength 5/5. Reflexes 1+ toes downgoing. No dysmetria noted on finger-nose testing gait normal Romberg negative.      PERTINENT DIAGNOSTIC STUDIES     Following studies were reviewed:     MRI BRAIN 11/19/2023  1.  No evidence of acute intracranial hemorrhage, mass effect, or infarction.  2.  Mild nonspecific white matter changes.  3.  Mild brain parenchymal volume loss.     PERTINENT LABS  Following labs were reviewed:  No visits with results within 3 Month(s) from this visit.   Latest known visit with results is:   Admission on 10/18/2024, Discharged on 10/18/2024   Component Date Value Ref Range Status    Case Report 10/18/2024    Final                    Value:Surgical Pathology Report                         Case: [id number]                                  Authorizing Provider:  Renato Gutierrez MD       Collected:           " 10/18/2024 11:41 AM          Ordering Location:     [facility]          Received:            10/18/2024 12:19 PM                                 [facility]                                                         Pathologist:           Andrew Solomon MD                                                          Specimens:   A) - Large Intestine, Colon, Random colon biopsies r/o microscopic colitis                          B) - Rectum, rectum polyp                                                                  Final Diagnosis 10/18/2024    Final                    Value:A(1).  Colon, random locations, biopsies:                          -Colonic mucosa with lymphocytic colitis pattern (see comment).                          -Patchy prominent intraepithelial lymphocytes identified.                          -Negative for dysplasia or malignancy                                                                              B(2). Colon, Rectum, polyp, polypectomy:                          -Tubular adenoma                          -Negative for high-grade dysplasia and malignancy.                                                        Comment 10/18/2024    Final                    Value: While this pattern of inflammation may be consistent with lymphocytic                           colitis, a drug-associated etiology should also be considered.     Clinical Information 10/18/2024    Final                    Value:Procedure:                          Colonoscopy with biopsies and polypectomy using regular forceps and exacto                           snare                          COLONOSCOPY, FLEXIBLE, WITH LESION REMOVAL USING SNARE                          Pre-op Diagnosis: Chronic diarrhea [K52.9]                          Post-op Diagnosis: K52.9 - Chronic diarrhea [ICD-10-CM]    Gross Description 10/18/2024    Final                    Value:A(1). Large Intestine, Colon, Random colon biopsies r/o  microscopic                           colitis:                          The specimen is received in formalin, labeled with the patient's name,                           medical record number and other identifying information and designated                            random colon biopsies rule out microscopic colitis . It consists of                           multiple tan soft tissue fragments ranging from 0.1-0.3 cm. Entirely                           submitted in one cassette.                                                    B(2). Rectum, rectum polyp:                          The specimen is received in formalin, labeled with the patient's name,                           medical record number and other identifying information and designated                            rectum polyp . It consists of a single tan soft tissue fragment measuring                           0.6 cm. Entirely submitted in one cassette.                           JENNIFER Ye(ASCP)CM 10/18/2024 1:32 PM     Microscopic Description 10/18/2024    Final                    Value:Microscopic examination was performed.                              Special Stains 10/18/2024    Final                    Value:Patchy prominent intraepithelial lymphocytes identified with CD3 IHC                          All controls stain appropriately.                              Performing Labs 10/18/2024    Final                    Value:The technical component of this testing was completed at [facility].                                                    Stain controls for all stains resulted within this report have been                           reviewed and show appropriate reactivity.     COLONOSCOPY 10/18/2024    Final                    Value:[facility]  _______________________________________________________________________________  Patient  Name: Wendy Handy          Procedure Date: 10/18/2024 11:16 AM  MRN: [id number]                       Account Number: [id number]  YOB: 1955              Admit Type: Outpatient  Age: 69                               Gender: Female  Attending MD: MEMO SANCHEZ MD,   Total Sedation Time: approximately  min  Instrument Name: [id number] Adult Colonoscope   _______________________________________________________________________________     Procedure:                Colonoscopy  Indications:              Diarrhea, Family history of colon cancer, Personal                             history of colonic polyps  Providers:                MEMO SANCHEZ MD (Doctor)  Patient Profile:          This is a 69 year old female here for 2 months of                             diarrhea; +FH CRC (father, age 80s). TA 2023.  Referring MD:             MENG RAMIREZ                          OM, APRN, CNP (Referring MD)  Medicines:                Midazolam 4 mg IV, Fentanyl 100 micrograms IV  Complications:            No immediate complications. Estimated blood loss:                             None.  _______________________________________________________________________________  Procedure:                Pre-Anesthesia Assessment:                            - Prior to the procedure, a History and Physical                             was performed, and patient medications and                             allergies were reviewed. The patient is competent.                             The risks and benefits of the procedure and the                             sedation options and risks were discussed with the                             patient. All questions were answered and informed                             consent was obtained. Patient identification and                             proposed procedure were verified by the physician                             and the nu                          rse in the procedure  room. Mental Status                             Examination: alert and oriented. Airway                             Examination: normal oropharyngeal airway and neck                             mobility. Respiratory Examination: clear to                             auscultation. CV Examination: normal. Prophylactic                             Antibiotics: The patient does not require                             prophylactic antibiotics. Prior Anticoagulants: The                             patient has taken no anticoagulant or antiplatelet                             agents. ASA Grade Assessment: II - A patient with                             mild systemic disease. After reviewing the risks                             and benefits, the patient was deemed in                             satisfactory condition to undergo the procedure.                             The anesthesia plan was to use moderate sedation /                             analgesia (conscious sedat                          ion). Immediately prior                             to administration of medications, the patient was                             re-assessed for adequacy to receive sedatives. The                             heart rate, respiratory rate, oxygen saturations,                             blood pressure, adequacy of pulmonary ventilation,                             and response to care were monitored throughout the                             procedure. The physical status of the patient was                             re-assessed after the procedure.                            After obtaining informed consent, the colonoscope                             was passed under direct vision. Throughout the                             procedure, the patient's blood pressure, pulse, and                             oxygen saturations were monitored continuously. The                             Olympus Adult Colonovideoscope, Model #                              [id number], [facility]# [id number],                           # [id number]                             was introduced through the anus and advanced to the                             terminal ileum, with identification of the                             appendiceal orifice and IC valve. The colonoscopy                             was performed without difficulty. The patient                             tolerated the procedure well. The quality of the                             bowel preparation was evaluated using the BBPS                             ([place] Bowel Preparation Scale) with scores of:                             Right Colon = 2 (minor amount of residual staining,                             small fragments of stool and/or opaque liquid, but                             mucosa seen well), Transverse Colon = 3 (entire                             mucosa seen well with no residual staining, small                             fragments of stool or opaque liquid) and Left Colon                             = 3 (entire mucosa seen well wi                          th no residual                             staining, small fragments of stool or opaque                             liquid). The total BBPS score equals 8. The quality                             of the bowel preparation was good.                                                                                   Findings:       The terminal ileum appeared normal.       Normal mucosa was found in the entire colon. Biopsies for histology were        taken with a cold forceps from the cecum, ascending colon, transverse        colon, descending colon and sigmoid colon for evaluation of microscopic        colitis.       A 2 mm polyp was found in the rectum. The polyp was sessile. The polyp        was removed with a cold snare. Resection and retrieval were complete.       Non-bleeding internal hemorrhoids were found during retroflexion.  The        hemorrhoids were small.                                                                                   Moderate Sedation:       Moderate (consci                          ous) sedation was administered by the nurse and        supervised by the endoscopist. The following parameters were monitored:        oxygen saturation, heart rate, blood pressure, and response to care.        Total physician intraservice time was 18 minutes.  Impression:               - The examined portion of the ileum was normal.                            - Normal mucosa in the entire examined colon.                             Biopsied.                            - One 2 mm polyp in the rectum, removed with a cold                             snare. Resected and retrieved.                            - Non-bleeding internal hemorrhoids.  Recommendation:           - Await pathology results.                            - Repeat colonoscopy in 5 years for surveillance.                            - High fiber diet.                            - Use fiber, for example Citrucel, Fibercon, Konsyl                             or Metamucil.                            - Return to my office in 4                           weeks.                            - Our office will contact patient with biopsy                             result in 1-2 weeks. Please contact our Office at                             [phone] at [facility] with any                             questions.                                                                                   Procedure Code(s):       --- Professional ---       39984, Colonoscopy, flexible; with removal of tumor(s), polyp(s), or        other lesion(s) by snare technique       12691, 59, Colonoscopy, flexible; with biopsy, single or multiple       , Moderate sedation services provided by the same physician or        other qualified health care professional performing a  gastrointestinal        endoscopic service that sedation supports, requiring the presence of an        independent trained observer to assist in the monitoring of the        patient's level of consciousness and physiological status; initial 15        minutes of intra-servic                          e time; patient age 5 years or older (additional        time may be reported with 37267, as appropriate)  Diagnosis Code(s):       --- Professional ---       K64.8, Other hemorrhoids       D12.8, Benign neoplasm of rectum       R19.7, Diarrhea, unspecified       Z80.0, Family history of malignant neoplasm of digestive organs       Z86.010, Personal history of colonic polyps    CPT copyright 2022 [employer]. All rights reserved.    The codes documented in this report are preliminary and upon  review may   be revised to meet current compliance requirements.    Electronic Signature by Dr. Renato Sanchez  ___________________  RENATO SANCHEZ MD  10/18/2024 11:52:10 AM  I was physically present for the entire viewing portion of the exam.  __________________________RENATO SANCHEZ MD  Number of Addenda: 0    Note Initiated On: 10/18/2024 11:16 AM  MRN:                      [id number]  Procedure Date:           10/18/2024 11:16:19 AM  Scope Withdrawal Time: 0 hours 9 mi                          nutes 50 seconds   Total Procedure Duration: 0 hours 12 minutes 54 seconds   Estimated Blood Loss:       Scope In: 11:34:12 AM  Scope Out: 11:47:06 AM           Total time spent for face to face visit, reviewing labs/imaging studies, counseling and coordination of care was: 30 Minutes spent on the date of the encounter doing chart review, review of outside records, review of test results, interpretation of tests, patient visit, and documentation     The longitudinal plan of care for the diagnosis(es)/condition(s) as documented were addressed during this visit. Due to the added complexity in care, I will continue to  support Wendy in the subsequent management and with ongoing continuity of care.    This note was dictated using voice recognition software.  Any grammatical or context distortions are unintentional and inherent to the software.    No orders of the defined types were placed in this encounter.     New Prescriptions    No medications on file     Modified Medications    No medications on file

## 2025-03-13 ENCOUNTER — OFFICE VISIT (OUTPATIENT)
Dept: NEUROLOGY | Facility: CLINIC | Age: 70
End: 2025-03-13
Payer: COMMERCIAL

## 2025-03-13 ENCOUNTER — TRANSFERRED RECORDS (OUTPATIENT)
Dept: HEALTH INFORMATION MANAGEMENT | Facility: CLINIC | Age: 70
End: 2025-03-13

## 2025-03-13 VITALS
WEIGHT: 155 LBS | DIASTOLIC BLOOD PRESSURE: 70 MMHG | HEART RATE: 80 BPM | HEIGHT: 63 IN | SYSTOLIC BLOOD PRESSURE: 105 MMHG | BODY MASS INDEX: 27.46 KG/M2

## 2025-03-13 DIAGNOSIS — G43.109 OCULAR MIGRAINE: Primary | ICD-10-CM

## 2025-03-13 NOTE — NURSING NOTE
Chief Complaint   Patient presents with    Headache     Patient states she is having 2 migraines per week. Follow up     Myrtle Lazaro on 3/13/2025 at 9:41 AM

## 2025-03-13 NOTE — LETTER
3/13/2025      Wendy Handy  [address] [facility] [address]      Dear Colleague,    Thank you for referring your patient, Wendy Handy, to the [facility] [place]. Please see a copy of my visit note below.    NEUROLOGY FOLLOW UP VISIT  NOTE       [facility] NEUROLOGY [place]  [address] [place] [address]  Tel: [phone]  Fax: [phone]  [email or web address]     Wendy Handy,  1955, MRN [id number]  PCP: Shahana Parker  Date: 2025      ASSESSMENT & PLAN     Visit Diagnosis  Ocular migraine     Ocular migraine  69-year-old female with SLE, CAD s/p TAVR, benign familial tremor who returns for follow-up for ocular migraine.  She was started on amitriptyline in the past that did not seem to help.  Prior to that she has tried trazodone.  She is reluctant to use any medication as she feels visual symptoms couple of times a week is more of a nuisance.  She is taking multivitamins and I have encouraged her to take magnesium and riboflavin that might help her migraine.  She will taper herself off of amitriptyline.  Follow-up will be in as needed basis.    Thank you again for this referral, please feel free to contact me if you have any questions.    Sourav Jim MD  [facility] NEUROLOGY, [place]     HISTORY OF PRESENT ILLNESS     Patient is a 69-year-old female with SLE, CAD s/p TAVR, benign familial tremor last seen on 3/12/2024 for ocular migraine who returns for follow-up.  Her symptoms are not associated with headache and previously was reluctant to try any prophylactic agent.  She was on trazodone to help her with sleep and was switched to amitriptyline and plan was to consider titrating the dose depending on her response.  Workup in the past included MRI brain that was normal and she reports at least couple of times a week she gets colored squiggly lines in her visual field that lasts for a few minutes to 15 minutes and then  resolved.  It is not associated with any headache but occasionally gets dizziness.  Patient reports that in spite of taking amitriptyline she did not notice any change in her symptoms and was having episodes at least couple of times a week.  She cannot identify any triggers and the symptoms are not associated with any headaches.     PROBLEM LIST   Patient Active Problem List   Diagnosis     Benign familial tremor     Family history of vascular disease     Family history of psychiatric condition     Family history of tremor     Hyperlipidemia     Vertigo     History of vitamin D deficiency     Vitamin D deficiency     Papanicolaou smear of cervix with low grade squamous intraepithelial lesion (LGSIL)     Osteopenia     Systemic lupus erythematosus (H)     Primary insomnia     Aortic aneurysm     Benign neoplasm of sigmoid colon     Coronary artery disease due to calcified coronary lesion     Discoid lupus erythematosus     Family history of colon cancer     H/O LEEP     Hemorrhoids     History of colonic polyps     Polyp of colon     S/P TAVR (transcatheter aortic valve replacement)     Aortic stenosis, severe     Arthritis of right shoulder region     Ocular migraine         PAST MEDICAL & SURGICAL HISTORY     Past Medical History:   Patient  has a past medical history of Aortic valve disorder, Bicuspid aortic valve, Coronary artery disease due to calcified coronary lesion (5/5/2016), Family history of psychiatric condition, Family history of tremor, Family history of vascular disease, H/O LEEP (1/1/2007), History of aortic stenosis, History of vitamin D deficiency, Hypercholesteremia, Hyperlipidemia, Laboratory test (8/18/2016), Low back pain, Lupus erythematosus, Nocardia infection, Osteopenia (2/15/2015), Other and unspecified hyperlipidemia, Papanicolaou smear of cervix with low grade squamous intraepithelial lesion (LGSIL), Plantar fasciitis of right foot, Primary insomnia, Snores, Tremor, Vertigo, Vitamin D  deficiency, and Wears glasses.    Surgical History:  She  has a past surgical history that includes ORAL SURGERY PROCEDURE; Cholecystectomy; Blepharoplasty; Facial reconstruction surgery; conization cervix,loop electrd; DILATION/CURETTAGE DIAG/THER NON OB; CLOSED RX MANDIBLE FX; Laparoscopic cholecystectomy (N/A, 2/23/2015); Cardiac catheterization (05/30/2017); Cardiac catheterization (N/A, 5/30/2017); Coronary Angiogram (N/A, 6/9/2022); Coronary Angiogram (6/9/2022); Transcatheter Aortic Valve Replacement-Femoral Approach (N/A, 8/23/2022); Or Transcatheter Aortic Valve Replacement, Femoral Percutaneous Approach ([place]) (N/A, 8/23/2022); Colonoscopy (N/A, 10/4/2023); Colonoscopy (N/A, 10/18/2024); and Colonoscopy (N/A, 10/18/2024).     SOCIAL HISTORY     Reviewed, and she  reports that she has never smoked. She has never used smokeless tobacco. She reports current alcohol use of about 2.0 standard drinks of alcohol per week. She reports that she does not use drugs.     FAMILY HISTORY     Reviewed, and family history includes Attention Deficit Disorder in her son; Cervical Cancer in her daughter; Colon Cancer in her father; Diabetes in her brother; Diabetes Type 2  in her brother and brother; Heart Disease in her father, sister, and sister; Hyperlipidemia in her brother, brother, and sister; Mental Illness in her brother, sister, and sister; Mental Illness (age of onset: 30.00) in her mother; Neurologic Disorder in her son; Schizophrenia in her mother and sister; Tremor in her cousin, father, paternal aunt, paternal grandfather, and son.     ALLERGIES     No Known Allergies      REVIEW OF SYSTEMS     A 12 point review of system was performed and was negative except as outlined in the history of present illness.     HOME MEDICATIONS     Current Outpatient Rx   Medication Sig Dispense Refill     aspirin 81 MG EC tablet Take 81 mg by mouth daily       atorvastatin (LIPITOR) 80 MG tablet TAKE 1 TABLET BY MOUTH AT   "BEDTIME 100 tablet 1     calcium carbonate-vitamin D (CALTRATE) 600-10 MG-MCG per tablet Take 1 tablet by mouth daily       cyanocobalamin (VITAMIN B-12) 1000 MCG tablet Take 1,000 mcg by mouth daily        ezetimibe (ZETIA) 10 MG tablet TAKE 1 TABLET BY MOUTH DAILY 100 tablet 1     ibuprofen (ADVIL/MOTRIN) 800 MG tablet TAKE 1 TABLET BY MOUTH 3 TIMES  DAILY AS NEEDED TAKE WITH FOOD 300 tablet 2     Magnesium Oxide -Mg Supplement 500 MG TABS        Melatonin 10 MG TABS tablet        Omega-3 Fatty Acids (OMEGA-3 FISH OIL) 1200 MG CAPS Take 1 capsule by mouth daily        polyethylene glycol-propylene glycol (SYSTANE) 0.4-0.3 % SOLN ophthalmic solution Place 1 drop into both eyes as needed for dry eyes       vitamin C (ASCORBIC ACID) 1000 MG TABS Take 1,000 mg by mouth daily        VITAMIN D3 50 MCG (2000 UT) tablet TAKE 1 TABLET BY MOUTH EVERY DAY 90 tablet 3     vitamin E 400 UNIT capsule Take 400 Units by mouth daily        zinc gluconate 50 MG tablet Take 50 mg by mouth daily            PHYSICAL EXAM     Vital signs  /70   Pulse 80   Ht 1.6 m (5' 3\")   Wt 70.3 kg (155 lb)   LMP  (LMP Unknown)   BMI 27.46 kg/m      Weight:   155 lbs 0 oz    Elderly female who is alert and oriented vital signs are reviewed and documented in electronic medical record neck supple. Neurologically speech was normal. Cranial nerves II through XII are intact. Motor strength 5/5. Reflexes 1+ toes downgoing. No dysmetria noted on finger-nose testing gait normal Romberg negative.      PERTINENT DIAGNOSTIC STUDIES     Following studies were reviewed:     MRI BRAIN 11/19/2023  1.  No evidence of acute intracranial hemorrhage, mass effect, or infarction.  2.  Mild nonspecific white matter changes.  3.  Mild brain parenchymal volume loss.     PERTINENT LABS  Following labs were reviewed:  No visits with results within 3 Month(s) from this visit.   Latest known visit with results is:   Admission on 10/18/2024, Discharged on 10/18/2024 "   Component Date Value Ref Range Status     Case Report 10/18/2024    Final                    Value:Surgical Pathology Report                         Case: [id number]                                  Authorizing Provider:  Renato Gutierrez MD       Collected:           10/18/2024 11:41 AM          Ordering Location:     [facility]          Received:            10/18/2024 12:19 PM                                 [facility]                                                         Pathologist:           Andrew Solomon MD                                                          Specimens:   A) - Large Intestine, Colon, Random colon biopsies r/o microscopic colitis                          B) - Rectum, rectum polyp                                                                   Final Diagnosis 10/18/2024    Final                    Value:A(1).  Colon, random locations, biopsies:                          -Colonic mucosa with lymphocytic colitis pattern (see comment).                          -Patchy prominent intraepithelial lymphocytes identified.                          -Negative for dysplasia or malignancy                                                                              B(2). Colon, Rectum, polyp, polypectomy:                          -Tubular adenoma                          -Negative for high-grade dysplasia and malignancy.                                                         Comment 10/18/2024    Final                    Value: While this pattern of inflammation may be consistent with lymphocytic                           colitis, a drug-associated etiology should also be considered.      Clinical Information 10/18/2024    Final                    Value:Procedure:                          Colonoscopy with biopsies and polypectomy using regular forceps and exacto                           snare                          COLONOSCOPY, FLEXIBLE, WITH LESION REMOVAL USING SNARE                           Pre-op Diagnosis: Chronic diarrhea [K52.9]                          Post-op Diagnosis: K52.9 - Chronic diarrhea [ICD-10-CM]     Gross Description 10/18/2024    Final                    Value:A(1). Large Intestine, Colon, Random colon biopsies r/o microscopic                           colitis:                          The specimen is received in formalin, labeled with the patient's name,                           medical record number and other identifying information and designated                            random colon biopsies rule out microscopic colitis . It consists of                           multiple tan soft tissue fragments ranging from 0.1-0.3 cm. Entirely                           submitted in one cassette.                                                    B(2). Rectum, rectum polyp:                          The specimen is received in formalin, labeled with the patient's name,                           medical record number and other identifying information and designated                            rectum polyp . It consists of a single tan soft tissue fragment measuring                           0.6 cm. Entirely submitted in one cassette.                           JENNIFER Ye(ASCP)CM 10/18/2024 1:32 PM      Microscopic Description 10/18/2024    Final                    Value:Microscopic examination was performed.                               Special Stains 10/18/2024    Final                    Value:Patchy prominent intraepithelial lymphocytes identified with CD3 IHC                          All controls stain appropriately.                               Performing Labs 10/18/2024    Final                    Value:The technical component of this testing was completed at [facility].                                                    Stain controls for all stains resulted within this  report have been                           reviewed and show appropriate reactivity.      COLONOSCOPY 10/18/2024    Final                    Value:[facility]  _______________________________________________________________________________  Patient Name: Wendy Handy          Procedure Date: 10/18/2024 11:16 AM  MRN: [id number]                       Account Number: [id number]  YOB: 1955              Admit Type: Outpatient  Age: 69                               Gender: Female  Attending MD: MEMO SANCHEZ MD,   Total Sedation Time: approximately  min  Instrument Name: [id number] Adult Colonoscope   _______________________________________________________________________________     Procedure:                Colonoscopy  Indications:              Diarrhea, Family history of colon cancer, Personal                             history of colonic polyps  Providers:                MEMO SANCHEZ MD (Doctor)  Patient Profile:          This is a 69 year old female here for 2 months of                             diarrhea; +FH CRC (father, age 80s). TA 2023.  Referring MD:             MENG RAMIREZ OM, APRN, CNP (Referring MD)  Medicines:                Midazolam 4 mg IV, Fentanyl 100 micrograms IV  Complications:            No immediate complications. Estimated blood loss:                             None.  _______________________________________________________________________________  Procedure:                Pre-Anesthesia Assessment:                            - Prior to the procedure, a History and Physical                             was performed, and patient medications and                             allergies were reviewed. The patient is competent.                             The risks and benefits of the procedure and the                             sedation options and risks were discussed with the                             patient. All  questions were answered and informed                             consent was obtained. Patient identification and                             proposed procedure were verified by the physician                             and the nu                          rse in the procedure room. Mental Status                             Examination: alert and oriented. Airway                             Examination: normal oropharyngeal airway and neck                             mobility. Respiratory Examination: clear to                             auscultation. CV Examination: normal. Prophylactic                             Antibiotics: The patient does not require                             prophylactic antibiotics. Prior Anticoagulants: The                             patient has taken no anticoagulant or antiplatelet                             agents. ASA Grade Assessment: II - A patient with                             mild systemic disease. After reviewing the risks                             and benefits, the patient was deemed in                             satisfactory condition to undergo the procedure.                             The anesthesia plan was to use moderate sedation /                             analgesia (conscious sedat                          ion). Immediately prior                             to administration of medications, the patient was                             re-assessed for adequacy to receive sedatives. The                             heart rate, respiratory rate, oxygen saturations,                             blood pressure, adequacy of pulmonary ventilation,                             and response to care were monitored throughout the                             procedure. The physical status of the patient was                             re-assessed after the procedure.                            After obtaining informed consent, the colonoscope                             was  passed under direct vision. Throughout the                             procedure, the patient's blood pressure, pulse, and                             oxygen saturations were monitored continuously. The                             Olympus Adult Colonovideoscope, Model #                             [id number], Censitrac# [id number],                           SN# [id number]                             was introduced through the anus and advanced to the                             terminal ileum, with identification of the                             appendiceal orifice and IC valve. The colonoscopy                             was performed without difficulty. The patient                             tolerated the procedure well. The quality of the                             bowel preparation was evaluated using the BBPS                             (Jefferson Bowel Preparation Scale) with scores of:                             Right Colon = 2 (minor amount of residual staining,                             small fragments of stool and/or opaque liquid, but                             mucosa seen well), Transverse Colon = 3 (entire                             mucosa seen well with no residual staining, small                             fragments of stool or opaque liquid) and Left Colon                             = 3 (entire mucosa seen well wi                          th no residual                             staining, small fragments of stool or opaque                             liquid). The total BBPS score equals 8. The quality                             of the bowel preparation was good.                                                                                   Findings:       The terminal ileum appeared normal.       Normal mucosa was found in the entire colon. Biopsies for histology were        taken with a cold forceps from the cecum, ascending colon, transverse        colon, descending colon and  sigmoid colon for evaluation of microscopic        colitis.       A 2 mm polyp was found in the rectum. The polyp was sessile. The polyp        was removed with a cold snare. Resection and retrieval were complete.       Non-bleeding internal hemorrhoids were found during retroflexion. The        hemorrhoids were small.                                                                                   Moderate Sedation:       Moderate (consci                          ous) sedation was administered by the nurse and        supervised by the endoscopist. The following parameters were monitored:        oxygen saturation, heart rate, blood pressure, and response to care.        Total physician intraservice time was 18 minutes.  Impression:               - The examined portion of the ileum was normal.                            - Normal mucosa in the entire examined colon.                             Biopsied.                            - One 2 mm polyp in the rectum, removed with a cold                             snare. Resected and retrieved.                            - Non-bleeding internal hemorrhoids.  Recommendation:           - Await pathology results.                            - Repeat colonoscopy in 5 years for surveillance.                            - High fiber diet.                            - Use fiber, for example Citrucel, Fibercon, Konsyl                             or Metamucil.                            - Return to my office in 4                           weeks.                            - Our office will contact patient with biopsy                             result in 1-2 weeks. Please contact our Office at                             [phone] at [facility] with any                             questions.                                                                                   Procedure Code(s):       --- Professional ---       97750, Colonoscopy, flexible; with removal of  tumor(s), polyp(s), or        other lesion(s) by snare technique       23655, 59, Colonoscopy, flexible; with biopsy, single or multiple       , Moderate sedation services provided by the same physician or        other qualified health care professional performing a gastrointestinal        endoscopic service that sedation supports, requiring the presence of an        independent trained observer to assist in the monitoring of the        patient's level of consciousness and physiological status; initial 15        minutes of intra-servic                          e time; patient age 5 years or older (additional        time may be reported with 51513, as appropriate)  Diagnosis Code(s):       --- Professional ---       K64.8, Other hemorrhoids       D12.8, Benign neoplasm of rectum       R19.7, Diarrhea, unspecified       Z80.0, Family history of malignant neoplasm of digestive organs       Z86.010, Personal history of colonic polyps    CPT copyright 2022 [employer] Medical [employer]. All rights reserved.    The codes documented in this report are preliminary and upon  review may   be revised to meet current compliance requirements.    Electronic Signature by Dr. Renato Sanchez  ___________________  RENATO SANCHEZ MD  10/18/2024 11:52:10 AM  I was physically present for the entire viewing portion of the exam.  __________________________RENATO SANCHEZ MD  Number of Addenda: 0    Note Initiated On: 10/18/2024 11:16 AM  MRN:                      [id number]  Procedure Date:           10/18/2024 11:16:19 AM  Scope Withdrawal Time: 0 hours 9 mi                          nutes 50 seconds   Total Procedure Duration: 0 hours 12 minutes 54 seconds   Estimated Blood Loss:       Scope In: 11:34:12 AM  Scope Out: 11:47:06 AM           Total time spent for face to face visit, reviewing labs/imaging studies, counseling and coordination of care was: 30 Minutes spent on the date of the encounter doing chart review, review of  outside records, review of test results, interpretation of tests, patient visit, and documentation     The longitudinal plan of care for the diagnosis(es)/condition(s) as documented were addressed during this visit. Due to the added complexity in care, I will continue to support Wendy in the subsequent management and with ongoing continuity of care.    This note was dictated using voice recognition software.  Any grammatical or context distortions are unintentional and inherent to the software.    No orders of the defined types were placed in this encounter.     New Prescriptions    No medications on file     Modified Medications    No medications on file                 Again, thank you for allowing me to participate in the care of your patient.        Sincerely,        Sourav Jim MD    Electronically signed

## 2025-03-16 SDOH — HEALTH STABILITY: PHYSICAL HEALTH: ON AVERAGE, HOW MANY DAYS PER WEEK DO YOU ENGAGE IN MODERATE TO STRENUOUS EXERCISE (LIKE A BRISK WALK)?: 5 DAYS

## 2025-03-16 SDOH — HEALTH STABILITY: PHYSICAL HEALTH: ON AVERAGE, HOW MANY MINUTES DO YOU ENGAGE IN EXERCISE AT THIS LEVEL?: 40 MIN

## 2025-03-16 ASSESSMENT — SOCIAL DETERMINANTS OF HEALTH (SDOH): HOW OFTEN DO YOU GET TOGETHER WITH FRIENDS OR RELATIVES?: ONCE A WEEK

## 2025-03-18 ENCOUNTER — OFFICE VISIT (OUTPATIENT)
Dept: FAMILY MEDICINE | Facility: CLINIC | Age: 70
End: 2025-03-18
Attending: NURSE PRACTITIONER
Payer: COMMERCIAL

## 2025-03-18 ENCOUNTER — HOSPITAL ENCOUNTER (OUTPATIENT)
Dept: MAMMOGRAPHY | Facility: CLINIC | Age: 70
Discharge: HOME | End: 2025-03-18
Attending: NURSE PRACTITIONER | Admitting: NURSE PRACTITIONER
Payer: COMMERCIAL

## 2025-03-18 VITALS
HEIGHT: 64 IN | OXYGEN SATURATION: 99 % | HEART RATE: 86 BPM | TEMPERATURE: 98.3 F | BODY MASS INDEX: 26.8 KG/M2 | SYSTOLIC BLOOD PRESSURE: 110 MMHG | DIASTOLIC BLOOD PRESSURE: 70 MMHG | WEIGHT: 157 LBS | RESPIRATION RATE: 14 BRPM

## 2025-03-18 DIAGNOSIS — I25.84 CORONARY ARTERY DISEASE DUE TO CALCIFIED CORONARY LESION: ICD-10-CM

## 2025-03-18 DIAGNOSIS — Z12.31 VISIT FOR SCREENING MAMMOGRAM: ICD-10-CM

## 2025-03-18 DIAGNOSIS — I25.10 CORONARY ARTERY DISEASE DUE TO CALCIFIED CORONARY LESION: ICD-10-CM

## 2025-03-18 DIAGNOSIS — Z00.00 ENCOUNTER FOR MEDICARE ANNUAL WELLNESS EXAM: Primary | ICD-10-CM

## 2025-03-18 DIAGNOSIS — Z95.2 S/P TAVR (TRANSCATHETER AORTIC VALVE REPLACEMENT): ICD-10-CM

## 2025-03-18 DIAGNOSIS — M77.11 LATERAL EPICONDYLITIS OF RIGHT ELBOW: ICD-10-CM

## 2025-03-18 DIAGNOSIS — G43.109 OCULAR MIGRAINE: ICD-10-CM

## 2025-03-18 DIAGNOSIS — M32.9 SYSTEMIC LUPUS ERYTHEMATOSUS, UNSPECIFIED SLE TYPE, UNSPECIFIED ORGAN INVOLVEMENT STATUS (H): ICD-10-CM

## 2025-03-18 DIAGNOSIS — Z78.0 POSTMENOPAUSAL STATUS: ICD-10-CM

## 2025-03-18 DIAGNOSIS — L60.8 NAIL DISCOLORATION: ICD-10-CM

## 2025-03-18 LAB
ALBUMIN SERPL BCG-MCNC: 4.7 G/DL (ref 3.5–5.2)
ALP SERPL-CCNC: 76 U/L (ref 40–150)
ALT SERPL W P-5'-P-CCNC: 23 U/L (ref 0–50)
ANION GAP SERPL CALCULATED.3IONS-SCNC: 11 MMOL/L (ref 7–15)
AST SERPL W P-5'-P-CCNC: 25 U/L (ref 0–45)
BILIRUB SERPL-MCNC: 1 MG/DL
BUN SERPL-MCNC: 13 MG/DL (ref 8–23)
CALCIUM SERPL-MCNC: 10.3 MG/DL (ref 8.8–10.4)
CHLORIDE SERPL-SCNC: 103 MMOL/L (ref 98–107)
CHOLEST SERPL-MCNC: 128 MG/DL
CREAT SERPL-MCNC: 0.82 MG/DL (ref 0.51–0.95)
EGFRCR SERPLBLD CKD-EPI 2021: 77 ML/MIN/1.73M2
FASTING STATUS PATIENT QL REPORTED: YES
FASTING STATUS PATIENT QL REPORTED: YES
GLUCOSE SERPL-MCNC: 85 MG/DL (ref 70–99)
HCO3 SERPL-SCNC: 25 MMOL/L (ref 22–29)
HDLC SERPL-MCNC: 61 MG/DL
LDLC SERPL CALC-MCNC: 49 MG/DL
NONHDLC SERPL-MCNC: 67 MG/DL
POTASSIUM SERPL-SCNC: 4.2 MMOL/L (ref 3.4–5.3)
PROT SERPL-MCNC: 7.3 G/DL (ref 6.4–8.3)
SODIUM SERPL-SCNC: 139 MMOL/L (ref 135–145)
TRIGL SERPL-MCNC: 92 MG/DL

## 2025-03-18 PROCEDURE — 80061 LIPID PANEL: CPT | Performed by: NURSE PRACTITIONER

## 2025-03-18 PROCEDURE — 36415 COLL VENOUS BLD VENIPUNCTURE: CPT | Performed by: NURSE PRACTITIONER

## 2025-03-18 PROCEDURE — 77063 BREAST TOMOSYNTHESIS BI: CPT

## 2025-03-18 PROCEDURE — 80053 COMPREHEN METABOLIC PANEL: CPT | Performed by: NURSE PRACTITIONER

## 2025-03-18 RX ORDER — AMITRIPTYLINE HYDROCHLORIDE 10 MG/1
TABLET ORAL
COMMUNITY
Start: 2024-03-16

## 2025-03-18 RX ORDER — AMOXICILLIN 500 MG/1
2000 CAPSULE ORAL ONCE
Qty: 4 CAPSULE | Refills: 1 | Status: SHIPPED | OUTPATIENT
Start: 2025-03-18 | End: 2025-03-18

## 2025-03-18 RX ORDER — EZETIMIBE 10 MG/1
10 TABLET ORAL DAILY
Qty: 100 TABLET | Refills: 3 | Status: SHIPPED | OUTPATIENT
Start: 2025-03-18

## 2025-03-18 RX ORDER — IBUPROFEN 800 MG/1
TABLET, FILM COATED ORAL
Qty: 60 TABLET | Refills: 1 | Status: SHIPPED | OUTPATIENT
Start: 2025-03-18

## 2025-03-18 RX ORDER — ATORVASTATIN CALCIUM 80 MG/1
80 TABLET, FILM COATED ORAL AT BEDTIME
Qty: 100 TABLET | Refills: 3 | Status: SHIPPED | OUTPATIENT
Start: 2025-03-18

## 2025-03-18 NOTE — PATIENT INSTRUCTIONS
Patient Education   Preventive Care Advice   This is general advice given by our system to help you stay healthy. However, your care team may have specific advice just for you. Please talk to your care team about your preventive care needs.  Nutrition  Eat 5 or more servings of fruits and vegetables each day.  Try wheat bread, brown rice and whole grain pasta (instead of white bread, rice, and pasta).  Get enough calcium and vitamin D. Check the label on foods and aim for 100% of the RDA (recommended daily allowance).  Lifestyle  Exercise at least 150 minutes each week  (30 minutes a day, 5 days a week).  Do muscle strengthening activities 2 days a week. These help control your weight and prevent disease.  No smoking.  Wear sunscreen to prevent skin cancer.  Have a dental exam and cleaning every 6 months.  Yearly exams  See your health care team every year to talk about:  Any changes in your health.  Any medicines your care team has prescribed.  Preventive care, family planning, and ways to prevent chronic diseases.  Shots (vaccines)   HPV shots (up to age 26), if you've never had them before.  Hepatitis B shots (up to age 59), if you've never had them before.  COVID-19 shot: Get this shot when it's due.  Flu shot: Get a flu shot every year.  Tetanus shot: Get a tetanus shot every 10 years.  Pneumococcal, hepatitis A, and RSV shots: Ask your care team if you need these based on your risk.  Shingles shot (for age 50 and up)  General health tests  Diabetes screening:  Starting at age 35, Get screened for diabetes at least every 3 years.  If you are younger than age 35, ask your care team if you should be screened for diabetes.  Cholesterol test: At age 39, start having a cholesterol test every 5 years, or more often if advised.  Bone density scan (DEXA): At age 50, ask your care team if you should have this scan for osteoporosis (brittle bones).  Hepatitis C: Get tested at least once in your life.  STIs (sexually  transmitted infections)  Before age 24: Ask your care team if you should be screened for STIs.  After age 24: Get screened for STIs if you're at risk. You are at risk for STIs (including HIV) if:  You are sexually active with more than one person.  You don't use condoms every time.  You or a partner was diagnosed with a sexually transmitted infection.  If you are at risk for HIV, ask about PrEP medicine to prevent HIV.  Get tested for HIV at least once in your life, whether you are at risk for HIV or not.  Cancer screening tests  Cervical cancer screening: If you have a cervix, begin getting regular cervical cancer screening tests starting at age 21.  Breast cancer scan (mammogram): If you've ever had breasts, begin having regular mammograms starting at age 40. This is a scan to check for breast cancer.  Colon cancer screening: It is important to start screening for colon cancer at age 45.  Have a colonoscopy test every 10 years (or more often if you're at risk) Or, ask your provider about stool tests like a FIT test every year or Cologuard test every 3 years.  To learn more about your testing options, visit:   .  For help making a decision, visit:   https://bit.ly/nr76483.  Prostate cancer screening test: If you have a prostate, ask your care team if a prostate cancer screening test (PSA) at age 55 is right for you.  Lung cancer screening: If you are a current or former smoker ages 50 to 80, ask your care team if ongoing lung cancer screenings are right for you.  For informational purposes only. Not to replace the advice of your health care provider. Copyright   2023 [facility] [employer]. All rights reserved. Clinically reviewed by the [facility]itions [facility]. "[employer]" 158159 - REV 01/24.  Bladder Training: Care Instructions  Your Care Instructions     Bladder training is used to treat urge incontinence and stress incontinence. Urge incontinence means that the need to urinate comes on so fast  that you can't get to a toilet in time. Stress incontinence means that you leak urine because of pressure on your bladder. For example, it may happen when you laugh, cough, or lift something heavy.  Bladder training can increase how long you can wait before you have to urinate. It can also help your bladder hold more urine. And it can give you better control over the urge to urinate.  It is important to remember that bladder training takes a few weeks to a few months to make a difference. You may not see results right away, but don't give up.  Follow-up care is a key part of your treatment and safety. Be sure to make and go to all appointments, and call your doctor if you are having problems. It's also a good idea to know your test results and keep a list of the medicines you take.  How can you care for yourself at home?  Work with your doctor to come up with a bladder training program that is right for you. You may use one or more of the following methods.  Delayed urination  In the beginning, try to keep from urinating for 5 minutes after you first feel the need to go.  While you wait, take deep, slow breaths to relax. Kegel exercises can also help you delay the need to go to the bathroom.  After some practice, when you can easily wait 5 minutes to urinate, try to wait 10 minutes before you urinate.  Slowly increase the waiting period until you are able to control when you have to urinate.  Scheduled urination  Empty your bladder when you first wake up in the morning.  Schedule times throughout the day when you will urinate.  Start by going to the bathroom every hour, even if you don't need to go.  Slowly increase the time between trips to the bathroom.  When you have found a schedule that works well for you, keep doing it.  If you wake up during the night and have to urinate, do it. Apply your schedule to waking hours only.  Kegel exercises  These tighten and strengthen pelvic muscles, which can help you control  "the flow of urine. (If doing these exercises causes pain, stop doing them and talk with your doctor.) To do Kegel exercises:  Squeeze your muscles as if you were trying not to pass gas. Or squeeze your muscles as if you were stopping the flow of urine. Your belly, legs, and buttocks shouldn't move.  Hold the squeeze for 3 seconds, then relax for 5 to 10 seconds.  Start with 3 seconds, then add 1 second each week until you are able to squeeze for 10 seconds.  Repeat the exercise 10 times a session. Do 3 to 8 sessions a day.  When should you call for help?  Watch closely for changes in your health, and be sure to contact your doctor if:    Your incontinence is getting worse.     You do not get better as expected.   Where can you learn more?  Go to https://www.[employer].net/patiented  Enter V684 in the search box to learn more about \"Bladder Training: Care Instructions.\"  Current as of: April 30, 2024  Content Version: 14.4    0554-6264 [employer].   Care instructions adapted under license by your healthcare professional. If you have questions about a medical condition or this instruction, always ask your healthcare professional. [employer] disclaims any warranty or liability for your use of this information.       "

## 2025-03-18 NOTE — PROGRESS NOTES
"Preventive Care Visit  [facility]  Shahana Parker NP, Family Medicine  Mar 18, 2025      Assessment & Plan     Encounter for Medicare annual wellness exam    Coronary artery disease due to calcified coronary lesion  - Lipid panel reflex to direct LDL Fasting  - Comprehensive metabolic panel (BMP + Alb, Alk Phos, ALT, AST, Total. Bili, TP)  - ezetimibe (ZETIA) 10 MG tablet  Dispense: 100 tablet; Refill: 3  - atorvastatin (LIPITOR) 80 MG tablet  Dispense: 100 tablet; Refill: 3    Systemic lupus erythematosus, unspecified SLE type, unspecified organ involvement status (H)  In remission.  Previously on Plaquenil.     Lateral epicondylitis of right elbow  Using ibuprofen infrequently.   - ibuprofen (ADVIL/MOTRIN) 800 MG tablet  Dispense: 60 tablet; Refill: 1    Postmenopausal status  Last DEXA in 2021 showed osteopenia.  - DX Bone Density    S/P TAVR (transcatheter aortic valve replacement)  Follows with cardiology regularly.   - amoxicillin (AMOXIL) 500 MG capsule  Dispense: 4 capsule; Refill: 1    Ocular migraine  Following with neurology.  Getting these about 3x per week.  Will be weaning off Amitriptyline as it is not beneficial.     Nail discoloration  Reassured that her dermatologist has seen this.  Watchful waiting.       The longitudinal plan of care for the diagnosis(es)/condition(s) as documented were addressed during this visit. Due to the added complexity in care, I will continue to support Wendy in the subsequent management and with ongoing continuity of care.        BMI  Estimated body mass index is 27.38 kg/m  as calculated from the following:    Height as of this encounter: 1.613 m (5' 3.5\").    Weight as of this encounter: 71.2 kg (157 lb).   Weight management plan: Discussed healthy diet and exercise guidelines    Counseling  Appropriate preventive services were addressed with this patient via screening, questionnaire, or discussion as appropriate for fall prevention, nutrition, " physical activity, Tobacco-use cessation, social engagement, weight loss and cognition.  Checklist reviewing preventive services available has been given to the patient.  Reviewed patient's diet, addressing concerns and/or questions.   Information on urinary incontinence and treatment options given to patient.           Ludy Nguyen is a 69 year old, presenting for the following:  Annual Visit (Fasting ; black lines on finger nails ; rx for amoxicillin going to dentist )    Patient is doing well.  She has enjoyed walking with the warmer weather.    Recently saw neurology for ocular migraines.  She does not get any pain, but gets left sided vision changes about 3 times a week.  Currently on amitriptyline, but will be weaning off of this as it has not been beneficial.  Has some issue with sleep and has been using melatonin.    Patient reports vertical black lines are on her bilateral thumb nails.  She did have her dermatologist look at this who was not alarmed.  He stated that a cancer would generally start at the base of the nail/cuticle.    Denies any other questions or concerns today.  Will hold off on a COVID booster today.    Advance Care Planning  Patient does not have a Health Care Directive: Discussed advance care planning with patient; however, patient declined at this time.      3/16/2025   General Health   How would you rate your overall physical health? Good   Feel stress (tense, anxious, or unable to sleep) Not at all         3/16/2025   Nutrition   Diet: Regular (no restrictions)         3/16/2025   Exercise   Days per week of moderate/strenous exercise 5 days   Average minutes spent exercising at this level 40 min         3/16/2025   Social Factors   Frequency of gathering with friends or relatives Once a week   Worry food won't last until get money to buy more No   Food not last or not have enough money for food? No   Do you have housing? (Housing is defined as stable permanent housing and does  not include staying ouside in a car, in a tent, in an abandoned building, in an overnight shelter, or couch-surfing.) Yes   Are you worried about losing your housing? No   Lack of transportation? No   Unable to get utilities (heat,electricity)? No         3/16/2025   Fall Risk   Fallen 2 or more times in the past year? No   Trouble with walking or balance? No          3/16/2025   Activities of Daily Living- Home Safety   Needs help with the following daily activites None of the above   Safety concerns in the home None of the above         3/16/2025   Dental   Dentist two times every year? Yes         3/16/2025   Hearing Screening   Hearing concerns? None of the above         3/16/2025   Driving Risk Screening   Patient/family members have concerns about driving No         3/16/2025   General Alertness/Fatigue Screening   Have you been more tired than usual lately? No         3/16/2025   Urinary Incontinence Screening   Bothered by leaking urine in past 6 months Yes           3/6/2024   TB Screening   Were you born outside of the [place]? No           Today's PHQ-2 Score:       3/18/2025     7:36 AM   PHQ-2 ( 1999 Pf[employer])   Q1: Little interest or pleasure in doing things 0   Q2: Feeling down, depressed or hopeless 0   PHQ-2 Score 0    Q1: Little interest or pleasure in doing things Not at all   Q2: Feeling down, depressed or hopeless Not at all   PHQ-2 Score 0       Patient-reported           3/16/2025   Substance Use   Alcohol more than 3/day or more than 7/wk No   Do you have a current opioid prescription? No   How severe/bad is pain from 1 to 10? 2/10   Do you use any other substances recreationally? No     Social History     Tobacco Use    Smoking status: Never    Smokeless tobacco: Never   Vaping Use    Vaping status: Never Used   Substance Use Topics    Alcohol use: Yes     Alcohol/week: 2.0 standard drinks of alcohol     Comment: occasion    Drug use: No           3/18/2025   LAST FHS-7 RESULTS   1st degree  relative breast or ovarian cancer No   Any relative bilateral breast cancer No   Any male have breast cancer No   Any ONE woman have BOTH breast AND ovarian cancer No   Any woman with breast cancer before 50yrs No   2 or more relatives with breast AND/OR ovarian cancer No   2 or more relatives with breast AND/OR bowel cancer No              History of abnormal Pap smear: No - age 65 or older with adequate negative prior screening test results (3 consecutive negative cytology results, 2 consecutive negative cotesting results, or 2 consecutive negative HrHPV test results within 10 years, with the most recent test occurring within the recommended screening interval for the test used)        Latest Ref Rng & Units 3/15/2024    11:38 AM 2/1/2021     3:15 PM 7/13/2020     8:53 AM   PAP / HPV   PAP  Negative for Intraepithelial Lesion or Malignancy (NILM)  Negative for squamous intraepithelial lesion or malignancy  Electronically signed by Irina Davenport CT (ASCP) on 2/10/2021 at  1:38 PM    Negative for squamous intraepithelial lesion or malignancy  Electronically signed by Balbir Martinez CT (ASCP) on 7/22/2020 at 11:24 AM      HPV 16 DNA Negative Negative  Negative  Negative    HPV 18 DNA Negative Negative  Negative  Negative    Other HR HPV Negative Negative  Negative  Negative      ASCVD Risk   The ASCVD Risk score (Liane DK, et al., 2019) failed to calculate for the following reasons:    The valid total cholesterol range is 130 to 320 mg/dL            Reviewed and updated as needed this visit by Provider   Tobacco  Allergies  Meds  Problems  Med Hx  Surg Hx  Fam Hx              Current providers sharing in care for this patient include:  Patient Care Team:  Shahana Parker NP as PCP - General (Family Medicine)  Shahana Parker NP as Nurse Practitioner (Family Medicine)  Kate Kong APRN CNP as Nurse Practitioner (Colon & Rectal)  Shahana Parker NP as Assigned  "PCP  Sourav Jim MD as MD (Neurology)  Sourav Jim MD as Assigned Neuroscience Provider  Teodora Platt MD as Assigned Heart and Vascular Provider    The following health maintenance items are reviewed in Epic and correct as of today:  Health Maintenance   Topic Date Due    HF ACTION PLAN  Never done    BMP  03/03/2025    LIPID  03/15/2025    COVID-19 Vaccine (8 - 2024-25 season) 03/15/2025    ALT  09/03/2025    CBC  09/03/2025    MEDICARE ANNUAL WELLNESS VISIT  03/18/2026    ANNUAL REVIEW OF HM ORDERS  03/18/2026    FALL RISK ASSESSMENT  03/18/2026    MAMMO SCREENING  03/18/2027    GLUCOSE  09/03/2027    DTAP/TDAP/TD IMMUNIZATION (3 - Td or Tdap) 07/10/2029    COLORECTAL CANCER SCREENING  10/18/2029    ADVANCE CARE PLANNING  03/18/2030    DEXA  02/23/2036    TSH W/FREE T4 REFLEX  Completed    HEPATITIS C SCREENING  Completed    PHQ-2 (once per calendar year)  Completed    PAP FOLLOW-UP  Completed    HPV FOLLOW-UP  Completed    INFLUENZA VACCINE  Completed    Pneumococcal Vaccine: 50+ Years  Completed    ZOSTER IMMUNIZATION  Completed    RSV VACCINE  Completed    HPV IMMUNIZATION  Aged Out    MENINGITIS IMMUNIZATION  Aged Out    PAP  Discontinued            Objective    Exam  /70 (BP Location: Right arm, Patient Position: Sitting, Cuff Size: Adult Regular)   Pulse 86   Temp 98.3  F (36.8  C) (Temporal)   Resp 14   Ht 1.613 m (5' 3.5\")   Wt 71.2 kg (157 lb)   LMP  (LMP Unknown)   SpO2 99%   BMI 27.38 kg/m     Estimated body mass index is 27.38 kg/m  as calculated from the following:    Height as of this encounter: 1.613 m (5' 3.5\").    Weight as of this encounter: 71.2 kg (157 lb).    Physical Exam  GENERAL: alert and no distress  EYES: Eyes grossly normal to inspection, PERRL and conjunctivae and sclerae normal  HENT: ear canals and TM's normal, nose and mouth without ulcers or lesions  NECK: no adenopathy, no asymmetry, masses, or scars  RESP: lungs clear to auscultation - no rales, rhonchi or " wheezes  CV: regular rate and rhythm, normal S1 S2, no S3 or S4, no murmur, click or rub, no peripheral edema  ABDOMEN: soft, nontender, no hepatosplenomegaly, no masses and bowel sounds normal  MS: no gross musculoskeletal defects noted, no edema  SKIN: no suspicious lesions or rashes. Vertical ridges to all nails. Vertical darkened ridges to bilateral distal thumbnails  NEURO: Normal strength and tone, mentation intact and speech normal  PSYCH: mentation appears normal, affect normal/bright         3/18/2025   Mini Cog   Clock Draw Score 2 Normal   3 Item Recall 3 objects recalled   Mini Cog Total Score 5              Signed Electronically by: Shahana Parker NP

## 2025-04-16 ENCOUNTER — ANCILLARY PROCEDURE (OUTPATIENT)
Dept: BONE DENSITY | Facility: CLINIC | Age: 70
End: 2025-04-16
Attending: NURSE PRACTITIONER
Payer: COMMERCIAL

## 2025-04-16 DIAGNOSIS — Z78.0 POSTMENOPAUSAL STATUS: ICD-10-CM

## 2025-04-16 PROCEDURE — 77091 TBS TECHL CALCULATION ONLY: CPT | Performed by: PHYSICIAN ASSISTANT

## 2025-04-16 PROCEDURE — 77080 DXA BONE DENSITY AXIAL: CPT | Mod: TC | Performed by: PHYSICIAN ASSISTANT

## 2025-04-28 ENCOUNTER — OFFICE VISIT (OUTPATIENT)
Dept: CARDIOLOGY | Facility: CLINIC | Age: 70
End: 2025-04-28
Attending: INTERNAL MEDICINE
Payer: COMMERCIAL

## 2025-04-28 VITALS
RESPIRATION RATE: 17 BRPM | HEART RATE: 71 BPM | SYSTOLIC BLOOD PRESSURE: 115 MMHG | BODY MASS INDEX: 26.98 KG/M2 | WEIGHT: 158 LBS | DIASTOLIC BLOOD PRESSURE: 76 MMHG | HEIGHT: 64 IN

## 2025-04-28 DIAGNOSIS — E78.00 PURE HYPERCHOLESTEROLEMIA: ICD-10-CM

## 2025-04-28 DIAGNOSIS — I71.21 ANEURYSM OF ASCENDING AORTA WITHOUT RUPTURE: ICD-10-CM

## 2025-04-28 DIAGNOSIS — I25.10 CORONARY ARTERY DISEASE DUE TO CALCIFIED CORONARY LESION: ICD-10-CM

## 2025-04-28 DIAGNOSIS — Z95.2 S/P TAVR (TRANSCATHETER AORTIC VALVE REPLACEMENT): Primary | ICD-10-CM

## 2025-04-28 DIAGNOSIS — I25.84 CORONARY ARTERY DISEASE DUE TO CALCIFIED CORONARY LESION: ICD-10-CM

## 2025-04-28 PROCEDURE — 3078F DIAST BP <80 MM HG: CPT | Performed by: INTERNAL MEDICINE

## 2025-04-28 PROCEDURE — 3074F SYST BP LT 130 MM HG: CPT | Performed by: INTERNAL MEDICINE

## 2025-04-28 PROCEDURE — 99214 OFFICE O/P EST MOD 30 MIN: CPT | Performed by: INTERNAL MEDICINE

## 2025-04-28 RX ORDER — AMOXICILLIN 500 MG/1
2000 CAPSULE ORAL
COMMUNITY
Start: 2025-03-18

## 2025-04-28 NOTE — PROGRESS NOTES
Thank you, Shahana Parker NP, for asking the [facility] team to see Ms. Wendy Handy to follow-up on TAVR, mild CAD, hypercholesterolemia.    Assessment/Recommendations   Assessment:    1.  Aortic valve stenosis, status post TAVR August 2022.  She continues to do well with no complaints of exertional chest discomfort, shortness of breath, orthopnea, PND or edema.  Her echocardiogram last July demonstrated normal prosthetic valve function.  Did recommend a repeat echocardiogram this summer.    2. Coronary artery disease, mild by pre-TAVR angiography.  She reports some intermittent mid scapular pain when she is walking uphill.  Does not notice it with other activity.  At this point suggested we continue to monitor.  If she has any progression, would favor stress testing.    3. Hypercholesterolemia, well-controlled.  4.  Mild ascending aortic dilatation, stable by previous echo and chest CTA    Plan:  1.  Continue current medications  2.  Schedule echocardiogram in July to follow-up on TAVR  3.  Patient to notify me if increasing symptoms of intrascapular pain with exercise  4.  Follow-up with me in a year       History of Present Illness    Ms. Wendy Handy is a 70 year old female with history of aortic valve stenosis, status post TAVR August 2022 with evidence of mild coronary artery disease by pre-TAVR angiography, hypercholesterolemia and mildly dilated ascending aortic aneurysm who presents today for routine follow-up visit.  Has done well over the course of the last year with no decline in exercise capacity.  Denies any exertional chest discomfort or dyspnea.  Occasionally gets some pain between her shoulder blades although this tends to occur more prominently when she is walking uphill.  Does not notice it at other times.  No similar discomfort at rest.  Denies orthopnea, PND or lower extremity edema.  Has been getting some increased indigestion symptoms when she lays down in bed.   "Suggested a trial of Prilosec OTC.  Most recent lipid profile looked excellent with an LDL of 49 on Zetia.    ECG (personally reviewed): No ECG today    Cardiac Imaging Studies (personally reviewed): No new imaging     Physical Examination Review of Systems   /76 (BP Location: Left arm, Patient Position: Sitting, Cuff Size: Adult Regular)   Pulse 71   Resp 17   Ht 1.613 m (5' 3.5\")   Wt 71.7 kg (158 lb)   LMP  (LMP Unknown)   BMI 27.55 kg/m    Body mass index is 27.55 kg/m .  Wt Readings from Last 3 Encounters:   04/28/25 71.7 kg (158 lb)   03/18/25 71.2 kg (157 lb)   03/13/25 70.3 kg (155 lb)     General Appearance:   Awake, Alert, No acute distress.   HEENT:  No scleral icterus; the mucous membranes were pink and moist.   Neck: No cervical bruits or jugular venous distention    Chest: The spine was straight. The chest was symmetric.   Lungs:   Respirations unlabored; the lungs are clear to auscultation. No wheezing   Cardiovascular:   Regular rate and rhythm.  S1, S2 normal.  No murmur or gallop   Abdomen:  No organomegaly, masses, bruits, or tenderness. Bowels sounds are present   Extremities: No peripheral edema bilateral   Skin: No xanthelasma. Warm, Dry.   Musculoskeletal: No tenderness.   Neurologic: Mood and affect are appropriate.    Encounter Vitals  BP: 115/76  Pulse: 71  Resp: 17  Weight: 71.7 kg (158 lb) (With shoes.)  Height: 161.3 cm (5' 3.5\")                                         Medical History  Surgical History Family History Social History   Past Medical History:   Diagnosis Date    Aortic valve disorder     echo 1/09  4.2 cm TAA-MRA 4/2016      Bicuspid aortic valve     Coronary artery disease due to calcified coronary lesion 5/5/2016    Echo 6/2016   Good function  CT Ca++ 172 LAD  Cardiology consult 4/016-nuclear stress    Family history of psychiatric condition     Family history of tremor     Family history of vascular disease     H/O LEEP 1/1/2007 2002-2006 LSIL paps with " colposcopy, had LEEP in 2007. No records 6/2/10 ASCUS, +HR HPV 53 6/20/12 ASCUS, +HR HPV 53. Colposcopy outside / system? 6/23/14 NIL 6/24/15 NIL pap, neg HPV 6/27/16 ASCUS, neg HPV 6/29/17 NIL pap, neg HPV 7/9/18 ASCUS, +HR HPV 16. Unclear if colposcopy was completed 7/10/19 NIL pap, neg HPV 7/13/20 NIL pap, neg HPV 2/1/21 NIL pap, neg HPV. Plan: await provider    History of aortic stenosis     History of vitamin D deficiency     Hypercholesteremia     Hyperlipidemia     Laboratory test 8/18/2016    Reading Physician Reading Date Result Priority    Patricia Orta MD 8/16/2016       Narrative       Examination: Nuclear medicine DATscan for Dopamine Receptor Localization.    Examination: NM BRAIN IMAGING TOMOGRAPHIC (SPECT) DATSCAN  Date: 8/16/2016 3:38 PM  Indication: Right sided postural tremor.   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      Osteopenia 2/15/2015    Other and unspecified hyperlipidemia     Created by Conversion     Papanicolaou smear of cervix with low grade squamous intraepithelial lesion (LGSIL)      LGSIL 2002  colpoLGSIL 2003  colpoLGSIL 4/2006 colpoLGSIL 11/06 colpoAbnormal 5/07 colpo  CHCWLEEPendometrial biopsy 5/07     Plantar fasciitis of right foot     Primary insomnia     Snores     Tremor     Vertigo     Vitamin D deficiency     Wears glasses     Past Surgical History:   Procedure Laterality Date    BLEPHAROPLASTY      CARDIAC CATHETERIZATION  05/30/2017    No intervention    CARDIAC CATHETERIZATION N/A 5/30/2017    Procedure: Coronary Angiogram;  Surgeon: Torito Metzger MD;  Location: [facility];  Service:     CHOLECYSTECTOMY      COLONOSCOPY N/A 10/4/2023    Procedure: COLONOSCOPY, WITH POLYPECTOMY;   Surgeon: Leventhal, Thomas Michael, MD;  Location: [facility] OR    COLONOSCOPY N/A 10/18/2024    Procedure: Colonoscopy with biopsies and polypectomy using regular forceps and exacto snare;  Surgeon: Renato Gutierrez MD;  Location:  GI    COLONOSCOPY N/A 10/18/2024    Procedure: COLONOSCOPY, FLEXIBLE, WITH LESION REMOVAL USING SNARE;  Surgeon: Renaot Gutierrez MD;  Location:  GI    CONIZATION CERVIX,LOOP ELECTRD      Description: Cervical Conization Loop Electrode Excision;  Recorded: 05/29/2008;    CV CORONARY ANGIOGRAM N/A 6/9/2022    Procedure: Coronary Angiogram;  Surgeon: Anabela Hernandez MD;  Location: [facility]    CV CORONARY ANGIOGRAM  6/9/2022    Procedure: ;  Surgeon: Anabela Hernandez MD;  Location: [facility] TRANSCATHETER AORTIC VALVE REPLACEMENT-FEMORAL APPROACH N/A 8/23/2022    Procedure: Transcatheter Aortic Valve Replacement-Femoral Approach;  Surgeon: Anabela Hernandez MD;  Location: [facility]    FACIAL RECONSTRUCTION SURGERY      forehead as well    HC CLOSED TX MANDIBLE FX W/O MANIP      Description: Closed Treatment Of Mandibular Fracture;  Recorded: 05/29/2008;    HC DILATION/CURETTAGE DIAG/THER NON OB      Description: Dilation And Curettage;  Recorded: 05/29/2008;  Comments: X 2  metrorhhagia    LAPAROSCOPIC CHOLECYSTECTOMY N/A 2/23/2015    Procedure: CHOLECYSTECTOMY LAPAROSCOPIC;  Surgeon: Doug Webber MD;  Location: [facility];  Service:     OR TRANSCATHETER AORTIC VALVE REPLACEMENT, FEMORAL PERCUTANEOUS APPROACH (STANDBY) N/A 8/23/2022    Procedure: OR TRANSCATHETER AORTIC VALVE REPLACEMENT, FEMORAL PERCUTANEOUS APPROACH (STANDBY);  Surgeon: Katy Rojas MD;  Location: [facility]    ZZC ORAL SURGERY PROCEDURE      jaw fracture    Family History   Problem Relation Age of Onset    Heart Disease Father     Heart Disease Sister     Heart Disease Sister     Mental Illness Mother 30.00        schizophrenia, bipolar    Mental  Illness Sister         bipolar    Colon Cancer Father     Hyperlipidemia Brother     Hyperlipidemia Brother     Hyperlipidemia Sister     Cervical Cancer Daughter     Diabetes Type 2  Brother     Diabetes Type 2  Brother     Tremor Father     Tremor Paternal Aunt     Tremor Paternal Grandfather     Tremor Son     Tremor Cousin         Paternal 1st cousin    Attention Deficit Disorder Son     Neurologic Disorder Son         Tourette Syndrome     Schizophrenia Mother     Schizophrenia Sister     Mental Illness Sister         depression    Diabetes Brother         type 2    Mental Illness Brother         depression    Social History     Socioeconomic History    Marital status:      Spouse name: Not on file    Number of children: Not on file    Years of education: Not on file    Highest education level: Not on file   Occupational History    Not on file   Tobacco Use    Smoking status: Never    Smokeless tobacco: Never   Vaping Use    Vaping status: Never Used   Substance and Sexual Activity    Alcohol use: Yes     Alcohol/week: 2.0 standard drinks of alcohol     Comment: occasion    Drug use: No    Sexual activity: Not Currently     Partners: Male     Birth control/protection: Abstinence   Other Topics Concern    Not on file   Social History Narrative    Living in [place] with her  and has been  for 11 yrs     This is her 3rd marriage    1st marriage had 2 kids: son and daughter - 33 y r old son and 31 yr old daughter    2nd marriage no kids        Not working presently. Retired    Had been  in the past.      Social Drivers of Health     Financial Resource Strain: Low Risk  (3/16/2025)    Financial Resource Strain     Within the past 12 months, have you or your family members you live with been unable to get utilities (heat, electricity) when it was really needed?: No   Food Insecurity: Low Risk  (3/16/2025)    Food Insecurity     Within the past 12 months, did you worry that your food  would run out before you got money to buy more?: No     Within the past 12 months, did the food you bought just not last and you didn t have money to get more?: No   Transportation Needs: Low Risk  (3/16/2025)    Transportation Needs     Within the past 12 months, has lack of transportation kept you from medical appointments, getting your medicines, non-medical meetings or appointments, work, or from getting things that you need?: No   Physical Activity: Sufficiently Active (3/16/2025)    Exercise Vital Sign     Days of Exercise per Week: 5 days     Minutes of Exercise per Session: 40 min   Stress: No Stress Concern Present (3/16/2025)    [employer] [place] [employer] - Occupational Stress Questionnaire     Feeling of Stress : Not at all   Social Connections: Unknown (3/16/2025)    Social Connection and Isolation Panel [NHANES]     Frequency of Communication with Friends and Family: Not on file     Frequency of Social Gatherings with Friends and Family: Once a week     Attends Sabianism Services: Not on file     Active Member of Clubs or Organizations: Not on file     Attends Club or Organization Meetings: Not on file     Marital Status: Not on file   Interpersonal Safety: Low Risk  (3/18/2025)    Interpersonal Safety     Do you feel physically and emotionally safe where you currently live?: Yes     Within the past 12 months, have you been hit, slapped, kicked or otherwise physically hurt by someone?: No     Within the past 12 months, have you been humiliated or emotionally abused in other ways by your partner or ex-partner?: No   Housing Stability: Low Risk  (3/16/2025)    Housing Stability     Do you have housing? : Yes     Are you worried about losing your housing?: No          Medications  Allergies   Current Outpatient Medications   Medication Sig Dispense Refill    amitriptyline (ELAVIL) 10 MG tablet       amoxicillin (AMOXIL) 500 MG capsule Take 2,000 mg by mouth once as needed (TAKE 4 CAPSULES  (2,000 MG) 30-60 MINUTES BEFORE DENTAL APPOINTMENTS.).      aspirin 81 MG EC tablet Take 81 mg by mouth daily      atorvastatin (LIPITOR) 80 MG tablet Take 1 tablet (80 mg) by mouth at bedtime. 100 tablet 3    calcium carbonate-vitamin D (CALTRATE) 600-10 MG-MCG per tablet Take 1 tablet by mouth daily      cyanocobalamin (VITAMIN B-12) 1000 MCG tablet Take 1,000 mcg by mouth daily       ezetimibe (ZETIA) 10 MG tablet Take 1 tablet (10 mg) by mouth daily. 100 tablet 3    ibuprofen (ADVIL/MOTRIN) 800 MG tablet TAKE 1 TABLET BY MOUTH 3  TIMES DAILY AS NEEDED TAKE  WITH FOOD 60 tablet 1    Magnesium Oxide -Mg Supplement 500 MG TABS       Melatonin 10 MG TABS tablet       Omega-3 Fatty Acids (OMEGA-3 FISH OIL) 1200 MG CAPS Take 1 capsule by mouth daily       polyethylene glycol-propylene glycol (SYSTANE) 0.4-0.3 % SOLN ophthalmic solution Place 1 drop into both eyes as needed for dry eyes      vitamin C (ASCORBIC ACID) 1000 MG TABS Take 1,000 mg by mouth daily       VITAMIN D3 50 MCG (2000 UT) tablet TAKE 1 TABLET BY MOUTH EVERY DAY 90 tablet 3    vitamin E 400 UNIT capsule Take 400 Units by mouth daily       zinc gluconate 50 MG tablet Take 50 mg by mouth daily         No Known Allergies      Lab Results    Chemistry/lipid CBC Cardiac Enzymes/BNP/TSH/INR   Recent Labs   Lab Test 03/18/25  0859   TRIG 92   LDL 49   BUN 13.0      CO2 25    Recent Labs   Lab Test 09/03/24  1031   WBC 8.8   HGB 13.6   HCT 40.6   MCV 93       Recent Labs   Lab Test 09/03/24  1031 08/19/22  0828   BNP  --  56   TSH 2.36  --    INR  --  1.07

## 2025-04-28 NOTE — PATIENT INSTRUCTIONS
Continue current medications  Set up echocardiogram in July. Let me know if back pain keeps persisting.  Follow up in 1 year.

## 2025-04-28 NOTE — LETTER
4/28/2025    Shahana Parker NP  [address] [place] [address]    RE: Wendy Handy       Dear Colleague,     I had the pleasure of seeing Wendy Handy in the [facility].      Thank you, Shahana Parker NP, for asking the [facility] team to see Ms. Wendy Handy to follow-up on TAVR, mild CAD, hypercholesterolemia.    Assessment/Recommendations   Assessment:    1.  Aortic valve stenosis, status post TAVR August 2022.  She continues to do well with no complaints of exertional chest discomfort, shortness of breath, orthopnea, PND or edema.  Her echocardiogram last July demonstrated normal prosthetic valve function.  Did recommend a repeat echocardiogram this summer.    2. Coronary artery disease, mild by pre-TAVR angiography.  She reports some intermittent mid scapular pain when she is walking uphill.  Does not notice it with other activity.  At this point suggested we continue to monitor.  If she has any progression, would favor stress testing.    3. Hypercholesterolemia, well-controlled.  4.  Mild ascending aortic dilatation, stable by previous echo and chest CTA    Plan:  1.  Continue current medications  2.  Schedule echocardiogram in July to follow-up on TAVR  3.  Patient to notify me if increasing symptoms of intrascapular pain with exercise  4.  Follow-up with me in a year       History of Present Illness    Ms. Wendy Handy is a 70 year old female with history of aortic valve stenosis, status post TAVR August 2022 with evidence of mild coronary artery disease by pre-TAVR angiography, hypercholesterolemia and mildly dilated ascending aortic aneurysm who presents today for routine follow-up visit.  Has done well over the course of the last year with no decline in exercise capacity.  Denies any exertional chest discomfort or dyspnea.  Occasionally gets some pain between her shoulder blades although this tends to occur more prominently when she is walking uphill.  Does  "not notice it at other times.  No similar discomfort at rest.  Denies orthopnea, PND or lower extremity edema.  Has been getting some increased indigestion symptoms when she lays down in bed.  Suggested a trial of Prilosec OTC.  Most recent lipid profile looked excellent with an LDL of 49 on Zetia.    ECG (personally reviewed): No ECG today    Cardiac Imaging Studies (personally reviewed): No new imaging     Physical Examination Review of Systems   /76 (BP Location: Left arm, Patient Position: Sitting, Cuff Size: Adult Regular)   Pulse 71   Resp 17   Ht 1.613 m (5' 3.5\")   Wt 71.7 kg (158 lb)   LMP  (LMP Unknown)   BMI 27.55 kg/m    Body mass index is 27.55 kg/m .  Wt Readings from Last 3 Encounters:   04/28/25 71.7 kg (158 lb)   03/18/25 71.2 kg (157 lb)   03/13/25 70.3 kg (155 lb)     General Appearance:   Awake, Alert, No acute distress.   HEENT:  No scleral icterus; the mucous membranes were pink and moist.   Neck: No cervical bruits or jugular venous distention    Chest: The spine was straight. The chest was symmetric.   Lungs:   Respirations unlabored; the lungs are clear to auscultation. No wheezing   Cardiovascular:   Regular rate and rhythm.  S1, S2 normal.  No murmur or gallop   Abdomen:  No organomegaly, masses, bruits, or tenderness. Bowels sounds are present   Extremities: No peripheral edema bilateral   Skin: No xanthelasma. Warm, Dry.   Musculoskeletal: No tenderness.   Neurologic: Mood and affect are appropriate.    Encounter Vitals  BP: 115/76  Pulse: 71  Resp: 17  Weight: 71.7 kg (158 lb) (With shoes.)  Height: 161.3 cm (5' 3.5\")                                         Medical History  Surgical History Family History Social History   Past Medical History:   Diagnosis Date     Aortic valve disorder     echo 1/09  4.2 cm TAA-MRA 4/2016       Bicuspid aortic valve      Coronary artery disease due to calcified coronary lesion 5/5/2016    Echo 6/2016   Good function  CT Ca++ 172 LAD  " Cardiology consult 4/016-nuclear stress     Family history of psychiatric condition      Family history of tremor      Family history of vascular disease      H/O LEEP 1/1/2007 2002-2006 LSIL paps with colposcopy, had LEEP in 2007. No records 6/2/10 ASCUS, +HR HPV 53 6/20/12 ASCUS, +HR HPV 53. Colposcopy outside HE/ system? 6/23/14 NIL 6/24/15 NIL pap, neg HPV 6/27/16 ASCUS, neg HPV 6/29/17 NIL pap, neg HPV 7/9/18 ASCUS, +HR HPV 16. Unclear if colposcopy was completed 7/10/19 NIL pap, neg HPV 7/13/20 NIL pap, neg HPV 2/1/21 NIL pap, neg HPV. Plan: await provider     History of aortic stenosis      History of vitamin D deficiency      Hypercholesteremia      Hyperlipidemia      Laboratory test 8/18/2016    Reading Physician Reading Date Result Priority    Patricia Orta MD 8/16/2016       Narrative       Examination: Nuclear medicine DATscan for Dopamine Receptor Localization.    Examination: NM BRAIN IMAGING TOMOGRAPHIC (SPECT) DATSCAN  Date: 8/16/2016 3:38 PM  Indication: Right sided postural tremor.   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       Osteopenia 2/15/2015     Other and unspecified hyperlipidemia     Created by Conversion      Papanicolaou smear of cervix with low grade squamous intraepithelial lesion (LGSIL)      LGSIL 2002  colpoLGSIL 2003  colpoLGSIL 4/2006 colpoLGSIL 11/06 colpoAbnormal 5/07 colpo  CHCWLEEPendometrial biopsy 5/07      Plantar fasciitis of right foot      Primary insomnia      Snores      Tremor      Vertigo      Vitamin D deficiency      Wears glasses     Past Surgical History:   Procedure Laterality Date     BLEPHAROPLASTY       CARDIAC CATHETERIZATION  05/30/2017    No intervention     CARDIAC  CATHETERIZATION N/A 5/30/2017    Procedure: Coronary Angiogram;  Surgeon: Torito Metzger MD;  Location: [facility];  Service:      CHOLECYSTECTOMY       COLONOSCOPY N/A 10/4/2023    Procedure: COLONOSCOPY, WITH POLYPECTOMY;  Surgeon: Leventhal, Thomas Michael, MD;  Location: [facility] OR     COLONOSCOPY N/A 10/18/2024    Procedure: Colonoscopy with biopsies and polypectomy using regular forceps and exacto snare;  Surgeon: Renato Gutierrez MD;  Location: [facility] GI     COLONOSCOPY N/A 10/18/2024    Procedure: COLONOSCOPY, FLEXIBLE, WITH LESION REMOVAL USING SNARE;  Surgeon: Renato Gutierrez MD;  Location:  GI     CONIZATION CERVIX,LOOP ELECTRD      Description: Cervical Conization Loop Electrode Excision;  Recorded: 05/29/2008;     CV CORONARY ANGIOGRAM N/A 6/9/2022    Procedure: Coronary Angiogram;  Surgeon: Anabela Hernandez MD;  Location: [facility]     CV CORONARY ANGIOGRAM  6/9/2022    Procedure: ;  Surgeon: Anabela Hernandez MD;  Location: [facility]     CV TRANSCATHETER AORTIC VALVE REPLACEMENT-FEMORAL APPROACH N/A 8/23/2022    Procedure: Transcatheter Aortic Valve Replacement-Femoral Approach;  Surgeon: Anabela Hernandez MD;  Location: [facility]     FACIAL RECONSTRUCTION SURGERY      forehead as well     HC CLOSED TX MANDIBLE FX W/O MANIP      Description: Closed Treatment Of Mandibular Fracture;  Recorded: 05/29/2008;     HC DILATION/CURETTAGE DIAG/THER NON OB      Description: Dilation And Curettage;  Recorded: 05/29/2008;  Comments: X 2  metrorhhagia     LAPAROSCOPIC CHOLECYSTECTOMY N/A 2/23/2015    Procedure: CHOLECYSTECTOMY LAPAROSCOPIC;  Surgeon: Doug Webber MD;  Location: [facility];  Service:      OR TRANSCATHETER AORTIC VALVE REPLACEMENT, FEMORAL PERCUTANEOUS APPROACH (STANDBY) N/A 8/23/2022    Procedure: OR TRANSCATHETER AORTIC VALVE REPLACEMENT, FEMORAL PERCUTANEOUS APPROACH (STANDBY);  Surgeon: Katy Rojas MD;  Location: [facility]      ZZC ORAL SURGERY PROCEDURE      jaw fracture    Family History   Problem Relation Age of Onset     Heart Disease Father      Heart Disease Sister      Heart Disease Sister      Mental Illness Mother 30.00        schizophrenia, bipolar     Mental Illness Sister         bipolar     Colon Cancer Father      Hyperlipidemia Brother      Hyperlipidemia Brother      Hyperlipidemia Sister      Cervical Cancer Daughter      Diabetes Type 2  Brother      Diabetes Type 2  Brother      Tremor Father      Tremor Paternal Aunt      Tremor Paternal Grandfather      Tremor Son      Tremor Cousin         Paternal 1st cousin     Attention Deficit Disorder Son      Neurologic Disorder Son         Tourette Syndrome      Schizophrenia Mother      Schizophrenia Sister      Mental Illness Sister         depression     Diabetes Brother         type 2     Mental Illness Brother         depression    Social History     Socioeconomic History     Marital status:      Spouse name: Not on file     Number of children: Not on file     Years of education: Not on file     Highest education level: Not on file   Occupational History     Not on file   Tobacco Use     Smoking status: Never     Smokeless tobacco: Never   Vaping Use     Vaping status: Never Used   Substance and Sexual Activity     Alcohol use: Yes     Alcohol/week: 2.0 standard drinks of alcohol     Comment: occasion     Drug use: No     Sexual activity: Not Currently     Partners: Male     Birth control/protection: Abstinence   Other Topics Concern     Not on file   Social History Narrative    Living in [place] with her  and has been  for 11 yrs     This is her 3rd marriage    1st marriage had 2 kids: son and daughter - 33 y r old son and 31 yr old daughter    2nd marriage no kids        Not working presently. Retired    Had been  in the past.      Social Drivers of Health     Financial Resource Strain: Low Risk  (3/16/2025)    Financial Resource  Strain      Within the past 12 months, have you or your family members you live with been unable to get utilities (heat, electricity) when it was really needed?: No   Food Insecurity: Low Risk  (3/16/2025)    Food Insecurity      Within the past 12 months, did you worry that your food would run out before you got money to buy more?: No      Within the past 12 months, did the food you bought just not last and you didn t have money to get more?: No   Transportation Needs: Low Risk  (3/16/2025)    Transportation Needs      Within the past 12 months, has lack of transportation kept you from medical appointments, getting your medicines, non-medical meetings or appointments, work, or from getting things that you need?: No   Physical Activity: Sufficiently Active (3/16/2025)    Exercise Vital Sign      Days of Exercise per Week: 5 days      Minutes of Exercise per Session: 40 min   Stress: No Stress Concern Present (3/16/2025)    [place] [employer] - Occupational Stress Questionnaire      Feeling of Stress : Not at all   Social Connections: Unknown (3/16/2025)    Social Connection and Isolation Panel [NHANES]      Frequency of Communication with Friends and Family: Not on file      Frequency of Social Gatherings with Friends and Family: Once a week      Attends Sikhism Services: Not on file      Active Member of Clubs or Organizations: Not on file      Attends Club or Organization Meetings: Not on file      Marital Status: Not on file   Interpersonal Safety: Low Risk  (3/18/2025)    Interpersonal Safety      Do you feel physically and emotionally safe where you currently live?: Yes      Within the past 12 months, have you been hit, slapped, kicked or otherwise physically hurt by someone?: No      Within the past 12 months, have you been humiliated or emotionally abused in other ways by your partner or ex-partner?: No   Housing Stability: Low Risk  (3/16/2025)    Housing Stability      Do you have  housing? : Yes      Are you worried about losing your housing?: No          Medications  Allergies   Current Outpatient Medications   Medication Sig Dispense Refill     amitriptyline (ELAVIL) 10 MG tablet        amoxicillin (AMOXIL) 500 MG capsule Take 2,000 mg by mouth once as needed (TAKE 4 CAPSULES (2,000 MG) 30-60 MINUTES BEFORE DENTAL APPOINTMENTS.).       aspirin 81 MG EC tablet Take 81 mg by mouth daily       atorvastatin (LIPITOR) 80 MG tablet Take 1 tablet (80 mg) by mouth at bedtime. 100 tablet 3     calcium carbonate-vitamin D (CALTRATE) 600-10 MG-MCG per tablet Take 1 tablet by mouth daily       cyanocobalamin (VITAMIN B-12) 1000 MCG tablet Take 1,000 mcg by mouth daily        ezetimibe (ZETIA) 10 MG tablet Take 1 tablet (10 mg) by mouth daily. 100 tablet 3     ibuprofen (ADVIL/MOTRIN) 800 MG tablet TAKE 1 TABLET BY MOUTH 3  TIMES DAILY AS NEEDED TAKE  WITH FOOD 60 tablet 1     Magnesium Oxide -Mg Supplement 500 MG TABS        Melatonin 10 MG TABS tablet        Omega-3 Fatty Acids (OMEGA-3 FISH OIL) 1200 MG CAPS Take 1 capsule by mouth daily        polyethylene glycol-propylene glycol (SYSTANE) 0.4-0.3 % SOLN ophthalmic solution Place 1 drop into both eyes as needed for dry eyes       vitamin C (ASCORBIC ACID) 1000 MG TABS Take 1,000 mg by mouth daily        VITAMIN D3 50 MCG (2000 UT) tablet TAKE 1 TABLET BY MOUTH EVERY DAY 90 tablet 3     vitamin E 400 UNIT capsule Take 400 Units by mouth daily        zinc gluconate 50 MG tablet Take 50 mg by mouth daily         No Known Allergies      Lab Results    Chemistry/lipid CBC Cardiac Enzymes/BNP/TSH/INR   Recent Labs   Lab Test 03/18/25  0859   TRIG 92   LDL 49   BUN 13.0      CO2 25    Recent Labs   Lab Test 09/03/24  1031   WBC 8.8   HGB 13.6   HCT 40.6   MCV 93       Recent Labs   Lab Test 09/03/24  1031 08/19/22  0828   BNP  --  56   TSH 2.36  --    INR  --  1.07                         Thank you for allowing me to participate in the care of  your patient.      Sincerely,     Teodora Platt MD     [facility]  cc:   Teodora Platt MD  [address], [address]  [place] [address]

## 2025-07-28 ENCOUNTER — HOSPITAL ENCOUNTER (OUTPATIENT)
Dept: CARDIOLOGY | Facility: CLINIC | Age: 70
Discharge: HOME | End: 2025-07-28
Attending: INTERNAL MEDICINE | Admitting: INTERNAL MEDICINE
Payer: COMMERCIAL

## 2025-07-28 DIAGNOSIS — I25.10 CORONARY ARTERY DISEASE DUE TO CALCIFIED CORONARY LESION: ICD-10-CM

## 2025-07-28 DIAGNOSIS — E78.00 PURE HYPERCHOLESTEROLEMIA: ICD-10-CM

## 2025-07-28 DIAGNOSIS — I25.84 CORONARY ARTERY DISEASE DUE TO CALCIFIED CORONARY LESION: ICD-10-CM

## 2025-07-28 DIAGNOSIS — Z95.2 S/P TAVR (TRANSCATHETER AORTIC VALVE REPLACEMENT): ICD-10-CM

## 2025-07-28 LAB — LVEF ECHO: NORMAL

## 2025-07-28 PROCEDURE — 93306 TTE W/DOPPLER COMPLETE: CPT | Mod: 26 | Performed by: INTERNAL MEDICINE

## 2025-07-28 PROCEDURE — 93306 TTE W/DOPPLER COMPLETE: CPT

## 2025-08-12 ENCOUNTER — TRANSFERRED RECORDS (OUTPATIENT)
Dept: HEALTH INFORMATION MANAGEMENT | Facility: CLINIC | Age: 70
End: 2025-08-12
Payer: COMMERCIAL

## (undated) DEVICE — SPECIMEN CONTAINER 3OZ W/FORMALIN 59901

## (undated) DEVICE — INTRO TERUMO 6FRX10CM PINNACLE W/MARKER RSB602

## (undated) DEVICE — CATH DIAG SOFT-VU BRAIDED RIM 5FRX65CM H787107333015

## (undated) DEVICE — SUCTION MANIFOLD NEPTUNE 2 SYS 1 PORT 702-025-000

## (undated) DEVICE — Device

## (undated) DEVICE — SNARE CAPIVATOR ROUND COLD SNR BX10 M00561101

## (undated) DEVICE — CATH GUIDING 5FR X 100CM LA5PAPA

## (undated) DEVICE — GOWN IMPERVIOUS 2XL BLUE

## (undated) DEVICE — SHEATH 4FR ULTIMUM 407840

## (undated) DEVICE — INTRO SHEATH 8FRX10CM PINNACLE RSS802

## (undated) DEVICE — SHEATH GLIDE RADIAL 5FR 10CM .021

## (undated) DEVICE — DRAPE CV INCISE CVARTS 89466

## (undated) DEVICE — ELECTRODE DEFIB CADENCE 22550R

## (undated) DEVICE — CATH ANGIO 110CM 5FR INFNT STRG PGTL CRV VNTRC VESTAN SS LG

## (undated) DEVICE — SYSTEM DELIVERY MECHANISM COMMANDER 23MM

## (undated) DEVICE — GLOVE GAMMEX NEOPRENE ULTRA SZ 7 LF 8514

## (undated) DEVICE — TUBING SUCTION MEDI-VAC 1/4"X20' N620A

## (undated) DEVICE — CATH ANGIO SUPERTORQUE AL1 6FRX100CM 532-645

## (undated) DEVICE — KIT ENDO FIRST STEP DISINFECTANT 200ML W/POUCH EP-4

## (undated) DEVICE — PLATE GROUNDING ADULT W/CORD 9165L

## (undated) DEVICE — CATH ANGIO INFINITI JR4 4FRX100CM 538421

## (undated) DEVICE — KIT HAND CONTROL ACIST 016795

## (undated) DEVICE — ELECTRODE ADULT PACING MULTI P-211-M1

## (undated) DEVICE — CUSTOM PACK CORONARY SAN5BCRHEA

## (undated) DEVICE — CATH ANGIO INFINITI JL4 4FRX100CM 538420

## (undated) DEVICE — WIRE GUIDE 0.035"X260CM AMPTLAZ XSTIFF CVD THSCF-35-260-3-A

## (undated) DEVICE — GUIDEWIRE VASC SAFARI2 0.035X275CM H74939406XS1

## (undated) DEVICE — INTRO MICRO MINI STICK 4FR STD NITINOL

## (undated) DEVICE — EXCHANGE WIRE .035 260 STAR/JFC/035/260/ M001491681

## (undated) DEVICE — MANIFOLD KIT ANGIO AUTOMATED 014613

## (undated) DEVICE — KIT HAND CONTROL ACIST 014644 AR-P54

## (undated) DEVICE — INFLATION DEVICE ATRION QL2530

## (undated) DEVICE — ENDO FORCEP SPIKED SERRATED SHAFT JUMBO 239CM G56998

## (undated) DEVICE — GUIDEWIRE LUNDERQUIST 0.035X260MM CVD TSCMG-35-260-7-LES

## (undated) DEVICE — CUSTOM PACK PERIPH VASCULAR SCV5BPVHEA

## (undated) DEVICE — SOL WATER IRRIG 500ML BOTTLE 2F7113

## (undated) DEVICE — 14FR EDWARDS ESHEATH+ INTRODUCER SET

## (undated) DEVICE — CATH ANGIO DIAG 6FR FR4 IMPULSE

## (undated) DEVICE — PREP CHLORAPREP 26ML TINTED HI-LITE ORANGE 930815

## (undated) DEVICE — INTRO MICRO MINI STICK 4FR STIFF NITINOL 45-753

## (undated) DEVICE — GUIDEWIRE STRAIGHT .035 M001491601

## (undated) DEVICE — INTRO TERUMO 6FRX25CM W/MARKER RSB603

## (undated) DEVICE — GLOVE BIOGEL PI SZ 6.5 40865

## (undated) DEVICE — KIT ENDO TURNOVER/PROCEDURE CARRY-ON 101822

## (undated) DEVICE — SYR ANGIOGRAPHY MULTIUSE KIT ACIST 014612

## (undated) DEVICE — PITCHER STERILE 1000ML  SSK9004A

## (undated) DEVICE — SUCTION CANISTER MEDIVAC LINER 3000ML W/LID 65651-530

## (undated) DEVICE — CATH DIAG IMPULSE 6FR PIG 155 SINGLE

## (undated) RX ORDER — OXYCODONE HYDROCHLORIDE 5 MG/1
TABLET ORAL
Status: DISPENSED
Start: 2022-08-23

## (undated) RX ORDER — HEPARIN SODIUM 1000 [USP'U]/ML
INJECTION, SOLUTION INTRAVENOUS; SUBCUTANEOUS
Status: DISPENSED
Start: 2022-08-23

## (undated) RX ORDER — ASPIRIN 325 MG
TABLET ORAL
Status: DISPENSED
Start: 2022-08-23

## (undated) RX ORDER — FENTANYL CITRATE 50 UG/ML
INJECTION, SOLUTION INTRAMUSCULAR; INTRAVENOUS
Status: DISPENSED
Start: 2023-10-04

## (undated) RX ORDER — ACETAMINOPHEN 325 MG/1
TABLET ORAL
Status: DISPENSED
Start: 2022-08-23

## (undated) RX ORDER — FENTANYL CITRATE 50 UG/ML
INJECTION, SOLUTION INTRAMUSCULAR; INTRAVENOUS
Status: DISPENSED
Start: 2024-10-18

## (undated) RX ORDER — DIPHENHYDRAMINE HYDROCHLORIDE 50 MG/ML
INJECTION INTRAMUSCULAR; INTRAVENOUS
Status: DISPENSED
Start: 2023-10-04

## (undated) RX ORDER — CEFAZOLIN SODIUM/WATER 2 G/20 ML
SYRINGE (ML) INTRAVENOUS
Status: DISPENSED
Start: 2022-08-23

## (undated) RX ORDER — FENTANYL CITRATE 50 UG/ML
INJECTION, SOLUTION INTRAMUSCULAR; INTRAVENOUS
Status: DISPENSED
Start: 2022-06-09

## (undated) RX ORDER — DIAZEPAM 5 MG
TABLET ORAL
Status: DISPENSED
Start: 2022-06-09

## (undated) RX ORDER — SIMETHICONE 40MG/0.6ML
SUSPENSION, DROPS(FINAL DOSAGE FORM)(ML) ORAL
Status: DISPENSED
Start: 2024-10-18

## (undated) RX ORDER — ONDANSETRON 2 MG/ML
INJECTION INTRAMUSCULAR; INTRAVENOUS
Status: DISPENSED
Start: 2022-08-23

## (undated) RX ORDER — FENTANYL CITRATE 50 UG/ML
INJECTION, SOLUTION INTRAMUSCULAR; INTRAVENOUS
Status: DISPENSED
Start: 2022-08-23

## (undated) RX ORDER — ONDANSETRON 2 MG/ML
INJECTION INTRAMUSCULAR; INTRAVENOUS
Status: DISPENSED
Start: 2023-10-04